# Patient Record
Sex: MALE | Race: WHITE | NOT HISPANIC OR LATINO | Employment: OTHER | URBAN - METROPOLITAN AREA
[De-identification: names, ages, dates, MRNs, and addresses within clinical notes are randomized per-mention and may not be internally consistent; named-entity substitution may affect disease eponyms.]

---

## 2017-01-04 ENCOUNTER — TELEPHONE (OUTPATIENT)
Dept: NEUROSURGERY | Facility: CLINIC | Age: 82
End: 2017-01-04

## 2017-01-04 DIAGNOSIS — M54.2 CERVICAL PAIN (NECK): Primary | ICD-10-CM

## 2017-01-04 NOTE — TELEPHONE ENCOUNTER
Patient called complaining of slight neck pain. His PCP put him on Tramadol 50 mg and the patient states that it is not helping. He has had 1 epidural and he cancelled the 2nd one because he was feeling better. He wants to know what he can do for the pain, he does not really want to have another epidural. He would like to speak with you at your convenience.

## 2017-01-04 NOTE — TELEPHONE ENCOUNTER
Epidural or a referral to a pain management doctor. Please call him and tell him that. If he has further questions let me know.

## 2017-01-05 NOTE — TELEPHONE ENCOUNTER
I have spoken with the patient and he would like to go to pain management. The order has been placed in his chart.

## 2017-02-28 ENCOUNTER — TELEPHONE (OUTPATIENT)
Dept: INTERNAL MEDICINE | Facility: CLINIC | Age: 82
End: 2017-02-28

## 2017-02-28 ENCOUNTER — OFFICE VISIT (OUTPATIENT)
Dept: INTERNAL MEDICINE | Facility: CLINIC | Age: 82
End: 2017-02-28

## 2017-02-28 VITALS
RESPIRATION RATE: 16 BRPM | TEMPERATURE: 96.7 F | WEIGHT: 187 LBS | SYSTOLIC BLOOD PRESSURE: 140 MMHG | HEART RATE: 77 BPM | HEIGHT: 66 IN | OXYGEN SATURATION: 96 % | BODY MASS INDEX: 30.05 KG/M2 | DIASTOLIC BLOOD PRESSURE: 90 MMHG

## 2017-02-28 DIAGNOSIS — M54.31 SCIATIC NERVE PAIN, RIGHT: ICD-10-CM

## 2017-02-28 DIAGNOSIS — R00.1 BRADYCARDIA: Primary | ICD-10-CM

## 2017-02-28 DIAGNOSIS — I50.23 ACUTE ON CHRONIC SYSTOLIC HEART FAILURE (HCC): ICD-10-CM

## 2017-02-28 DIAGNOSIS — N52.01 ERECTILE DYSFUNCTION DUE TO ARTERIAL INSUFFICIENCY: ICD-10-CM

## 2017-02-28 DIAGNOSIS — R53.83 OTHER FATIGUE: ICD-10-CM

## 2017-02-28 PROCEDURE — 99214 OFFICE O/P EST MOD 30 MIN: CPT | Performed by: FAMILY MEDICINE

## 2017-02-28 NOTE — TELEPHONE ENCOUNTER
Patient called and stated he was seen yesterday and is reading over his clinical summary and is concerned about some the diagnosis and feels he is unaware of some health issues stated in his clinical summary.  Particulars were Bradycardia and Acute/Chronic Systolic Heart Failure. He would like to speak with Dr. Mccall.

## 2017-02-28 NOTE — PROGRESS NOTES
Subjective   Jonathan Trejo is a 81 y.o. male.     Chief Complaint   Patient presents with   • Leg Pain   • Hypertension   • Pain         History of Present Illness patient returns for recheck and is feeling better reactive.  Metoprolol altogether and feels much better now and is starting to get erections.    He stop furosemide as well.  An overall stamina in general is better and he has no symptoms.    He has stopped taking tramadol in his neck is better after one epidural shot.  Otherwise he has developed some sciatic nerve pain in the right side coming from the right buttock.  Exercise and stretching exercises are demonstrated for him.  He like to do that as opposed to further rule out radiologic workup.    We discussed erectile dysfunction and will give him Viagra 2 mg 2 tablets.  He does not take nitroglycerin.    The following portions of the patient's history were reviewed and updated as appropriate: allergies, current medications, past social history and problem list.    Review of Systems   Constitutional: Positive for fatigue.   HENT: Negative.    Eyes: Negative.    Respiratory: Negative.    Cardiovascular: Negative.    Gastrointestinal: Negative.    Endocrine: Negative.    Genitourinary: Negative.    Musculoskeletal: Positive for arthralgias, back pain, gait problem and myalgias.   Skin: Negative.    Allergic/Immunologic: Negative.    Hematological: Negative.    Psychiatric/Behavioral: Negative.        Objective   Vitals:    02/28/17 1335   BP: 140/90   Pulse: 77   Resp: 16   Temp: 96.7 °F (35.9 °C)   SpO2: 96%     Physical Exam   Constitutional: He is oriented to person, place, and time. He appears well-developed and well-nourished.   HENT:   Head: Normocephalic and atraumatic.   Right Ear: Tympanic membrane and external ear normal.   Left Ear: Tympanic membrane and external ear normal.   Nose: Nose normal.   Mouth/Throat: Oropharynx is clear and moist.   Eyes: Conjunctivae and EOM are normal. Pupils are  equal, round, and reactive to light.   Neck: Normal range of motion. Neck supple. No JVD present. No thyromegaly present.   Cardiovascular: Normal rate, regular rhythm, normal heart sounds and intact distal pulses.    Pulmonary/Chest: Effort normal and breath sounds normal.   Abdominal: Soft. Bowel sounds are normal.   Musculoskeletal: Normal range of motion.        Back:    Lymphadenopathy:     He has no cervical adenopathy.   Neurological: He is alert and oriented to person, place, and time. No cranial nerve deficit. Coordination normal.   Mild chronic left foot drop   Skin: Skin is warm and dry. No rash noted.   Psychiatric: He has a normal mood and affect. His behavior is normal. Judgment and thought content normal.   Vitals reviewed.      Assessment/Plan   Problem List Items Addressed This Visit        Cardiovascular and Mediastinum    Bradycardia - Primary    Acute on chronic systolic heart failure      Other Visit Diagnoses     Erectile dysfunction due to arterial insufficiency        Other fatigue        Sciatic nerve pain, right           plan: He has stopped metoprolol and furosemide neck he feels better.  Will given Viagra 50 mg 1 daily when necessary #2.  Recheck in about 6 months or when necessary.  He'll follow-up with cardiology as well.  Stretching exercises for sciatica demonstrated for the right buttock.

## 2017-03-01 RX ORDER — ATORVASTATIN CALCIUM 10 MG/1
10 TABLET, FILM COATED ORAL DAILY
Qty: 90 TABLET | Refills: 1 | Status: SHIPPED | OUTPATIENT
Start: 2017-03-01 | End: 2017-03-16 | Stop reason: SDUPTHER

## 2017-03-01 RX ORDER — OMEPRAZOLE 40 MG/1
40 CAPSULE, DELAYED RELEASE ORAL DAILY
Qty: 90 CAPSULE | Refills: 1 | Status: SHIPPED | OUTPATIENT
Start: 2017-03-01 | End: 2017-03-16 | Stop reason: SDUPTHER

## 2017-03-16 RX ORDER — OMEPRAZOLE 40 MG/1
40 CAPSULE, DELAYED RELEASE ORAL DAILY
Qty: 90 CAPSULE | Refills: 1 | Status: ON HOLD | OUTPATIENT
Start: 2017-03-16 | End: 2017-07-27

## 2017-03-16 RX ORDER — ATORVASTATIN CALCIUM 10 MG/1
10 TABLET, FILM COATED ORAL DAILY
Qty: 90 TABLET | Refills: 1 | Status: ON HOLD | OUTPATIENT
Start: 2017-03-16 | End: 2017-07-27

## 2017-04-06 ENCOUNTER — OFFICE VISIT (OUTPATIENT)
Dept: CARDIOLOGY | Facility: CLINIC | Age: 82
End: 2017-04-06

## 2017-04-06 VITALS
HEIGHT: 66 IN | WEIGHT: 184 LBS | BODY MASS INDEX: 29.57 KG/M2 | SYSTOLIC BLOOD PRESSURE: 102 MMHG | DIASTOLIC BLOOD PRESSURE: 62 MMHG | HEART RATE: 71 BPM

## 2017-04-06 DIAGNOSIS — I48.3 TYPICAL ATRIAL FLUTTER (HCC): Primary | ICD-10-CM

## 2017-04-06 PROCEDURE — 93000 ELECTROCARDIOGRAM COMPLETE: CPT | Performed by: INTERNAL MEDICINE

## 2017-04-06 PROCEDURE — 99213 OFFICE O/P EST LOW 20 MIN: CPT | Performed by: INTERNAL MEDICINE

## 2017-04-06 NOTE — PROGRESS NOTES
Date of Office Visit: 2017  Encounter Provider: Bogdan Vargas MD  Place of Service: [facility] CARDIOLOGY  Patient Name: Jonathan Trejo  :1935      Chief Complaint   Patient presents with   • Atrial Flutter     History of Present Illness  The patient is an 82-year-old white male with a history of atrial flutter who returns to the office today for follow-up.  Since his last visit in cardioversion he has felt well.  His anticoagulation was discontinued.  He is not on any rhythm control medication because of his blood pressure.  Despite this he remains in sinus rhythm without any complaints of shortness of breath, lightheadedness nor dizziness.      Past Medical History:   Diagnosis Date   • Abnormal electrocardiogram    • Atrial fibrillation    • Colon cancer     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   • Colon polyp 2015   • Dyspnea    • Episode of generalized weakness     knees were weak - had to be helped by wife to sit down   • Fatigue    • Hyperlipidemia    • Inguinal hernia    • Lumbar radiculopathy    • Osteoarthritis cervical spine     doc on [facility]    • Urge incontinence of urine    • Vitamin D deficiency    • Wheezing          Past Surgical History:   Procedure Laterality Date   • CARDIOVERSION     • COLONOSCOPY      07/15 redo 5 years  Segun   • KNEE SURGERY      benign tumor removed   • REFRACTIVE SURGERY     • REPAIR PERONEAL TENDONS ANKLE W/ FIBULAR OSTEOTOMY Right 2013    Dr. Banks           Current Outpatient Prescriptions:   •  aspirin 81 MG tablet, Take 81 mg by mouth., Disp: , Rfl:   •  atorvastatin (LIPITOR) 10 MG tablet, Take 1 tablet by mouth Daily., Disp: 90 tablet, Rfl: 1  •  buPROPion XL (WELLBUTRIN XL) 300 MG 24 hr tablet, Take 300 mg by mouth daily. Take one tablet daily as directed, Disp: , Rfl:   •  Cholecalciferol (VITAMIN D3) 2000 UNITS tablet, Take 2,000 Units by mouth daily., Disp: , Rfl:   •  Flaxseed, Linseed, (FLAXSEED  "OIL) 1000 MG capsule, Take 1,000 mg by mouth., Disp: , Rfl:   •  Omega-3 Fatty Acids (FISH OIL) 1000 MG capsule, Take 1,000 mg by mouth daily., Disp: , Rfl:   •  omeprazole (priLOSEC) 40 MG capsule, Take 1 capsule by mouth Daily., Disp: 90 capsule, Rfl: 1  •  tamsulosin (FLOMAX) 0.4 MG capsule 24 hr capsule, Take 1 capsule by mouth Daily., Disp: 90 capsule, Rfl: 1  •  traMADol (ULTRAM) 50 MG tablet, Take 1 tablet by mouth Every 6 (Six) Hours As Needed for moderate pain (4-6)., Disp: 30 tablet, Rfl: 2      Social History     Social History   • Marital status:      Spouse name: N/A   • Number of children: 2   • Years of education: COLLEGE GRADUATE     Occupational History   • RETIRED      Social History Main Topics   • Smoking status: Former Smoker     Packs/day: 0.50     Years: 50.00     Types: Cigarettes     Quit date: 5/1/2008   • Smokeless tobacco: Never Used   • Alcohol use Yes   • Drug use: No   • Sexual activity: Defer     Other Topics Concern   • Not on file     Social History Narrative         Review of Systems   Constitution: Negative.   HENT: Negative.    Eyes: Negative.    Cardiovascular: Negative.    Respiratory: Negative.    Endocrine: Negative.    Skin: Negative.    Musculoskeletal: Negative.    Gastrointestinal: Negative.    Neurological: Negative.    Psychiatric/Behavioral: Negative.        Procedures      ECG 12 Lead  Date/Time: 4/6/2017 1:36 PM  Performed by: ASIM ORTEGA  Authorized by: ASIM ORTEGA   Comparison: compared with previous ECG from 9/29/2016  Similar to previous ECG  Rhythm: sinus rhythm  Rate: normal  Conduction: conduction normal  QRS axis: normal  Comments: Nonspecific ST-T wave changes               Objective:    /62  Pulse 71  Ht 66\" (167.6 cm)  Wt 184 lb (83.5 kg)  BMI 29.7 kg/m2        Physical Exam   Constitutional: He is oriented to person, place, and time. He appears well-developed and well-nourished.   HENT:   Head: Normocephalic.   Eyes: " Pupils are equal, round, and reactive to light.   Neck: Normal range of motion. No JVD present. Carotid bruit is not present. No thyromegaly present.   Cardiovascular: Normal rate, regular rhythm, S1 normal, S2 normal, normal heart sounds and intact distal pulses.  Exam reveals no gallop and no friction rub.    No murmur heard.  Pulmonary/Chest: Effort normal and breath sounds normal.   Abdominal: Soft. Bowel sounds are normal.   Musculoskeletal: He exhibits no edema.   Neurological: He is alert and oriented to person, place, and time.   Skin: Skin is warm, dry and intact. No erythema.   Psychiatric: He has a normal mood and affect.   Vitals reviewed.          Assessment:       Diagnosis Plan   1. Typical atrial flutter              Plan:     Overall the patient is feeling well.  No changes in medication are being made.  I will see him back in a year

## 2017-05-01 RX ORDER — TRAMADOL HYDROCHLORIDE 50 MG/1
50 TABLET ORAL EVERY 6 HOURS PRN
Qty: 90 TABLET | Refills: 0 | Status: ON HOLD | OUTPATIENT
Start: 2017-05-01 | End: 2017-07-27

## 2017-06-06 ENCOUNTER — OFFICE VISIT (OUTPATIENT)
Dept: INTERNAL MEDICINE | Facility: CLINIC | Age: 82
End: 2017-06-06

## 2017-06-06 VITALS
DIASTOLIC BLOOD PRESSURE: 80 MMHG | OXYGEN SATURATION: 95 % | TEMPERATURE: 98.1 F | HEART RATE: 77 BPM | SYSTOLIC BLOOD PRESSURE: 120 MMHG | WEIGHT: 192 LBS | HEIGHT: 66 IN | BODY MASS INDEX: 30.86 KG/M2 | RESPIRATION RATE: 16 BRPM

## 2017-06-06 DIAGNOSIS — M54.16 LUMBAR RADICULOPATHY: Primary | ICD-10-CM

## 2017-06-06 DIAGNOSIS — E78.2 MIXED HYPERLIPIDEMIA: ICD-10-CM

## 2017-06-06 DIAGNOSIS — F32.1 MODERATE SINGLE CURRENT EPISODE OF MAJOR DEPRESSIVE DISORDER (HCC): ICD-10-CM

## 2017-06-06 PROCEDURE — 99214 OFFICE O/P EST MOD 30 MIN: CPT | Performed by: FAMILY MEDICINE

## 2017-06-06 RX ORDER — BUPROPION HYDROCHLORIDE 300 MG/1
300 TABLET ORAL DAILY
Qty: 90 TABLET | Refills: 3 | Status: ON HOLD | OUTPATIENT
Start: 2017-06-06 | End: 2017-07-27

## 2017-06-06 RX ORDER — GABAPENTIN 100 MG/1
100 CAPSULE ORAL 3 TIMES DAILY
Qty: 270 CAPSULE | Refills: 1 | Status: SHIPPED | OUTPATIENT
Start: 2017-06-06 | End: 2017-07-06 | Stop reason: DRUGHIGH

## 2017-06-06 NOTE — PROGRESS NOTES
Subjective   Jonathan Trejo is a 82 y.o. male.     Chief Complaint   Patient presents with   • Leg Pain   • Depression   • Back Pain         History of Present Illness   Patient comes in he is going to transitional issues from moving from his house to assisted living.  This is been quite difficult overall but he is getting there.    Otherwise she's had some neuropathic type pain in the anterior left thigh is also anterior right thigh sometimes twitching legs.  It is positional.  He's had no evidence of weakness.    His description of pain is that of some degree of spinal stenosis although he has no neurogenic claudication.    Otherwise she is stable on Wellbutrin  milligrams daily and desires not to follow-up with a psychiatrist which he states he waits a long time when he gets there and only sees him for a few minutes during which she gets a refill every 6 months.      The following portions of the patient's history were reviewed and updated as appropriate: allergies, current medications, past social history and problem list.    Review of Systems   Constitutional: Negative.    HENT: Negative.    Eyes: Negative.    Respiratory: Negative.    Cardiovascular: Negative.    Gastrointestinal: Negative.    Endocrine: Negative.    Genitourinary: Negative.    Musculoskeletal: Positive for arthralgias, back pain, gait problem and myalgias.   Skin: Negative.    Allergic/Immunologic: Negative.    Hematological: Negative.    Psychiatric/Behavioral: Negative.        Objective   Vitals:    06/06/17 1645   BP: 120/80   Pulse: 77   Resp: 16   Temp: 98.1 °F (36.7 °C)   SpO2: 95%     Physical Exam   Constitutional: He is oriented to person, place, and time. He appears well-developed and well-nourished.   HENT:   Head: Normocephalic and atraumatic.   Right Ear: Tympanic membrane and external ear normal.   Left Ear: Tympanic membrane and external ear normal.   Nose: Nose normal.   Mouth/Throat: Oropharynx is clear and moist.   Eyes:  Conjunctivae and EOM are normal. Pupils are equal, round, and reactive to light.   Neck: Normal range of motion. Neck supple. No JVD present. No thyromegaly present.   Cardiovascular: Normal rate, regular rhythm, normal heart sounds and intact distal pulses.    Pulmonary/Chest: Effort normal and breath sounds normal.   Abdominal: Soft. Bowel sounds are normal.   Musculoskeletal:        Lumbar back: He exhibits decreased range of motion.   Lymphadenopathy:     He has no cervical adenopathy.   Neurological: He is alert and oriented to person, place, and time. No cranial nerve deficit. Coordination normal.   Skin: Skin is warm and dry. No rash noted.   Psychiatric: He has a normal mood and affect. His behavior is normal. Judgment and thought content normal.   Vitals reviewed.      Assessment/Plan   Problem List Items Addressed This Visit        Cardiovascular and Mediastinum    Hyperlipidemia       Nervous and Auditory    Lumbar radiculopathy - Primary       Other    Depression    Relevant Medications    buPROPion XL (WELLBUTRIN XL) 300 MG 24 hr tablet      Plan: Refill Wellbutrin XL 3 mg daily #90 refill ×3 mail-order.  Trial of gabapentin 100 3 times a day #270 refill ×1.  Recheck in 6 months sooner if needed.  Knee current treatment for hyperlipidemia.

## 2017-06-12 ENCOUNTER — TELEPHONE (OUTPATIENT)
Dept: CARDIOLOGY | Facility: CLINIC | Age: 82
End: 2017-06-12

## 2017-06-12 ENCOUNTER — TELEPHONE (OUTPATIENT)
Dept: INTERNAL MEDICINE | Facility: CLINIC | Age: 82
End: 2017-06-12

## 2017-06-12 NOTE — TELEPHONE ENCOUNTER
Pt is calling to report that his heart rate has been elevated the past couple days. He had a physical on 5-29-17 & his hr was 77. Yesterday he was feeling fatigued & went to [facility] to check his bp/hr. It was 123/73/111 after a few minutes his hr dropped down to 96. This morning his hr was 135. He gets soa with activity. He is not currently taking a beta blocker or anything for bp. Pt # [phone]. dmk

## 2017-06-12 NOTE — TELEPHONE ENCOUNTER
Pt is concerned about his heart rate, the last few times he checked it it's been very high  Yesterday 111  This morning 135    Please advise

## 2017-06-13 NOTE — TELEPHONE ENCOUNTER
He will see cardiology tomorrow and I'll talk to him after that.  Discussed his symptoms with him today.

## 2017-06-14 ENCOUNTER — OFFICE VISIT (OUTPATIENT)
Dept: CARDIOLOGY | Facility: CLINIC | Age: 82
End: 2017-06-14

## 2017-06-14 VITALS
BODY MASS INDEX: 30.53 KG/M2 | WEIGHT: 190 LBS | HEART RATE: 115 BPM | SYSTOLIC BLOOD PRESSURE: 118 MMHG | DIASTOLIC BLOOD PRESSURE: 68 MMHG | HEIGHT: 66 IN

## 2017-06-14 DIAGNOSIS — I48.3 TYPICAL ATRIAL FLUTTER (HCC): Primary | ICD-10-CM

## 2017-06-14 PROCEDURE — 93000 ELECTROCARDIOGRAM COMPLETE: CPT | Performed by: INTERNAL MEDICINE

## 2017-06-14 PROCEDURE — 99213 OFFICE O/P EST LOW 20 MIN: CPT | Performed by: INTERNAL MEDICINE

## 2017-06-14 RX ORDER — ATENOLOL 25 MG/1
25 TABLET ORAL DAILY
Qty: 30 TABLET | Refills: 11 | Status: SHIPPED | OUTPATIENT
Start: 2017-06-14 | End: 2017-06-19 | Stop reason: SDUPTHER

## 2017-06-14 RX ORDER — WARFARIN SODIUM 5 MG/1
5 TABLET ORAL
Qty: 30 TABLET | Refills: 11 | Status: SHIPPED | OUTPATIENT
Start: 2017-06-14 | End: 2017-06-19 | Stop reason: SDUPTHER

## 2017-06-14 NOTE — PROGRESS NOTES
Date of Office Visit: 2017  Encounter Provider: Bogdan Vargas MD  Place of Service: [facility] CARDIOLOGY  Patient Name: Jonathan Trejo  :1935      Chief Complaint   Patient presents with   • Atrial Flutter     History of Present IllnessPatient is an 82-year-old white male with a history of atrial flutter.  He returns to the office today with the same complaint.  Over the past week or so he's noted elevated heart rate.  He does not feel great with 8 complaining of palpitations, dyspnea on exertion lightheadedness and fatigue.  He does not have any chest pain.  He does have a history of atrial flutter in the past and he was converted to normal sinus rhythm.  He hasn't had complaints up until recently.    Past Medical History:   Diagnosis Date   • Abnormal electrocardiogram    • Atrial fibrillation    • Colon cancer     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   • Colon polyp 2015   • Dyspnea    • Episode of generalized weakness     knees were weak - had to be helped by wife to sit down   • Fatigue    • Hyperlipidemia    • Inguinal hernia    • Lumbar radiculopathy    • Osteoarthritis cervical spine     doc on [facility]    • Urge incontinence of urine    • Vitamin D deficiency    • Wheezing          Past Surgical History:   Procedure Laterality Date   • CARDIOVERSION     • COLONOSCOPY      07/15 redo 5 years  Segun   • KNEE SURGERY      benign tumor removed   • REFRACTIVE SURGERY     • REPAIR PERONEAL TENDONS ANKLE W/ FIBULAR OSTEOTOMY Right 2013    Dr. Banks           Current Outpatient Prescriptions:   •  atorvastatin (LIPITOR) 10 MG tablet, Take 1 tablet by mouth Daily., Disp: 90 tablet, Rfl: 1  •  buPROPion XL (WELLBUTRIN XL) 300 MG 24 hr tablet, Take 1 tablet by mouth Daily. Take one tablet daily as directed, Disp: 90 tablet, Rfl: 3  •  Cholecalciferol (VITAMIN D3) 2000 UNITS tablet, Take 2,000 Units by mouth daily., Disp: , Rfl:   •  Flaxseed, Linseed,  (FLAXSEED OIL) 1000 MG capsule, Take 1,000 mg by mouth., Disp: , Rfl:   •  gabapentin (NEURONTIN) 100 MG capsule, Take 1 capsule by mouth 3 (Three) Times a Day., Disp: 270 capsule, Rfl: 1  •  Omega-3 Fatty Acids (FISH OIL) 1000 MG capsule, Take 1,000 mg by mouth daily., Disp: , Rfl:   •  omeprazole (priLOSEC) 40 MG capsule, Take 1 capsule by mouth Daily., Disp: 90 capsule, Rfl: 1  •  tamsulosin (FLOMAX) 0.4 MG capsule 24 hr capsule, Take 1 capsule by mouth Daily., Disp: 90 capsule, Rfl: 1  •  traMADol (ULTRAM) 50 MG tablet, Take 1 tablet by mouth Every 6 (Six) Hours As Needed for Moderate Pain (4-6)., Disp: 90 tablet, Rfl: 0  •  atenolol (TENORMIN) 25 MG tablet, Take 1 tablet by mouth Daily., Disp: 30 tablet, Rfl: 11  •  warfarin (COUMADIN) 5 MG tablet, Take 1 tablet by mouth Daily., Disp: 30 tablet, Rfl: 11      Social History     Social History   • Marital status:      Spouse name: N/A   • Number of children: 2   • Years of education: COLLEGE GRADUATE     Occupational History   • RETIRED      Social History Main Topics   • Smoking status: Former Smoker     Packs/day: 0.50     Years: 50.00     Types: Cigarettes     Quit date: 5/1/2008   • Smokeless tobacco: Never Used   • Alcohol use Yes   • Drug use: No   • Sexual activity: Defer     Other Topics Concern   • Not on file     Social History Narrative         Review of Systems   Constitution: Positive for malaise/fatigue.   HENT: Negative.    Eyes: Negative.    Cardiovascular: Positive for dyspnea on exertion and palpitations.   Respiratory: Negative.    Endocrine: Negative.    Skin: Negative.    Musculoskeletal: Negative.    Gastrointestinal: Negative.    Neurological: Negative.    Psychiatric/Behavioral: Negative.        Procedures      ECG 12 Lead  Date/Time: 6/14/2017 4:03 PM  Performed by: ASIM ORTEGA  Authorized by: ASIM ORTEGA   Comparison: compared with previous ECG from 4/6/2017  Comparison to previous ECG: Atrial flutter is  "new  Rhythm: atrial flutter  Rate: tachycardic  Conduction: conduction normal  QRS axis: normal  Comments: Nonspecific ST-T wave changes               Objective:    /68  Pulse 115  Ht 66\" (167.6 cm)  Wt 190 lb (86.2 kg)  BMI 30.67 kg/m2        Physical Exam   Constitutional: He is oriented to person, place, and time. He appears well-developed and well-nourished.   HENT:   Head: Normocephalic.   Eyes: Pupils are equal, round, and reactive to light.   Neck: Normal range of motion. No JVD present. Carotid bruit is not present. No thyromegaly present.   Cardiovascular: Regular rhythm, S1 normal, S2 normal, normal heart sounds and intact distal pulses.  Tachycardia present.  Exam reveals no gallop and no friction rub.    No murmur heard.  Pulses:       Carotid pulses are 1+ on the right side, and 1+ on the left side.       Dorsalis pedis pulses are 1+ on the right side, and 1+ on the left side.        Posterior tibial pulses are 1+ on the right side, and 1+ on the left side.   Pulmonary/Chest: Effort normal and breath sounds normal.   Abdominal: Soft. Bowel sounds are normal.   Musculoskeletal: He exhibits no edema.   Neurological: He is alert and oriented to person, place, and time.   Skin: Skin is warm, dry and intact. No erythema.   Psychiatric: He has a normal mood and affect.   Vitals reviewed.          Assessment:       Diagnosis Plan   1. Typical atrial flutter              Plan:     I'm going to place the patient on warfarin.  I will have him check his INR in one week.  In addition I will start atenolol in the hopes of slowing his heart rate down.  I've given him literature and also discussed with him atrial flutter ablation.  I will have him make an appointment with Dr. Solis.    "

## 2017-06-15 ENCOUNTER — TELEPHONE (OUTPATIENT)
Dept: CARDIOLOGY | Facility: CLINIC | Age: 82
End: 2017-06-15

## 2017-06-15 ENCOUNTER — TELEPHONE (OUTPATIENT)
Dept: INTERNAL MEDICINE | Facility: CLINIC | Age: 82
End: 2017-06-15

## 2017-06-16 NOTE — TELEPHONE ENCOUNTER
I called back Friday evening at 5:40 PM same thing happened this this morning the phone rang 3 times and went with a voicemail I left a message.

## 2017-06-19 ENCOUNTER — TELEPHONE (OUTPATIENT)
Dept: INTERNAL MEDICINE | Facility: CLINIC | Age: 82
End: 2017-06-19

## 2017-06-19 RX ORDER — WARFARIN SODIUM 5 MG/1
5 TABLET ORAL
Qty: 30 TABLET | Refills: 11 | Status: ON HOLD | OUTPATIENT
Start: 2017-06-19 | End: 2017-07-27

## 2017-06-19 RX ORDER — ATENOLOL 25 MG/1
25 TABLET ORAL DAILY
Qty: 30 TABLET | Refills: 11 | Status: ON HOLD | OUTPATIENT
Start: 2017-06-19 | End: 2017-07-27

## 2017-06-20 NOTE — TELEPHONE ENCOUNTER
The patient has not gotten his medicine from Dr. Vargas which appears that it was sent to [facility] mail order.  I will send the medicine to the local pharmacy.and he will follow up with Dr. Vargas.

## 2017-06-21 ENCOUNTER — HOSPITAL ENCOUNTER (OUTPATIENT)
Dept: CARDIOLOGY | Facility: HOSPITAL | Age: 82
Setting detail: RECURRING SERIES
Discharge: HOME | End: 2017-06-21

## 2017-06-21 PROCEDURE — 36416 COLLJ CAPILLARY BLOOD SPEC: CPT

## 2017-06-21 PROCEDURE — 85610 PROTHROMBIN TIME: CPT

## 2017-06-28 ENCOUNTER — HOSPITAL ENCOUNTER (OUTPATIENT)
Dept: CARDIOLOGY | Facility: HOSPITAL | Age: 82
Setting detail: RECURRING SERIES
Discharge: HOME | End: 2017-06-28

## 2017-06-28 PROCEDURE — 36416 COLLJ CAPILLARY BLOOD SPEC: CPT

## 2017-06-28 PROCEDURE — 85610 PROTHROMBIN TIME: CPT

## 2017-07-05 ENCOUNTER — HOSPITAL ENCOUNTER (OUTPATIENT)
Dept: CARDIOLOGY | Facility: HOSPITAL | Age: 82
Setting detail: RECURRING SERIES
Discharge: HOME | End: 2017-07-05

## 2017-07-05 PROCEDURE — 85610 PROTHROMBIN TIME: CPT

## 2017-07-05 PROCEDURE — 36416 COLLJ CAPILLARY BLOOD SPEC: CPT

## 2017-07-06 ENCOUNTER — TELEPHONE (OUTPATIENT)
Dept: INTERNAL MEDICINE | Facility: CLINIC | Age: 82
End: 2017-07-06

## 2017-07-06 RX ORDER — GABAPENTIN 300 MG/1
300 CAPSULE ORAL 3 TIMES DAILY
Qty: 90 CAPSULE | Refills: 2 | Status: SHIPPED | OUTPATIENT
Start: 2017-07-06 | End: 2017-07-07 | Stop reason: SDUPTHER

## 2017-07-06 NOTE — TELEPHONE ENCOUNTER
Pt states that he is on Gabapentin 100mg TID.  When prescribed you mentioned that dose could be increased if needed.  Pt is requesting that we increase dose.

## 2017-07-07 ENCOUNTER — OFFICE VISIT (OUTPATIENT)
Dept: CARDIOLOGY | Facility: CLINIC | Age: 82
End: 2017-07-07

## 2017-07-07 VITALS
WEIGHT: 192 LBS | HEART RATE: 125 BPM | SYSTOLIC BLOOD PRESSURE: 100 MMHG | DIASTOLIC BLOOD PRESSURE: 68 MMHG | BODY MASS INDEX: 30.86 KG/M2 | HEIGHT: 66 IN

## 2017-07-07 DIAGNOSIS — I48.3 TYPICAL ATRIAL FLUTTER (HCC): Primary | ICD-10-CM

## 2017-07-07 PROCEDURE — 99214 OFFICE O/P EST MOD 30 MIN: CPT | Performed by: NURSE PRACTITIONER

## 2017-07-07 PROCEDURE — 93000 ELECTROCARDIOGRAM COMPLETE: CPT | Performed by: NURSE PRACTITIONER

## 2017-07-07 RX ORDER — GABAPENTIN 300 MG/1
300 CAPSULE ORAL 3 TIMES DAILY
Qty: 270 CAPSULE | Refills: 0 | Status: ON HOLD | OUTPATIENT
Start: 2017-07-07 | End: 2017-07-27

## 2017-07-07 NOTE — PROGRESS NOTES
Date of Office Visit: 2017  Encounter Provider: SAMI Egan  Place of Service: [facility] CARDIOLOGY  Patient Name: Jonathan Trejo  :1935    Chief Complaint   Patient presents with   • Rapid Heart Rate     Atrial flutter   :     HPI: Jonathan Trejo is a 82 y.o. male who is a patient of Dr. Vargas, new to me today. Old records reviewed.  He has a past medical history of atrial flutter (diagnosed initially in Spring of 2016) status post cardioversion in 2016 with recurrence of atrial  flutter recently.  He also has a history of colon cancer status post resection, hyperlipidemia, osteoarthritis, lumbar radiculopathy as well as CK D stage III followed by Dr. Scott.  He was seen by Dr. Vargas, noted to be back in atrial flutter with RVR on 2017 and was started on atenolol and warfarin. He has not been able to tolerate rhythm control medications secondary to blood pressure and only low dose beta blocker. He had an echocardiogram in 2016 which showed a calculated EF of 42% but an estimated EF of 30% with normal LV size, mild LVH, trace AI and mild TR.  When he had atrial flutter in the past he was on rivaroxabn. It was stopped due to maintaining sinus rhythm and also probably because he has renal insufficiency. He has been referred to the EP service for evaluation for possible ablation.          Past Medical History:   Diagnosis Date   • Abnormal electrocardiogram    • Arm pain    • Atrial fibrillation    • BPH associated with nocturia    • Chronic kidney disease    • Colon cancer     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   • Colon polyp 2015   • Depression    • Dyspnea    • Episode of generalized weakness     knees were weak - had to be helped by wife to sit down   • Esophagitis, reflux    • Fatigue    • Hyperlipidemia    • Inguinal hernia    • Loss of hearing    • Lumbar radiculopathy    • Obesity    • Osteoarthritis cervical spine     doc on  Lucy 08/16   • Osteopenia    • Osteoporosis    • Overweight    • Tobacco dependence in remission    • Urge incontinence of urine    • Vitamin D deficiency    • Wheezing        Past Surgical History:   Procedure Laterality Date   • CARDIOVERSION     • COLONOSCOPY      07/15 redo 5 years  Segun   • KNEE SURGERY      benign tumor removed   • REFRACTIVE SURGERY  2007   • REPAIR PERONEAL TENDONS ANKLE W/ FIBULAR OSTEOTOMY Right 03/2013    Dr. Banks       Social History     Social History   • Marital status:      Spouse name: N/A   • Number of children: 2   • Years of education: COLLEGE GRADUATE     Occupational History   • RETIRED      Social History Main Topics   • Smoking status: Former Smoker     Packs/day: 0.50     Years: 50.00     Types: Cigarettes     Quit date: 5/1/2008   • Smokeless tobacco: Never Used   • Alcohol use Yes   • Drug use: No   • Sexual activity: Defer     Other Topics Concern   • Not on file     Social History Narrative       Family History   Problem Relation Age of Onset   • Breast cancer Mother    • Heart disease Father    • Hypertension Father    • Hypertension Sister        Review of Systems   Constitution: Positive for malaise/fatigue. Negative for chills, fever, weight gain and weight loss.   HENT: Negative for ear pain, headaches, hearing loss, nosebleeds and sore throat.    Eyes: Negative for double vision, pain and visual disturbance.   Cardiovascular: Positive for dyspnea on exertion and palpitations. Negative for chest pain, irregular heartbeat, leg swelling, near-syncope, orthopnea, paroxysmal nocturnal dyspnea and syncope.   Respiratory: Negative for cough, shortness of breath, sleep disturbances due to breathing, snoring and wheezing.    Endocrine: Negative for cold intolerance, heat intolerance and polyuria.   Hematologic/Lymphatic: Negative.    Skin: Negative for itching and rash.   Musculoskeletal: Negative for joint pain, joint swelling and myalgias.   Gastrointestinal:  "Negative for abdominal pain, diarrhea, melena, nausea and vomiting.   Genitourinary: Negative for frequency, hematuria and hesitancy.   Neurological: Positive for light-headedness. Negative for excessive daytime sleepiness, numbness, paresthesias and seizures.   Psychiatric/Behavioral: Negative for altered mental status and depression.   Allergic/Immunologic: Negative.    All other systems reviewed and are negative.      No Known Allergies      Current Outpatient Prescriptions:   •  atenolol (TENORMIN) 25 MG tablet, Take 1 tablet by mouth Daily., Disp: 30 tablet, Rfl: 11  •  atorvastatin (LIPITOR) 10 MG tablet, Take 1 tablet by mouth Daily., Disp: 90 tablet, Rfl: 1  •  buPROPion XL (WELLBUTRIN XL) 300 MG 24 hr tablet, Take 1 tablet by mouth Daily. Take one tablet daily as directed, Disp: 90 tablet, Rfl: 3  •  Cholecalciferol (VITAMIN D3) 2000 UNITS tablet, Take 2,000 Units by mouth daily., Disp: , Rfl:   •  Flaxseed, Linseed, (FLAXSEED OIL) 1000 MG capsule, Take 1,000 mg by mouth., Disp: , Rfl:   •  gabapentin (NEURONTIN) 300 MG capsule, Take 1 capsule by mouth 3 (Three) Times a Day. For nerve pain, Disp: 90 capsule, Rfl: 2  •  Omega-3 Fatty Acids (FISH OIL) 1000 MG capsule, Take 1,000 mg by mouth daily., Disp: , Rfl:   •  omeprazole (priLOSEC) 40 MG capsule, Take 1 capsule by mouth Daily., Disp: 90 capsule, Rfl: 1  •  tamsulosin (FLOMAX) 0.4 MG capsule 24 hr capsule, Take 1 capsule by mouth Daily., Disp: 90 capsule, Rfl: 1  •  traMADol (ULTRAM) 50 MG tablet, Take 1 tablet by mouth Every 6 (Six) Hours As Needed for Moderate Pain (4-6)., Disp: 90 tablet, Rfl: 0  •  warfarin (COUMADIN) 5 MG tablet, Take 1 tablet by mouth Daily., Disp: 30 tablet, Rfl: 11     Objective:     Vitals:    07/07/17 0855   BP: 100/68   Pulse: (!) 125   Weight: 192 lb (87.1 kg)   Height: 66\" (167.6 cm)     Body mass index is 30.99 kg/(m^2).    PHYSICAL EXAM:    Vitals Reviewed.   General Appearance: No acute distress, well developed and well " nourished.   Eyes: Conjunctiva and lids: No erythema, swelling, or discharge. Sclera non-icteric.   HENT: Atraumatic, normocephalic. External eyes, ears, and nose normal. No hearing loss noted. Mucous membranes normal. Lips not cyanotic. Neck supple with no tenderness.  Respiratory: No signs of respiratory distress. Respiration rhythm and depth normal.   Clear to auscultation. No rales, crackles, rhonchi, or wheezing auscultated.   Cardiovascular:  Jugular Venous Pressure: Normal  Heart Rate and Rhythm: regular but tachy. Heart Sounds: Normal S1 and S2. No S3 or S4 noted.  Murmurs: No murmurs noted. No rubs, thrills, or gallops.   Arterial Pulses: Posterior tibialis and dorsalis pedis pulses normal.   Lower Extremities: No edema noted.  Gastrointestinal:  Abdomen soft, non-distended, non-tender. Normal bowel sounds. No hepatomegaly.   Musculoskeletal: Normal movement of extremities  Skin and Nails: General appearance normal. No pallor, cyanosis, diaphoresis. Skin temperature normal. No clubbing of fingernails.   Psychiatric: Patient alert and oriented to person, place, and time. Speech and behavior appropriate. Normal mood and affect.       ECG 12 Lead  Date/Time: 7/7/2017 10:07 AM  Performed by: ILIR MCNAMARA  Authorized by: ILIR MCNAMARA   Comparison: compared with previous ECG   Similar to previous ECG  Comparison to previous ECG: Rate slower  Rhythm: atrial flutter  BPM: 125  Clinical impression: abnormal ECG  Comments: Clinical indication: atrial flutter              Assessment:       Diagnosis Plan   1. Typical atrial flutter  Case Request EP Lab: Ablation atrial flutter  Will need to have 3 -4 weeks of therapeutic INR's          Plan:       1. Atrial Fibrillation and Atrial Flutter  Assessment  • The patient has persistent atrial flutter  • The patient's CHADS2-VASc score is 2  • A MET2ZA8-SIEx score of 2 or more is considered a high risk for a thromboembolic event  • Warfarin prescribed  • Recurrent  symptomatic atrial flutter, intolerant to meds due to blood pressure. Dr. Solis and I discussed treatment options, ablation recommended, patient agreeable. He was just started on warfarin on 6/14/17. He has had therapeutic INR's on 6/28/17 3.6, 7/5/17 3.2, he will have next INR on 7/12. I have spoke with INR clinic to let them know he is pre-flutter ablation so they will monitor him weekly. We will tentatively get him scheduled for the week of 7/24 for ablation. We did instruct him to just take it easy until then as we really don't have any room to increase his beta blocker due to blood pressure.     Plan  • Attempt to maintain sinus rhythm  • Continue warfarin for antithrombotic therapy, bleeding issues discussed  • Continue beta blocker for rhythm control    Subjective - Objective  • The patient underwent cardioversion on 8/22/2016           As always, it has been a pleasure to participate in your patient's care.      Sincerely,         SAMI Laureano

## 2017-07-10 ENCOUNTER — HOSPITAL ENCOUNTER (OUTPATIENT)
Dept: CARDIOLOGY | Facility: HOSPITAL | Age: 82
Setting detail: RECURRING SERIES
Discharge: HOME | End: 2017-07-10

## 2017-07-10 PROCEDURE — 85610 PROTHROMBIN TIME: CPT

## 2017-07-10 PROCEDURE — 36416 COLLJ CAPILLARY BLOOD SPEC: CPT

## 2017-07-19 ENCOUNTER — HOSPITAL ENCOUNTER (OUTPATIENT)
Dept: CARDIOLOGY | Facility: HOSPITAL | Age: 82
Setting detail: RECURRING SERIES
Discharge: HOME | End: 2017-07-19

## 2017-07-19 PROCEDURE — 36416 COLLJ CAPILLARY BLOOD SPEC: CPT

## 2017-07-19 PROCEDURE — 85610 PROTHROMBIN TIME: CPT

## 2017-07-26 ENCOUNTER — LAB (OUTPATIENT)
Dept: LAB | Facility: HOSPITAL | Age: 82
End: 2017-07-26
Attending: INTERNAL MEDICINE

## 2017-07-26 ENCOUNTER — TRANSCRIBE ORDERS (OUTPATIENT)
Dept: ADMINISTRATIVE | Facility: HOSPITAL | Age: 82
End: 2017-07-26

## 2017-07-26 DIAGNOSIS — Z01.810 PRE-OPERATIVE CARDIOVASCULAR EXAMINATION: Primary | ICD-10-CM

## 2017-07-26 DIAGNOSIS — Z79.01 LONG TERM (CURRENT) USE OF ANTICOAGULANTS: ICD-10-CM

## 2017-07-26 DIAGNOSIS — I48.3 TYPICAL ATRIAL FLUTTER (HCC): Primary | ICD-10-CM

## 2017-07-26 DIAGNOSIS — Z13.6 SCREENING FOR ISCHEMIC HEART DISEASE: ICD-10-CM

## 2017-07-26 DIAGNOSIS — Z01.810 PRE-OPERATIVE CARDIOVASCULAR EXAMINATION: ICD-10-CM

## 2017-07-26 LAB
ANION GAP SERPL CALCULATED.3IONS-SCNC: 11.2 MMOL/L
BASOPHILS # BLD AUTO: 0.04 10*3/MM3 (ref 0–0.2)
BASOPHILS NFR BLD AUTO: 0.4 % (ref 0–1.5)
BUN BLD-MCNC: 20 MG/DL (ref 8–23)
BUN/CREAT SERPL: 13.5 (ref 7–25)
CALCIUM SPEC-SCNC: 9.5 MG/DL (ref 8.6–10.5)
CHLORIDE SERPL-SCNC: 110 MMOL/L (ref 98–107)
CO2 SERPL-SCNC: 24.8 MMOL/L (ref 22–29)
CREAT BLD-MCNC: 1.48 MG/DL (ref 0.76–1.27)
DEPRECATED RDW RBC AUTO: 53.1 FL (ref 37–54)
EOSINOPHIL # BLD AUTO: 0.25 10*3/MM3 (ref 0–0.7)
EOSINOPHIL NFR BLD AUTO: 2.6 % (ref 0.3–6.2)
ERYTHROCYTE [DISTWIDTH] IN BLOOD BY AUTOMATED COUNT: 14.3 % (ref 11.5–14.5)
GFR SERPL CREATININE-BSD FRML MDRD: 46 ML/MIN/1.73
GLUCOSE BLD-MCNC: 101 MG/DL (ref 65–99)
HCT VFR BLD AUTO: 46.4 % (ref 40.4–52.2)
HGB BLD-MCNC: 14.5 G/DL (ref 13.7–17.6)
IMM GRANULOCYTES # BLD: 0.28 10*3/MM3 (ref 0–0.03)
IMM GRANULOCYTES NFR BLD: 2.9 % (ref 0–0.5)
INR PPP: 1.57 (ref 0.9–1.1)
LYMPHOCYTES # BLD AUTO: 1.14 10*3/MM3 (ref 0.9–4.8)
LYMPHOCYTES NFR BLD AUTO: 11.8 % (ref 19.6–45.3)
MCH RBC QN AUTO: 31.7 PG (ref 27–32.7)
MCHC RBC AUTO-ENTMCNC: 31.3 G/DL (ref 32.6–36.4)
MCV RBC AUTO: 101.3 FL (ref 79.8–96.2)
MONOCYTES # BLD AUTO: 1.15 10*3/MM3 (ref 0.2–1.2)
MONOCYTES NFR BLD AUTO: 11.9 % (ref 5–12)
NEUTROPHILS # BLD AUTO: 6.84 10*3/MM3 (ref 1.9–8.1)
NEUTROPHILS NFR BLD AUTO: 70.4 % (ref 42.7–76)
PLATELET # BLD AUTO: 289 10*3/MM3 (ref 140–500)
PMV BLD AUTO: 10.6 FL (ref 6–12)
POTASSIUM BLD-SCNC: 4.8 MMOL/L (ref 3.5–5.2)
PROTHROMBIN TIME: 18.2 SECONDS (ref 11.7–14.2)
RBC # BLD AUTO: 4.58 10*6/MM3 (ref 4.6–6)
SODIUM BLD-SCNC: 146 MMOL/L (ref 136–145)
WBC NRBC COR # BLD: 9.7 10*3/MM3 (ref 4.5–10.7)

## 2017-07-26 PROCEDURE — 36415 COLL VENOUS BLD VENIPUNCTURE: CPT

## 2017-07-26 PROCEDURE — 80048 BASIC METABOLIC PNL TOTAL CA: CPT

## 2017-07-26 PROCEDURE — 85025 COMPLETE CBC W/AUTO DIFF WBC: CPT

## 2017-07-26 PROCEDURE — 85610 PROTHROMBIN TIME: CPT

## 2017-07-27 ENCOUNTER — HOSPITAL ENCOUNTER (OUTPATIENT)
Dept: CARDIOLOGY | Facility: HOSPITAL | Age: 82
Discharge: HOME | End: 2017-07-27
Attending: INTERNAL MEDICINE

## 2017-07-27 ENCOUNTER — ANESTHESIA EVENT (OUTPATIENT)
Dept: CARDIOLOGY | Facility: HOSPITAL | Age: 82
End: 2017-07-27

## 2017-07-27 ENCOUNTER — HOSPITAL ENCOUNTER (OUTPATIENT)
Facility: HOSPITAL | Age: 82
Setting detail: HOSPITAL OUTPATIENT SURGERY
Discharge: HOME | End: 2017-07-27
Attending: INTERNAL MEDICINE | Admitting: INTERNAL MEDICINE

## 2017-07-27 ENCOUNTER — ANESTHESIA (OUTPATIENT)
Dept: CARDIOLOGY | Facility: HOSPITAL | Age: 82
End: 2017-07-27

## 2017-07-27 VITALS
OXYGEN SATURATION: 96 % | HEART RATE: 89 BPM | RESPIRATION RATE: 16 BRPM | BODY MASS INDEX: 29.73 KG/M2 | WEIGHT: 185 LBS | TEMPERATURE: 99 F | SYSTOLIC BLOOD PRESSURE: 133 MMHG | DIASTOLIC BLOOD PRESSURE: 98 MMHG | HEIGHT: 66 IN

## 2017-07-27 DIAGNOSIS — I48.3 TYPICAL ATRIAL FLUTTER (HCC): ICD-10-CM

## 2017-07-27 LAB
BH CV ECHO MEAS - BSA(HAYCOCK): 2 M^2
BH CV ECHO MEAS - BSA: 1.9 M^2
BH CV ECHO MEAS - BZI_BMI: 29.9 KILOGRAMS/M^2
BH CV ECHO MEAS - BZI_METRIC_HEIGHT: 167.6 CM
BH CV ECHO MEAS - BZI_METRIC_WEIGHT: 83.9 KG
BH CV VAS BP RIGHT ARM: NORMAL MMHG
INR PPP: 1.1 (ref 0.9–1.1)
LV EF 2D ECHO EST: 35 %
PROTHROMBIN TIME: 13.2 SECONDS

## 2017-07-27 PROCEDURE — 25010000002 HEPARIN (PORCINE) PER 1000 UNITS: Performed by: INTERNAL MEDICINE

## 2017-07-27 PROCEDURE — 25010000002 ONDANSETRON PER 1 MG: Performed by: NURSE ANESTHETIST, CERTIFIED REGISTERED

## 2017-07-27 PROCEDURE — C1732 CATH, EP, DIAG/ABL, 3D/VECT: HCPCS | Performed by: INTERNAL MEDICINE

## 2017-07-27 PROCEDURE — 93325 DOPPLER ECHO COLOR FLOW MAPG: CPT | Performed by: INTERNAL MEDICINE

## 2017-07-27 PROCEDURE — 25010000002 PROPOFOL 10 MG/ML EMULSION: Performed by: NURSE ANESTHETIST, CERTIFIED REGISTERED

## 2017-07-27 PROCEDURE — 93613 INTRACARDIAC EPHYS 3D MAPG: CPT | Performed by: INTERNAL MEDICINE

## 2017-07-27 PROCEDURE — C1894 INTRO/SHEATH, NON-LASER: HCPCS | Performed by: INTERNAL MEDICINE

## 2017-07-27 PROCEDURE — 25010000002 FENTANYL CITRATE (PF) 100 MCG/2ML SOLUTION: Performed by: NURSE ANESTHETIST, CERTIFIED REGISTERED

## 2017-07-27 PROCEDURE — 93653 COMPRE EP EVAL TX SVT: CPT | Performed by: INTERNAL MEDICINE

## 2017-07-27 PROCEDURE — 93320 DOPPLER ECHO COMPLETE: CPT | Performed by: INTERNAL MEDICINE

## 2017-07-27 PROCEDURE — 93005 ELECTROCARDIOGRAM TRACING: CPT | Performed by: INTERNAL MEDICINE

## 2017-07-27 PROCEDURE — 93010 ELECTROCARDIOGRAM REPORT: CPT | Performed by: INTERNAL MEDICINE

## 2017-07-27 PROCEDURE — 93621 COMP EP EVL L PAC&REC C SINS: CPT | Performed by: INTERNAL MEDICINE

## 2017-07-27 PROCEDURE — 25010000002 DEXAMETHASONE PER 1 MG: Performed by: NURSE ANESTHETIST, CERTIFIED REGISTERED

## 2017-07-27 PROCEDURE — C1730 CATH, EP, 19 OR FEW ELECT: HCPCS | Performed by: INTERNAL MEDICINE

## 2017-07-27 PROCEDURE — 93325 DOPPLER ECHO COLOR FLOW MAPG: CPT

## 2017-07-27 PROCEDURE — 85610 PROTHROMBIN TIME: CPT

## 2017-07-27 PROCEDURE — 93655 ICAR CATH ABLTJ DSCRT ARRHYT: CPT | Performed by: INTERNAL MEDICINE

## 2017-07-27 PROCEDURE — 25010000002 PHENYLEPHRINE PER 1 ML: Performed by: NURSE ANESTHETIST, CERTIFIED REGISTERED

## 2017-07-27 PROCEDURE — 93312 ECHO TRANSESOPHAGEAL: CPT | Performed by: INTERNAL MEDICINE

## 2017-07-27 PROCEDURE — C1731 CATH, EP, 20 OR MORE ELEC: HCPCS | Performed by: INTERNAL MEDICINE

## 2017-07-27 PROCEDURE — 93312 ECHO TRANSESOPHAGEAL: CPT

## 2017-07-27 PROCEDURE — 93623 PRGRMD STIMJ&PACG IV RX NFS: CPT | Performed by: INTERNAL MEDICINE

## 2017-07-27 PROCEDURE — 93320 DOPPLER ECHO COMPLETE: CPT

## 2017-07-27 PROCEDURE — C1893 INTRO/SHEATH, FIXED,NON-PEEL: HCPCS | Performed by: INTERNAL MEDICINE

## 2017-07-27 RX ORDER — NALOXONE HCL 0.4 MG/ML
0.4 VIAL (ML) INJECTION
Status: DISCONTINUED | OUTPATIENT
Start: 2017-07-27 | End: 2017-07-27 | Stop reason: HOSPADM

## 2017-07-27 RX ORDER — LIDOCAINE HYDROCHLORIDE AND EPINEPHRINE 10; 10 MG/ML; UG/ML
INJECTION, SOLUTION INFILTRATION; PERINEURAL AS NEEDED
Status: DISCONTINUED | OUTPATIENT
Start: 2017-07-27 | End: 2017-07-27 | Stop reason: HOSPADM

## 2017-07-27 RX ORDER — MIDAZOLAM HYDROCHLORIDE 1 MG/ML
2 INJECTION INTRAMUSCULAR; INTRAVENOUS
Status: DISCONTINUED | OUTPATIENT
Start: 2017-07-27 | End: 2017-07-28 | Stop reason: HOSPADM

## 2017-07-27 RX ORDER — HYDROMORPHONE HYDROCHLORIDE 1 MG/ML
0.5 INJECTION, SOLUTION INTRAMUSCULAR; INTRAVENOUS; SUBCUTANEOUS
Status: DISCONTINUED | OUTPATIENT
Start: 2017-07-27 | End: 2017-07-27 | Stop reason: HOSPADM

## 2017-07-27 RX ORDER — PROMETHAZINE HYDROCHLORIDE 25 MG/ML
12.5 INJECTION, SOLUTION INTRAMUSCULAR; INTRAVENOUS ONCE AS NEEDED
Status: DISCONTINUED | OUTPATIENT
Start: 2017-07-27 | End: 2017-07-27 | Stop reason: HOSPADM

## 2017-07-27 RX ORDER — ZOLPIDEM TARTRATE 5 MG/1
5 TABLET ORAL NIGHTLY PRN
Status: DISCONTINUED | OUTPATIENT
Start: 2017-07-27 | End: 2017-07-27 | Stop reason: HOSPADM

## 2017-07-27 RX ORDER — SODIUM CHLORIDE, SODIUM LACTATE, POTASSIUM CHLORIDE, CALCIUM CHLORIDE 600; 310; 30; 20 MG/100ML; MG/100ML; MG/100ML; MG/100ML
9 INJECTION, SOLUTION INTRAVENOUS CONTINUOUS
Status: DISCONTINUED | OUTPATIENT
Start: 2017-07-27 | End: 2017-07-28 | Stop reason: HOSPADM

## 2017-07-27 RX ORDER — HYDRALAZINE HYDROCHLORIDE 20 MG/ML
5 INJECTION INTRAMUSCULAR; INTRAVENOUS
Status: DISCONTINUED | OUTPATIENT
Start: 2017-07-27 | End: 2017-07-27 | Stop reason: HOSPADM

## 2017-07-27 RX ORDER — PROMETHAZINE HYDROCHLORIDE 25 MG/1
12.5 TABLET ORAL ONCE AS NEEDED
Status: DISCONTINUED | OUTPATIENT
Start: 2017-07-27 | End: 2017-07-27 | Stop reason: HOSPADM

## 2017-07-27 RX ORDER — NITROGLYCERIN 0.4 MG/1
0.4 TABLET SUBLINGUAL
Status: DISCONTINUED | OUTPATIENT
Start: 2017-07-27 | End: 2017-07-27 | Stop reason: HOSPADM

## 2017-07-27 RX ORDER — SODIUM CHLORIDE 0.9 % (FLUSH) 0.9 %
1-10 SYRINGE (ML) INJECTION AS NEEDED
Status: DISCONTINUED | OUTPATIENT
Start: 2017-07-27 | End: 2017-07-28 | Stop reason: HOSPADM

## 2017-07-27 RX ORDER — ONDANSETRON 2 MG/ML
4 INJECTION INTRAMUSCULAR; INTRAVENOUS EVERY 6 HOURS PRN
Status: DISCONTINUED | OUTPATIENT
Start: 2017-07-27 | End: 2017-07-27 | Stop reason: HOSPADM

## 2017-07-27 RX ORDER — LABETALOL HYDROCHLORIDE 5 MG/ML
5 INJECTION, SOLUTION INTRAVENOUS
Status: DISCONTINUED | OUTPATIENT
Start: 2017-07-27 | End: 2017-07-27 | Stop reason: HOSPADM

## 2017-07-27 RX ORDER — FAMOTIDINE 10 MG/ML
20 INJECTION, SOLUTION INTRAVENOUS ONCE
Status: COMPLETED | OUTPATIENT
Start: 2017-07-27 | End: 2017-07-27

## 2017-07-27 RX ORDER — TAMSULOSIN HYDROCHLORIDE 0.4 MG/1
1 CAPSULE ORAL NIGHTLY
COMMUNITY
End: 2017-10-24 | Stop reason: SINTOL

## 2017-07-27 RX ORDER — SODIUM CHLORIDE 0.9 % (FLUSH) 0.9 %
1-10 SYRINGE (ML) INJECTION AS NEEDED
Status: CANCELLED | OUTPATIENT
Start: 2017-07-27

## 2017-07-27 RX ORDER — ATENOLOL 25 MG/1
25 TABLET ORAL DAILY
COMMUNITY
End: 2017-09-22 | Stop reason: SINTOL

## 2017-07-27 RX ORDER — HYDROCODONE BITARTRATE AND ACETAMINOPHEN 7.5; 325 MG/1; MG/1
1 TABLET ORAL EVERY 4 HOURS PRN
Status: DISCONTINUED | OUTPATIENT
Start: 2017-07-27 | End: 2017-07-27 | Stop reason: HOSPADM

## 2017-07-27 RX ORDER — SODIUM CHLORIDE 0.9 % (FLUSH) 0.9 %
1-10 SYRINGE (ML) INJECTION AS NEEDED
Status: DISCONTINUED | OUTPATIENT
Start: 2017-07-27 | End: 2017-07-27 | Stop reason: HOSPADM

## 2017-07-27 RX ORDER — WARFARIN SODIUM 5 MG/1
5 TABLET ORAL
COMMUNITY
End: 2017-07-27 | Stop reason: HOSPADM

## 2017-07-27 RX ORDER — FENTANYL CITRATE 50 UG/ML
INJECTION, SOLUTION INTRAMUSCULAR; INTRAVENOUS AS NEEDED
Status: DISCONTINUED | OUTPATIENT
Start: 2017-07-27 | End: 2017-07-27 | Stop reason: SURG

## 2017-07-27 RX ORDER — METOCLOPRAMIDE HYDROCHLORIDE 5 MG/ML
10 INJECTION INTRAMUSCULAR; INTRAVENOUS EVERY 6 HOURS PRN
Status: DISCONTINUED | OUTPATIENT
Start: 2017-07-27 | End: 2017-07-27 | Stop reason: HOSPADM

## 2017-07-27 RX ORDER — TRAMADOL HYDROCHLORIDE 50 MG/1
50 TABLET ORAL EVERY 6 HOURS PRN
COMMUNITY
End: 2017-10-04

## 2017-07-27 RX ORDER — MIDAZOLAM HYDROCHLORIDE 1 MG/ML
2 INJECTION INTRAMUSCULAR; INTRAVENOUS
Status: CANCELLED | OUTPATIENT
Start: 2017-07-27

## 2017-07-27 RX ORDER — HYDROCODONE BITARTRATE AND ACETAMINOPHEN 7.5; 325 MG/1; MG/1
1 TABLET ORAL ONCE AS NEEDED
Status: DISCONTINUED | OUTPATIENT
Start: 2017-07-27 | End: 2017-07-27 | Stop reason: HOSPADM

## 2017-07-27 RX ORDER — OMEPRAZOLE 40 MG/1
40 CAPSULE, DELAYED RELEASE ORAL DAILY
COMMUNITY
End: 2018-05-01 | Stop reason: SDUPTHER

## 2017-07-27 RX ORDER — PROMETHAZINE HYDROCHLORIDE 25 MG/1
25 TABLET ORAL ONCE AS NEEDED
Status: DISCONTINUED | OUTPATIENT
Start: 2017-07-27 | End: 2017-07-27 | Stop reason: HOSPADM

## 2017-07-27 RX ORDER — PROMETHAZINE HYDROCHLORIDE 25 MG/1
25 SUPPOSITORY RECTAL ONCE AS NEEDED
Status: DISCONTINUED | OUTPATIENT
Start: 2017-07-27 | End: 2017-07-27 | Stop reason: HOSPADM

## 2017-07-27 RX ORDER — FENTANYL CITRATE 50 UG/ML
50 INJECTION, SOLUTION INTRAMUSCULAR; INTRAVENOUS
Status: DISCONTINUED | OUTPATIENT
Start: 2017-07-27 | End: 2017-07-27 | Stop reason: HOSPADM

## 2017-07-27 RX ORDER — MIDAZOLAM HYDROCHLORIDE 1 MG/ML
1 INJECTION INTRAMUSCULAR; INTRAVENOUS
Status: CANCELLED | OUTPATIENT
Start: 2017-07-27

## 2017-07-27 RX ORDER — DEXAMETHASONE SODIUM PHOSPHATE 10 MG/ML
INJECTION INTRAMUSCULAR; INTRAVENOUS AS NEEDED
Status: DISCONTINUED | OUTPATIENT
Start: 2017-07-27 | End: 2017-07-27 | Stop reason: SURG

## 2017-07-27 RX ORDER — PROPOFOL 10 MG/ML
VIAL (ML) INTRAVENOUS AS NEEDED
Status: DISCONTINUED | OUTPATIENT
Start: 2017-07-27 | End: 2017-07-27 | Stop reason: SURG

## 2017-07-27 RX ORDER — FENTANYL CITRATE 50 UG/ML
50 INJECTION, SOLUTION INTRAMUSCULAR; INTRAVENOUS
Status: DISCONTINUED | OUTPATIENT
Start: 2017-07-27 | End: 2017-07-28 | Stop reason: HOSPADM

## 2017-07-27 RX ORDER — EPHEDRINE SULFATE 50 MG/ML
5 INJECTION, SOLUTION INTRAVENOUS ONCE AS NEEDED
Status: DISCONTINUED | OUTPATIENT
Start: 2017-07-27 | End: 2017-07-27 | Stop reason: HOSPADM

## 2017-07-27 RX ORDER — ALPRAZOLAM 0.25 MG/1
0.25 TABLET ORAL EVERY 6 HOURS PRN
Status: DISCONTINUED | OUTPATIENT
Start: 2017-07-27 | End: 2017-07-27 | Stop reason: HOSPADM

## 2017-07-27 RX ORDER — NALOXONE HCL 0.4 MG/ML
0.2 VIAL (ML) INJECTION AS NEEDED
Status: DISCONTINUED | OUTPATIENT
Start: 2017-07-27 | End: 2017-07-27 | Stop reason: HOSPADM

## 2017-07-27 RX ORDER — FAMOTIDINE 10 MG/ML
20 INJECTION, SOLUTION INTRAVENOUS ONCE
Status: CANCELLED | OUTPATIENT
Start: 2017-07-27 | End: 2017-07-27

## 2017-07-27 RX ORDER — ONDANSETRON 2 MG/ML
4 INJECTION INTRAMUSCULAR; INTRAVENOUS ONCE AS NEEDED
Status: DISCONTINUED | OUTPATIENT
Start: 2017-07-27 | End: 2017-07-27 | Stop reason: HOSPADM

## 2017-07-27 RX ORDER — HYDROCODONE BITARTRATE AND ACETAMINOPHEN 10; 325 MG/1; MG/1
1 TABLET ORAL EVERY 4 HOURS PRN
Status: DISCONTINUED | OUTPATIENT
Start: 2017-07-27 | End: 2017-07-27 | Stop reason: HOSPADM

## 2017-07-27 RX ORDER — GABAPENTIN 300 MG/1
300 CAPSULE ORAL 3 TIMES DAILY
COMMUNITY
End: 2017-09-11 | Stop reason: SDUPTHER

## 2017-07-27 RX ORDER — MIDAZOLAM HYDROCHLORIDE 1 MG/ML
1 INJECTION INTRAMUSCULAR; INTRAVENOUS
Status: DISCONTINUED | OUTPATIENT
Start: 2017-07-27 | End: 2017-07-28 | Stop reason: HOSPADM

## 2017-07-27 RX ORDER — FENTANYL CITRATE 50 UG/ML
50 INJECTION, SOLUTION INTRAMUSCULAR; INTRAVENOUS
Status: CANCELLED | OUTPATIENT
Start: 2017-07-27

## 2017-07-27 RX ORDER — HEPARIN SODIUM 1000 [USP'U]/ML
INJECTION, SOLUTION INTRAVENOUS; SUBCUTANEOUS AS NEEDED
Status: DISCONTINUED | OUTPATIENT
Start: 2017-07-27 | End: 2017-07-27 | Stop reason: HOSPADM

## 2017-07-27 RX ORDER — ONDANSETRON 2 MG/ML
INJECTION INTRAMUSCULAR; INTRAVENOUS AS NEEDED
Status: DISCONTINUED | OUTPATIENT
Start: 2017-07-27 | End: 2017-07-27 | Stop reason: SURG

## 2017-07-27 RX ORDER — LIDOCAINE HYDROCHLORIDE 20 MG/ML
INJECTION, SOLUTION INFILTRATION; PERINEURAL AS NEEDED
Status: DISCONTINUED | OUTPATIENT
Start: 2017-07-27 | End: 2017-07-27 | Stop reason: SURG

## 2017-07-27 RX ORDER — FLUMAZENIL 0.1 MG/ML
0.2 INJECTION INTRAVENOUS AS NEEDED
Status: DISCONTINUED | OUTPATIENT
Start: 2017-07-27 | End: 2017-07-27 | Stop reason: HOSPADM

## 2017-07-27 RX ORDER — SODIUM CHLORIDE 9 MG/ML
75 INJECTION, SOLUTION INTRAVENOUS CONTINUOUS
Status: DISCONTINUED | OUTPATIENT
Start: 2017-07-27 | End: 2017-07-27 | Stop reason: HOSPADM

## 2017-07-27 RX ORDER — PROMETHAZINE HYDROCHLORIDE 25 MG/ML
12.5 INJECTION, SOLUTION INTRAMUSCULAR; INTRAVENOUS EVERY 4 HOURS PRN
Status: DISCONTINUED | OUTPATIENT
Start: 2017-07-27 | End: 2017-07-27 | Stop reason: HOSPADM

## 2017-07-27 RX ORDER — SODIUM CHLORIDE, SODIUM LACTATE, POTASSIUM CHLORIDE, CALCIUM CHLORIDE 600; 310; 30; 20 MG/100ML; MG/100ML; MG/100ML; MG/100ML
9 INJECTION, SOLUTION INTRAVENOUS CONTINUOUS
Status: CANCELLED | OUTPATIENT
Start: 2017-07-27

## 2017-07-27 RX ORDER — OXYCODONE AND ACETAMINOPHEN 7.5; 325 MG/1; MG/1
1 TABLET ORAL ONCE AS NEEDED
Status: DISCONTINUED | OUTPATIENT
Start: 2017-07-27 | End: 2017-07-27 | Stop reason: HOSPADM

## 2017-07-27 RX ORDER — ATORVASTATIN CALCIUM 10 MG/1
10 TABLET, FILM COATED ORAL DAILY
COMMUNITY
End: 2017-09-06 | Stop reason: SDUPTHER

## 2017-07-27 RX ORDER — BUPROPION HYDROCHLORIDE 300 MG/1
300 TABLET ORAL DAILY
COMMUNITY
End: 2018-07-12 | Stop reason: SDUPTHER

## 2017-07-27 RX ORDER — DIPHENHYDRAMINE HYDROCHLORIDE 50 MG/ML
12.5 INJECTION INTRAMUSCULAR; INTRAVENOUS
Status: DISCONTINUED | OUTPATIENT
Start: 2017-07-27 | End: 2017-07-27 | Stop reason: HOSPADM

## 2017-07-27 RX ORDER — LIDOCAINE HYDROCHLORIDE 10 MG/ML
0.1 INJECTION, SOLUTION EPIDURAL; INFILTRATION; INTRACAUDAL; PERINEURAL ONCE AS NEEDED
Status: DISCONTINUED | OUTPATIENT
Start: 2017-07-27 | End: 2017-07-27 | Stop reason: HOSPADM

## 2017-07-27 RX ORDER — ROCURONIUM BROMIDE 10 MG/ML
INJECTION, SOLUTION INTRAVENOUS AS NEEDED
Status: DISCONTINUED | OUTPATIENT
Start: 2017-07-27 | End: 2017-07-27 | Stop reason: SURG

## 2017-07-27 RX ADMIN — SODIUM CHLORIDE 75 ML/HR: 9 INJECTION, SOLUTION INTRAVENOUS at 10:00

## 2017-07-27 RX ADMIN — PHENYLEPHRINE HYDROCHLORIDE 100 MCG: 10 INJECTION INTRAVENOUS at 12:15

## 2017-07-27 RX ADMIN — RIVAROXABAN 20 MG: 20 TABLET, FILM COATED ORAL at 15:16

## 2017-07-27 RX ADMIN — PHENYLEPHRINE HYDROCHLORIDE 100 MCG: 10 INJECTION INTRAVENOUS at 11:26

## 2017-07-27 RX ADMIN — PHENYLEPHRINE HYDROCHLORIDE 100 MCG: 10 INJECTION INTRAVENOUS at 12:07

## 2017-07-27 RX ADMIN — PHENYLEPHRINE HYDROCHLORIDE 100 MCG: 10 INJECTION INTRAVENOUS at 12:04

## 2017-07-27 RX ADMIN — PHENYLEPHRINE HYDROCHLORIDE 100 MCG: 10 INJECTION INTRAVENOUS at 12:10

## 2017-07-27 RX ADMIN — PHENYLEPHRINE HYDROCHLORIDE 100 MCG: 10 INJECTION INTRAVENOUS at 11:16

## 2017-07-27 RX ADMIN — SODIUM CHLORIDE, POTASSIUM CHLORIDE, SODIUM LACTATE AND CALCIUM CHLORIDE: 600; 310; 30; 20 INJECTION, SOLUTION INTRAVENOUS at 10:38

## 2017-07-27 RX ADMIN — FAMOTIDINE 20 MG: 10 INJECTION INTRAVENOUS at 10:34

## 2017-07-27 RX ADMIN — ROCURONIUM BROMIDE 50 MG: 10 INJECTION INTRAVENOUS at 11:06

## 2017-07-27 RX ADMIN — PROPOFOL 160 MG: 10 INJECTION, EMULSION INTRAVENOUS at 11:06

## 2017-07-27 RX ADMIN — FENTANYL CITRATE 100 MCG: 50 INJECTION INTRAMUSCULAR; INTRAVENOUS at 11:06

## 2017-07-27 RX ADMIN — PHENYLEPHRINE HYDROCHLORIDE 100 MCG: 10 INJECTION INTRAVENOUS at 11:41

## 2017-07-27 RX ADMIN — DEXAMETHASONE SODIUM PHOSPHATE 8 MG: 10 INJECTION INTRAMUSCULAR; INTRAVENOUS at 12:20

## 2017-07-27 RX ADMIN — LIDOCAINE HYDROCHLORIDE 80 MG: 20 INJECTION, SOLUTION INFILTRATION; PERINEURAL at 11:06

## 2017-07-27 RX ADMIN — ONDANSETRON 4 MG: 2 INJECTION INTRAMUSCULAR; INTRAVENOUS at 12:25

## 2017-07-27 RX ADMIN — SUGAMMADEX 200 MG: 100 INJECTION, SOLUTION INTRAVENOUS at 12:52

## 2017-07-27 NOTE — ANESTHESIA PROCEDURE NOTES
Airway  Urgency: elective    Airway not difficult    General Information and Staff    Patient location during procedure: OR  Anesthesiologist: CHARLES LEAHY  CRNA: DANILO LAGUNA    Indications and Patient Condition  Indications for airway management: airway protection    Preoxygenated: yes  MILS not maintained throughout  Mask difficulty assessment: 1 - vent by mask    Final Airway Details  Final airway type: endotracheal airway      Successful airway: ETT  Cuffed: yes   Successful intubation technique: direct laryngoscopy  Endotracheal tube insertion site: oral  Blade: Nguyen  Blade size: #4  ETT size: 7.5 mm  Cormack-Lehane Classification: grade I - full view of glottis  Placement verified by: chest auscultation and capnometry   Measured from: lips  ETT to lips (cm): 23  Number of attempts at approach: 1    Additional Comments  Dentition intact and unchanged. CBEBS.  +ETCO2.

## 2017-07-27 NOTE — H&P (VIEW-ONLY)
Date of Office Visit: 2017  Encounter Provider: SAMI Egan  Place of Service: [facility] CARDIOLOGY  Patient Name: Jonathan Trejo  :1935    Chief Complaint   Patient presents with   • Rapid Heart Rate     Atrial flutter   :     HPI: Jonathan Trejo is a 82 y.o. male who is a patient of Dr. Vargas, new to me today. Old records reviewed.  He has a past medical history of atrial flutter (diagnosed initially in Spring of 2016) status post cardioversion in 2016 with recurrence of atrial  flutter recently.  He also has a history of colon cancer status post resection, hyperlipidemia, osteoarthritis, lumbar radiculopathy as well as CK D stage III followed by Dr. Scott.  He was seen by Dr. Vargas, noted to be back in atrial flutter with RVR on 2017 and was started on atenolol and warfarin. He has not been able to tolerate rhythm control medications secondary to blood pressure and only low dose beta blocker. He had an echocardiogram in 2016 which showed a calculated EF of 42% but an estimated EF of 30% with normal LV size, mild LVH, trace AI and mild TR.  When he had atrial flutter in the past he was on rivaroxabn. It was stopped due to maintaining sinus rhythm and also probably because he has renal insufficiency. He has been referred to the EP service for evaluation for possible ablation.          Past Medical History:   Diagnosis Date   • Abnormal electrocardiogram    • Arm pain    • Atrial fibrillation    • BPH associated with nocturia    • Chronic kidney disease    • Colon cancer     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   • Colon polyp 2015   • Depression    • Dyspnea    • Episode of generalized weakness     knees were weak - had to be helped by wife to sit down   • Esophagitis, reflux    • Fatigue    • Hyperlipidemia    • Inguinal hernia    • Loss of hearing    • Lumbar radiculopathy    • Obesity    • Osteoarthritis cervical spine     doc on  Lucy 08/16   • Osteopenia    • Osteoporosis    • Overweight    • Tobacco dependence in remission    • Urge incontinence of urine    • Vitamin D deficiency    • Wheezing        Past Surgical History:   Procedure Laterality Date   • CARDIOVERSION     • COLONOSCOPY      07/15 redo 5 years  Segun   • KNEE SURGERY      benign tumor removed   • REFRACTIVE SURGERY  2007   • REPAIR PERONEAL TENDONS ANKLE W/ FIBULAR OSTEOTOMY Right 03/2013    Dr. Banks       Social History     Social History   • Marital status:      Spouse name: N/A   • Number of children: 2   • Years of education: COLLEGE GRADUATE     Occupational History   • RETIRED      Social History Main Topics   • Smoking status: Former Smoker     Packs/day: 0.50     Years: 50.00     Types: Cigarettes     Quit date: 5/1/2008   • Smokeless tobacco: Never Used   • Alcohol use Yes   • Drug use: No   • Sexual activity: Defer     Other Topics Concern   • Not on file     Social History Narrative       Family History   Problem Relation Age of Onset   • Breast cancer Mother    • Heart disease Father    • Hypertension Father    • Hypertension Sister        Review of Systems   Constitution: Positive for malaise/fatigue. Negative for chills, fever, weight gain and weight loss.   HENT: Negative for ear pain, headaches, hearing loss, nosebleeds and sore throat.    Eyes: Negative for double vision, pain and visual disturbance.   Cardiovascular: Positive for dyspnea on exertion and palpitations. Negative for chest pain, irregular heartbeat, leg swelling, near-syncope, orthopnea, paroxysmal nocturnal dyspnea and syncope.   Respiratory: Negative for cough, shortness of breath, sleep disturbances due to breathing, snoring and wheezing.    Endocrine: Negative for cold intolerance, heat intolerance and polyuria.   Hematologic/Lymphatic: Negative.    Skin: Negative for itching and rash.   Musculoskeletal: Negative for joint pain, joint swelling and myalgias.   Gastrointestinal:  "Negative for abdominal pain, diarrhea, melena, nausea and vomiting.   Genitourinary: Negative for frequency, hematuria and hesitancy.   Neurological: Positive for light-headedness. Negative for excessive daytime sleepiness, numbness, paresthesias and seizures.   Psychiatric/Behavioral: Negative for altered mental status and depression.   Allergic/Immunologic: Negative.    All other systems reviewed and are negative.      No Known Allergies      Current Outpatient Prescriptions:   •  atenolol (TENORMIN) 25 MG tablet, Take 1 tablet by mouth Daily., Disp: 30 tablet, Rfl: 11  •  atorvastatin (LIPITOR) 10 MG tablet, Take 1 tablet by mouth Daily., Disp: 90 tablet, Rfl: 1  •  buPROPion XL (WELLBUTRIN XL) 300 MG 24 hr tablet, Take 1 tablet by mouth Daily. Take one tablet daily as directed, Disp: 90 tablet, Rfl: 3  •  Cholecalciferol (VITAMIN D3) 2000 UNITS tablet, Take 2,000 Units by mouth daily., Disp: , Rfl:   •  Flaxseed, Linseed, (FLAXSEED OIL) 1000 MG capsule, Take 1,000 mg by mouth., Disp: , Rfl:   •  gabapentin (NEURONTIN) 300 MG capsule, Take 1 capsule by mouth 3 (Three) Times a Day. For nerve pain, Disp: 90 capsule, Rfl: 2  •  Omega-3 Fatty Acids (FISH OIL) 1000 MG capsule, Take 1,000 mg by mouth daily., Disp: , Rfl:   •  omeprazole (priLOSEC) 40 MG capsule, Take 1 capsule by mouth Daily., Disp: 90 capsule, Rfl: 1  •  tamsulosin (FLOMAX) 0.4 MG capsule 24 hr capsule, Take 1 capsule by mouth Daily., Disp: 90 capsule, Rfl: 1  •  traMADol (ULTRAM) 50 MG tablet, Take 1 tablet by mouth Every 6 (Six) Hours As Needed for Moderate Pain (4-6)., Disp: 90 tablet, Rfl: 0  •  warfarin (COUMADIN) 5 MG tablet, Take 1 tablet by mouth Daily., Disp: 30 tablet, Rfl: 11     Objective:     Vitals:    07/07/17 0855   BP: 100/68   Pulse: (!) 125   Weight: 192 lb (87.1 kg)   Height: 66\" (167.6 cm)     Body mass index is 30.99 kg/(m^2).    PHYSICAL EXAM:    Vitals Reviewed.   General Appearance: No acute distress, well developed and well " nourished.   Eyes: Conjunctiva and lids: No erythema, swelling, or discharge. Sclera non-icteric.   HENT: Atraumatic, normocephalic. External eyes, ears, and nose normal. No hearing loss noted. Mucous membranes normal. Lips not cyanotic. Neck supple with no tenderness.  Respiratory: No signs of respiratory distress. Respiration rhythm and depth normal.   Clear to auscultation. No rales, crackles, rhonchi, or wheezing auscultated.   Cardiovascular:  Jugular Venous Pressure: Normal  Heart Rate and Rhythm: regular but tachy. Heart Sounds: Normal S1 and S2. No S3 or S4 noted.  Murmurs: No murmurs noted. No rubs, thrills, or gallops.   Arterial Pulses: Posterior tibialis and dorsalis pedis pulses normal.   Lower Extremities: No edema noted.  Gastrointestinal:  Abdomen soft, non-distended, non-tender. Normal bowel sounds. No hepatomegaly.   Musculoskeletal: Normal movement of extremities  Skin and Nails: General appearance normal. No pallor, cyanosis, diaphoresis. Skin temperature normal. No clubbing of fingernails.   Psychiatric: Patient alert and oriented to person, place, and time. Speech and behavior appropriate. Normal mood and affect.       ECG 12 Lead  Date/Time: 7/7/2017 10:07 AM  Performed by: ILIR MCNAMARA  Authorized by: ILIR MCNAMARA   Comparison: compared with previous ECG   Similar to previous ECG  Comparison to previous ECG: Rate slower  Rhythm: atrial flutter  BPM: 125  Clinical impression: abnormal ECG  Comments: Clinical indication: atrial flutter              Assessment:       Diagnosis Plan   1. Typical atrial flutter  Case Request EP Lab: Ablation atrial flutter  Will need to have 3 -4 weeks of therapeutic INR's          Plan:       1. Atrial Fibrillation and Atrial Flutter  Assessment  • The patient has persistent atrial flutter  • The patient's CHADS2-VASc score is 2  • A HLH2AP8-YQSa score of 2 or more is considered a high risk for a thromboembolic event  • Warfarin prescribed  • Recurrent  symptomatic atrial flutter, intolerant to meds due to blood pressure. Dr. Solis and I discussed treatment options, ablation recommended, patient agreeable. He was just started on warfarin on 6/14/17. He has had therapeutic INR's on 6/28/17 3.6, 7/5/17 3.2, he will have next INR on 7/12. I have spoke with INR clinic to let them know he is pre-flutter ablation so they will monitor him weekly. We will tentatively get him scheduled for the week of 7/24 for ablation. We did instruct him to just take it easy until then as we really don't have any room to increase his beta blocker due to blood pressure.     Plan  • Attempt to maintain sinus rhythm  • Continue warfarin for antithrombotic therapy, bleeding issues discussed  • Continue beta blocker for rhythm control    Subjective - Objective  • The patient underwent cardioversion on 8/22/2016           As always, it has been a pleasure to participate in your patient's care.      Sincerely,         SAMI Laureano

## 2017-07-27 NOTE — PERIOPERATIVE NURSING NOTE
Sienna RASHID here, samples of Xarelto given to patient to take home.  Daughter given instructions for pt to take Xarelto 20 mg daily every evening.

## 2017-07-27 NOTE — DISCHARGE INSTRUCTIONS
[facility]  Cardiology  [address] [place] [address]  [phone]    Coronary Ablation After Care    Refer to this sheet in the next few weeks. These instructions provide you with information on caring for yourself after your procedure. Your health care provider may also give you more specific instructions. Your treatment has been planned according to current medical practices, but problems sometimes occur. Call your health care provider if you have any problems or questions after your procedure.      What to Expect After the Procedure:  After your procedure, it is typical to have the following sensations:  · Minor discomfort or tenderness and a small bump at the catheter insertion site(s). The bump(s) should usually decrease in size and tenderness within 1 to 2 weeks.  · Any bruising will usually fade within 2 to 4 weeks.  Home Care Instructions:  · Do not apply powder or lotion to the site.  · Do not take baths, swim, or use a hot tub until your health care provider approves and the site is completely healed.  · Do not bend, squat, or lift anything over 20 lb (9 kg) or as directed by your health care provider. However, we recommend lifting nothing heavier than a gallon of milk.    · You may shower 24 hours after the procedure. Remove the bandage (dressing) and gently wash the site with plain soap and water. Gently pat the site dry. You may apply a band aid daily for 2 days if desired.    · Inspect the site at least twice daily.  · Increase your fluid intake for the next 2 days.    · Limit your activity for the first 48 hours.   · Avoid strenuous activity for 1 week or as advised by your physician.    · Follow instructions about when you can drive or return to work as directed by your physician.    · Hold direct pressure over the site when you cough, sneeze, laugh or change positions.  Do this for the next 2 days.    Call Your Doctor If:  · You have drainage (other than a small amount of  blood on the dressing).  · You have chills or a fever > 101.  · You have redness, warmth, swelling (larger than a walnut), or pain at the insertion   · You develop palpitations, chest pain or shortness of breath, feel faint, or pass out.  · You develop pain, discoloration, coldness, numbness, tingling, or severe bruising in the leg that held the catheter.  · You develop bleeding from any other place, such as the bowels.  · You have heavy bleeding from the site.  If this happens, hold pressure on the site and call 911.        Make Sure You:  · Understand these instructions.  · Will watch your condition.  · Will get help right away if you are not doing well or get worse.

## 2017-07-27 NOTE — ANESTHESIA PREPROCEDURE EVALUATION
Anesthesia Evaluation            Airway   Mallampati: III  no difficulty expected  Dental      Pulmonary - normal exam   (+) a smoker Former, shortness of breath,   (-) COPD, asthma, sleep apnea  Cardiovascular - normal exam  Exercise tolerance: poor (<4 METS)    (+) hypertension, dysrhythmias Atrial Fib, GARCIA, hyperlipidemia  (-) past MI, CAD, angina, cardiac stents, CABG      Neuro/Psych  (+) psychiatric history Depression,    GI/Hepatic/Renal/Endo    (+) obesity,  renal disease CRI,     Musculoskeletal     Abdominal    Substance History      OB/GYN          Other                                        Anesthesia Plan    ASA 3     general     Anesthetic plan and risks discussed with patient.

## 2017-07-27 NOTE — ANESTHESIA POSTPROCEDURE EVALUATION
Patient: Jonathan Trejo    Procedure Summary     Date Anesthesia Start Anesthesia Stop Room / Location    07/27/17 1100 1315 [facility] CATH/EP LAB 5 / [facility]IVE LOCATION       Procedure Diagnosis Provider Provider    Ablation atrial flutter Will need to have 3 -4 weeks of therapeutic INR's  (N/A ); Valve assessment (N/A ) Typical atrial flutter  (atrial flutter) Adonis Solis MD; Cath Lab Physician Cherelle Breaux MD          Anesthesia Type: general  Last vitals  BP   133/98 (07/27/17 1605)    Temp   37.2 °C (99 °F) (07/27/17 1618)    Pulse   89 (07/27/17 1605)   Resp   16 (07/27/17 1605)    SpO2   96 % (07/27/17 1605)      Post Anesthesia Care and Evaluation    Patient location during evaluation: PHASE II  Patient participation: complete - patient participated  Level of consciousness: awake and alert  Pain management: adequate  Airway patency: patent  Anesthetic complications: No anesthetic complications  PONV Status: none  Cardiovascular status: acceptable  Respiratory status: acceptable  Hydration status: acceptable

## 2017-07-27 NOTE — PLAN OF CARE
Problem: Patient Care Overview (Adult)  Goal: Plan of Care Review  Outcome: Outcome(s) achieved Date Met:  07/27/17 07/27/17 1643   Coping/Psychosocial Response Interventions   Plan Of Care Reviewed With patient;daughter   Patient Care Overview   Progress improving   Outcome Evaluation   Outcome Summary/Follow up Plan ready for discharge       Goal: Adult Individualization and Mutuality  Outcome: Outcome(s) achieved Date Met:  07/27/17  Goal: Discharge Needs Assessment  Outcome: Outcome(s) achieved Date Met:  07/27/17    Problem: Cardiac Catheterization with/without PCI (Adult)  Goal: Signs and Symptoms of Listed Potential Problems Will be Absent or Manageable (Cardiac Catheterization with/without PCI)  Outcome: Outcome(s) achieved Date Met:  07/27/17    Problem: Perioperative Period (Adult)  Goal: Signs and Symptoms of Listed Potential Problems Will be Absent or Manageable (Perioperative Period)  Outcome: Outcome(s) achieved Date Met:  07/27/17

## 2017-07-28 LAB
INR PPP: 1.1 (ref 0.8–1.2)
PROTHROMBIN TIME: 13.2 SECONDS (ref 12.8–15.2)

## 2017-08-07 ENCOUNTER — TELEPHONE (OUTPATIENT)
Dept: INTERNAL MEDICINE | Facility: CLINIC | Age: 82
End: 2017-08-07

## 2017-08-08 ENCOUNTER — OFFICE VISIT (OUTPATIENT)
Dept: INTERNAL MEDICINE | Facility: CLINIC | Age: 82
End: 2017-08-08

## 2017-08-08 VITALS
RESPIRATION RATE: 16 BRPM | BODY MASS INDEX: 30.37 KG/M2 | OXYGEN SATURATION: 94 % | HEART RATE: 82 BPM | HEIGHT: 66 IN | TEMPERATURE: 98.8 F | DIASTOLIC BLOOD PRESSURE: 88 MMHG | SYSTOLIC BLOOD PRESSURE: 110 MMHG | WEIGHT: 189 LBS

## 2017-08-08 DIAGNOSIS — Z74.09 IMPAIRED FUNCTIONAL MOBILITY, BALANCE, GAIT, AND ENDURANCE: ICD-10-CM

## 2017-08-08 DIAGNOSIS — R29.6 FREQUENT FALLS: Primary | ICD-10-CM

## 2017-08-08 DIAGNOSIS — F32.1 MODERATE SINGLE CURRENT EPISODE OF MAJOR DEPRESSIVE DISORDER (HCC): ICD-10-CM

## 2017-08-08 PROCEDURE — 99213 OFFICE O/P EST LOW 20 MIN: CPT | Performed by: NURSE PRACTITIONER

## 2017-08-08 RX ORDER — ASPIRIN 81 MG/1
81 TABLET, CHEWABLE ORAL DAILY
COMMUNITY

## 2017-08-08 RX ORDER — WARFARIN SODIUM 5 MG/1
5 TABLET ORAL
COMMUNITY
End: 2017-08-24

## 2017-08-08 NOTE — PROGRESS NOTES
Vitals:    08/08/17 1355   BP: 110/88   Pulse: 82   Resp: 16   Temp: 98.8 °F (37.1 °C)   SpO2: 94%     Last 2 weights    08/08/17  1355   Weight: 189 lb (85.7 kg)     Social History   Substance Use Topics   • Smoking status: Former Smoker     Packs/day: 0.50     Years: 55.00     Types: Cigarettes     Quit date: 5/1/2008   • Smokeless tobacco: Never Used      Comment: QUIT 10 YRS   • Alcohol use Yes      Comment: SOCIALLY       Subjective     HPI  Pt presents to office today with new problem of recent falls. He states over the past couple of years he has had progressive balances issues. He explains that as he walks and increases his speed his foot/toes are not able to keep up with the rest of his body therefore his toes drag, causing him to fall. He state he can typically catch himself of compensate his imbalance issues but is worried about him recently not being able to do so now over the past 3 days. He had an ablation done on July 7th for a flutter and recovered well from that. Pt was started on  wellbutrin for depression on 6/6/17 due to transition issues from moving out of his own home to assisted living; and he was started on coumadin by cardiology on 6/14/17. Pt denies any injuries from recent falls aside from scabbed knees. He also denies blurry vision, vision changes, headaches, dizziness or lightheadedness, cp, soa, palpitations.    The following portions of the patient's history were reviewed and updated as appropriate: allergies, current medications, past medical history, past social history and problem list.    Review of Systems   Constitutional: Negative.    Respiratory: Negative.    Cardiovascular: Negative.    Neurological:        Balance issues and recent falls         Objective      Physical Exam   Constitutional: He is oriented to person, place, and time. Vital signs are normal. He appears well-developed and well-nourished.   HENT:   Head: Normocephalic and atraumatic.   Neck: Normal range of  motion.   Cardiovascular: Normal rate, regular rhythm and normal heart sounds.    Pulmonary/Chest: Effort normal and breath sounds normal.   Musculoskeletal: Normal range of motion.   Neurological: He is alert and oriented to person, place, and time. He has normal strength. No cranial nerve deficit or sensory deficit. He displays a negative Romberg sign. GCS eye subscore is 4. GCS verbal subscore is 5.   Normal walking gait in office, pt able to flex and dosiflex bilateral feet   Nursing note and vitals reviewed.      Assessment/Plan   Jonathan was seen today for balance issues.    Diagnoses and all orders for this visit:    Frequent falls  -     MRI Brain With & Without Contrast    Moderate single current episode of major depressive disorder  -     MRI Brain With & Without Contrast    Impaired functional mobility, balance, gait, and endurance  -     MRI Brain With & Without Contrast         -discussed pt with dr barton. He would like to get MRI of brain to see if there are any changes or abnormalities causing pts imbalances and recent falls  -cont home meds at this time  -FU prn or if symptoms persist/worsen

## 2017-08-08 NOTE — TELEPHONE ENCOUNTER
I discussed the situation with his daughter.  He will see nurse practitioner Mery on Tuesday.  I discussed this with her as well.

## 2017-08-09 RX ORDER — ALPRAZOLAM 1 MG/1
TABLET ORAL
Qty: 1 TABLET | Refills: 0 | Status: SHIPPED | OUTPATIENT
Start: 2017-08-09 | End: 2017-09-22

## 2017-08-15 ENCOUNTER — TELEPHONE (OUTPATIENT)
Dept: INTERNAL MEDICINE | Facility: CLINIC | Age: 82
End: 2017-08-15

## 2017-08-16 ENCOUNTER — TELEPHONE (OUTPATIENT)
Dept: INTERNAL MEDICINE | Facility: CLINIC | Age: 82
End: 2017-08-16

## 2017-08-16 NOTE — TELEPHONE ENCOUNTER
The pt's Daughter called back and she scheduled him an appt with Dr Holden at [facility] for 8/23/17 at 10am.  She did not tell him and they are going to mail him a packet, so she wanted us to be aware incase he calls wondering why he has an appt with them.

## 2017-08-16 NOTE — TELEPHONE ENCOUNTER
I talked to the patient's daughter this morning is becoming more delusional.  Advise follow-up with Michelle Mcdonald for neuropsychological testing.  He is scheduled for an MRI of his brain already on the 24th.  I'll instruct her about this and will assist as needed.

## 2017-08-24 ENCOUNTER — HOSPITAL ENCOUNTER (OUTPATIENT)
Dept: MRI IMAGING | Facility: HOSPITAL | Age: 82
Discharge: HOME | End: 2017-08-24
Admitting: NURSE PRACTITIONER

## 2017-08-24 LAB — CREAT BLDA-MCNC: 1.7 MG/DL (ref 0.6–1.3)

## 2017-08-24 PROCEDURE — A9577 INJ MULTIHANCE: HCPCS | Performed by: NURSE PRACTITIONER

## 2017-08-24 PROCEDURE — A9270 NON-COVERED ITEM OR SERVICE: HCPCS | Performed by: RADIOLOGY

## 2017-08-24 PROCEDURE — 63710000001 DIAZEPAM 5 MG TABLET: Performed by: RADIOLOGY

## 2017-08-24 PROCEDURE — 70553 MRI BRAIN STEM W/O & W/DYE: CPT

## 2017-08-24 PROCEDURE — 0 GADOBENATE DIMEGLUMINE 529 MG/ML SOLUTION: Performed by: NURSE PRACTITIONER

## 2017-08-24 PROCEDURE — 82565 ASSAY OF CREATININE: CPT

## 2017-08-24 RX ORDER — DIAZEPAM 5 MG/1
5 TABLET ORAL ONCE
Status: COMPLETED | OUTPATIENT
Start: 2017-08-24 | End: 2017-08-24

## 2017-08-24 RX ADMIN — GADOBENATE DIMEGLUMINE 17 ML: 529 INJECTION, SOLUTION INTRAVENOUS at 14:20

## 2017-08-24 RX ADMIN — DIAZEPAM 5 MG: 5 TABLET ORAL at 12:57

## 2017-08-24 NOTE — NURSING NOTE
Patient requesting relaxing medicine for MRI.  Spoke with Dr. Werner.  Order noted for Valium 5 mg.  Allergies and meds reviewed.  Daughter with patient and will be driving him home.  Patient states that this relaxed him last time he had an MRI and he did fine with the MRI.

## 2017-09-05 ENCOUNTER — TELEPHONE (OUTPATIENT)
Dept: INTERNAL MEDICINE | Facility: CLINIC | Age: 82
End: 2017-09-05

## 2017-09-05 ENCOUNTER — TELEPHONE (OUTPATIENT)
Dept: CARDIOLOGY | Facility: CLINIC | Age: 82
End: 2017-09-05

## 2017-09-05 NOTE — TELEPHONE ENCOUNTER
The MRI shows mild small vessel disease the brain with mild brain atrophy/shrinkage which is not unusual.  There are no strokes or tumors.  There is a fair amount of sinus swelling.

## 2017-09-05 NOTE — TELEPHONE ENCOUNTER
Called pt to schedule and he only wants to be seen in the afternoon.  He is asking if this appointment is necessary?    Thank you  Tereza BARNES

## 2017-09-05 NOTE — TELEPHONE ENCOUNTER
----- Message from Rosa Campbell MA sent at 8/31/2017 10:35 AM EDT -----  Regarding: NEEDS APPT WITH [facility]  Pt needs hospital follow up with [facility]. He had an ablation on 7/27/17.....Rosa

## 2017-09-05 NOTE — TELEPHONE ENCOUNTER
Tell him yes we do like to follow up with them after a procedure. I see he is on 9/19 at 10:20- can he not make that?

## 2017-09-06 RX ORDER — ATORVASTATIN CALCIUM 10 MG/1
TABLET, FILM COATED ORAL
Qty: 90 TABLET | Refills: 1 | Status: SHIPPED | OUTPATIENT
Start: 2017-09-06 | End: 2018-09-29 | Stop reason: SDUPTHER

## 2017-09-11 RX ORDER — GABAPENTIN 300 MG/1
CAPSULE ORAL
Qty: 270 CAPSULE | Refills: 1 | Status: SHIPPED | OUTPATIENT
Start: 2017-09-11 | End: 2017-09-22 | Stop reason: SINTOL

## 2017-09-19 ENCOUNTER — OFFICE VISIT (OUTPATIENT)
Dept: CARDIOLOGY | Facility: CLINIC | Age: 82
End: 2017-09-19

## 2017-09-19 VITALS
SYSTOLIC BLOOD PRESSURE: 122 MMHG | HEART RATE: 60 BPM | WEIGHT: 182 LBS | HEIGHT: 66 IN | DIASTOLIC BLOOD PRESSURE: 70 MMHG | BODY MASS INDEX: 29.25 KG/M2

## 2017-09-19 DIAGNOSIS — Z86.79 S/P CATHETER ABLATION OF SLOW PATHWAY: ICD-10-CM

## 2017-09-19 DIAGNOSIS — Z86.79 S/P ABLATION OF ATRIAL FLUTTER: ICD-10-CM

## 2017-09-19 DIAGNOSIS — R06.09 DOE (DYSPNEA ON EXERTION): ICD-10-CM

## 2017-09-19 DIAGNOSIS — Z98.890 S/P ABLATION OF ATRIAL FLUTTER: ICD-10-CM

## 2017-09-19 DIAGNOSIS — I42.9 CARDIOMYOPATHY (HCC): ICD-10-CM

## 2017-09-19 DIAGNOSIS — I48.3 TYPICAL ATRIAL FLUTTER (HCC): Primary | ICD-10-CM

## 2017-09-19 DIAGNOSIS — I47.1 AVNRT (AV NODAL RE-ENTRY TACHYCARDIA) (HCC): ICD-10-CM

## 2017-09-19 DIAGNOSIS — Z98.890 S/P CATHETER ABLATION OF SLOW PATHWAY: ICD-10-CM

## 2017-09-19 PROBLEM — I47.19 AVNRT (AV NODAL RE-ENTRY TACHYCARDIA): Status: ACTIVE | Noted: 2017-09-19

## 2017-09-19 PROCEDURE — 93000 ELECTROCARDIOGRAM COMPLETE: CPT | Performed by: NURSE PRACTITIONER

## 2017-09-19 PROCEDURE — 99214 OFFICE O/P EST MOD 30 MIN: CPT | Performed by: NURSE PRACTITIONER

## 2017-09-19 NOTE — PROGRESS NOTES
Date of Office Visit: 2017  Encounter Provider: SAMI Egan  Place of Service: [facility] CARDIOLOGY  Patient Name: Jonathan Trejo  :1935    Chief Complaint   Patient presents with   • Rapid Heart Rate     s/p aflutter ablation   :     HPI: Jonathan Trejo is a 82 y.o. male who is a patient of Dr. Vargas who was referred to Dr. Solis for possible aflutter ablation. Dr. Solis and I saw him on 17 and he underwent successful CTI and AVNRT ablation by Dr. Solis on 17. His clinical arhythmia was typical CTI flutter. He presents today for routine post procedure follow up. He reports that he is doing pretty good. He has not had any more palpitations or heart racing. He denies chest pain, shortness of breath at rest, PND, orthopnea, dizziness or syncope. In reviewing his chart he did have an MRI of his brain due to his daughter calling Dr. Mccall and reporting that he was have some falls and patient being off balance. The MRI was done on 17 and showed moderate small vessel disease in the cerebral white matter and there was mild cerebral atrophy.  Mild right frontiall and bilateral anterior ethmoid sinus mucosal thickening and thre is subtotal opacificantion of the right maxillary sinus with thick ndoular circumferential mucosal thickening.The remainder of the MRI was within normal limits. He does complain of dyspnea with walking short distances. He says this is not new, he has had it for at least 4-6 months, and he thought it would be better after his rhythm was fixed. His most recent echo was in 2016 that showed calculated EF 42% but an estimated EF of 30% with normal LV size, mild LVH, trace AI and mild TR. He did have a LUISA prior to his ablation on 17 which showed EF 35%, LV global hypokinesis, mildly reduced RV systolic function, moderate to severely dilated LA, no evidence of BECKY thrombus, mild to moderately dilated RA, trace MR and TR.            Past Medical History:   Diagnosis Date   • Abnormal electrocardiogram    • Arm pain    • Atrial fibrillation    • Atrial flutter    • BPH associated with nocturia    • Chronic kidney disease    • Colon cancer     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   • Colon polyp 11/22/2015   • Depression    • Dyspnea    • Episode of generalized weakness     knees were weak - had to be helped by wife to sit down   • Esophagitis, reflux    • Fatigue    • Hyperlipidemia    • Inguinal hernia    • Loss of hearing    • Lumbar radiculopathy    • Obesity    • Osteoarthritis cervical spine     doc on [facility] 08/16   • Osteopenia    • Osteoporosis    • Overweight    • Tobacco dependence in remission    • Urge incontinence of urine    • Vitamin D deficiency    • Wheezing        Past Surgical History:   Procedure Laterality Date   • CARDIAC CATHETERIZATION N/A 7/27/2017    Procedure: Valve assessment;  Surgeon: Adonis Solis MD;  Location:  [facility];  Service:    • CARDIAC ELECTROPHYSIOLOGY PROCEDURE N/A 7/27/2017    Procedure: Ablation atrial flutter Will need to have 3 -4 weeks of therapeutic INR's ;  Surgeon: Adonis Solis MD;  Location:  [facility];  Service:    • CARDIOVERSION     • CATARACT EXTRACTION W/ INTRAOCULAR LENS  IMPLANT, BILATERAL     • COLON RESECTION     • COLONOSCOPY      07/15 redo 5 years  [facility]   • KNEE SURGERY      benign tumor removed   • REFRACTIVE SURGERY  2007   • REPAIR PERONEAL TENDONS ANKLE W/ FIBULAR OSTEOTOMY Right 03/2013    Dr. Banks       Social History     Social History   • Marital status:      Spouse name: N/A   • Number of children: 2   • Years of education: COLLEGE GRADUATE     Occupational History   • RETIRED      Social History Main Topics   • Smoking status: Former Smoker     Packs/day: 0.50     Years: 55.00     Types: Cigarettes     Quit date: 5/1/2008   • Smokeless tobacco: Never Used      Comment: QUIT 10 YRS   • Alcohol use Yes      Comment:  SOCIALLY   • Drug use: No   • Sexual activity: Defer     Other Topics Concern   • Not on file     Social History Narrative       Family History   Problem Relation Age of Onset   • Breast cancer Mother    • Heart disease Father    • Hypertension Father    • Hypertension Sister        Review of Systems   Constitution: Negative for chills, fever, malaise/fatigue, weight gain and weight loss.   HENT: Negative for ear pain, headaches, hearing loss, nosebleeds and sore throat.    Eyes: Negative for double vision, pain and visual disturbance.   Cardiovascular: Positive for dyspnea on exertion. Negative for chest pain, irregular heartbeat, leg swelling, near-syncope, orthopnea, palpitations, paroxysmal nocturnal dyspnea and syncope.   Respiratory: Negative for cough, shortness of breath, sleep disturbances due to breathing, snoring and wheezing.    Endocrine: Negative for cold intolerance, heat intolerance and polyuria.   Hematologic/Lymphatic: Negative.    Skin: Negative for itching and rash.   Musculoskeletal: Negative for joint pain, joint swelling and myalgias.   Gastrointestinal: Negative for abdominal pain, diarrhea, melena, nausea and vomiting.   Genitourinary: Negative for frequency, hematuria and hesitancy.   Neurological: Positive for loss of balance. Negative for excessive daytime sleepiness, light-headedness, numbness, paresthesias and seizures.   Psychiatric/Behavioral: Negative for altered mental status and depression.   Allergic/Immunologic: Negative.    All other systems reviewed and are negative.      No Known Allergies      Current Outpatient Prescriptions:   •  ALPRAZolam (XANAX) 1 MG tablet, Take 1 Tablet By Mouth 30 Minutes Prior To Open MRI, Disp: 1 tablet, Rfl: 0  •  aspirin 81 MG chewable tablet, Chew 81 mg Daily., Disp: , Rfl:   •  atenolol (TENORMIN) 25 MG tablet, Take 25 mg by mouth Daily., Disp: , Rfl:   •  atorvastatin (LIPITOR) 10 MG tablet, TAKE 1 TABLET EVERY DAY, Disp: 90 tablet, Rfl: 1  •   "buPROPion XL (WELLBUTRIN XL) 300 MG 24 hr tablet, Take 300 mg by mouth Daily., Disp: , Rfl:   •  Cholecalciferol (VITAMIN D3) 2000 UNITS tablet, Take 2,000 Units by mouth daily., Disp: , Rfl:   •  Flaxseed, Linseed, (FLAXSEED OIL) 1000 MG capsule, Take 1,000 mg by mouth Daily., Disp: , Rfl:   •  gabapentin (NEURONTIN) 300 MG capsule, TAKE 1 CAPSULE THREE TIMES DAILY  FOR  NERVE  PAIN, Disp: 270 capsule, Rfl: 1  •  omeprazole (priLOSEC) 40 MG capsule, Take 40 mg by mouth Daily., Disp: , Rfl:   •  tamsulosin (FLOMAX) 0.4 MG capsule 24 hr capsule, Take 1 capsule by mouth Every Night., Disp: , Rfl:   •  traMADol (ULTRAM) 50 MG tablet, Take 50 mg by mouth Every 6 (Six) Hours As Needed for Moderate Pain (4-6)., Disp: , Rfl:      Objective:     Vitals:    09/19/17 1035   BP: 122/70   Pulse: 60   Weight: 182 lb (82.6 kg)   Height: 66\" (167.6 cm)     Body mass index is 29.38 kg/(m^2).    PHYSICAL EXAM:    Vitals Reviewed.   General Appearance: No acute distress, well developed and well nourished.   Eyes: Conjunctiva and lids: No erythema, swelling, or discharge. Sclera non-icteric.   HENT: Atraumatic, normocephalic. External eyes, ears, and nose normal. No hearing loss noted. Mucous membranes normal. Lips not cyanotic. Neck supple with no tenderness.  Respiratory: No signs of respiratory distress. Respiration rhythm and depth normal.   Clear to auscultation. No rales, crackles, rhonchi, or wheezing auscultated.   Cardiovascular:  Jugular Venous Pressure: Normal  Heart Rate and Rhythm: Normal, Heart Sounds: Normal S1 and S2. No S3 or S4 noted.  Murmurs: No murmurs noted. No rubs, thrills, or gallops.   Arterial Pulses:  Posterior tibialis and dorsalis pedis pulses normal.   Lower Extremities: Trace lower extremity edema.   Gastrointestinal:  Abdomen soft, non-distended, non-tender. Normal bowel sounds.  Musculoskeletal: Normal movement of extremities  Skin and Nails: General appearance normal. No pallor, cyanosis, " diaphoresis. Skin temperature normal. No clubbing of fingernails.   Psychiatric: Patient alert and oriented to person, place, and time. Speech and behavior appropriate. Normal mood and affect.       ECG 12 Lead  Date/Time: 9/19/2017 11:48 AM  Performed by: ILIR MCNAMARA  Authorized by: ILIR MCNAMARA   Comparison: compared with previous ECG from 7/27/2017  Similar to previous ECG  Rhythm: sinus rhythm  Other findings: early transition  Clinical impression: abnormal ECG  Comments: T wave abnormalities  Clinical indication: Aflutter              Assessment:       Diagnosis Plan   1. Typical atrial flutter     2. AVNRT (AV tarun re-entry tachycardia)     3. S/P ablation of atrial flutter     4. S/P catheter ablation of slow pathway     5. Cardiomyopathy     6. GARCIA (dyspnea on exertion)            Plan:       1., 2. 3. & 4. Clinical arrhythmia typical CTI flutter, s/p successful ablation. Also found AVNRT during EP and had successful ablation of AVNRT. SR today. Has no had any palpitations or heart racing.    5. CM, EF 35% by LUISA 7/27/17, but basically unchanged from previous echo.    6. GARCIA, which is unchanged for the last 4-6 months, but not improved since ablation, will defer/discuss with Dr. Vargas and call patient.     He has follow up appointment with Dr. Vargas.     As always, it has been a pleasure to participate in your patient's care.      Sincerely,         SAMI Laureano

## 2017-09-20 ENCOUNTER — TELEPHONE (OUTPATIENT)
Dept: CARDIOLOGY | Facility: CLINIC | Age: 82
End: 2017-09-20

## 2017-09-20 NOTE — TELEPHONE ENCOUNTER
----- Message from SAMI Egan sent at 9/19/2017  5:05 PM EDT -----  Regarding: call pt  Will you call him and let him know I talked to Sam and he does want to see him regarding his dyspnea on exertion. I sent message to Nika to call him with appt in the next month per Dr. Vargas's instruction but I told the patient we would call and let him know what he said.    Thanks

## 2017-09-22 ENCOUNTER — OFFICE VISIT (OUTPATIENT)
Dept: INTERNAL MEDICINE | Facility: CLINIC | Age: 82
End: 2017-09-22

## 2017-09-22 VITALS
BODY MASS INDEX: 29.54 KG/M2 | OXYGEN SATURATION: 94 % | HEART RATE: 57 BPM | SYSTOLIC BLOOD PRESSURE: 86 MMHG | TEMPERATURE: 97.2 F | DIASTOLIC BLOOD PRESSURE: 58 MMHG | WEIGHT: 183 LBS

## 2017-09-22 DIAGNOSIS — F32.1 MODERATE SINGLE CURRENT EPISODE OF MAJOR DEPRESSIVE DISORDER (HCC): ICD-10-CM

## 2017-09-22 DIAGNOSIS — G21.8 OTHER SECONDARY PARKINSONISM (HCC): ICD-10-CM

## 2017-09-22 DIAGNOSIS — R26.89 FUNCTIONAL GAIT ABNORMALITY: Primary | ICD-10-CM

## 2017-09-22 DIAGNOSIS — I95.1 ORTHOSTATIC HYPOTENSION: ICD-10-CM

## 2017-09-22 PROCEDURE — G0439 PPPS, SUBSEQ VISIT: HCPCS | Performed by: FAMILY MEDICINE

## 2017-09-22 PROCEDURE — 99214 OFFICE O/P EST MOD 30 MIN: CPT | Performed by: FAMILY MEDICINE

## 2017-09-22 RX ORDER — ESCITALOPRAM OXALATE 5 MG/1
5 TABLET ORAL DAILY
Qty: 90 TABLET | Refills: 0 | OUTPATIENT
Start: 2017-09-22 | End: 2017-12-16

## 2017-09-22 NOTE — PROGRESS NOTES
QUICK REFERENCE INFORMATION:  The ABCs of the Annual Wellness Visit    Subsequent Medicare Wellness Visit    HEALTH RISK ASSESSMENT    1935    Recent Hospitalizations:  No hospitalization(s) within the last year..        Current Medical Providers:  Patient Care Team:  Panda Mccall Jr., MD as PCP - General (Family Medicine)  Panda Mccall Jr., MD as PCP - Claims Attributed        Smoking Status:  History   Smoking Status   • Former Smoker   • Packs/day: 0.50   • Years: 55.00   • Types: Cigarettes   • Quit date: 5/1/2008   Smokeless Tobacco   • Never Used     Comment: QUIT 10 YRS       Alcohol Consumption:  History   Alcohol Use   • Yes     Comment: SOCIALLY       Depression Screen:   PHQ-2/PHQ-9 Depression Screening 9/22/2017   Little interest or pleasure in doing things 1   Feeling down, depressed, or hopeless 2   Trouble falling or staying asleep, or sleeping too much 1   Feeling tired or having little energy 3   Poor appetite or overeating 0   Feeling bad about yourself - or that you are a failure or have let yourself or your family down 0   Trouble concentrating on things, such as reading the newspaper or watching television 0   Moving or speaking so slowly that other people could have noticed. Or the opposite - being so fidgety or restless that you have been moving around a lot more than usual 0   Thoughts that you would be better off dead, or of hurting yourself in some way 1   Total Score 8   If you checked off any problems, how difficult have these problems made it for you to do your work, take care of things at home, or get along with other people? Not difficult at all       Health Habits and Functional and Cognitive Screening:  No flowsheet data found.           Does the patient have evidence of cognitive impairment? Yes    Aspirin use counseling: Taking ASA appropriately as indicated      Recent Lab Results:  CMP:  Lab Results   Component Value Date    GLU 93 03/31/2016    BUN 20 07/26/2017    CREATININE  1.70 (H) 08/24/2017    EGFRIFNONA 46 (L) 07/26/2017    EGFRIFAFRI 53 (L) 03/31/2016    BCR 13.5 07/26/2017     (H) 07/26/2017    K 4.8 07/26/2017    CO2 24.8 07/26/2017    CALCIUM 9.5 07/26/2017    PROTENTOTREF 6.2 09/29/2016    ALBUMIN 4.20 09/29/2016    LABGLOBREF 2.3 03/31/2016    LABIL2 1.7 03/31/2016    BILITOT 0.7 09/29/2016    ALKPHOS 71 09/29/2016    AST 14 09/29/2016    ALT 16 09/29/2016     Lipid Panel:  Lab Results   Component Value Date    TRIG 124 09/29/2016    HDL 53 09/29/2016    VLDL 24.8 09/29/2016     HbA1c:       Visual Acuity:  No exam data present    Age-appropriate Screening Schedule:  Refer to the list below for future screening recommendations based on patient's age, sex and/or medical conditions. Orders for these recommended tests are listed in the plan section. The patient has been provided with a written plan.    Health Maintenance   Topic Date Due   • PNEUMOCOCCAL VACCINES (65+ LOW/MEDIUM RISK) (2 of 2 - PPSV23) 04/04/2017   • INFLUENZA VACCINE  08/01/2017   • LIPID PANEL  09/29/2017   • DXA SCAN  09/22/2018   • COLONOSCOPY  07/15/2020   • TDAP/TD VACCINES (2 - Td) 03/18/2024   • ZOSTER VACCINE  Completed        Subjective   History of Present Illness    Jonathan Trejo is a 82 y.o. male who presents for an Subsequent Wellness Visit.    The following portions of the patient's history were reviewed and updated as appropriate: allergies, current medications, past family history, past medical history, past social history, past surgical history and problem list.    Outpatient Medications Prior to Visit   Medication Sig Dispense Refill   • aspirin 81 MG chewable tablet Chew 81 mg Daily.     • atorvastatin (LIPITOR) 10 MG tablet TAKE 1 TABLET EVERY DAY 90 tablet 1   • buPROPion XL (WELLBUTRIN XL) 300 MG 24 hr tablet Take 300 mg by mouth Daily.     • Cholecalciferol (VITAMIN D3) 2000 UNITS tablet Take 2,000 Units by mouth daily.     • Flaxseed, Linseed, (FLAXSEED OIL) 1000 MG capsule Take  1,000 mg by mouth Daily.     • omeprazole (priLOSEC) 40 MG capsule Take 40 mg by mouth Daily.     • tamsulosin (FLOMAX) 0.4 MG capsule 24 hr capsule Take 1 capsule by mouth Every Night.     • traMADol (ULTRAM) 50 MG tablet Take 50 mg by mouth Every 6 (Six) Hours As Needed for Moderate Pain (4-6).     • atenolol (TENORMIN) 25 MG tablet Take 25 mg by mouth Daily.     • gabapentin (NEURONTIN) 300 MG capsule TAKE 1 CAPSULE THREE TIMES DAILY  FOR  NERVE  PAIN 270 capsule 1   • ALPRAZolam (XANAX) 1 MG tablet Take 1 Tablet By Mouth 30 Minutes Prior To Open MRI 1 tablet 0     No facility-administered medications prior to visit.        Patient Active Problem List   Diagnosis   • Benign prostatic hyperplasia with urinary obstruction   • Chronic renal impairment, stage 3 (moderate)   • Depression   • Gastroesophageal reflux disease with esophagitis   • Hyperlipidemia   • Nocturia   • Obesity (BMI 30-39.9)   • Osteopenia   • Osteoporosis   • Seborrhea   • Abnormal EKG   • Bradycardia   • Degenerative disc disease, cervical   • Typical atrial flutter   • Lumbar radiculopathy   • AVNRT (AV tarun re-entry tachycardia)   • S/P ablation of atrial flutter   • S/P catheter ablation of slow pathway   • Cardiomyopathy   • GARCIA (dyspnea on exertion)       Advance Care Planning:  has an advance directive - a copy has been provided and is in file    Identification of Risk Factors:  Risk factors include: cardiovascular risk and increased fall risk.    Review of Systems    Compared to one year ago, the patient feels his physical health is worse.  Compared to one year ago, the patient feels his mental health is worse.    Objective     Physical Exam    Vitals:    09/22/17 1709   BP: (!) 86/58   BP Location: Left arm   Patient Position: Sitting   Cuff Size: Adult   Pulse: 57   Temp: 97.2 °F (36.2 °C)   TempSrc: Tympanic   SpO2: 94%   Weight: 183 lb (83 kg)   PainSc: 0-No pain       Body mass index is 29.54 kg/(m^2).  Discussed the patient's BMI  with him. The BMI is above average; BMI management plan is completed.    Assessment/Plan   Patient Self-Management and Personalized Health Advice  The patient has been provided with information about: prevention of cardiac or vascular disease, fall prevention and mental health concerns and preventive services including:   · Counseling for cardiovascular disease risk reduction.    Visit Diagnoses:  No diagnosis found.    No orders of the defined types were placed in this encounter.      Outpatient Encounter Prescriptions as of 9/22/2017   Medication Sig Dispense Refill   • aspirin 81 MG chewable tablet Chew 81 mg Daily.     • atorvastatin (LIPITOR) 10 MG tablet TAKE 1 TABLET EVERY DAY 90 tablet 1   • buPROPion XL (WELLBUTRIN XL) 300 MG 24 hr tablet Take 300 mg by mouth Daily.     • Cholecalciferol (VITAMIN D3) 2000 UNITS tablet Take 2,000 Units by mouth daily.     • Flaxseed, Linseed, (FLAXSEED OIL) 1000 MG capsule Take 1,000 mg by mouth Daily.     • omeprazole (priLOSEC) 40 MG capsule Take 40 mg by mouth Daily.     • tamsulosin (FLOMAX) 0.4 MG capsule 24 hr capsule Take 1 capsule by mouth Every Night.     • traMADol (ULTRAM) 50 MG tablet Take 50 mg by mouth Every 6 (Six) Hours As Needed for Moderate Pain (4-6).     • atenolol (TENORMIN) 25 MG tablet Take 25 mg by mouth Daily.     • gabapentin (NEURONTIN) 300 MG capsule TAKE 1 CAPSULE THREE TIMES DAILY  FOR  NERVE  PAIN 270 capsule 1   • [DISCONTINUED] ALPRAZolam (XANAX) 1 MG tablet Take 1 Tablet By Mouth 30 Minutes Prior To Open MRI 1 tablet 0     No facility-administered encounter medications on file as of 9/22/2017.        Reviewed use of high risk medication in the elderly: yes  Reviewed for potential of harmful drug interactions in the elderly: yes    Follow Up:  No Follow-up on file.     An After Visit Summary and PPPS with all of these plans were given to the patient.

## 2017-09-22 NOTE — PROGRESS NOTES
Subjective   Jonathan Trejo is a 82 y.o. male.     Chief Complaint   Patient presents with   • Depression   • Fatigue   • Shortness of Breath         History of Present Illness patient comes in accompanied by his daughter.  He is gone through neuropsychological testing which shows mild cognitive dysfunction exacerbated by evidence of some depression.  He has had an MRI of the brain which only shows small vessel disease the brain.  He has had some parkinsonian gait issues and some episodes of falling.  His move from his house and residence for very long time to an independent living senior community.  He is tired his right leg tends to limp at times he has nocturia at night for 5 times tends to walk forward with small steps.  His balance is not great.  He has been evaluated with cardiologist within the past week with normal blood pressure and he is off atenolol.  He has a history of ablation for atrial flutter.  Otherwise today he had low blood pressure with systolic blood pressure approximately 86 on 2 separate measurements.  Diastolic was 58.  Heart rate was 57.  We discussed coming back for labs and drinking plenty of fluids.  He is asymptomatic in the office during the interview.    He does admit to some depression without suicidal effusion we did Medicare wellness exam as well.  He is on Wellbutrin  mg which is helped in the past has been treated by psychiatry with that dose.  We discussed adding low-dose Lexapro.  He has been on Prozac in the distant past.    The following portions of the patient's history were reviewed and updated as appropriate: allergies, current medications, past social history and problem list.    Review of Systems   Constitutional: Positive for fatigue.   HENT: Negative.    Eyes: Negative.    Respiratory: Negative.    Cardiovascular: Negative.    Gastrointestinal: Negative.    Endocrine: Negative.    Genitourinary: Negative.    Musculoskeletal: Negative.    Skin: Negative.     Allergic/Immunologic: Negative.    Neurological: Negative.    Hematological: Negative.    Psychiatric/Behavioral: Positive for dysphoric mood.       Objective   Vitals:    09/22/17 1709   BP: (!) 86/58   Pulse: 57   Temp: 97.2 °F (36.2 °C)   SpO2: 94%     Physical Exam   Constitutional: He is oriented to person, place, and time. He appears well-developed and well-nourished.   HENT:   Head: Normocephalic and atraumatic.   Right Ear: Tympanic membrane and external ear normal.   Left Ear: Tympanic membrane and external ear normal.   Nose: Nose normal.   Mouth/Throat: Oropharynx is clear and moist.   Eyes: Conjunctivae and EOM are normal. Pupils are equal, round, and reactive to light.   Neck: Normal range of motion. Neck supple. No JVD present. No thyromegaly present.   Cardiovascular: Normal rate, regular rhythm, normal heart sounds and intact distal pulses.    Pulmonary/Chest: Effort normal and breath sounds normal.   Abdominal: Soft. Bowel sounds are normal.   Musculoskeletal: Normal range of motion.   Lymphadenopathy:     He has no cervical adenopathy.   Neurological: He is alert and oriented to person, place, and time. No cranial nerve deficit. Coordination and gait abnormal.   Skin: Skin is warm and dry. No rash noted.   Psychiatric: His behavior is normal. Judgment and thought content normal. He exhibits a depressed mood.   Vitals reviewed.      Assessment/Plan   Problem List Items Addressed This Visit        Other    Depression    Relevant Medications    escitalopram (LEXAPRO) 5 MG tablet    Other Relevant Orders    CBC & Differential    Basic Metabolic Panel    TSH    T4, Free    Urinalysis With / Culture If Indicated    Cortisol      Other Visit Diagnoses     Functional gait abnormality    -  Primary    Relevant Orders    Ambulatory Referral to Neurology    CBC & Differential    Basic Metabolic Panel    TSH    T4, Free    Urinalysis With / Culture If Indicated    Cortisol    Orthostatic hypotension         Relevant Orders    CBC & Differential    Basic Metabolic Panel    TSH    T4, Free    Urinalysis With / Culture If Indicated    Cortisol    Other secondary parkinsonism        Relevant Orders    CBC & Differential    Basic Metabolic Panel    TSH    T4, Free    Urinalysis With / Culture If Indicated    Cortisol      Plan: Return next week for labs.  Recheck in a month.  Schedule follow-up with neurology.  This is for examination possible parkinsonian gait.  Add Lexapro 5 mg at bedtime.

## 2017-09-22 NOTE — PATIENT INSTRUCTIONS
Medicare Wellness  Personal Prevention Plan of Service     Date of Office Visit:  2017  Encounter Provider:  Panda Mccall Jr., MD  Place of Service:  [facility] INTERNAL MEDICINE  Patient Name: Jonathan FUCHS:  1935    As part of the Medicare Wellness portion of your visit today, we are providing you with this personalized preventive plan of services (PPPS). This plan is based upon recommendations of the [place] Preventive Services Task Force (USPSTF) and the Advisory Committee on Immunization Practices (ACIP).    This lists the preventive care services that should be considered, and provides dates of when you are due. Items listed as completed are up-to-date and do not require any further intervention.    Health Maintenance   Topic Date Due   • MEDICARE ANNUAL WELLNESS  2016   • PNEUMOCOCCAL VACCINES (65+ LOW/MEDIUM RISK) (2 of 2 - PPSV23) 2017   • INFLUENZA VACCINE  2017   • LIPID PANEL  2017   • DXA SCAN  2018   • COLONOSCOPY  07/15/2020   • TDAP/TD VACCINES (2 - Td) 2024   • ZOSTER VACCINE  Completed       Orders Placed This Encounter   Procedures   • Basic Metabolic Panel   • TSH   • T4, Free   • Urinalysis With / Culture If Indicated   • Cortisol   • Ambulatory Referral to Neurology     Referral Priority:   Urgent     Referral Type:   Consultation     Referral Reason:   Specialty Services Required     Referred to Provider:   Carla Mcmahan MD     Requested Specialty:   Neurology     Number of Visits Requested:   1   • CBC & Differential     Order Specific Question:   Manual Differential     Answer:   No       Return in about 1 month (around 10/22/2017) for Recheck.

## 2017-09-25 ENCOUNTER — TELEPHONE (OUTPATIENT)
Dept: INTERNAL MEDICINE | Facility: CLINIC | Age: 82
End: 2017-09-25

## 2017-09-25 NOTE — TELEPHONE ENCOUNTER
Patient left 90 capsules of Gabapentin 300 mg given to Dr. Mccall which was disposed of in the appropriate manner.

## 2017-09-29 LAB
APPEARANCE UR: CLEAR
BACTERIA #/AREA URNS HPF: ABNORMAL /HPF
BACTERIA UR CULT: NORMAL
BACTERIA UR CULT: NORMAL
BASOPHILS # BLD AUTO: 0.05 10*3/MM3 (ref 0–0.2)
BASOPHILS NFR BLD AUTO: 0.6 % (ref 0–1.5)
BILIRUB UR QL STRIP: NEGATIVE
BUN SERPL-MCNC: 17 MG/DL (ref 8–23)
BUN/CREAT SERPL: 11.6 (ref 7–25)
CALCIUM SERPL-MCNC: 9.9 MG/DL (ref 8.6–10.5)
CASTS URNS MICRO: ABNORMAL
CASTS URNS QL MICRO: PRESENT /LPF
CHLORIDE SERPL-SCNC: 106 MMOL/L (ref 98–107)
CO2 SERPL-SCNC: 24.3 MMOL/L (ref 22–29)
COLOR UR: YELLOW
CORTIS SERPL-MCNC: 16.3 UG/DL
CREAT SERPL-MCNC: 1.46 MG/DL (ref 0.76–1.27)
CRYSTALS URNS MICRO: ABNORMAL
EOSINOPHIL # BLD AUTO: 0.1 10*3/MM3 (ref 0–0.7)
EOSINOPHIL NFR BLD AUTO: 1.1 % (ref 0.3–6.2)
EPI CELLS #/AREA URNS HPF: ABNORMAL /HPF
ERYTHROCYTE [DISTWIDTH] IN BLOOD BY AUTOMATED COUNT: 14 % (ref 11.5–14.5)
GLUCOSE SERPL-MCNC: 108 MG/DL (ref 65–99)
GLUCOSE UR QL: NEGATIVE
HCT VFR BLD AUTO: 46.8 % (ref 40.4–52.2)
HGB BLD-MCNC: 15.4 G/DL (ref 13.7–17.6)
HGB UR QL STRIP: NEGATIVE
IMM GRANULOCYTES # BLD: 0.17 10*3/MM3 (ref 0–0.03)
IMM GRANULOCYTES NFR BLD: 1.9 % (ref 0–0.5)
KETONES UR QL STRIP: NEGATIVE
LEUKOCYTE ESTERASE UR QL STRIP: ABNORMAL
LYMPHOCYTES # BLD AUTO: 1.02 10*3/MM3 (ref 0.9–4.8)
LYMPHOCYTES NFR BLD AUTO: 11.2 % (ref 19.6–45.3)
MCH RBC QN AUTO: 32.8 PG (ref 27–32.7)
MCHC RBC AUTO-ENTMCNC: 32.9 G/DL (ref 32.6–36.4)
MCV RBC AUTO: 99.8 FL (ref 79.8–96.2)
MICRO URNS: ABNORMAL
MONOCYTES # BLD AUTO: 1.01 10*3/MM3 (ref 0.2–1.2)
MONOCYTES NFR BLD AUTO: 11.1 % (ref 5–12)
MUCOUS THREADS URNS QL MICRO: PRESENT /HPF
NEUTROPHILS # BLD AUTO: 6.73 10*3/MM3 (ref 1.9–8.1)
NEUTROPHILS NFR BLD AUTO: 74.1 % (ref 42.7–76)
NITRITE UR QL STRIP: NEGATIVE
PH UR STRIP: 6 [PH] (ref 5–7.5)
PLATELET # BLD AUTO: 288 10*3/MM3 (ref 140–500)
POTASSIUM SERPL-SCNC: 4.9 MMOL/L (ref 3.5–5.2)
PROT UR QL STRIP: ABNORMAL
RBC # BLD AUTO: 4.69 10*6/MM3 (ref 4.6–6)
RBC #/AREA URNS HPF: ABNORMAL /HPF
SODIUM SERPL-SCNC: 144 MMOL/L (ref 136–145)
SP GR UR: 1.02 (ref 1–1.03)
T4 FREE SERPL-MCNC: 1.42 NG/DL (ref 0.93–1.7)
TSH SERPL DL<=0.005 MIU/L-ACNC: 2.56 MIU/ML (ref 0.27–4.2)
UNIDENT CRYS URNS QL MICRO: PRESENT /LPF
URINALYSIS REFLEX: ABNORMAL
UROBILINOGEN UR STRIP-MCNC: 1 MG/DL (ref 0.2–1)
WBC # BLD AUTO: 9.08 10*3/MM3 (ref 4.5–10.7)
WBC #/AREA URNS HPF: ABNORMAL /HPF

## 2017-10-04 ENCOUNTER — OFFICE VISIT (OUTPATIENT)
Dept: CARDIOLOGY | Facility: CLINIC | Age: 82
End: 2017-10-04

## 2017-10-04 VITALS
SYSTOLIC BLOOD PRESSURE: 114 MMHG | DIASTOLIC BLOOD PRESSURE: 80 MMHG | HEART RATE: 70 BPM | HEIGHT: 66 IN | BODY MASS INDEX: 29.73 KG/M2 | WEIGHT: 185 LBS

## 2017-10-04 DIAGNOSIS — I47.1 AVNRT (AV NODAL RE-ENTRY TACHYCARDIA) (HCC): Primary | ICD-10-CM

## 2017-10-04 DIAGNOSIS — I42.9 CARDIOMYOPATHY, UNSPECIFIED TYPE (HCC): ICD-10-CM

## 2017-10-04 PROCEDURE — 99214 OFFICE O/P EST MOD 30 MIN: CPT | Performed by: INTERNAL MEDICINE

## 2017-10-04 PROCEDURE — 93000 ELECTROCARDIOGRAM COMPLETE: CPT | Performed by: INTERNAL MEDICINE

## 2017-10-04 RX ORDER — GABAPENTIN 300 MG/1
300 CAPSULE ORAL 3 TIMES DAILY
Status: ON HOLD | COMMUNITY
End: 2018-01-05

## 2017-10-04 NOTE — PROGRESS NOTES
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  As part of his workup he had an echocardiogram which shows that his left ventricular function has been reduced to about 30-35%.  Despite this patient symptoms suggesting congestive heart failure.  He does not complain of any palpitations, peripheral edema nor does he complain of any chest pain.  He does have problems with fatigue and he also complains of some lower extremity weakness.  When he was in atrial flutter he really had no sensation that this was occurring.    Past Medical History:   Diagnosis Date   • Abnormal electrocardiogram    • Arm pain    • Atrial fibrillation    • Atrial flutter    • BPH associated with nocturia    • Chronic kidney disease    • Colon cancer     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   • Colon polyp 2015   • Depression    • Dyspnea    • Episode of generalized weakness     knees were weak - had to be helped by wife to sit down   • Esophagitis, reflux    • Fatigue    • Hyperlipidemia    • Inguinal hernia    • Loss of hearing    • Lumbar radiculopathy    • Obesity    • Osteoarthritis cervical spine     doc on [place]    • Osteopenia    • Osteoporosis    • Overweight    • Tobacco dependence in remission    • Urge incontinence of urine    • Vitamin D deficiency    • Wheezing          Past Surgical History:   Procedure Laterality Date   • CARDIAC CATHETERIZATION N/A 2017    Procedure: Valve assessment;  Surgeon: Adonis Solis MD;  Location: [facility] INVASIVE LOCATION;  Service:    • CARDIAC ELECTROPHYSIOLOGY PROCEDURE N/A 2017    Procedure:  Ablation atrial flutter Will need to have 3 -4 weeks of therapeutic INR's ;  Surgeon: Adonis Solis MD;  Location: [facility] INVASIVE LOCATION;  Service:    • CARDIOVERSION     • CATARACT EXTRACTION W/ INTRAOCULAR LENS  IMPLANT, BILATERAL     • COLON RESECTION     • COLONOSCOPY      07/15 redo 5 years  Segun   • KNEE SURGERY      benign tumor removed   • REFRACTIVE SURGERY  2007   • REPAIR PERONEAL TENDONS ANKLE W/ FIBULAR OSTEOTOMY Right 03/2013    Dr. Banks           Current Outpatient Prescriptions:   •  aspirin 81 MG chewable tablet, Chew 81 mg Daily., Disp: , Rfl:   •  atorvastatin (LIPITOR) 10 MG tablet, TAKE 1 TABLET EVERY DAY, Disp: 90 tablet, Rfl: 1  •  buPROPion XL (WELLBUTRIN XL) 300 MG 24 hr tablet, Take 300 mg by mouth Daily., Disp: , Rfl:   •  Cholecalciferol (VITAMIN D3) 2000 UNITS tablet, Take 2,000 Units by mouth daily., Disp: , Rfl:   •  escitalopram (LEXAPRO) 5 MG tablet, Take 1 tablet by mouth Daily., Disp: 90 tablet, Rfl: 0  •  Flaxseed, Linseed, (FLAXSEED OIL) 1000 MG capsule, Take 1,000 mg by mouth Daily., Disp: , Rfl:   •  gabapentin (NEURONTIN) 300 MG capsule, Take 300 mg by mouth 3 (Three) Times a Day., Disp: , Rfl:   •  omeprazole (priLOSEC) 40 MG capsule, Take 40 mg by mouth Daily., Disp: , Rfl:   •  tamsulosin (FLOMAX) 0.4 MG capsule 24 hr capsule, Take 1 capsule by mouth Every Night., Disp: , Rfl:       Social History     Social History   • Marital status:      Spouse name: N/A   • Number of children: 2   • Years of education: COLLEGE GRADUATE     Occupational History   • RETIRED      Social History Main Topics   • Smoking status: Former Smoker     Packs/day: 0.50     Years: 55.00     Types: Cigarettes     Quit date: 5/1/2008   • Smokeless tobacco: Never Used      Comment: QUIT 10 YRS   • Alcohol use Yes      Comment: SOCIALLY   • Drug use: No   • Sexual activity: Defer     Other Topics Concern   • Not on file     Social History Narrative         Review of Systems  "  Constitution: Positive for malaise/fatigue.   HENT: Negative.    Eyes: Negative.    Cardiovascular: Negative.    Respiratory: Negative.    Endocrine: Negative.    Skin: Negative.    Musculoskeletal: Negative.    Gastrointestinal: Negative.    Neurological: Positive for light-headedness.   Psychiatric/Behavioral: Negative.        Procedures      ECG 12 Lead  Date/Time: 10/4/2017 11:58 AM  Performed by: ASIM ORTEGA  Authorized by: ASIM ORTEGA   Comparison: compared with previous ECG from 7/27/2017  Comparison to previous ECG: ST-T wave changes in the lateral leads appear to be a little bit more prominent  Rhythm: sinus rhythm  Conduction: conduction normal  QRS axis: normal                 Objective:    /80  Pulse 70  Ht 66\" (167.6 cm)  Wt 185 lb (83.9 kg)  BMI 29.86 kg/m2        Physical Exam   Constitutional: He is oriented to person, place, and time. He appears well-developed and well-nourished.   HENT:   Head: Normocephalic.   Eyes: Pupils are equal, round, and reactive to light.   Neck: Normal range of motion. No JVD present. Carotid bruit is not present. No thyromegaly present.   Cardiovascular: Normal rate, regular rhythm, S1 normal, S2 normal, normal heart sounds and intact distal pulses.  Exam reveals no gallop and no friction rub.    No murmur heard.  Pulmonary/Chest: Effort normal and breath sounds normal.   Abdominal: Soft. Bowel sounds are normal.   Musculoskeletal: He exhibits no edema.   Neurological: He is alert and oriented to person, place, and time.   Skin: Skin is warm, dry and intact. No erythema.   Psychiatric: He has a normal mood and affect.   Vitals reviewed.          Assessment:       Diagnosis Plan   1. AVNRT (AV tarun re-entry tachycardia)     2. Cardiomyopathy, unspecified type         The patient is now status post ablation.  I'm going to see him back in 6 months and do another echocardiogram for evaluation of his myopathy.  He does not not have signs or " symptoms of congestive heart failure at present.     Plan:

## 2017-10-09 ENCOUNTER — TELEPHONE (OUTPATIENT)
Dept: INTERNAL MEDICINE | Facility: CLINIC | Age: 82
End: 2017-10-09

## 2017-10-13 ENCOUNTER — TELEPHONE (OUTPATIENT)
Dept: INTERNAL MEDICINE | Facility: CLINIC | Age: 82
End: 2017-10-13

## 2017-10-13 NOTE — TELEPHONE ENCOUNTER
Pt's Dtr called because she missed a call from you a few days ago, she assumed it was regarding his blood work?  Also, she is going to tell him that we said he should go see the NP at the Neurologist instead of waiting to see the actual Dr Salcedo

## 2017-10-21 ENCOUNTER — HOSPITAL ENCOUNTER (EMERGENCY)
Facility: HOSPITAL | Age: 82
Discharge: HOME | End: 2017-10-22
Attending: EMERGENCY MEDICINE | Admitting: EMERGENCY MEDICINE

## 2017-10-21 VITALS
WEIGHT: 185 LBS | RESPIRATION RATE: 16 BRPM | BODY MASS INDEX: 29.73 KG/M2 | TEMPERATURE: 99.1 F | OXYGEN SATURATION: 97 % | HEIGHT: 66 IN | SYSTOLIC BLOOD PRESSURE: 156 MMHG | HEART RATE: 78 BPM | DIASTOLIC BLOOD PRESSURE: 88 MMHG

## 2017-10-21 DIAGNOSIS — S80.211A KNEE ABRASION, RIGHT, INITIAL ENCOUNTER: Primary | ICD-10-CM

## 2017-10-21 DIAGNOSIS — S80.01XA CONTUSION OF RIGHT KNEE, INITIAL ENCOUNTER: ICD-10-CM

## 2017-10-21 DIAGNOSIS — S80.212A KNEE ABRASION, LEFT, INITIAL ENCOUNTER: ICD-10-CM

## 2017-10-21 PROCEDURE — 99283 EMERGENCY DEPT VISIT LOW MDM: CPT

## 2017-10-22 NOTE — ED NOTES
Pt fell on Thursday on right knee. Pt stated since Thursday the pain and mobility have gotten worse, pt states he can barely walk around now. Pt states he has pain in lower back, right hip and right leg at this time.      Mamie Melvin RN  10/21/17 2277

## 2017-10-22 NOTE — ED TRIAGE NOTES
Patient fell on right knee, pt complains of right hip pain and denies pain in right thigh, and c/o mild pain in calf; patient denies hitting head, patient denies blood thinning medication. No obvious deformity of lower extremities.

## 2017-10-22 NOTE — ED PROVIDER NOTES
EMERGENCY DEPARTMENT ENCOUNTER    CHIEF COMPLAINT  Chief Complaint: fall  History given by: patient with daughter at bedside  History limited by: none  Room Number: 26/26  PMD: Panda Mccall Jr., MD      HPI:  Pt is a 82 y.o. male who experienced a trip and fall several weeks ago. Patient states that he fell unto both his knees at that time and presents today with worsening pain and weakness to the affected knees. He has been able to bear weight since the incident, but states that earlier tonight the pain and weakness was too much to bear. Daughter at bedside states that patient has been having difficulty with his gait and fluctuating blood pressure recently. His gait has significantly worsened since his fall several weeks ago.     Duration:  Several weeks  Onset: sudden  Timing: constant  Location: bilateral knees  Radiation: none stated  Quality: weakness  Intensity/Severity: moderate  Progression: worsening  Associated Symptoms: gait difficulty, fluctuating blood pressure  Aggravating Factors: none stated  Alleviating Factors: none stated  Previous Episodes: none stated  Treatment before arrival: none stated    PAST MEDICAL HISTORY  Active Ambulatory Problems     Diagnosis Date Noted   • Benign prostatic hyperplasia with urinary obstruction 11/22/2015   • Chronic renal impairment, stage 3 (moderate) 11/22/2015   • Depression 11/22/2015   • Gastroesophageal reflux disease with esophagitis 11/22/2015   • Hyperlipidemia 11/22/2015   • Nocturia 11/22/2015   • Obesity (BMI 30-39.9) 11/22/2015   • Osteopenia 11/22/2015   • Osteoporosis 11/22/2015   • Seborrhea 04/04/2016   • Abnormal EKG 07/19/2016   • Bradycardia 09/02/2016   • Degenerative disc disease, cervical 10/02/2016   • Typical atrial flutter 04/06/2017   • Lumbar radiculopathy 06/06/2017   • 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   • S/P ablation of atrial flutter 09/19/2017   • S/P catheter ablation of slow pathway 09/19/2017   • Cardiomyopathy  09/19/2017   • GARCIA (dyspnea on exertion) 09/19/2017     Resolved Ambulatory Problems     Diagnosis Date Noted   • Pain of upper extremity 11/22/2015   • Colon polyp 11/22/2015   • Hearing loss 11/22/2015   • Obesity (BMI 30-39.9) 11/22/2015   • Acute on chronic systolic heart failure 09/02/2016     Past Medical History:   Diagnosis Date   • Abnormal electrocardiogram    • Arm pain    • Atrial fibrillation    • Atrial flutter    • BPH associated with nocturia    • Chronic kidney disease    • Colon cancer    • Colon polyp 11/22/2015   • Depression    • Dyspnea    • Episode of generalized weakness    • Esophagitis, reflux    • Fatigue    • Hyperlipidemia    • Inguinal hernia    • Loss of hearing    • Lumbar radiculopathy    • Obesity    • Osteoarthritis cervical spine    • Osteopenia    • Osteoporosis    • Overweight    • Tobacco dependence in remission    • Urge incontinence of urine    • Vitamin D deficiency    • Wheezing        PAST SURGICAL HISTORY  Past Surgical History:   Procedure Laterality Date   • CARDIAC CATHETERIZATION N/A 7/27/2017    Procedure: Valve assessment;  Surgeon: Adonis Solis MD;  Location: [facility] CATH INVASIVE LOCATION;  Service:    • CARDIAC ELECTROPHYSIOLOGY PROCEDURE N/A 7/27/2017    Procedure: Ablation atrial flutter Will need to have 3 -4 weeks of therapeutic INR's ;  Surgeon: Adonis Solis MD;  Location:  [facility] CATH INVASIVE LOCATION;  Service:    • CARDIOVERSION     • CATARACT EXTRACTION W/ INTRAOCULAR LENS  IMPLANT, BILATERAL     • COLON RESECTION     • COLONOSCOPY      07/15 redo 5 years  Segun   • KNEE SURGERY      benign tumor removed   • REFRACTIVE SURGERY  2007   • REPAIR PERONEAL TENDONS ANKLE W/ FIBULAR OSTEOTOMY Right 03/2013    Dr. Banks       FAMILY HISTORY  Family History   Problem Relation Age of Onset   • Breast cancer Mother    • Heart disease Father    • Hypertension Father    • Hypertension Sister        SOCIAL HISTORY  Social History     Social History   • Marital  status:      Spouse name: N/A   • Number of children: 2   • Years of education: COLLEGE GRADUATE     Occupational History   • RETIRED      Social History Main Topics   • Smoking status: Former Smoker     Packs/day: 0.50     Years: 55.00     Types: Cigarettes     Quit date: 5/1/2008   • Smokeless tobacco: Never Used      Comment: QUIT 10 YRS   • Alcohol use Yes      Comment: SOCIALLY   • Drug use: No   • Sexual activity: Defer     Other Topics Concern   • Not on file     Social History Narrative       ALLERGIES  Review of patient's allergies indicates no known allergies.    REVIEW OF SYSTEMS  Review of Systems   Constitutional: Negative for activity change and fever.   Respiratory: Negative for shortness of breath.    Cardiovascular: Negative for chest pain.        Positive for blood pressure fluctuating   Musculoskeletal: Positive for arthralgias (bilateral knees) and gait problem.   Neurological: Positive for weakness (bilateral knees). Negative for dizziness, numbness and headaches.   All other systems reviewed and are negative.      PHYSICAL EXAM  ED Triage Vitals   Temp Heart Rate Resp BP SpO2   10/21/17 2119 10/21/17 2119 10/21/17 2119 10/21/17 2119 10/21/17 2216   98.5 °F (36.9 °C) 80 16 154/84 96 %      Temp src Heart Rate Source Patient Position BP Location FiO2 (%)   -- -- -- -- --              Physical Exam   Constitutional: He is oriented to person, place, and time. No distress.   HENT:   Head: Normocephalic and atraumatic.   Eyes: EOM are normal.   Neck: Normal range of motion.   Cardiovascular: Normal heart sounds.    Pulmonary/Chest: No respiratory distress.   Abdominal: There is no tenderness.   Musculoskeletal: He exhibits no edema.   Soft tissue swelling and abrasion over right patella with no effusion, instability or signs of infection   Neurological: He is alert and oriented to person, place, and time.   Skin: Skin is warm and dry.   Right knee has an abrasion over the patella   Nursing  note and vitals reviewed.        PROCEDURES  Procedures      PROGRESS AND CONSULTS  ED Course   2330: Patient's injuries are not indicative of fracture, thus no imaging was ordered. He has full ROM without pain. Patient will be d/c with a walker. Pt and family understand and agree with the plan. All questions answered.        MEDICAL DECISION MAKING  Results were reviewed/discussed with the patient and they were also made aware of online access.     MDM  Number of Diagnoses or Management Options  Patient Progress  Patient progress: stable         DIAGNOSIS  Final diagnoses:   Knee abrasion, right, initial encounter   Contusion of right knee, initial encounter   Knee abrasion, left, initial encounter       DISPOSITION  DISCHARGE    Patient discharged in stable condition.    Reviewed implications of results, diagnosis, meds, responsibility to follow up, warning signs and symptoms of possible worsening, potential complications and reasons to return to ER, including new or worsening sx.    Patient/Family voiced understanding of above instructions.    Discussed plan for discharge, as there is no emergent indication for admission.  Pt/family is agreeable and understands need for follow up and repeat testing.  Pt is aware that discharge does not mean that nothing is wrong but it indicates no emergency is present that requires admission and they must continue care with follow-up as given below or physician of their choice.     FOLLOW-UP  Panda Mccall Jr., MD  [address] [address]  [phone]               Medication List      Stop          escitalopram 5 MG tablet   Commonly known as:  LEXAPRO             Latest Documented Vital Signs:  As of 11:29 PM  BP- 152/84 HR- 80 Temp- 98.5 °F (36.9 °C) O2 sat- 96%    --  Documentation assistance provided by akua Pretty for Dr. Burgos.  Information recorded by the akua was done at my direction and has been verified and validated by me.     Denice  Sukhwinder  10/22/17 0028       Chai Burgos MD  10/22/17 0347

## 2017-10-23 ENCOUNTER — EPISODE CHANGES (OUTPATIENT)
Dept: CASE MANAGEMENT | Facility: OTHER | Age: 82
End: 2017-10-23

## 2017-10-24 ENCOUNTER — OFFICE VISIT (OUTPATIENT)
Dept: INTERNAL MEDICINE | Facility: CLINIC | Age: 82
End: 2017-10-24

## 2017-10-24 VITALS
BODY MASS INDEX: 28.89 KG/M2 | OXYGEN SATURATION: 94 % | WEIGHT: 179 LBS | TEMPERATURE: 97.2 F | SYSTOLIC BLOOD PRESSURE: 102 MMHG | HEART RATE: 82 BPM | DIASTOLIC BLOOD PRESSURE: 66 MMHG

## 2017-10-24 DIAGNOSIS — I95.1 ORTHOSTATIC HYPOTENSION: ICD-10-CM

## 2017-10-24 DIAGNOSIS — I42.9 CARDIOMYOPATHY, UNSPECIFIED TYPE (HCC): Primary | ICD-10-CM

## 2017-10-24 DIAGNOSIS — G25.9 MOVEMENT DISORDER: ICD-10-CM

## 2017-10-24 DIAGNOSIS — N40.1 BENIGN PROSTATIC HYPERPLASIA WITH URINARY OBSTRUCTION: ICD-10-CM

## 2017-10-24 DIAGNOSIS — N13.8 BENIGN PROSTATIC HYPERPLASIA WITH URINARY OBSTRUCTION: ICD-10-CM

## 2017-10-24 PROCEDURE — 99214 OFFICE O/P EST MOD 30 MIN: CPT | Performed by: FAMILY MEDICINE

## 2017-10-24 NOTE — PROGRESS NOTES
Subjective   Jonathan Trejo is a 82 y.o. male.     Chief Complaint   Patient presents with   • falling   • Hypotension   • Hypertension         History of Present Illness    patient returns for recheck.  He has papers to be filled out for [facility] movement disorder clinic.  Otherwise he's had some falling episodes and is reviewed by emergency department record.  He recounts this episode.  He has a walker which she is encouraged to use.  He has evidence of hypotension in the office today at 102/66 but this is a random hypotension which was not evident and cardiologist's office.    Otherwise he talked about getting some Viagra although he is not in a relationship nor does he have significant other at this time.  He states this is for futurepossibilities of relationship.  I encouraged him not to get Viagra given the fact these on tamsulosin for BPH.  He feels like he is having not much trouble urinating and I was worried that tamsulosin is the alone medicine on his list that may be enhancing occasional hypotension.  He agreed to stop the tamsulosin and wait on any discussion of Viagra to light the first of the year.      The following portions of the patient's history were reviewed and updated as appropriate: allergies, current medications, past social history and problem list.    Review of Systems   Constitutional: Negative.    HENT: Negative.    Eyes: Negative.    Respiratory: Negative.    Cardiovascular: Negative.    Gastrointestinal: Negative.    Endocrine: Negative.    Genitourinary: Negative.    Musculoskeletal: Negative.    Skin: Negative.    Allergic/Immunologic: Negative.    Neurological: Positive for dizziness and light-headedness.   Hematological: Negative.    Psychiatric/Behavioral: Negative.        Objective   Vitals:    10/24/17 1652   BP: 102/66   Pulse: 82   Temp: 97.2 °F (36.2 °C)   SpO2: 94%     Physical Exam   Constitutional: He is oriented to person, place, and time. He appears  well-developed and well-nourished.   HENT:   Head: Normocephalic and atraumatic.   Right Ear: Tympanic membrane and external ear normal.   Left Ear: Tympanic membrane and external ear normal.   Nose: Nose normal.   Mouth/Throat: Oropharynx is clear and moist.   Eyes: Conjunctivae and EOM are normal. Pupils are equal, round, and reactive to light.   Neck: Normal range of motion. Neck supple. No JVD present. No thyromegaly present.   Cardiovascular: Normal rate, regular rhythm, normal heart sounds and intact distal pulses.    Pulmonary/Chest: Effort normal and breath sounds normal.   Abdominal: Soft. Bowel sounds are normal.   Musculoskeletal: Normal range of motion.   Lymphadenopathy:     He has no cervical adenopathy.   Neurological: He is alert and oriented to person, place, and time. No cranial nerve deficit. Coordination and gait abnormal.   Skin: Skin is warm and dry. No rash noted.   Psychiatric: He has a normal mood and affect. His behavior is normal. Judgment and thought content normal.   Vitals reviewed.      Assessment/Plan   Problem List Items Addressed This Visit        Cardiovascular and Mediastinum    Cardiomyopathy - Primary       Genitourinary    Benign prostatic hyperplasia with urinary obstruction      Other Visit Diagnoses     Orthostatic hypotension        Movement disorder          see him back in about 6 weeks and stop the tamsulosin see if hisblood pressure improves.

## 2017-11-03 ENCOUNTER — TELEPHONE (OUTPATIENT)
Dept: INTERNAL MEDICINE | Facility: CLINIC | Age: 82
End: 2017-11-03

## 2017-11-06 ENCOUNTER — TELEPHONE (OUTPATIENT)
Dept: INTERNAL MEDICINE | Facility: CLINIC | Age: 82
End: 2017-11-06

## 2017-11-06 NOTE — TELEPHONE ENCOUNTER
The [facility] Dr he saw to check for Parkinsons saw him and doesn't see any signs of Parkinsons but he also had a fall and was c/o of some low back pain so she ordered a MRI-it showed a Fracture at S1 and some other abnormalities which she is going to refer to Neuro for but she wanted to make you aware and she will send you a copy of the MRI

## 2017-11-08 ENCOUNTER — TELEPHONE (OUTPATIENT)
Dept: INTERNAL MEDICINE | Facility: CLINIC | Age: 82
End: 2017-11-08

## 2017-11-15 NOTE — TELEPHONE ENCOUNTER
Safest thing for him would be tramadol 50 mg 3 times a day when necessary back pain #30 no refill and see how he does on it.

## 2017-11-16 RX ORDER — TRAMADOL HYDROCHLORIDE 50 MG/1
50 TABLET ORAL EVERY 6 HOURS PRN
Status: ON HOLD
Start: 2017-11-16 | End: 2018-01-05

## 2017-11-16 NOTE — TELEPHONE ENCOUNTER
I spoke with the pt and he has some tramadol 50mg at home that he's been taking, I will add to his med list

## 2017-12-12 ENCOUNTER — TRANSCRIBE ORDERS (OUTPATIENT)
Dept: ADMINISTRATIVE | Facility: HOSPITAL | Age: 82
End: 2017-12-12

## 2017-12-12 DIAGNOSIS — M21.371 RIGHT FOOT DROP: Primary | ICD-10-CM

## 2017-12-12 DIAGNOSIS — S32.000S COMPRESSION FX, LUMBAR SPINE, SEQUELA: ICD-10-CM

## 2017-12-13 ENCOUNTER — TRANSCRIBE ORDERS (OUTPATIENT)
Dept: ADMINISTRATIVE | Facility: HOSPITAL | Age: 82
End: 2017-12-13

## 2017-12-13 DIAGNOSIS — S32.10XA CLOSED FRACTURE OF SACRUM, UNSPECIFIED PORTION OF SACRUM, INITIAL ENCOUNTER (HCC): Primary | ICD-10-CM

## 2017-12-15 ENCOUNTER — HOSPITAL ENCOUNTER (OUTPATIENT)
Dept: CT IMAGING | Facility: HOSPITAL | Age: 82
Discharge: HOME | End: 2017-12-15
Admitting: PHYSICIAN ASSISTANT

## 2017-12-15 DIAGNOSIS — M21.371 RIGHT FOOT DROP: ICD-10-CM

## 2017-12-15 DIAGNOSIS — S32.000S COMPRESSION FX, LUMBAR SPINE, SEQUELA: ICD-10-CM

## 2017-12-15 PROCEDURE — 72131 CT LUMBAR SPINE W/O DYE: CPT

## 2017-12-26 ENCOUNTER — HOSPITAL ENCOUNTER (OUTPATIENT)
Dept: PAIN MEDICINE | Facility: HOSPITAL | Age: 82
Discharge: HOME | End: 2017-12-26
Attending: NEUROLOGICAL SURGERY

## 2018-01-02 ENCOUNTER — APPOINTMENT (OUTPATIENT)
Dept: CT IMAGING | Facility: HOSPITAL | Age: 83
End: 2018-01-02

## 2018-01-02 ENCOUNTER — APPOINTMENT (OUTPATIENT)
Dept: GENERAL RADIOLOGY | Facility: HOSPITAL | Age: 83
End: 2018-01-02

## 2018-01-02 ENCOUNTER — TELEPHONE (OUTPATIENT)
Dept: INTERNAL MEDICINE | Facility: CLINIC | Age: 83
End: 2018-01-02

## 2018-01-02 ENCOUNTER — HOSPITAL ENCOUNTER (INPATIENT)
Facility: HOSPITAL | Age: 83
LOS: 3 days | Discharge: NURSING, REHAB OR LONG-TERM CARE | End: 2018-01-05
Attending: EMERGENCY MEDICINE | Admitting: INTERNAL MEDICINE

## 2018-01-02 DIAGNOSIS — R26.2 DIFFICULTY WALKING: ICD-10-CM

## 2018-01-02 DIAGNOSIS — E86.0 DEHYDRATION: ICD-10-CM

## 2018-01-02 DIAGNOSIS — N39.0 URINARY TRACT INFECTION WITHOUT HEMATURIA, SITE UNSPECIFIED: Primary | ICD-10-CM

## 2018-01-02 DIAGNOSIS — R29.6 FREQUENT FALLS: ICD-10-CM

## 2018-01-02 LAB
ALBUMIN SERPL-MCNC: 3.1 G/DL (ref 3.5–5.2)
ALBUMIN/GLOB SERPL: 0.9 G/DL
ALP SERPL-CCNC: 136 U/L (ref 39–117)
ALT SERPL W P-5'-P-CCNC: 22 U/L (ref 1–41)
ANION GAP SERPL CALCULATED.3IONS-SCNC: 11.8 MMOL/L
AST SERPL-CCNC: 23 U/L (ref 1–40)
BACTERIA UR QL AUTO: ABNORMAL /HPF
BASOPHILS # BLD AUTO: 0.02 10*3/MM3 (ref 0–0.2)
BASOPHILS NFR BLD AUTO: 0.2 % (ref 0–1.5)
BILIRUB SERPL-MCNC: 1 MG/DL (ref 0.1–1.2)
BILIRUB UR QL STRIP: NEGATIVE
BUN BLD-MCNC: 40 MG/DL (ref 8–23)
BUN/CREAT SERPL: 15.2 (ref 7–25)
CALCIUM SPEC-SCNC: 9.1 MG/DL (ref 8.6–10.5)
CHLORIDE SERPL-SCNC: 104 MMOL/L (ref 98–107)
CLARITY UR: ABNORMAL
CO2 SERPL-SCNC: 21.2 MMOL/L (ref 22–29)
COD CRY URNS QL: ABNORMAL /HPF
COLOR UR: ABNORMAL
CREAT BLD-MCNC: 2.64 MG/DL (ref 0.76–1.27)
DEPRECATED RDW RBC AUTO: 48.6 FL (ref 37–54)
EOSINOPHIL # BLD AUTO: 0.08 10*3/MM3 (ref 0–0.7)
EOSINOPHIL NFR BLD AUTO: 0.7 % (ref 0.3–6.2)
ERYTHROCYTE [DISTWIDTH] IN BLOOD BY AUTOMATED COUNT: 13.3 % (ref 11.5–14.5)
GFR SERPL CREATININE-BSD FRML MDRD: 23 ML/MIN/1.73
GLOBULIN UR ELPH-MCNC: 3.6 GM/DL
GLUCOSE BLD-MCNC: 109 MG/DL (ref 65–99)
GLUCOSE UR STRIP-MCNC: NEGATIVE MG/DL
HCT VFR BLD AUTO: 40.1 % (ref 40.4–52.2)
HGB BLD-MCNC: 13 G/DL (ref 13.7–17.6)
HGB UR QL STRIP.AUTO: NEGATIVE
HOLD SPECIMEN: NORMAL
HOLD SPECIMEN: NORMAL
HYALINE CASTS UR QL AUTO: ABNORMAL /LPF
IMM GRANULOCYTES # BLD: 0.15 10*3/MM3 (ref 0–0.03)
IMM GRANULOCYTES NFR BLD: 1.3 % (ref 0–0.5)
KETONES UR QL STRIP: ABNORMAL
LEUKOCYTE ESTERASE UR QL STRIP.AUTO: ABNORMAL
LYMPHOCYTES # BLD AUTO: 0.78 10*3/MM3 (ref 0.9–4.8)
LYMPHOCYTES NFR BLD AUTO: 6.9 % (ref 19.6–45.3)
MAGNESIUM SERPL-MCNC: 2.1 MG/DL (ref 1.6–2.4)
MCH RBC QN AUTO: 32.3 PG (ref 27–32.7)
MCHC RBC AUTO-ENTMCNC: 32.4 G/DL (ref 32.6–36.4)
MCV RBC AUTO: 99.5 FL (ref 79.8–96.2)
MONOCYTES # BLD AUTO: 1.48 10*3/MM3 (ref 0.2–1.2)
MONOCYTES NFR BLD AUTO: 13 % (ref 5–12)
NEUTROPHILS # BLD AUTO: 8.85 10*3/MM3 (ref 1.9–8.1)
NEUTROPHILS NFR BLD AUTO: 77.9 % (ref 42.7–76)
NITRITE UR QL STRIP: NEGATIVE
NRBC BLD MANUAL-RTO: 0 /100 WBC (ref 0–0)
PH UR STRIP.AUTO: <=5 [PH] (ref 5–8)
PLATELET # BLD AUTO: 390 10*3/MM3 (ref 140–500)
PMV BLD AUTO: 11.4 FL (ref 6–12)
POTASSIUM BLD-SCNC: 4.2 MMOL/L (ref 3.5–5.2)
PROT SERPL-MCNC: 6.7 G/DL (ref 6–8.5)
PROT UR QL STRIP: ABNORMAL
RBC # BLD AUTO: 4.03 10*6/MM3 (ref 4.6–6)
RBC # UR: ABNORMAL /HPF
REF LAB TEST METHOD: ABNORMAL
SODIUM BLD-SCNC: 137 MMOL/L (ref 136–145)
SP GR UR STRIP: 1.02 (ref 1–1.03)
SQUAMOUS #/AREA URNS HPF: ABNORMAL /HPF
TROPONIN T SERPL-MCNC: <0.01 NG/ML (ref 0–0.03)
UROBILINOGEN UR QL STRIP: ABNORMAL
WBC NRBC COR # BLD: 11.36 10*3/MM3 (ref 4.5–10.7)
WBC UR QL AUTO: ABNORMAL /HPF
WHOLE BLOOD HOLD SPECIMEN: NORMAL
WHOLE BLOOD HOLD SPECIMEN: NORMAL

## 2018-01-02 PROCEDURE — 93010 ELECTROCARDIOGRAM REPORT: CPT | Performed by: INTERNAL MEDICINE

## 2018-01-02 PROCEDURE — 70450 CT HEAD/BRAIN W/O DYE: CPT

## 2018-01-02 PROCEDURE — 83735 ASSAY OF MAGNESIUM: CPT | Performed by: EMERGENCY MEDICINE

## 2018-01-02 PROCEDURE — 85025 COMPLETE CBC W/AUTO DIFF WBC: CPT | Performed by: EMERGENCY MEDICINE

## 2018-01-02 PROCEDURE — 84484 ASSAY OF TROPONIN QUANT: CPT | Performed by: EMERGENCY MEDICINE

## 2018-01-02 PROCEDURE — 80053 COMPREHEN METABOLIC PANEL: CPT | Performed by: EMERGENCY MEDICINE

## 2018-01-02 PROCEDURE — 99285 EMERGENCY DEPT VISIT HI MDM: CPT

## 2018-01-02 PROCEDURE — 87086 URINE CULTURE/COLONY COUNT: CPT | Performed by: EMERGENCY MEDICINE

## 2018-01-02 PROCEDURE — 71045 X-RAY EXAM CHEST 1 VIEW: CPT

## 2018-01-02 PROCEDURE — 81001 URINALYSIS AUTO W/SCOPE: CPT | Performed by: EMERGENCY MEDICINE

## 2018-01-02 PROCEDURE — 36415 COLL VENOUS BLD VENIPUNCTURE: CPT | Performed by: EMERGENCY MEDICINE

## 2018-01-02 PROCEDURE — 93005 ELECTROCARDIOGRAM TRACING: CPT | Performed by: EMERGENCY MEDICINE

## 2018-01-02 PROCEDURE — 25010000002 CEFTRIAXONE IN SWFI 1 GRAM/10ML IV PUSH SYRINGE (SIMPLE): Performed by: PHYSICIAN ASSISTANT

## 2018-01-02 RX ORDER — ACETAMINOPHEN 325 MG/1
650 TABLET ORAL EVERY 4 HOURS PRN
Status: DISCONTINUED | OUTPATIENT
Start: 2018-01-02 | End: 2018-01-05 | Stop reason: HOSPADM

## 2018-01-02 RX ORDER — ESCITALOPRAM OXALATE 5 MG/1
5 TABLET ORAL DAILY
COMMUNITY
End: 2018-01-16 | Stop reason: SDUPTHER

## 2018-01-02 RX ORDER — ONDANSETRON 2 MG/ML
4 INJECTION INTRAMUSCULAR; INTRAVENOUS EVERY 6 HOURS PRN
Status: DISCONTINUED | OUTPATIENT
Start: 2018-01-02 | End: 2018-01-05 | Stop reason: HOSPADM

## 2018-01-02 RX ORDER — SODIUM CHLORIDE 0.9 % (FLUSH) 0.9 %
1-10 SYRINGE (ML) INJECTION AS NEEDED
Status: DISCONTINUED | OUTPATIENT
Start: 2018-01-02 | End: 2018-01-05 | Stop reason: HOSPADM

## 2018-01-02 RX ORDER — ONDANSETRON 4 MG/1
4 TABLET, ORALLY DISINTEGRATING ORAL EVERY 6 HOURS PRN
Status: DISCONTINUED | OUTPATIENT
Start: 2018-01-02 | End: 2018-01-05 | Stop reason: HOSPADM

## 2018-01-02 RX ORDER — SODIUM CHLORIDE 0.9 % (FLUSH) 0.9 %
10 SYRINGE (ML) INJECTION AS NEEDED
Status: DISCONTINUED | OUTPATIENT
Start: 2018-01-02 | End: 2018-01-05 | Stop reason: HOSPADM

## 2018-01-02 RX ORDER — MELOXICAM 15 MG/1
15 TABLET ORAL DAILY
COMMUNITY
End: 2018-01-05 | Stop reason: HOSPADM

## 2018-01-02 RX ORDER — CYCLOBENZAPRINE HCL 10 MG
10 TABLET ORAL 3 TIMES DAILY PRN
COMMUNITY
End: 2018-08-16

## 2018-01-02 RX ORDER — SODIUM CHLORIDE 9 MG/ML
75 INJECTION, SOLUTION INTRAVENOUS CONTINUOUS
Status: DISCONTINUED | OUTPATIENT
Start: 2018-01-02 | End: 2018-01-04

## 2018-01-02 RX ORDER — HEPARIN SODIUM 5000 [USP'U]/ML
5000 INJECTION, SOLUTION INTRAVENOUS; SUBCUTANEOUS EVERY 8 HOURS SCHEDULED
Status: DISCONTINUED | OUTPATIENT
Start: 2018-01-02 | End: 2018-01-05 | Stop reason: HOSPADM

## 2018-01-02 RX ORDER — SENNA AND DOCUSATE SODIUM 50; 8.6 MG/1; MG/1
2 TABLET, FILM COATED ORAL NIGHTLY PRN
Status: DISCONTINUED | OUTPATIENT
Start: 2018-01-02 | End: 2018-01-05 | Stop reason: HOSPADM

## 2018-01-02 RX ORDER — ONDANSETRON 4 MG/1
4 TABLET, FILM COATED ORAL EVERY 6 HOURS PRN
Status: DISCONTINUED | OUTPATIENT
Start: 2018-01-02 | End: 2018-01-05 | Stop reason: HOSPADM

## 2018-01-02 RX ADMIN — SODIUM CHLORIDE 500 ML: 9 INJECTION, SOLUTION INTRAVENOUS at 21:20

## 2018-01-02 RX ADMIN — CEFTRIAXONE SODIUM 1 G: 1 INJECTION, POWDER, FOR SOLUTION INTRAMUSCULAR; INTRAVENOUS at 21:46

## 2018-01-02 NOTE — ED TRIAGE NOTES
"Multiple falls over the last few months, family reports since Sunday afternoon pt has fallen 5 times despite using a walker, issues with speech and \"clear\" thoughts  "

## 2018-01-02 NOTE — TELEPHONE ENCOUNTER
I reviewed the current results in the ER which is where he is right now with his daughter being evaluated.

## 2018-01-02 NOTE — TELEPHONE ENCOUNTER
Pt's Daughter called concerned and would like to speak with you regarding his recent falls, etc before his upcoming appt.

## 2018-01-03 PROBLEM — G93.41 METABOLIC ENCEPHALOPATHY: Status: ACTIVE | Noted: 2018-01-03

## 2018-01-03 PROBLEM — N17.9 ACUTE RENAL FAILURE: Status: ACTIVE | Noted: 2018-01-03

## 2018-01-03 PROBLEM — R29.6 FREQUENT FALLS: Status: ACTIVE | Noted: 2018-01-03

## 2018-01-03 PROBLEM — M21.371 RIGHT FOOT DROP: Status: ACTIVE | Noted: 2018-01-03

## 2018-01-03 LAB
ALBUMIN SERPL-MCNC: 3.1 G/DL (ref 3.5–5.2)
ALBUMIN/GLOB SERPL: 1 G/DL
ALP SERPL-CCNC: 127 U/L (ref 39–117)
ALT SERPL W P-5'-P-CCNC: 19 U/L (ref 1–41)
ANION GAP SERPL CALCULATED.3IONS-SCNC: 14.1 MMOL/L
AST SERPL-CCNC: 18 U/L (ref 1–40)
BASOPHILS # BLD AUTO: 0.04 10*3/MM3 (ref 0–0.2)
BASOPHILS NFR BLD AUTO: 0.5 % (ref 0–1.5)
BILIRUB SERPL-MCNC: 0.6 MG/DL (ref 0.1–1.2)
BUN BLD-MCNC: 35 MG/DL (ref 8–23)
BUN/CREAT SERPL: 16.1 (ref 7–25)
CALCIUM SPEC-SCNC: 8.4 MG/DL (ref 8.6–10.5)
CHLORIDE SERPL-SCNC: 106 MMOL/L (ref 98–107)
CO2 SERPL-SCNC: 21.9 MMOL/L (ref 22–29)
CREAT BLD-MCNC: 2.17 MG/DL (ref 0.76–1.27)
DEPRECATED RDW RBC AUTO: 49.2 FL (ref 37–54)
EOSINOPHIL # BLD AUTO: 0.24 10*3/MM3 (ref 0–0.7)
EOSINOPHIL NFR BLD AUTO: 3 % (ref 0.3–6.2)
ERYTHROCYTE [DISTWIDTH] IN BLOOD BY AUTOMATED COUNT: 13.4 % (ref 11.5–14.5)
GFR SERPL CREATININE-BSD FRML MDRD: 29 ML/MIN/1.73
GLOBULIN UR ELPH-MCNC: 3.2 GM/DL
GLUCOSE BLD-MCNC: 86 MG/DL (ref 65–99)
HCT VFR BLD AUTO: 40.1 % (ref 40.4–52.2)
HGB BLD-MCNC: 12.5 G/DL (ref 13.7–17.6)
IMM GRANULOCYTES # BLD: 0.2 10*3/MM3 (ref 0–0.03)
IMM GRANULOCYTES NFR BLD: 2.5 % (ref 0–0.5)
LYMPHOCYTES # BLD AUTO: 1.17 10*3/MM3 (ref 0.9–4.8)
LYMPHOCYTES NFR BLD AUTO: 14.6 % (ref 19.6–45.3)
MCH RBC QN AUTO: 31.3 PG (ref 27–32.7)
MCHC RBC AUTO-ENTMCNC: 31.2 G/DL (ref 32.6–36.4)
MCV RBC AUTO: 100.3 FL (ref 79.8–96.2)
MONOCYTES # BLD AUTO: 1.25 10*3/MM3 (ref 0.2–1.2)
MONOCYTES NFR BLD AUTO: 15.5 % (ref 5–12)
NEUTROPHILS # BLD AUTO: 5.14 10*3/MM3 (ref 1.9–8.1)
NEUTROPHILS NFR BLD AUTO: 63.9 % (ref 42.7–76)
NRBC BLD MANUAL-RTO: 0 /100 WBC (ref 0–0)
NT-PROBNP SERPL-MCNC: 493.9 PG/ML (ref 0–1800)
PLATELET # BLD AUTO: 321 10*3/MM3 (ref 140–500)
PMV BLD AUTO: 11.5 FL (ref 6–12)
POTASSIUM BLD-SCNC: 3.7 MMOL/L (ref 3.5–5.2)
PROT SERPL-MCNC: 6.3 G/DL (ref 6–8.5)
RBC # BLD AUTO: 4 10*6/MM3 (ref 4.6–6)
SODIUM BLD-SCNC: 142 MMOL/L (ref 136–145)
TSH SERPL DL<=0.05 MIU/L-ACNC: 0.87 MIU/ML (ref 0.27–4.2)
VIT B12 BLD-MCNC: 388 PG/ML (ref 211–946)
WBC NRBC COR # BLD: 8.04 10*3/MM3 (ref 4.5–10.7)

## 2018-01-03 PROCEDURE — 80053 COMPREHEN METABOLIC PANEL: CPT | Performed by: INTERNAL MEDICINE

## 2018-01-03 PROCEDURE — 97110 THERAPEUTIC EXERCISES: CPT

## 2018-01-03 PROCEDURE — 83880 ASSAY OF NATRIURETIC PEPTIDE: CPT | Performed by: INTERNAL MEDICINE

## 2018-01-03 PROCEDURE — 84443 ASSAY THYROID STIM HORMONE: CPT | Performed by: INTERNAL MEDICINE

## 2018-01-03 PROCEDURE — 25010000002 HEPARIN (PORCINE) PER 1000 UNITS: Performed by: INTERNAL MEDICINE

## 2018-01-03 PROCEDURE — 85025 COMPLETE CBC W/AUTO DIFF WBC: CPT | Performed by: INTERNAL MEDICINE

## 2018-01-03 PROCEDURE — 97162 PT EVAL MOD COMPLEX 30 MIN: CPT

## 2018-01-03 PROCEDURE — 82607 VITAMIN B-12: CPT | Performed by: INTERNAL MEDICINE

## 2018-01-03 PROCEDURE — 25010000002 CEFTRIAXONE IN SWFI 1 GRAM/10ML IV PUSH SYRINGE (SIMPLE): Performed by: INTERNAL MEDICINE

## 2018-01-03 RX ORDER — PANTOPRAZOLE SODIUM 40 MG/1
40 TABLET, DELAYED RELEASE ORAL EVERY MORNING
Status: DISCONTINUED | OUTPATIENT
Start: 2018-01-03 | End: 2018-01-05 | Stop reason: HOSPADM

## 2018-01-03 RX ORDER — ATORVASTATIN CALCIUM 10 MG/1
10 TABLET, FILM COATED ORAL NIGHTLY
Status: DISCONTINUED | OUTPATIENT
Start: 2018-01-03 | End: 2018-01-05 | Stop reason: HOSPADM

## 2018-01-03 RX ORDER — BUPROPION HYDROCHLORIDE 300 MG/1
300 TABLET ORAL DAILY
Status: DISCONTINUED | OUTPATIENT
Start: 2018-01-03 | End: 2018-01-05 | Stop reason: HOSPADM

## 2018-01-03 RX ORDER — ESCITALOPRAM OXALATE 5 MG/1
5 TABLET ORAL DAILY
Status: DISCONTINUED | OUTPATIENT
Start: 2018-01-03 | End: 2018-01-05 | Stop reason: HOSPADM

## 2018-01-03 RX ORDER — ASPIRIN 81 MG/1
81 TABLET, CHEWABLE ORAL DAILY
Status: DISCONTINUED | OUTPATIENT
Start: 2018-01-03 | End: 2018-01-05 | Stop reason: HOSPADM

## 2018-01-03 RX ADMIN — CEFTRIAXONE SODIUM 1 G: 1 INJECTION, POWDER, FOR SOLUTION INTRAMUSCULAR; INTRAVENOUS at 21:26

## 2018-01-03 RX ADMIN — SODIUM CHLORIDE 100 ML/HR: 9 INJECTION, SOLUTION INTRAVENOUS at 00:34

## 2018-01-03 RX ADMIN — HEPARIN SODIUM 5000 UNITS: 5000 INJECTION, SOLUTION INTRAVENOUS; SUBCUTANEOUS at 22:28

## 2018-01-03 RX ADMIN — BUPROPION HYDROCHLORIDE 300 MG: 300 TABLET, EXTENDED RELEASE ORAL at 08:47

## 2018-01-03 RX ADMIN — ASPIRIN 81 MG: 81 TABLET, CHEWABLE ORAL at 08:47

## 2018-01-03 RX ADMIN — HEPARIN SODIUM 5000 UNITS: 5000 INJECTION, SOLUTION INTRAVENOUS; SUBCUTANEOUS at 06:15

## 2018-01-03 RX ADMIN — ATORVASTATIN CALCIUM 10 MG: 10 TABLET, FILM COATED ORAL at 22:27

## 2018-01-03 RX ADMIN — PANTOPRAZOLE SODIUM 40 MG: 40 TABLET, DELAYED RELEASE ORAL at 08:46

## 2018-01-03 RX ADMIN — ESCITALOPRAM 5 MG: 5 TABLET, FILM COATED ORAL at 08:47

## 2018-01-03 RX ADMIN — HEPARIN SODIUM 5000 UNITS: 5000 INJECTION, SOLUTION INTRAVENOUS; SUBCUTANEOUS at 15:17

## 2018-01-03 RX ADMIN — HEPARIN SODIUM 5000 UNITS: 5000 INJECTION, SOLUTION INTRAVENOUS; SUBCUTANEOUS at 00:34

## 2018-01-03 RX ADMIN — ATORVASTATIN CALCIUM 10 MG: 10 TABLET, FILM COATED ORAL at 01:43

## 2018-01-03 NOTE — H&P
Name: Jonathan Trejo ADMIT: 2018   : 1935  PCP: Panda Mccall Jr., MD    MRN: [id number] LOS: 1 days   AGE/SEX: 82 y.o. male  ROOM: [facility]     Chief Complaint   Patient presents with   • Fall     5 falls since  night    • Altered Mental Status     confusion, slurred speech, leg weakness        Subjective   Mr. Trejo is a 82 y.o. male who presents to [facility] complaining of worsening frequency of falls. He normally falls few times per month due to chronic right foot drop however recently has had worsening generalized weakness and has fallen 5 times at assisted living facility. He has had worsening confusion over this timeframe as well. He reports worsened urinary frequency/urgency but denies over dysuria. He does report subjective fever. He has had worsening dyspnea with exertion. No productive cough. No orthopnea pnd or edema. No CP or palpitations.      History of Present Illness    Past Medical History:   Diagnosis Date   • Abnormal electrocardiogram    • Arm pain    • Atrial fibrillation    • Atrial flutter    • BPH associated with nocturia    • Chronic kidney disease    • Colon cancer     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   • Colon polyp 2015   • Depression    • Dyspnea    • Episode of generalized weakness     knees were weak - had to be helped by wife to sit down   • Esophagitis, reflux    • Fatigue    • Hyperlipidemia    • Inguinal hernia    • Loss of hearing    • Lumbar radiculopathy    • Obesity    • Osteoarthritis cervical spine     doc on [place]    • Osteopenia    • Osteoporosis    • Overweight    • Tobacco dependence in remission    • Urge incontinence of urine    • Vitamin D deficiency    • Wheezing      Past Surgical History:   Procedure Laterality Date   • CARDIAC CATHETERIZATION N/A 2017    Procedure: Valve assessment;  Surgeon: Adonis Solis MD;  Location: [facility] INVASIVE LOCATION;  Service:    • CARDIAC ELECTROPHYSIOLOGY PROCEDURE N/A  7/27/2017    Procedure: Ablation atrial flutter Will need to have 3 -4 weeks of therapeutic INR's ;  Surgeon: Adonis Solis MD;  Location: [facility] INVASIVE LOCATION;  Service:    • CARDIOVERSION     • CATARACT EXTRACTION W/ INTRAOCULAR LENS  IMPLANT, BILATERAL     • COLON RESECTION     • COLONOSCOPY      07/15 redo 5 years  [facility]   • KNEE SURGERY      benign tumor removed   • REFRACTIVE SURGERY  2007   • REPAIR PERONEAL TENDONS ANKLE W/ FIBULAR OSTEOTOMY Right 03/2013    Dr. Banks     Family History   Problem Relation Age of Onset   • Breast cancer Mother    • Heart disease Father    • Hypertension Father    • Hypertension Sister      Social History   Substance Use Topics   • Smoking status: Former Smoker     Packs/day: 0.50     Years: 55.00     Types: Cigarettes     Quit date: 5/1/2008   • Smokeless tobacco: Never Used      Comment: QUIT 10 YRS   • Alcohol use Yes      Comment: SOCIALLY     Prescriptions Prior to Admission   Medication Sig Dispense Refill Last Dose   • aspirin 81 MG chewable tablet Chew 81 mg Daily.   Taking   • atorvastatin (LIPITOR) 10 MG tablet TAKE 1 TABLET EVERY DAY 90 tablet 1 Taking   • buPROPion XL (WELLBUTRIN XL) 300 MG 24 hr tablet Take 300 mg by mouth Daily.   Taking   • Cholecalciferol (VITAMIN D3) 2000 UNITS tablet Take 2,000 Units by mouth daily.   Taking   • cyclobenzaprine (FLEXERIL) 10 MG tablet Take 10 mg by mouth 3 (Three) Times a Day As Needed for Muscle Spasms.      • escitalopram (LEXAPRO) 5 MG tablet Take 5 mg by mouth Daily.      • Flaxseed, Linseed, (FLAXSEED OIL) 1000 MG capsule Take 1,000 mg by mouth Daily.   Taking   • meloxicam (MOBIC) 15 MG tablet Take 15 mg by mouth Daily.      • omeprazole (priLOSEC) 40 MG capsule Take 40 mg by mouth Daily.   Taking   • gabapentin (NEURONTIN) 300 MG capsule Take 300 mg by mouth 3 (Three) Times a Day.   Taking   • traMADol (ULTRAM) 50 MG tablet Take 1 tablet by mouth Every 6 (Six) Hours As Needed for Moderate Pain .         Allergies:  No Known Allergies    Review of Systems   Constitutional: Positive for fever. Negative for chills.   HENT: Negative for sinus pressure and sore throat.    Eyes: Negative for pain and visual disturbance.   Respiratory: Positive for shortness of breath. Negative for cough.    Cardiovascular: Negative for chest pain and palpitations.   Gastrointestinal: Negative for constipation, diarrhea, nausea and vomiting.   Endocrine: Negative for cold intolerance and heat intolerance.   Genitourinary: Negative for difficulty urinating and dysuria.   Musculoskeletal: Negative for neck pain and neck stiffness.   Skin: Negative for pallor and rash.   Allergic/Immunologic: Negative for environmental allergies and food allergies.   Neurological: Negative for seizures and syncope.   Hematological: Negative for adenopathy.   Psychiatric/Behavioral: Positive for confusion. Negative for agitation.        Objective    Vital Signs  Temp:  [97.5 °F (36.4 °C)-98.4 °F (36.9 °C)] 97.5 °F (36.4 °C)  Heart Rate:  [65-79] 67  Resp:  [16-18] 18  BP: (109-142)/(63-81) 142/78  SpO2:  [94 %-97 %] 94 %  on   ;   O2 Device: room air  Body mass index is 28.4 kg/(m^2).    Physical Exam   Constitutional: He appears well-developed and well-nourished. No distress.   HENT:   Head: Normocephalic and atraumatic.   Eyes: EOM are normal. Pupils are equal, round, and reactive to light.   Neck: Normal range of motion. Neck supple.   Cardiovascular: Normal rate, regular rhythm and intact distal pulses.    Pulmonary/Chest: Effort normal and breath sounds normal. He has no wheezes. He has no rales.   Abdominal: Soft. Bowel sounds are normal. He exhibits no distension. There is no tenderness.   Musculoskeletal: Normal range of motion. He exhibits no edema.   Neurological: He is alert. No cranial nerve deficit.   Skin: Skin is warm and dry. He is not diaphoretic.   Psychiatric: He has a normal mood and affect. His behavior is normal.   Nursing note and  vitals reviewed.      Results Review:   I reviewed the patient's new clinical results. Reviewed imaging, agree with interpretation. Reviewed EKG, sinus rhythm with lateral st depression similar to previous EKGs. Reviewed prior records.    Lab Results (last 24 hours)     Procedure Component Value Units Date/Time    CBC & Differential [[id number]] Collected:  01/02/18 1602    Specimen:  Blood Updated:  01/02/18 1657    Narrative:       The following orders were created for panel order CBC & Differential.  Procedure                               Abnormality         Status                     ---------                               -----------         ------                     Scan Slide[[id number]]                                                                  CBC Auto Differential[[id number]]        Abnormal            Final result                 Please view results for these tests on the individual orders.    Comprehensive Metabolic Panel [[id number]]  (Abnormal) Collected:  01/02/18 1602    Specimen:  Blood Updated:  01/02/18 1652     Glucose 109 (H) mg/dL      BUN 40 (H) mg/dL      Creatinine 2.64 (H) mg/dL      Sodium 137 mmol/L      Potassium 4.2 mmol/L      Chloride 104 mmol/L      CO2 21.2 (L) mmol/L      Calcium 9.1 mg/dL      Total Protein 6.7 g/dL      Albumin 3.10 (L) g/dL      ALT (SGPT) 22 U/L      AST (SGOT) 23 U/L      Alkaline Phosphatase 136 (H) U/L      Total Bilirubin 1.0 mg/dL      eGFR Non African Amer 23 (L) mL/min/1.73      Globulin 3.6 gm/dL      A/G Ratio 0.9 g/dL      BUN/Creatinine Ratio 15.2     Anion Gap 11.8 mmol/L     Narrative:       The MDRD GFR formula is only valid for adults with stable renal function between ages 18 and 70.    Troponin [[id number]]  (Normal) Collected:  01/02/18 1602    Specimen:  Blood Updated:  01/02/18 1652     Troponin T <0.010 ng/mL     Narrative:       Troponin T Reference Ranges:  Less than 0.03 ng/mL:    Negative for AMI  0.03 to 0.09 ng/mL:       Indeterminant for AMI  Greater than 0.09 ng/mL: Positive for AMI    Magnesium [[id number]]  (Normal) Collected:  01/02/18 1602    Specimen:  Blood Updated:  01/02/18 1652     Magnesium 2.1 mg/dL     CBC Auto Differential [[id number]]  (Abnormal) Collected:  01/02/18 1602    Specimen:  Blood Updated:  01/02/18 1657     WBC 11.36 (H) 10*3/mm3      RBC 4.03 (L) 10*6/mm3      Hemoglobin 13.0 (L) g/dL      Hematocrit 40.1 (L) %      MCV 99.5 (H) fL      MCH 32.3 pg      MCHC 32.4 (L) g/dL      RDW 13.3 %      RDW-SD 48.6 fl      MPV 11.4 fL      Platelets 390 10*3/mm3      Neutrophil % 77.9 (H) %      Lymphocyte % 6.9 (L) %      Monocyte % 13.0 (H) %      Eosinophil % 0.7 %      Basophil % 0.2 %      Immature Grans % 1.3 (H) %      Neutrophils, Absolute 8.85 (H) 10*3/mm3      Lymphocytes, Absolute 0.78 (L) 10*3/mm3      Monocytes, Absolute 1.48 (H) 10*3/mm3      Eosinophils, Absolute 0.08 10*3/mm3      Basophils, Absolute 0.02 10*3/mm3      Immature Grans, Absolute 0.15 (H) 10*3/mm3      nRBC 0.0 /100 WBC     Urinalysis With / Culture If Indicated - Urine, Clean Catch [[id number]]  (Abnormal) Collected:  01/02/18 1812    Specimen:  Urine from Urine, Clean Catch Updated:  01/02/18 1858     Color, UA Dark Yellow (A)     Appearance, UA Cloudy (A)     pH, UA <=5.0     Specific Gravity, UA 1.025     Glucose, UA Negative     Ketones, UA Trace (A)     Bilirubin, UA Negative     Blood, UA Negative     Protein, UA 30 mg/dL (1+) (A)     Leuk Esterase, UA Small (1+) (A)     Nitrite, UA Negative     Urobilinogen, UA 1.0 E.U./dL    Urinalysis, Microscopic Only - Urine, Clean Catch [[id number]]  (Abnormal) Collected:  01/02/18 1812    Specimen:  Urine from Urine, Clean Catch Updated:  01/02/18 1858     RBC, UA 0-2 /HPF      WBC, UA 6-12 (A) /HPF      Bacteria, UA 1+ (A) /HPF      Squamous Epithelial Cells, UA 0-2 /HPF      Hyaline Casts, UA 3-6 /LPF      Calcium Oxalate Crystals, UA Small/1+ /HPF      Methodology Manual Light Microscopy     Urine Culture - Urine, Urine, Clean Catch [[id number]] Collected:  01/02/18 1812    Specimen:  Urine from Urine, Clean Catch Updated:  01/02/18 1841          CT Head Without Contrast   Final Result           1. No acute intracranial hemorrhage or hydrocephalus. Chronic changes of   the brain. If there is further clinical concern, MRI could be considered   for further evaluation.   2. Paranasal sinus disease, with evidence of left maxillary acute   sinusitis.       This report was finalized on 1/2/2018 5:03 PM by Dr. Sami Avila MD.          XR Chest 1 View   Final Result   No focal pulmonary consolidation. Follow-up as clinical   indications persist.       This report was finalized on 1/2/2018 4:16 PM by Dr. Sami Avila MD.            Assessment/Plan   Active Hospital Problems (** Indicates Principal Problem)    Diagnosis Date Noted   • Right foot drop [M21.371] 01/03/2018   • Frequent falls [R29.6] 01/03/2018   • Metabolic encephalopathy [G93.41] 01/03/2018   • Acute renal failure [N17.9] 01/03/2018   • Urinary tract infection without hematuria [N39.0] 01/02/2018      Resolved Hospital Problems    Diagnosis Date Noted Date Resolved   No resolved problems to display.       - UTI/SPENCER: Will give IVF for SPENCER and Rocephin for antibiotic coverage. Monitor response and culture results.  - Metabolic Encephalopathy: Monitor with treatment.  - GARCIA: He has a history of systolic heart failure and AVNRT but does not appear acutely volume overloaded on exam. Check BNP. Can consult his Cardiologist, Dr Vargas if needed.  - Right Foot Drop: Will check TSH and B12. PT consult.  - PPx SQ Heparin  - Full Code      I discussed the patients findings and my recommendations with patient, nursing staff and consulting provider.      Bakari Frank MD  [facility]  01/03/18  12:36 AM

## 2018-01-03 NOTE — PLAN OF CARE
Problem: Patient Care Overview (Adult)  Goal: Plan of Care Review   01/03/18 1111   Outcome Evaluation   Outcome Summary/Follow up Plan Pt admitted with frequent falls/UTI. He was previously independent but has been falling. Increased R foot drop over last several months. Fractured tailbone during fall. Upon eval, pt demonstrates poor safety awareness with walker, foot drop, and decreased balance. Educated on exercises to improve R foot drop and LE weakness. Will see pt to improve independence, he is not currently safe to d/c to ILF, may require SNF at d/c. Will progress towards goals as tolerated.        Problem: Inpatient Physical Therapy  Goal: Bed Mobility Goal LTG- PT   01/03/18 1111   Bed Mobility PT LTG   Bed Mobility PT LTG, Date Established 01/03/18   Bed Mobility PT LTG, Time to Achieve 1 wk   Bed Mobility PT LTG, Activity Type all bed mobility   Bed Mobility PT LTG, [place] Level independent     Goal: Transfer Training Goal 1 LTG- PT   01/03/18 1111   Transfer Training PT LTG   Transfer Training PT LTG, Date Established 01/03/18   Transfer Training PT LTG, Time to Achieve 1 wk   Transfer Training PT LTG, Activity Type bed to chair /chair to bed;sit to stand/stand to sit   Transfer Training PT LTG, [place] Level supervision required   Transfer Training PT LTG, Assist Device walker, rolling     Goal: Gait Training Goal LTG- PT   01/03/18 1111   Gait Training PT LTG   Gait Training Goal PT LTG, Date Established 01/03/18   Gait Training Goal PT LTG, Time to Achieve 1 wk   Gait Training Goal PT LTG, [place] Level contact guard assist   Gait Training Goal PT LTG, Assist Device walker, rolling

## 2018-01-03 NOTE — ED PROVIDER NOTES
Pt presents to ED complaining of weakness in bilateral legs which has caused 5 falls the past 48 hours. Pt ambulates with walker. Pt's family states pt has been generally confused and intermittent slurred speech. Pt confirms increased frequency of urination denies urgency, difficulty urinating, cough, fever, vomiting, and loss of appetite. Pt has hx of falling but has worsened recently. Pt was seen in ED for fall 11/2017 where he cracked his tailbone. Pt's family states he has seen neurologists recently who have been unable to determine the cause of the falls.     On exam, pt is AOx3, is RRR with clear lungs, head atraumatic and normocephalic, neck supple and non tender, non tender abdomen, no bony tenderness of extremities, and has a normal neuro exam    EKG           EKG time: 2052  Rhythm/Rate: SR 56  P waves and WI: nml  QRS, axis: small lateral Q waves   ST and T waves: nonspecific T wave changes    Interpreted Contemporaneously by me, independently viewed  Unchanged compared to prior 10/27/17    I agree with midlevel plan to admit pt to hospital due to number of falls of indeterminate cause and to be treated for UTI.   Labs, CXR, and CT head viewed independently by me. Pt has elevated creatinine and symptoms of UTI.     I supervised care provided by the midlevel provider.    We have discussed this patient's history, physical exam, and treatment plan.   I have reviewed the note and personally saw and examined the patient and agree with the plan of care.    Documentation assistance provided by akua Paniagua for Dr. Al.  Information recorded by the akua was done at my direction and has been verified and validated by me.       Annabel Paniagua  01/02/18 [id number]       Neo Al MD  01/02/18 3501

## 2018-01-03 NOTE — ED NOTES
Pt from [facility] - independent / assisted living. Pt has fallen 5 times in the last 48 hours, family states he is confused,aphasia with slurred speech off and on over the past few days and has been walking with a forward lean which causes him to lose his balance. Daughter - Kaylynn states he started with gait issues about 6 months ago with his right leg dragging but unable to pinpoint cause. Pt has seen a neurosurgeon Dr Carlson for his low back pain (DDD and cracked tailbone in Nov 2017) and neurologist Dr Mckeon for gait issues.    Family concerned about his SOA which is worsening with activity.    Mamie Park RN  01/02/18 2109       Mamie Park RN  01/02/18 2115       Mamie Park RN  01/02/18 2137

## 2018-01-03 NOTE — ED PROVIDER NOTES
EMERGENCY DEPARTMENT ENCOUNTER    Room Number:  18/18  Date seen:  1/2/2018  Time seen: 8:37 PM  PCP: Panda Mccall Jr., MD    HPI:  Chief complaint: Fall (x5 in 48 hours)  Context:Jonathan Trejo is a 82 y.o. male who presents from [facility] after experiencing 5 falls in the past 48 hours. Daughter notes that patient has been increasingly confused with disordered thoughts and BLE weakness. She further reports that patient fell and was evaluated in the ED in 11/2017. He was dx with a fractured tailbone and d/c with a walker, and though he has a history of falls, never this many in this short of time. Patient personally c/o urinary frequency and decreased urine.     Timing:episodic  Duration: 2 days  Quality:falls  Intensity/Severity:moderate  Associated Symptoms:confusion, disordered thoughts, BLE weakness, frequency, decreased urine  Aggravating Factors:none stated  Alleviating Factors:none stated  Previous Episodes:patient was dx with a fractured tailbone 11/2017  Treatment before arrival:none stated        ALLERGIES  Review of patient's allergies indicates no known allergies.    PAST MEDICAL HISTORY  Active Ambulatory Problems     Diagnosis Date Noted   • Benign prostatic hyperplasia with urinary obstruction 11/22/2015   • Chronic renal impairment, stage 3 (moderate) 11/22/2015   • Depression 11/22/2015   • Gastroesophageal reflux disease with esophagitis 11/22/2015   • Hyperlipidemia 11/22/2015   • Nocturia 11/22/2015   • Obesity (BMI 30-39.9) 11/22/2015   • Osteopenia 11/22/2015   • Osteoporosis 11/22/2015   • Seborrhea 04/04/2016   • Abnormal EKG 07/19/2016   • Bradycardia 09/02/2016   • Degenerative disc disease, cervical 10/02/2016   • Typical atrial flutter 04/06/2017   • Lumbar radiculopathy 06/06/2017   • 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   • S/P ablation of atrial flutter 09/19/2017   • S/P catheter ablation of slow pathway 09/19/2017   • Cardiomyopathy 09/19/2017   • GARCIA (dyspnea  on exertion) 09/19/2017     Resolved Ambulatory Problems     Diagnosis Date Noted   • Pain of upper extremity 11/22/2015   • Colon polyp 11/22/2015   • Hearing loss 11/22/2015   • Obesity (BMI 30-39.9) 11/22/2015   • Acute on chronic systolic heart failure 09/02/2016     Past Medical History:   Diagnosis Date   • Abnormal electrocardiogram    • Arm pain    • Atrial fibrillation    • Atrial flutter    • BPH associated with nocturia    • Chronic kidney disease    • Colon cancer    • Colon polyp 11/22/2015   • Depression    • Dyspnea    • Episode of generalized weakness    • Esophagitis, reflux    • Fatigue    • Hyperlipidemia    • Inguinal hernia    • Loss of hearing    • Lumbar radiculopathy    • Obesity    • Osteoarthritis cervical spine    • Osteopenia    • Osteoporosis    • Overweight    • Tobacco dependence in remission    • Urge incontinence of urine    • Vitamin D deficiency    • Wheezing        PAST SURGICAL HISTORY  Past Surgical History:   Procedure Laterality Date   • CARDIAC CATHETERIZATION N/A 7/27/2017    Procedure: Valve assessment;  Surgeon: Adonis Solis MD;  Location: [facility] CATH INVASIVE LOCATION;  Service:    • CARDIAC ELECTROPHYSIOLOGY PROCEDURE N/A 7/27/2017    Procedure: Ablation atrial flutter Will need to have 3 -4 weeks of therapeutic INR's ;  Surgeon: Adonis Solis MD;  Location:  [facility] CATH INVASIVE LOCATION;  Service:    • CARDIOVERSION     • CATARACT EXTRACTION W/ INTRAOCULAR LENS  IMPLANT, BILATERAL     • COLON RESECTION     • COLONOSCOPY      07/15 redo 5 years  [facility]   • KNEE SURGERY      benign tumor removed   • REFRACTIVE SURGERY  2007   • REPAIR PERONEAL TENDONS ANKLE W/ FIBULAR OSTEOTOMY Right 03/2013    Dr. Banks       FAMILY HISTORY  Family History   Problem Relation Age of Onset   • Breast cancer Mother    • Heart disease Father    • Hypertension Father    • Hypertension Sister        SOCIAL HISTORY  Social History     Social History   • Marital status:      Spouse  name: N/A   • Number of children: 2   • Years of education: COLLEGE GRADUATE     Occupational History   • RETIRED      Social History Main Topics   • Smoking status: Former Smoker     Packs/day: 0.50     Years: 55.00     Types: Cigarettes     Quit date: 5/1/2008   • Smokeless tobacco: Never Used      Comment: QUIT 10 YRS   • Alcohol use Yes      Comment: SOCIALLY   • Drug use: No   • Sexual activity: Defer     Other Topics Concern   • Not on file     Social History Narrative       REVIEW OF SYSTEMS  Review of Systems   Constitutional: Negative for chills and fever.   HENT: Negative.  Negative for congestion.    Eyes: Negative.    Respiratory: Positive for shortness of breath (chronic). Negative for cough.    Cardiovascular: Negative for chest pain.        Intermittent hypotension is chronic   Gastrointestinal: Negative for abdominal pain, diarrhea, nausea and vomiting.   Genitourinary: Positive for decreased urine volume and frequency. Negative for dysuria and hematuria.   Musculoskeletal: Negative.    Skin: Negative.    Neurological: Positive for weakness (BLE). Negative for numbness.   Psychiatric/Behavioral: Positive for confusion.        Disordered thoughts       PHYSICAL EXAM  ED Triage Vitals   Temp Heart Rate Resp BP SpO2   01/02/18 1534 01/02/18 1527 01/02/18 1527 01/02/18 1534 01/02/18 1527   98.4 °F (36.9 °C) 79 18 114/63 94 %      Temp src Heart Rate Source Patient Position BP Location FiO2 (%)   -- 01/02/18 1831 -- -- --    Monitor        Physical Exam   Constitutional: He is oriented to person, place, and time and well-developed, well-nourished, and in no distress. No distress.   HENT:   Head: Normocephalic and atraumatic.   Right Ear: Tympanic membrane and external ear normal.   Left Ear: Tympanic membrane and external ear normal.   Nose: Nose normal.   Mouth/Throat: Oropharynx is clear and moist.   Eyes: Conjunctivae and EOM are normal. Pupils are equal, round, and reactive to light.   Neck: Normal  range of motion.   Cardiovascular: Normal rate and regular rhythm.    Pulmonary/Chest: Effort normal and breath sounds normal.   Abdominal:   Normal bowel sounds  Soft  Nontender  Nondistended  No rebound, guarding, or rigidity  No appreciable organomegaly  No CVA tenderness   Musculoskeletal: Normal range of motion.   There is eccymosis to the left second toe without tenderness or edema. There is also ecchymosis to the lumbosacral area. No C-T- or L midline tenderness. No other signs of trauma or tenderness to the extremities.    Neurological: He is alert and oriented to person, place, and time.   GCS is 15  Cranial nerves II-XII are intact.  There is no dysarthria, aphasia or slurred speech.  Sensation is intact to light touch bilaterally and is symmetrical in the face, BUE and BLE.  Strength is 5/5 and symmetrical in the BUE and BLE.  Finger to nose, heel to shin, and rapid alternating movements are intact. 4+/5 EHL on the right   Skin: Skin is warm and dry.   Psychiatric: Affect normal.   Nursing note and vitals reviewed.      LAB RESULTS  Recent Results (from the past 24 hour(s))   Comprehensive Metabolic Panel    Collection Time: 01/02/18  4:02 PM   Result Value Ref Range    Glucose 109 (H) 65 - 99 mg/dL    BUN 40 (H) 8 - 23 mg/dL    Creatinine 2.64 (H) 0.76 - 1.27 mg/dL    Sodium 137 136 - 145 mmol/L    Potassium 4.2 3.5 - 5.2 mmol/L    Chloride 104 98 - 107 mmol/L    CO2 21.2 (L) 22.0 - 29.0 mmol/L    Calcium 9.1 8.6 - 10.5 mg/dL    Total Protein 6.7 6.0 - 8.5 g/dL    Albumin 3.10 (L) 3.50 - 5.20 g/dL    ALT (SGPT) 22 1 - 41 U/L    AST (SGOT) 23 1 - 40 U/L    Alkaline Phosphatase 136 (H) 39 - 117 U/L    Total Bilirubin 1.0 0.1 - 1.2 mg/dL    eGFR Non African Amer 23 (L) >60 mL/min/1.73    Globulin 3.6 gm/dL    A/G Ratio 0.9 g/dL    BUN/Creatinine Ratio 15.2 7.0 - 25.0    Anion Gap 11.8 mmol/L   Troponin    Collection Time: 01/02/18  4:02 PM   Result Value Ref Range    Troponin T <0.010 0.000 - 0.030 ng/mL    Magnesium    Collection Time: 01/02/18  4:02 PM   Result Value Ref Range    Magnesium 2.1 1.6 - 2.4 mg/dL   Light Blue Top    Collection Time: 01/02/18  4:02 PM   Result Value Ref Range    Extra Tube hold for add-on    Green Top (Gel)    Collection Time: 01/02/18  4:02 PM   Result Value Ref Range    Extra Tube Hold for add-ons.    Lavender Top    Collection Time: 01/02/18  4:02 PM   Result Value Ref Range    Extra Tube hold for add-on    Gold Top - SST    Collection Time: 01/02/18  4:02 PM   Result Value Ref Range    Extra Tube Hold for add-ons.    CBC Auto Differential    Collection Time: 01/02/18  4:02 PM   Result Value Ref Range    WBC 11.36 (H) 4.50 - 10.70 10*3/mm3    RBC 4.03 (L) 4.60 - 6.00 10*6/mm3    Hemoglobin 13.0 (L) 13.7 - 17.6 g/dL    Hematocrit 40.1 (L) 40.4 - 52.2 %    MCV 99.5 (H) 79.8 - 96.2 fL    MCH 32.3 27.0 - 32.7 pg    MCHC 32.4 (L) 32.6 - 36.4 g/dL    RDW 13.3 11.5 - 14.5 %    RDW-SD 48.6 37.0 - 54.0 fl    MPV 11.4 6.0 - 12.0 fL    Platelets 390 140 - 500 10*3/mm3    Neutrophil % 77.9 (H) 42.7 - 76.0 %    Lymphocyte % 6.9 (L) 19.6 - 45.3 %    Monocyte % 13.0 (H) 5.0 - 12.0 %    Eosinophil % 0.7 0.3 - 6.2 %    Basophil % 0.2 0.0 - 1.5 %    Immature Grans % 1.3 (H) 0.0 - 0.5 %    Neutrophils, Absolute 8.85 (H) 1.90 - 8.10 10*3/mm3    Lymphocytes, Absolute 0.78 (L) 0.90 - 4.80 10*3/mm3    Monocytes, Absolute 1.48 (H) 0.20 - 1.20 10*3/mm3    Eosinophils, Absolute 0.08 0.00 - 0.70 10*3/mm3    Basophils, Absolute 0.02 0.00 - 0.20 10*3/mm3    Immature Grans, Absolute 0.15 (H) 0.00 - 0.03 10*3/mm3    nRBC 0.0 0.0 - 0.0 /100 WBC   Urinalysis With / Culture If Indicated - Urine, Clean Catch    Collection Time: 01/02/18  6:12 PM   Result Value Ref Range    Color, UA Dark Yellow (A) Yellow, Straw    Appearance, UA Cloudy (A) Clear    pH, UA <=5.0 5.0 - 8.0    Specific Gravity, UA 1.025 1.005 - 1.030    Glucose, UA Negative Negative    Ketones, UA Trace (A) Negative    Bilirubin, UA Negative Negative     Blood, UA Negative Negative    Protein, UA 30 mg/dL (1+) (A) Negative    Leuk Esterase, UA Small (1+) (A) Negative    Nitrite, UA Negative Negative    Urobilinogen, UA 1.0 E.U./dL 0.2 - 1.0 E.U./dL   Urinalysis, Microscopic Only - Urine, Clean Catch    Collection Time: 01/02/18  6:12 PM   Result Value Ref Range    RBC, UA 0-2 None Seen, 0-2 /HPF    WBC, UA 6-12 (A) None Seen, 0-2 /HPF    Bacteria, UA 1+ (A) None Seen /HPF    Squamous Epithelial Cells, UA 0-2 None Seen, 0-2 /HPF    Hyaline Casts, UA 3-6 None Seen /LPF    Calcium Oxalate Crystals, UA Small/1+ None Seen /HPF    Methodology Manual Light Microscopy        I ordered the above labs and reviewed the results    RADIOLOGY  CT Head Without Contrast   Final Result           1. No acute intracranial hemorrhage or hydrocephalus. Chronic changes of   the brain. If there is further clinical concern, MRI could be considered   for further evaluation.   2. Paranasal sinus disease, with evidence of left maxillary acute   sinusitis.       This report was finalized on 1/2/2018 5:03 PM by Dr. Sami Avila MD.          XR Chest 1 View   Final Result   No focal pulmonary consolidation. Follow-up as clinical   indications persist.       This report was finalized on 1/2/2018 4:16 PM by Dr. Sami Avila MD.              I ordered the above noted radiological studies and reviewed the images on the PACS system.      MEDICATIONS GIVEN IN ER  Medications   sodium chloride 0.9 % flush 10 mL (not administered)   cefTRIAXone (ROCEPHIN) in SWFI 1 gram/10ml IV PUSH syringe (not administered)   sodium chloride 0.9 % bolus 500 mL (500 mL Intravenous New Bag 1/2/18 2120)       EKG  Interpreted by ED Physician    PROCEDURES  Procedures      PROGRESS AND CONSULTS    Progress Notes:    ED Course     2100 Informed patient and family of pertinent workup which revealed patient is mildly dehydrated and has a possible UTI. Due to his frequent falling and UTI, I think it would be  "beneficial to admit for treatment with abx and IVF. Pt and family understand and agree with the plan. All questions answered.    2108 Reviewed pt's history and workup with Dr. Al.  After a bedside evaluation; Dr Al agrees with the plan of care. Based on the patient's lab findings and presenting symptoms, the doctor and I feel it is appropriate to admit the patient for further management, evaluation, and treatment.  I have discussed this with the admitting team.  I have also discussed this with the patient/family.  They are in agreement with admission.      2133 Discussed patient's case with DOYLE Benitez, including concerns regarding patient's frequent falls and pertinent lab/radiology workup which revealed a mild UTI. He agrees to admit.     Disposition vitals:  /73 (BP Location: Right arm, Patient Position: Lying)  Pulse 71  Temp 98.4 °F (36.9 °C)  Resp 18  Ht 167.6 cm (66\")  Wt 79.8 kg (176 lb)  SpO2 97%  BMI 28.41 kg/m2      DIAGNOSIS  Final diagnoses:   Urinary tract infection without hematuria, site unspecified   Frequent falls   Dehydration     Documentation assistance provided by akua Pretty for Chari Spear PA-C.  Information recorded by the akua was done at my direction and has been verified and validated by me.  Electronically signed by Denice Pretty on 1/2/2018 at time 9:38 PM           Denice Pretty  01/02/18 2138       WILDA Patel  01/06/18 1533    "

## 2018-01-03 NOTE — DISCHARGE PLACEMENT REQUEST
"Jose Trejo (82 y.o. Male)     Date of Birth Social Security Number Address Home Phone MRN    1935  [id number] [address] [phone] [id number]    Jehovah[employer] witness Marital Status          Evangelical        Admission Date Admission Type Admitting Provider Attending Provider Department, Room/Bed    1/2/18 Emergency Zac, MD Guille Villarreal, Refugio Perez MD [address], P586/1    Discharge Date Discharge Disposition Discharge Destination                      Attending Provider: Refugio Han MD     Allergies:  No Known Allergies    Isolation:  None   Infection:  None   Code Status:  FULL    Ht:  167.6 cm (66\")   Wt:  79.8 kg (175 lb 14.8 oz)    Admission Cmt:  None   Principal Problem:  None                Active Insurance as of 1/2/2018     Primary Coverage     Payor Plan Insurance Group Employer/Plan Group    MEDICARE MEDICARE A & B      Payor Plan Address Payor Plan Phone Number Effective From Effective To    [address] [phone] 3/1/2002     [place] [address]       Subscriber Name Subscriber Birth Date Member ID       JOSE TREJO 1935 [id number]           Secondary Coverage     Payor Plan Insurance Group Employer/Plan Group    AAR MED SUPP [facility] HEALTH CARE OPTIONS      Payor Plan Address Payor Plan Phone Number Effective From Effective To    [facility] [phone] 1/1/2016     [address]       [place] [address]       Subscriber Name Subscriber Birth Date Member ID       JOSE TREJO 1935 [id number]                 Emergency Contacts      (Rel.) Home Phone Work Phone Mobile Phone    Louise(Bailey)Kaylynn (Daughter) [phone] -- [phone]            "

## 2018-01-03 NOTE — PLAN OF CARE
Problem: Fall Risk (Adult)  Goal: Identify Related Risk Factors and Signs and Symptoms  Outcome: Ongoing (interventions implemented as appropriate)   01/03/18 1558   Fall Risk   Fall Risk: Related Risk Factors age-related changes;confusion/agitation;culprit medication(s);gait/mobility problems;history of falls;environment unfamiliar   Fall Risk: Signs and Symptoms presence of risk factors     Goal: Absence of Falls  Outcome: Ongoing (interventions implemented as appropriate)      Problem: Patient Care Overview (Adult)  Goal: Plan of Care Review  Outcome: Ongoing (interventions implemented as appropriate)   01/03/18 5458   Coping/Psychosocial Response Interventions   Plan Of Care Reviewed With patient;daughter   Patient Care Overview   Progress no change   Outcome Evaluation   Outcome Summary/Follow up Plan Patient alert with confusion. Unsteady gait. Requires two assist with transfers and ambulation. Daughter at the bedside. Denies having any pain. IVF infusing without difficulty. Patient stated he had a fracture tailbone from a previous fall. Previous right foot drop present.

## 2018-01-03 NOTE — PROGRESS NOTES
Discharge Planning Assessment  [facility]     Patient Name: Jonathan Trejo  MRN: [id number]  Today's Date: 1/3/2018    Admit Date: 1/2/2018          Discharge Needs Assessment       01/03/18 1218    Living Environment    Lives With facility resident    Living Arrangements independent living facility   [facility]    Home Accessibility no concerns    Type of Financial/Environmental Concern none    Transportation Available car;family or friend will provide    Living Environment    Provides Primary Care For no one    Quality Of Family Relationships supportive;helpful;involved    Able to Return to Prior Living Arrangements yes    Discharge Needs Assessment    Concerns To Be Addressed discharge planning concerns    Equipment Currently Used at Home walker, rolling    Discharge Facility/Level Of Care Needs nursing facility, skilled    Discharge Disposition skilled nursing facility    Discharge Planning Comments SNF            Discharge Plan       01/03/18 1220    Case Management/Social Work Plan    Plan SNF    Patient/Family In Agreement With Plan yes    Additional Comments IMM letter checked. CCP met with pt and daughter/POA (Kaylynn Gil, [phone]) to discuss d/c planning. Facesheet verified. CCP role explained. Pt resides in independent living at [facility] and reportedly uses walker for mobility. Pt has not had past home health or sub-acute rehab. Pt's pharmacy is [facility]. Pt/daughter anticipate sub-acute rehab need and select 1. [facility] and 2. [facility] for referral. CCP made referrals to Jo and will follow for anthony Morrell LCSW        Discharge Placement     Facility/Agency Request Status Selected? Address Phone Number Fax Number    [facility] Pending - Request Sent     [address] [facility] [address] [phone]    [facility] Pending - Request Sent     [address] [facility],  [facility] [id number] [phone]                Demographic Summary       01/03/18 1217    Referral Information    Admission Type inpatient    Arrived From admitted as an inpatient    Referral Source nursing;physician    Reason For Consult discharge planning    Record Reviewed medical record    Primary Care Physician Information    Name Panda Mccall MD            Functional Status       01/03/18 1217    Functional Status Current    Ambulation 3-->assistive equipment and person    Transferring 3-->assistive equipment and person    Toileting 1-->assistive equipment    Bathing 1-->assistive equipment    Dressing 1-->assistive equipment    Eating 0-->independent    Communication 0-->understands/communicates without difficulty    Swallowing (if score 2 or more for any item, consult Rehab Services) 0-->swallows foods/liquids without difficulty    Functional Status Prior    Ambulation 1-->assistive equipment    Transferring 1-->assistive equipment    Toileting 0-->independent    Bathing 0-->independent    Dressing 0-->independent    Eating 0-->independent    Communication 0-->understands/communicates without difficulty    Swallowing 0-->swallows foods/liquids without difficulty    Cognitive/Perceptual/Developmental    Current Mental Status/Cognitive Functioning unable to assess            Psychosocial     None            Abuse/Neglect     None            Legal     None            Substance Abuse     None            Patient Forms     None          Mamie Morrell LCSW

## 2018-01-03 NOTE — THERAPY EVALUATION
Acute Care - Physical Therapy Initial Evaluation  [facility]     Patient Name: Jonathan Trejo  : 1935  MRN: [id number]  Today's Date: 1/3/2018         Referring Physician: Guille       Admit Date: 2018     Visit Dx:    ICD-10-CM ICD-9-CM   1. Urinary tract infection without hematuria, site unspecified N39.0 599.0   2. Frequent falls R29.6 V15.88   3. Dehydration E86.0 276.51   4. Difficulty walking R26.2 719.7     Patient Active Problem List   Diagnosis   • Benign prostatic hyperplasia with urinary obstruction   • Chronic renal impairment, stage 3 (moderate)   • Depression   • Gastroesophageal reflux disease with esophagitis   • Hyperlipidemia   • Nocturia   • Obesity (BMI 30-39.9)   • Osteopenia   • Osteoporosis   • Seborrhea   • Abnormal EKG   • Bradycardia   • Degenerative disc disease, cervical   • Typical atrial flutter   • Lumbar radiculopathy   • AVNRT (AV tarun re-entry tachycardia)   • S/P ablation of atrial flutter   • S/P catheter ablation of slow pathway   • Cardiomyopathy   • GARCIA (dyspnea on exertion)   • Urinary tract infection without hematuria   • Right foot drop   • Frequent falls   • Metabolic encephalopathy   • Acute renal failure     Past Medical History:   Diagnosis Date   • Abnormal electrocardiogram    • Arm pain    • Atrial fibrillation    • Atrial flutter    • BPH associated with nocturia    • Chronic kidney disease    • Colon cancer     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   • Colon polyp 2015   • Depression    • Dyspnea    • Episode of generalized weakness     knees were weak - had to be helped by wife to sit down   • Esophagitis, reflux    • Fatigue    • Hyperlipidemia    • Inguinal hernia    • Loss of hearing    • Lumbar radiculopathy    • Obesity    • Osteoarthritis cervical spine     doc on Sray    • Osteopenia    • Osteoporosis    • Overweight    • Tobacco dependence in remission    • Urge incontinence of urine    • Vitamin D deficiency    • Wheezing      Past  Surgical History:   Procedure Laterality Date   • CARDIAC CATHETERIZATION N/A 7/27/2017    Procedure: Valve assessment;  Surgeon: Adonis Solis MD;  Location:  [facility];  Service:    • CARDIAC ELECTROPHYSIOLOGY PROCEDURE N/A 7/27/2017    Procedure: Ablation atrial flutter Will need to have 3 -4 weeks of therapeutic INR's ;  Surgeon: Adonis Solis MD;  Location:  [facility];  Service:    • CARDIOVERSION     • CATARACT EXTRACTION W/ INTRAOCULAR LENS  IMPLANT, BILATERAL     • COLON RESECTION     • COLONOSCOPY      07/15 redo 5 years  [facility]   • KNEE SURGERY      benign tumor removed   • REFRACTIVE SURGERY  2007   • REPAIR PERONEAL TENDONS ANKLE W/ FIBULAR OSTEOTOMY Right 03/2013    Dr. Banks          PT ASSESSMENT (last 72 hours)      PT Evaluation       01/03/18 1100 01/02/18 2300    Rehab Evaluation    Document Type evaluation  -MG     Subjective Information agree to therapy;complains of;weakness;fatigue  -MG     Patient Effort, Rehab Treatment good  -MG     Symptoms Noted During/After Treatment fatigue  -MG     General Information    Patient Profile Review yes  -MG     Referring Physician Guille   -     General Observations supine in bed, no alarm   -MG     Pertinent History Of Current Problem incr falls over last year, fractured tailbone, foot drop last 6mo, LE weakness last 6mo   -MG     Precautions/Limitations fall precautions  -MG     Prior Level of Function independent:  -MG     Equipment Currently Used at Home walker, rolling  -MG walker, rolling  -SC    Plans/Goals Discussed With patient;family  -MG     Barriers to Rehab medically complex;previous functional deficit;cognitive status  -MG     Living Environment    Lives With  other (see comments)   assisted living  -SC    Living Arrangements  assisted living  -SC    Home Accessibility  no concerns  -SC    Stair Railings at Home  none  -SC    Type of Financial/Environmental Concern  none  -SC    Transportation  "Available  family or friend will provide  -SC    Living Environment Comment Lives in independent living   -MG     Clinical Impression    Patient/Family Goals Statement get stronger  -MG     Criteria for Skilled Therapeutic Interventions Met yes;treatment indicated  -MG     Impairments Found (describe specific impairments) gait, locomotion, and balance;aerobic capacity/endurance;muscle performance  -MG     Rehab Potential fair, will monitor progress closely  -MG     Pain Assessment    Pain Assessment --   tailbone discomfort   -MG     Cognitive Assessment/Intervention    Current Cognitive/Communication Assessment impaired   per daughter, \"not thinking clearly\"   -MG     Orientation Status oriented x 4  -MG     Follows Commands/Answers Questions 100% of the time  -MG     Personal Safety mild impairment  -MG     Personal Safety Interventions fall prevention program maintained;gait belt  -MG     ROM (Range of Motion)    General ROM Detail WFL  -MG     MMT (Manual Muscle Testing)    General MMT Assessment Detail R ankle dorsiflexion 3/5  -MG     Bed Mobility, Assessment/Treatment    Bed Mob, Supine to Sit, [facility] supervision required  -MG     Transfer Assessment/Treatment    Transfers, Sit-Stand [facility] minimum assist (75% patient effort);2 person assist required  -MG     Transfers, Stand-Sit [facility] minimum assist (75% patient effort)  -MG     Transfers, Sit-Stand-Sit, Assist Device rolling walker  -MG     Transfer, Safety Issues step length decreased;balance decreased during turns  -MG     Transfer, Impairments strength decreased;impaired balance  -MG     Transfer, Comment cues for hand placement  -MG     Gait Assessment/Treatment    Gait, [facility] Level minimum assist (75% patient effort);1 person + 1 person to manage equipment  -MG     Gait, Assistive Device rolling walker  -MG     Gait, Gait Deviations ирина decreased;step length decreased  -MG     Gait, Safety Issues step length " decreased;balance decreased during turns  -MG     Gait, Impairments strength decreased;impaired balance  -MG     Gait, Comment R LE ER, cues to stay close and inside walker   -MG     Therapy Exercises    Bilateral Lower Extremities AROM:;10 reps;ankle pumps/circles;SLR  -MG     Positioning and Restraints    Pre-Treatment Position in bed  -MG     Post Treatment Position bed  -MG     In Bed supine;call light within reach;encouraged to call for assist;exit alarm on;with family/caregiver  -MG       User Key  (r) = Recorded By, (t) = Taken By, (c) = Cosigned By    Initials Name Provider Type    SC Zee Camp, RN Registered Nurse    MG Megan Gosselin, PT Physical Therapist          Physical Therapy Education     Title: PT OT SLP Therapies (Done)     Topic: Physical Therapy (Done)     Point: Mobility training (Done)    Learning Progress Summary    Learner Readiness Method Response Comment Documented by Status   Patient Acceptance E VU,NR  MG 01/03/18 1110 Done               Point: Home exercise program (Done)    Learning Progress Summary    Learner Readiness Method Response Comment Documented by Status   Patient Acceptance E VU,NR  MG 01/03/18 1110 Done               Point: Body mechanics (Done)    Learning Progress Summary    Learner Readiness Method Response Comment Documented by Status   Patient Acceptance E VU,NR  MG 01/03/18 1110 Done               Point: Precautions (Done)    Learning Progress Summary    Learner Readiness Method Response Comment Documented by Status   Patient Acceptance E VU,NR  MG 01/03/18 1110 Done                      User Key     Initials Effective Dates Name Provider Type Discipline     09/13/17 -  Megan Gosselin, PT Physical Therapist PT                PT Recommendation and Plan  Anticipated Discharge Disposition: skilled nursing facility (SNF vs ILF)  Planned Therapy Interventions: gait training, balance training, bed mobility training, home exercise program, strengthening, transfer  training, patient/family education  PT Frequency: daily  Plan of Care Review  Outcome Summary/Follow up Plan: Pt admitted with frequent falls/UTI. He was previously independent but has been falling. Increased R foot drop over last several months. Fractured tailbone during fall. Upon eval, pt demonstrates poor safety awareness with walker, foot drop, and decreased balance. Educated on exercises to improve R foot drop and LE weakness. Will see pt to improve independence, he is not currently safe to d/c to ILF, may require SNF at d/c. Will progress towards goals as tolerated.           IP PT Goals       01/03/18 1111          Bed Mobility PT LTG    Bed Mobility PT LTG, Date Established 01/03/18  -MG      Bed Mobility PT LTG, Time to Achieve 1 wk  -MG      Bed Mobility PT LTG, Activity Type all bed mobility  -MG      Bed Mobility PT LTG, [place] Level independent  -MG      Transfer Training PT LTG    Transfer Training PT LTG, Date Established 01/03/18  -MG      Transfer Training PT LTG, Time to Achieve 1 wk  -MG      Transfer Training PT LTG, Activity Type bed to chair /chair to bed;sit to stand/stand to sit  -MG      Transfer Training PT LTG, [place] Level supervision required  -MG      Transfer Training PT LTG, Assist Device walker, rolling  -MG      Gait Training PT LTG    Gait Training Goal PT LTG, Date Established 01/03/18  -MG      Gait Training Goal PT LTG, Time to Achieve 1 wk  -MG      Gait Training Goal PT LTG, [place]ell Level contact guard assist  -MG      Gait Training Goal PT LTG, Assist Device walker, rolling  -MG        User Key  (r) = Recorded By, (t) = Taken By, (c) = Cosigned By    Initials Name Provider Type    MG Megan Gosselin, PT Physical Therapist                Outcome Measures       01/03/18 1100          How much help from another person do you currently need...    Turning from your back to your side while in flat bed without using bedrails? 4  -MG      Moving from lying on back to  sitting on the side of a flat bed without bedrails? 4  -MG      Moving to and from a bed to a chair (including a wheelchair)? 3  -MG      Standing up from a chair using your arms (e.g., wheelchair, bedside chair)? 3  -MG      Climbing 3-5 steps with a railing? 2  -MG      To walk in hospital room? 2  -MG      AM-PAC 6 Clicks Score 18  -MG      Functional Assessment    Outcome Measure Options AM-PAC 6 Clicks Basic Mobility (PT)  -MG        User Key  (r) = Recorded By, (t) = Taken By, (c) = Cosigned By    Initials Name Provider Type    MG Megan Gosselin, PT Physical Therapist           Time Calculation:         PT Charges       01/03/18 1115          Time Calculation    Start Time 1035  -MG      Stop Time 1102  -MG      Time Calculation (min) 27 min  -MG      PT Received On 01/03/18  -MG      PT - Next Appointment 01/04/18  -MG      PT Goal Re-Cert Due Date 01/10/18  -MG        User Key  (r) = Recorded By, (t) = Taken By, (c) = Cosigned By    Initials Name Provider Type    MG Megan Gosselin, PT Physical Therapist          Therapy Charges for Today     Code Description Service Date Service Provider Modifiers Qty    08960971017 HC PT EVAL MOD COMPLEXITY 2 1/3/2018 Megan Gosselin, PT GP 1    54278020423 HC PT THER PROC EA 15 MIN 1/3/2018 Megan Gosselin, PT GP 2          PT G-Codes  Outcome Measure Options: AM-PAC 6 Clicks Basic Mobility (PT)      Megan Gosselin, PT  1/3/2018

## 2018-01-03 NOTE — PLAN OF CARE
Problem: Fall Risk (Adult)  Goal: Identify Related Risk Factors and Signs and Symptoms  Outcome: Ongoing (interventions implemented as appropriate)    Goal: Absence of Falls  Outcome: Ongoing (interventions implemented as appropriate)      Problem: Patient Care Overview (Adult)  Goal: Plan of Care Review  Outcome: Ongoing (interventions implemented as appropriate)   01/03/18 0527   Coping/Psychosocial Response Interventions   Plan Of Care Reviewed With patient;family   Patient Care Overview   Progress no change   Outcome Evaluation   Outcome Summary/Follow up Plan New admit from ER. Hx of falls. Pt a&ox4, but intermittently confused. Bed alarm on with call button in reach. Vss.

## 2018-01-04 LAB
ANION GAP SERPL CALCULATED.3IONS-SCNC: 12.2 MMOL/L
BACTERIA SPEC AEROBE CULT: NO GROWTH
BUN BLD-MCNC: 25 MG/DL (ref 8–23)
BUN/CREAT SERPL: 15.2 (ref 7–25)
CALCIUM SPEC-SCNC: 8.5 MG/DL (ref 8.6–10.5)
CHLORIDE SERPL-SCNC: 111 MMOL/L (ref 98–107)
CO2 SERPL-SCNC: 20.8 MMOL/L (ref 22–29)
CREAT BLD-MCNC: 1.64 MG/DL (ref 0.76–1.27)
DEPRECATED RDW RBC AUTO: 50.2 FL (ref 37–54)
ERYTHROCYTE [DISTWIDTH] IN BLOOD BY AUTOMATED COUNT: 13.4 % (ref 11.5–14.5)
GFR SERPL CREATININE-BSD FRML MDRD: 41 ML/MIN/1.73
GLUCOSE BLD-MCNC: 95 MG/DL (ref 65–99)
HCT VFR BLD AUTO: 39.8 % (ref 40.4–52.2)
HGB BLD-MCNC: 12.2 G/DL (ref 13.7–17.6)
MCH RBC QN AUTO: 31.4 PG (ref 27–32.7)
MCHC RBC AUTO-ENTMCNC: 30.7 G/DL (ref 32.6–36.4)
MCV RBC AUTO: 102.3 FL (ref 79.8–96.2)
PLATELET # BLD AUTO: 325 10*3/MM3 (ref 140–500)
PMV BLD AUTO: 11.2 FL (ref 6–12)
POTASSIUM BLD-SCNC: 3.7 MMOL/L (ref 3.5–5.2)
RBC # BLD AUTO: 3.89 10*6/MM3 (ref 4.6–6)
SODIUM BLD-SCNC: 144 MMOL/L (ref 136–145)
WBC NRBC COR # BLD: 8.17 10*3/MM3 (ref 4.5–10.7)

## 2018-01-04 PROCEDURE — 80048 BASIC METABOLIC PNL TOTAL CA: CPT | Performed by: INTERNAL MEDICINE

## 2018-01-04 PROCEDURE — 85027 COMPLETE CBC AUTOMATED: CPT | Performed by: INTERNAL MEDICINE

## 2018-01-04 PROCEDURE — 25010000002 HEPARIN (PORCINE) PER 1000 UNITS: Performed by: INTERNAL MEDICINE

## 2018-01-04 PROCEDURE — 25010000002 CEFTRIAXONE IN SWFI 1 GRAM/10ML IV PUSH SYRINGE (SIMPLE): Performed by: INTERNAL MEDICINE

## 2018-01-04 PROCEDURE — 97110 THERAPEUTIC EXERCISES: CPT

## 2018-01-04 RX ADMIN — ASPIRIN 81 MG: 81 TABLET, CHEWABLE ORAL at 08:48

## 2018-01-04 RX ADMIN — HEPARIN SODIUM 5000 UNITS: 5000 INJECTION, SOLUTION INTRAVENOUS; SUBCUTANEOUS at 20:35

## 2018-01-04 RX ADMIN — HEPARIN SODIUM 5000 UNITS: 5000 INJECTION, SOLUTION INTRAVENOUS; SUBCUTANEOUS at 05:46

## 2018-01-04 RX ADMIN — ATORVASTATIN CALCIUM 10 MG: 10 TABLET, FILM COATED ORAL at 20:35

## 2018-01-04 RX ADMIN — PANTOPRAZOLE SODIUM 40 MG: 40 TABLET, DELAYED RELEASE ORAL at 05:46

## 2018-01-04 RX ADMIN — SODIUM CHLORIDE 75 ML/HR: 9 INJECTION, SOLUTION INTRAVENOUS at 12:14

## 2018-01-04 RX ADMIN — BUPROPION HYDROCHLORIDE 300 MG: 300 TABLET, EXTENDED RELEASE ORAL at 08:48

## 2018-01-04 RX ADMIN — HEPARIN SODIUM 5000 UNITS: 5000 INJECTION, SOLUTION INTRAVENOUS; SUBCUTANEOUS at 14:48

## 2018-01-04 RX ADMIN — ESCITALOPRAM 5 MG: 5 TABLET, FILM COATED ORAL at 08:48

## 2018-01-04 NOTE — PROGRESS NOTES
Continued Stay Note  [facility]     Patient Name: Jonathan Trejo  MRN: [id number]  Today's Date: 1/4/2018    Admit Date: 1/2/2018          Discharge Plan       01/04/18 1446    Case Management/Social Work Plan    Plan [facility], bed available tomorrow    Patient/Family In Agreement With Plan yes    Additional Comments Per Zeinab Nguyenz[facility] has no bed availability. Per Edith, [facility] accepts pt. CCP spoke with pt's daughter/POA (Kaylynn Gil, [phone]) due to pt's confusion. Pt's daughter agreeable to [facility] and states she will transport pt to facility at d/c. Packet in CCP office. Lara Morrell LCSW              Discharge Codes     None            Mamie Morrell LCSW

## 2018-01-04 NOTE — PLAN OF CARE
Problem: Fall Risk (Adult)  Goal: Identify Related Risk Factors and Signs and Symptoms  Outcome: Ongoing (interventions implemented as appropriate)    Goal: Absence of Falls  Outcome: Ongoing (interventions implemented as appropriate)      Problem: Patient Care Overview (Adult)  Goal: Plan of Care Review  Outcome: Ongoing (interventions implemented as appropriate)   01/04/18 0446   Coping/Psychosocial Response Interventions   Plan Of Care Reviewed With patient   Patient Care Overview   Progress no change   Outcome Evaluation   Outcome Summary/Follow up Plan Pt alert and confused. Frequently trys to get out of bed without assistance. Bed alarm on and frequent reminders to press call button when needing assistance. Neuro checks remain the same.

## 2018-01-04 NOTE — PROGRESS NOTES
"  Name: Jonathan Trejo  ADMIT: 2018   Age/Sex: 82 y.o.male LOS:  LOS: 2 days    :    1935     ROOM: [facility]   MRN:    [id number]    PCP:    Panda Mccall Jr., MD     Subjective   Doing well. No complaints. Wants to go home    Objective   Vital Signs  Temp:  [97.7 °F (36.5 °C)-99.1 °F (37.3 °C)] 97.7 °F (36.5 °C)  Heart Rate:  [72-80] 74  Resp:  [18-20] 18  BP: (134-154)/(68-90) 134/82  Body mass index is 28.4 kg/(m^2).    Objective:  General Appearance:  Comfortable and well-appearing.    Vital signs: (most recent): Blood pressure 134/82, pulse 74, temperature 97.7 °F (36.5 °C), temperature source Oral, resp. rate 18, height 167.6 cm (66\"), weight 79.8 kg (175 lb 14.8 oz), SpO2 94 %.  Vital signs are normal.    HEENT: Normal HEENT exam.    Lungs:  Normal effort.  Breath sounds clear to auscultation.    Heart: Normal rate.  Regular rhythm.    Abdomen: Abdomen is soft and non-distended.  Bowel sounds are normal.   There is no abdominal tenderness.     Extremities: Normal range of motion.  There is no dependent edema.    Neurological: Patient is alert and oriented to person, place and time.    Skin:  Warm and dry.  No rash.             Results Review:       I reviewed the patient's new clinical results.    Results from last 7 days  Lab Units 18  0512 18  0540 18  1602   WBC 10*3/mm3 8.17 8.04 11.36*   HEMOGLOBIN g/dL 12.2* 12.5* 13.0*   PLATELETS 10*3/mm3 325 321 390     Results from last 7 days  Lab Units 18  0512 18  0540 18  1602   SODIUM mmol/L 144 142 137   POTASSIUM mmol/L 3.7 3.7 4.2   CHLORIDE mmol/L 111* 106 104   CO2 mmol/L 20.8* 21.9* 21.2*   BUN mg/dL 25* 35* 40*   CREATININE mg/dL 1.64* 2.17* 2.64*   GLUCOSE mg/dL 95 86 109*   Estimated Creatinine Clearance: 35.7 mL/min (by C-G formula based on Cr of 1.64).  Results from last 7 days  Lab Units 18  0512 18  0540 18  1602   CALCIUM mg/dL 8.5* 8.4* 9.1   ALBUMIN g/dL  --  3.10* 3.10*   MAGNESIUM " mg/dL  --   --  2.1       RADIOLOGY  1/4/2018  Pending      aspirin 81 mg Oral Daily   atorvastatin 10 mg Oral Nightly   buPROPion  mg Oral Daily   ceftriaxone 1 g Intravenous Q24H   escitalopram 5 mg Oral Daily   heparin (porcine) 5,000 Units Subcutaneous Q8H   pantoprazole 40 mg Oral QAM       sodium chloride 75 mL/hr Last Rate: 75 mL/hr (01/04/18 1214)   Diet Regular      Assessment/Plan   Assessment:   Active Problems:    Urinary tract infection without hematuria    Right foot drop    Frequent falls    Metabolic encephalopathy    Acute renal failure        Plan:   1. SPENCER  - improved with IVF  - will d/c IVF today and recheck in am  - avoid any nephrotoxic agents  - UCx with NGTD. Will d/c rocephin after 3rd dose today    2. Encephalopathy  - likely has some underlying cognitive impairment vs dementia  - he is completely AAOx3 for me and answers all questions appropriately    3. Chronic systolic HF  - EF 35%  - d/c IVF and monitor  - low salt diet. Not on lasix as outpt      Disposition  To SNF hopefully tomorrow      Norberto Price MD  [facility]  01/04/18  12:53 PM

## 2018-01-04 NOTE — PLAN OF CARE
Problem: Fall Risk (Adult)  Goal: Identify Related Risk Factors and Signs and Symptoms  Outcome: Outcome(s) achieved Date Met: 01/04/18 01/04/18 1614   Fall Risk   Fall Risk: Related Risk Factors environment unfamiliar     Goal: Absence of Falls  Outcome: Ongoing (interventions implemented as appropriate)      Problem: Patient Care Overview (Adult)  Goal: Plan of Care Review  Outcome: Ongoing (interventions implemented as appropriate)   01/04/18 1614   Coping/Psychosocial Response Interventions   Plan Of Care Reviewed With patient   Patient Care Overview   Progress improving   Outcome Evaluation   Outcome Summary/Follow up Plan PT DISORIENTED TO SITUATION, CONFUSION AT TIMES, BED ALARM IN PLACE D/T PT GETTING UP S CALLING FOR ASSISTANCE, DIET 2000 NA RESTRICTION, IVF DCED, NO PAIN NOTED, VOIDING PER BRP, BM TODAY, VSS- APICAL HR IRR     Goal: Adult Individualization and Mutuality  Outcome: Ongoing (interventions implemented as appropriate)    Goal: Discharge Needs Assessment  Outcome: Ongoing (interventions implemented as appropriate)

## 2018-01-04 NOTE — THERAPY TREATMENT NOTE
Acute Care - Physical Therapy Treatment Note  [facility]     Patient Name: Jonathan Trejo  : 1935  MRN: [id number]  Today's Date: 2018        Referring Physician: Guille     Admit Date: 2018    Visit Dx:    ICD-10-CM ICD-9-CM   1. Urinary tract infection without hematuria, site unspecified N39.0 599.0   2. Frequent falls R29.6 V15.88   3. Dehydration E86.0 276.51   4. Difficulty walking R26.2 719.7     Patient Active Problem List   Diagnosis   • Benign prostatic hyperplasia with urinary obstruction   • Chronic renal impairment, stage 3 (moderate)   • Depression   • Gastroesophageal reflux disease with esophagitis   • Hyperlipidemia   • Nocturia   • Obesity (BMI 30-39.9)   • Osteopenia   • Osteoporosis   • Seborrhea   • Abnormal EKG   • Bradycardia   • Degenerative disc disease, cervical   • Typical atrial flutter   • Lumbar radiculopathy   • AVNRT (AV tarun re-entry tachycardia)   • S/P ablation of atrial flutter   • S/P catheter ablation of slow pathway   • Cardiomyopathy   • GARCIA (dyspnea on exertion)   • Urinary tract infection without hematuria   • Right foot drop   • Frequent falls   • Metabolic encephalopathy   • Acute renal failure               Adult Rehabilitation Note       18 0800 18 1100       Rehab Assessment/Intervention    Discipline physical therapist  -MG      Document Type therapy note (daily note)  -MG      Subjective Information agree to therapy;complains of;weakness;fatigue  -MG      Patient Effort, Rehab Treatment good  -MG      Symptoms Noted During/After Treatment fatigue  -MG      Precautions/Limitations fall precautions  -MG      Recorded by [MG] Megan Gosselin, PT      Pain Assessment    Pain Assessment No/denies pain  -MG      Recorded by [MG] Megan Gosselin, PT      Cognitive Assessment/Intervention    Current Cognitive/Communication Assessment impaired  -MG      Orientation Status oriented to;person;place  -MG      Follows Commands/Answers Questions 75% of  the time;able to follow single-step instructions;needs cueing;needs increased time  -MG      Personal Safety mild impairment  -MG      Personal Safety Interventions gait belt;fall prevention program maintained  -MG      Recorded by [MG] Megan Gosselin, PT      Bed Mobility, Assessment/Treatment    Bed Mob, Supine to Sit, Jefferson supervision required  -MG      Bed Mob, Sit to Supine, Jefferson supervision required  -MG      Recorded by [MG] Megan Gosselin, PT      Transfer Assessment/Treatment    Transfers, Sit-Stand Jefferson minimum assist (75% patient effort)  -MG      Transfers, Stand-Sit Jefferson minimum assist (75% patient effort)  -MG      Transfers, Sit-Stand-Sit, Assist Device rolling walker  -MG      Transfer, Safety Issues step length decreased;balance decreased during turns;weight-shifting ability decreased  -MG      Transfer, Impairments strength decreased;impaired balance  -MG      Transfer, Comment cues to keep using walker until completely turned. SIts quickly and uncontrolled   -MG      Recorded by [MG] Megan Gosselin, PT      Gait Assessment/Treatment    Gait, Jefferson Level minimum assist (75% patient effort);1 person + 1 person to manage equipment  -MG      Gait, Assistive Device rolling walker  -MG      Gait, Distance (Feet) 100  -MG 75  -MG     Gait, Gait Deviations ирина decreased;forward flexed posture;step length decreased  -MG      Gait, Safety Issues step length decreased;balance decreased during turns  -MG      Gait, Impairments impaired balance;strength decreased  -MG      Gait, Comment R LE ER (cues to point toes forward to decrease dragging of R foot). increased ирина this date, however allows walker to get too far out in front (despite cues) causing him to lose his balance forwards  -MG      Recorded by [MG] Megan Gosselin, PT [MG] Megan Gosselin, PT     Therapy Exercises    Bilateral Lower Extremities --   verbally reviewed exercises  -MG      Recorded by [MG]  Megan Gosselin, PT      Positioning and Restraints    Pre-Treatment Position in bed  -MG      Post Treatment Position bed  -MG      In Bed supine;call light within reach;encouraged to call for assist;exit alarm on;with family/caregiver   declined to sit up in chair   -MG      Recorded by [MG] Megan Gosselin, PT        User Key  (r) = Recorded By, (t) = Taken By, (c) = Cosigned By    Initials Name Effective Dates    MG Megan Gosselin, PT 09/13/17 -                 IP PT Goals       01/03/18 1111          Bed Mobility PT LTG    Bed Mobility PT LTG, Date Established 01/03/18  -MG      Bed Mobility PT LTG, Time to Achieve 1 wk  -MG      Bed Mobility PT LTG, Activity Type all bed mobility  -MG      Bed Mobility PT LTG, Selfridge Level independent  -MG      Transfer Training PT LTG    Transfer Training PT LTG, Date Established 01/03/18  -MG      Transfer Training PT LTG, Time to Achieve 1 wk  -MG      Transfer Training PT LTG, Activity Type bed to chair /chair to bed;sit to stand/stand to sit  -MG      Transfer Training PT LTG, Selfridge Level supervision required  -MG      Transfer Training PT LTG, Assist Device walker, rolling  -MG      Gait Training PT LTG    Gait Training Goal PT LTG, Date Established 01/03/18  -MG      Gait Training Goal PT LTG, Time to Achieve 1 wk  -MG      Gait Training Goal PT LTG, Selfridge Level contact guard assist  -MG      Gait Training Goal PT LTG, Assist Device walker, rolling  -MG        User Key  (r) = Recorded By, (t) = Taken By, (c) = Cosigned By    Initials Name Provider Type    MG Megan Gosselin, PT Physical Therapist          Physical Therapy Education     Title: PT OT SLP Therapies (Done)     Topic: Physical Therapy (Done)     Point: Mobility training (Done)    Learning Progress Summary    Learner Readiness Method Response Comment Documented by Status   Patient Acceptance E VU,NR  MG 01/04/18 0834 Done    Acceptance E VU,NR  MG 01/03/18 1110 Done               Point: Home  exercise program (Done)    Learning Progress Summary    Learner Readiness Method Response Comment Documented by Status   Patient Acceptance E VU,NR  MG 01/04/18 0834 Done    Acceptance E VU,NR  MG 01/03/18 1110 Done               Point: Body mechanics (Done)    Learning Progress Summary    Learner Readiness Method Response Comment Documented by Status   Patient Acceptance E VU,NR  MG 01/04/18 0834 Done    Acceptance E VU,NR  MG 01/03/18 1110 Done               Point: Precautions (Done)    Learning Progress Summary    Learner Readiness Method Response Comment Documented by Status   Patient Acceptance E VU,NR  MG 01/04/18 0834 Done    Acceptance E VU,NR  MG 01/03/18 1110 Done                      User Key     Initials Effective Dates Name Provider Type Discipline     09/13/17 -  Megan Gosselin, PT Physical Therapist PT                    PT Recommendation and Plan  Anticipated Discharge Disposition: skilled nursing facility (SNF vs ILF)  Planned Therapy Interventions: gait training, balance training, bed mobility training, home exercise program, strengthening, transfer training, patient/family education  PT Frequency: daily  Plan of Care Review  Outcome Summary/Follow up Plan: Pt continues to have poor safety awareness which places him at an increased fall risk. He requires cues to stay close to walker and to keep feet insides walker. Unsafe to return to independent living. Will benefit from SNF stay to improve his safety and decrease his fall risk. Will conitnue to progress towards goals as tolerated. Encouraged pt to ambulate with nursing with FWW this afternoon, pt in agreement.           Outcome Measures       01/04/18 0800 01/03/18 1100       How much help from another person do you currently need...    Turning from your back to your side while in flat bed without using bedrails? 4  -MG 4  -MG     Moving from lying on back to sitting on the side of a flat bed without bedrails? 4  -MG 4  -MG     Moving to and  from a bed to a chair (including a wheelchair)? 3  -MG 3  -MG     Standing up from a chair using your arms (e.g., wheelchair, bedside chair)? 3  -MG 3  -MG     Climbing 3-5 steps with a railing? 2  -MG 2  -MG     To walk in hospital room? 3  -MG 2  -MG     AM-PAC 6 Clicks Score 19  -MG 18  -MG     Functional Assessment    Outcome Measure Options AM-PAC 6 Clicks Basic Mobility (PT)  -MG AM-PAC 6 Clicks Basic Mobility (PT)  -MG       User Key  (r) = Recorded By, (t) = Taken By, (c) = Cosigned By    Initials Name Provider Type    MG Megan Gosselin, PT Physical Therapist           Time Calculation:         PT Charges       01/04/18 0836          Time Calculation    Start Time 0813  -MG      Stop Time 0824  -MG      Time Calculation (min) 11 min  -MG      PT Received On 01/04/18  -MG      PT - Next Appointment 01/05/18  -MG        User Key  (r) = Recorded By, (t) = Taken By, (c) = Cosigned By    Initials Name Provider Type    MG Megan Gosselin, PT Physical Therapist          Therapy Charges for Today     Code Description Service Date Service Provider Modifiers Qty    04053213188 HC PT EVAL MOD COMPLEXITY 2 1/3/2018 Megan Gosselin, PT GP 1    13572645399 HC PT THER PROC EA 15 MIN 1/3/2018 Megan Gosselin, PT GP 2    49850066212 HC PT THER PROC EA 15 MIN 1/4/2018 Megan Gosselin, PT GP 1          PT G-Codes  Outcome Measure Options: AM-PAC 6 Clicks Basic Mobility (PT)    Megan Gosselin, PT  1/4/2018

## 2018-01-04 NOTE — PLAN OF CARE
Problem: Patient Care Overview (Adult)  Goal: Plan of Care Review   01/04/18 0834   Outcome Evaluation   Outcome Summary/Follow up Plan Pt continues to have poor safety awareness which places him at an increased fall risk. He requires cues to stay close to walker and to keep feet insides walker. Unsafe to return to independent living. Will benefit from SNF stay to improve his safety and decrease his fall risk. Will conitnue to progress towards goals as tolerated. Encouraged pt to ambulate with nursing with FWW this afternoon, pt in agreement.

## 2018-01-05 ENCOUNTER — APPOINTMENT (OUTPATIENT)
Dept: GENERAL RADIOLOGY | Facility: HOSPITAL | Age: 83
End: 2018-01-05
Attending: INTERNAL MEDICINE

## 2018-01-05 VITALS
TEMPERATURE: 97.6 F | SYSTOLIC BLOOD PRESSURE: 150 MMHG | HEIGHT: 66 IN | OXYGEN SATURATION: 95 % | DIASTOLIC BLOOD PRESSURE: 82 MMHG | WEIGHT: 175.93 LBS | RESPIRATION RATE: 18 BRPM | BODY MASS INDEX: 28.27 KG/M2 | HEART RATE: 71 BPM

## 2018-01-05 LAB
ANION GAP SERPL CALCULATED.3IONS-SCNC: 11.9 MMOL/L
BUN BLD-MCNC: 16 MG/DL (ref 8–23)
BUN/CREAT SERPL: 11.5 (ref 7–25)
CALCIUM SPEC-SCNC: 8.8 MG/DL (ref 8.6–10.5)
CHLORIDE SERPL-SCNC: 107 MMOL/L (ref 98–107)
CO2 SERPL-SCNC: 23.1 MMOL/L (ref 22–29)
CREAT BLD-MCNC: 1.39 MG/DL (ref 0.76–1.27)
GFR SERPL CREATININE-BSD FRML MDRD: 49 ML/MIN/1.73
GLUCOSE BLD-MCNC: 91 MG/DL (ref 65–99)
POTASSIUM BLD-SCNC: 3.8 MMOL/L (ref 3.5–5.2)
SODIUM BLD-SCNC: 142 MMOL/L (ref 136–145)

## 2018-01-05 PROCEDURE — 25010000002 HEPARIN (PORCINE) PER 1000 UNITS: Performed by: INTERNAL MEDICINE

## 2018-01-05 PROCEDURE — 73502 X-RAY EXAM HIP UNI 2-3 VIEWS: CPT

## 2018-01-05 PROCEDURE — 80048 BASIC METABOLIC PNL TOTAL CA: CPT | Performed by: INTERNAL MEDICINE

## 2018-01-05 RX ORDER — GABAPENTIN 300 MG/1
300 CAPSULE ORAL 3 TIMES DAILY
Qty: 15 CAPSULE | Refills: 0 | Status: SHIPPED | OUTPATIENT
Start: 2018-01-05 | End: 2018-01-05 | Stop reason: HOSPADM

## 2018-01-05 RX ORDER — TRAMADOL HYDROCHLORIDE 50 MG/1
50 TABLET ORAL EVERY 6 HOURS PRN
Qty: 15 TABLET
Start: 2018-01-05 | End: 2018-01-05 | Stop reason: HOSPADM

## 2018-01-05 RX ADMIN — ESCITALOPRAM 5 MG: 5 TABLET, FILM COATED ORAL at 08:19

## 2018-01-05 RX ADMIN — HEPARIN SODIUM 5000 UNITS: 5000 INJECTION, SOLUTION INTRAVENOUS; SUBCUTANEOUS at 16:22

## 2018-01-05 RX ADMIN — PANTOPRAZOLE SODIUM 40 MG: 40 TABLET, DELAYED RELEASE ORAL at 05:55

## 2018-01-05 RX ADMIN — ASPIRIN 81 MG: 81 TABLET, CHEWABLE ORAL at 08:20

## 2018-01-05 RX ADMIN — BUPROPION HYDROCHLORIDE 300 MG: 300 TABLET, EXTENDED RELEASE ORAL at 08:20

## 2018-01-05 NOTE — NURSING NOTE
Aide and nurse entered patient's room due to bed alarm going off.  Staff attempted to assist patient back to bed, however patient was combative and uncooperative and continued walking.  Pt fell to floor and hit right hip.  Dr. Garcia aware, X-ray ordered.  Attempted to call patient's daughter Kaylynn, left message for a return call.

## 2018-01-05 NOTE — PROGRESS NOTES
Case Management Discharge Note    Final Note: skilled bed at [facility]; Placed call to daughter/POA Kaylynn Gil @ [phone] and left vm advising of d/c today.  Provided call back number.    Discharge Placement     Facility/Agency Request Status Selected? Address Phone Number Fax Number    [facility] Accepted    Yes [address] [facility] [address] [phone]    [facility]     [address] [facility] [address] [phone]        Other: Other (dgt car)    Discharge Codes: 03  Discharged/transferred to skilled nursing facility (SNF) with Medicare certification in anticipation of skilled care

## 2018-01-05 NOTE — PLAN OF CARE
Problem: Fall Risk (Adult)  Goal: Absence of Falls  Outcome: Ongoing (interventions implemented as appropriate)      Problem: Patient Care Overview (Adult)  Goal: Plan of Care Review  Outcome: Ongoing (interventions implemented as appropriate)   01/05/18 0520   Coping/Psychosocial Response Interventions   Plan Of Care Reviewed With patient   Patient Care Overview   Progress no change   Outcome Evaluation   Outcome Summary/Follow up Plan Patient alert, disoriented at times. Still uncooperative when educated on why he must call for assistance before getting out of bed. However, his orientation has greatly improved overall today.

## 2018-01-05 NOTE — DISCHARGE SUMMARY
Date of Admission: 1/2/2018  Date of Discharge:  1/5/2018    PCP: Panda Mccall Jr., MD      DISCHARGE DIAGNOSIS  Active Problems:    Urinary tract infection without hematuria    Right foot drop    Frequent falls    Metabolic encephalopathy    Acute renal failure      SECONDARY DIAGNOSES  Past Medical History:   Diagnosis Date   • Abnormal electrocardiogram    • Arm pain    • Atrial fibrillation    • Atrial flutter    • BPH associated with nocturia    • Chronic kidney disease    • Colon cancer     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   • Colon polyp 11/22/2015   • Depression    • Dyspnea    • Episode of generalized weakness     knees were weak - had to be helped by wife to sit down   • Esophagitis, reflux    • Fatigue    • Hyperlipidemia    • Inguinal hernia    • Loss of hearing    • Lumbar radiculopathy    • Obesity    • Osteoarthritis cervical spine     doc on [facility] 08/16   • Osteopenia    • Osteoporosis    • Overweight    • Tobacco dependence in remission    • Urge incontinence of urine    • Vitamin D deficiency    • Wheezing        CONSULTS   Consults     Date and Time Order Name Status Description    1/2/2018 2110 [facility] (on-call MD unless specified) Completed           PROCEDURES PERFORMED  CT Head:  1. No acute intracranial hemorrhage or hydrocephalus. Chronic changes of the brain. If there is further clinical concern, MRI could be considered for further evaluation.  2. Paranasal sinus disease, with evidence of left maxillary acute sinusitis.    HOSPITAL COURSE  Patient is a 82 y.o. male presented to [facility] complaining of worsening frequency of falls.  Please see the admitting history and physical for further details. On admission he had a Cr of 2.64 with a mildly elevated WBC count of 11,000. His u/a looked infectious initially so he was placed on IV Rocephin, however, UCx remained negative so he was only treated for 3 days. He was given IVF and his Cr came down and was 1.3 at the time  "of discharge. He did have some mild confusion off and on throughout the admission. I feel this is most likely due to some mild underlying cognitive impairment vs mild dementia. He should have further testing for this as an outpatient. He was stable for d/c to SNF today.      He does have systolic HF and was on lisinopril and metoprolol when he came in. His lisinopril was not restarted due to his SPENCER. He should have repeat BMP early next week and if his Cr is stable or improved Lisinopril can be restarted at that time.       CONDITION ON DISCHARGE  Stable.      VITAL SIGNS  /92 (BP Location: Right arm, Patient Position: Lying)  Pulse 72  Temp 97.9 °F (36.6 °C) (Oral)   Resp 18  Ht 167.6 cm (66\")  Wt 79.8 kg (175 lb 14.8 oz)  SpO2 96%  BMI 28.4 kg/m2  Objective:  General Appearance:  Comfortable and well-appearing.    Vital signs: (most recent): Blood pressure 164/92, pulse 72, temperature 97.9 °F (36.6 °C), temperature source Oral, resp. rate 18, height 167.6 cm (66\"), weight 79.8 kg (175 lb 14.8 oz), SpO2 96 %.  Vital signs are normal.    HEENT: Normal HEENT exam.    Lungs:  Normal effort.  Breath sounds clear to auscultation.    Heart: Normal rate.  Regular rhythm.    Abdomen: Abdomen is soft and non-distended.  Bowel sounds are normal.   There is no abdominal tenderness.     Extremities: Normal range of motion.  There is no dependent edema.    Neurological: Patient is alert and oriented to person, place and time.    Skin:  Warm and dry.  No rash.               DISCHARGE DISPOSITION   Skilled Nursing Facility (DC - External)      DISCHARGE MEDICATIONS   Jonathan Trejo   Home Medication Instructions SIRIA:[id number]    Printed on:01/05/18 8944   Medication Information                      aspirin 81 MG chewable tablet  Chew 81 mg Daily.             atorvastatin (LIPITOR) 10 MG tablet  TAKE 1 TABLET EVERY DAY             buPROPion XL (WELLBUTRIN XL) 300 MG 24 hr tablet  Take 300 mg by mouth Daily.         "     Cholecalciferol (VITAMIN D3) 2000 UNITS tablet  Take 2,000 Units by mouth daily.             cyclobenzaprine (FLEXERIL) 10 MG tablet  Take 10 mg by mouth 3 (Three) Times a Day As Needed for Muscle Spasms.             escitalopram (LEXAPRO) 5 MG tablet  Take 5 mg by mouth Daily.             Flaxseed, Linseed, (FLAXSEED OIL) 1000 MG capsule  Take 1,000 mg by mouth Daily.             gabapentin (NEURONTIN) 300 MG capsule  Take 1 capsule by mouth 3 (Three) Times a Day.             omeprazole (priLOSEC) 40 MG capsule  Take 40 mg by mouth Daily.             traMADol (ULTRAM) 50 MG tablet  Take 1 tablet by mouth Every 6 (Six) Hours As Needed for Moderate Pain .                Future Appointments  Date Time Provider Department Center   1/11/2018 3:45 PM Panda Mccall Jr., MD [id number] None   4/5/2018 12:30 PM MD JAYSHREE [id number] None     Follow-up Information     Follow up with [facility] .    Specialties:  Skilled Nursing Facility, Intermediate Care Facility    Contact information:    [address]  [facility] [address] [phone]        Follow up with Panda Mccall Jr., MD .    Specialty:  Family Medicine    Contact information:    [address] [facility] [address]  [place] [id number]  [phone]            TEST  RESULTS PENDING AT DISCHARGE         Norberto Price MD  [facility]  01/05/18  8:55 AM      Time: greater than 30 minutes.      Dragon disclaimer:  Much of this encounter note is an electronic transcription/translation of spoken language to printed text. The electronic translation of spoken language may permit erroneous, or at times, nonsensical words or phrases to be inadvertently transcribed; Although I have reviewed the note for such errors, some may still exist.

## 2018-01-08 ENCOUNTER — EPISODE CHANGES (OUTPATIENT)
Dept: CASE MANAGEMENT | Facility: OTHER | Age: 83
End: 2018-01-08

## 2018-01-08 ENCOUNTER — PATIENT OUTREACH (OUTPATIENT)
Dept: CASE MANAGEMENT | Facility: OTHER | Age: 83
End: 2018-01-08

## 2018-01-10 ENCOUNTER — PATIENT OUTREACH (OUTPATIENT)
Dept: CASE MANAGEMENT | Facility: OTHER | Age: 83
End: 2018-01-10

## 2018-01-16 RX ORDER — ESCITALOPRAM OXALATE 5 MG/1
5 TABLET ORAL DAILY
Qty: 30 TABLET | Refills: 3 | Status: SHIPPED | OUTPATIENT
Start: 2018-01-16 | End: 2018-06-09 | Stop reason: SDUPTHER

## 2018-01-18 ENCOUNTER — PATIENT OUTREACH (OUTPATIENT)
Dept: CASE MANAGEMENT | Facility: OTHER | Age: 83
End: 2018-01-18

## 2018-01-23 ENCOUNTER — EPISODE CHANGES (OUTPATIENT)
Dept: CASE MANAGEMENT | Facility: OTHER | Age: 83
End: 2018-01-23

## 2018-02-15 ENCOUNTER — OFFICE VISIT (OUTPATIENT)
Dept: INTERNAL MEDICINE | Facility: CLINIC | Age: 83
End: 2018-02-15

## 2018-02-15 VITALS
WEIGHT: 176 LBS | OXYGEN SATURATION: 97 % | SYSTOLIC BLOOD PRESSURE: 92 MMHG | BODY MASS INDEX: 28.41 KG/M2 | DIASTOLIC BLOOD PRESSURE: 54 MMHG | HEART RATE: 64 BPM | TEMPERATURE: 97.2 F

## 2018-02-15 DIAGNOSIS — F32.1 MODERATE SINGLE CURRENT EPISODE OF MAJOR DEPRESSIVE DISORDER (HCC): Primary | ICD-10-CM

## 2018-02-15 DIAGNOSIS — N40.1 BENIGN PROSTATIC HYPERPLASIA WITH URINARY OBSTRUCTION: ICD-10-CM

## 2018-02-15 DIAGNOSIS — N13.8 BENIGN PROSTATIC HYPERPLASIA WITH URINARY OBSTRUCTION: ICD-10-CM

## 2018-02-15 DIAGNOSIS — R41.3 MEMORY DEFICIT: ICD-10-CM

## 2018-02-15 DIAGNOSIS — E78.2 MIXED HYPERLIPIDEMIA: ICD-10-CM

## 2018-02-15 PROCEDURE — 99214 OFFICE O/P EST MOD 30 MIN: CPT | Performed by: FAMILY MEDICINE

## 2018-02-15 RX ORDER — TAMSULOSIN HYDROCHLORIDE 0.4 MG/1
1 CAPSULE ORAL NIGHTLY
Qty: 90 CAPSULE | Refills: 3 | Status: SHIPPED | OUTPATIENT
Start: 2018-02-15 | End: 2018-07-27

## 2018-02-16 NOTE — PROGRESS NOTES
Subjective   Jonathan Trejo is a 82 y.o. male.     Chief Complaint   Patient presents with   • Back Pain   • Hyperlipidemia         History of Present Illness   Patient returns after [employer] rehabilitation.  He is still using a walker and does not like using a walker.  He is in assisted living and has evidence of sundry of memory deficit as well.  He is currently treated for depression were reviewed and continue Wellbutrin and Lexapro for now.    We discussed his history of BPH he feels like his urination is reduced and we can restart Flomax 0.4 mg daily.    Otherwise continue treating hyperlipidemia Lipitor.    He is in denial of any type of memory deficit at this time.      The following portions of the patient's history were reviewed and updated as appropriate: allergies, current medications, past social history and problem list.    Review of Systems   Constitutional: Negative.    HENT: Negative.    Eyes: Negative.    Respiratory: Negative.    Cardiovascular: Negative.    Gastrointestinal: Negative.    Endocrine: Negative.    Genitourinary: Negative.    Musculoskeletal: Positive for back pain and gait problem.   Skin: Negative.    Allergic/Immunologic: Negative.    Hematological: Negative.    Psychiatric/Behavioral: Negative.        Objective   Vitals:    02/15/18 1723   BP: 92/54   Pulse: 64   Temp: 97.2 °F (36.2 °C)   SpO2: 97%     Physical Exam   Constitutional: He is oriented to person, place, and time. He appears well-developed and well-nourished.   HENT:   Head: Normocephalic and atraumatic.   Right Ear: Tympanic membrane and external ear normal.   Left Ear: Tympanic membrane and external ear normal.   Nose: Nose normal.   Mouth/Throat: Oropharynx is clear and moist.   Eyes: Conjunctivae and EOM are normal. Pupils are equal, round, and reactive to light.   Neck: Normal range of motion. Neck supple. No JVD present. No thyromegaly present.   Cardiovascular: Normal rate, regular rhythm, normal heart  sounds and intact distal pulses.    Pulmonary/Chest: Effort normal and breath sounds normal.   Abdominal: Soft. Bowel sounds are normal.   Musculoskeletal: Normal range of motion.   Lymphadenopathy:     He has no cervical adenopathy.   Neurological: He is alert and oriented to person, place, and time. No cranial nerve deficit. Coordination and gait abnormal.   His gait is impaired to the point of using a walker but he is pretty good at using a walker when he will use it.   Skin: Skin is warm and dry. No rash noted.   Psychiatric: He has a normal mood and affect. His behavior is normal. Judgment and thought content normal.   Vitals reviewed.      Assessment/Plan   Problem List Items Addressed This Visit        Cardiovascular and Mediastinum    Hyperlipidemia       Genitourinary    Benign prostatic hyperplasia with urinary obstruction    Relevant Medications    tamsulosin (FLOMAX) 0.4 MG capsule 24 hr capsule       Other    Depression - Primary      Other Visit Diagnoses     Memory deficit          Plan: Re-add Flomax 0.4 daily.  We'll see him back in 3 months sooner if needed.  Other medications are continued.  Await and update from his daughter.

## 2018-02-22 ENCOUNTER — TELEPHONE (OUTPATIENT)
Dept: INTERNAL MEDICINE | Facility: CLINIC | Age: 83
End: 2018-02-22

## 2018-02-22 DIAGNOSIS — M21.371 RIGHT FOOT DROP: Primary | ICD-10-CM

## 2018-03-13 ENCOUNTER — PATIENT OUTREACH (OUTPATIENT)
Dept: CASE MANAGEMENT | Facility: OTHER | Age: 83
End: 2018-03-13

## 2018-03-13 ENCOUNTER — TELEPHONE (OUTPATIENT)
Dept: INTERNAL MEDICINE | Facility: CLINIC | Age: 83
End: 2018-03-13

## 2018-03-13 NOTE — OUTREACH NOTE
Spoke with patient and received brace from Marco Antonio last week and PT continues with [facility] for exercise program.  Currently residing at [facility] where staff assists with medication administration.  Review of upcoming physician appointments and gaps in care.  Following for health care maintenance needs.

## 2018-03-18 ENCOUNTER — DOCUMENTATION (OUTPATIENT)
Dept: INTERNAL MEDICINE | Facility: CLINIC | Age: 83
End: 2018-03-18

## 2018-03-19 NOTE — TELEPHONE ENCOUNTER
I talked to his daughter last week and apparently he saw the neurologist with no diagnosis of Parkinson's.  He seems to have some fixed obsessions and may still have a vascular dementia.  He has osteoporotic vertebral fracture last fall and needs a reclast infusion as recommended by his neurosurgeon.

## 2018-04-12 ENCOUNTER — TELEPHONE (OUTPATIENT)
Dept: INTERNAL MEDICINE | Facility: CLINIC | Age: 83
End: 2018-04-12

## 2018-04-12 NOTE — TELEPHONE ENCOUNTER
Pt's Daughter has some things she'd like to speak with you about, please call her next week when you get back. I told her it might be middle of the weak

## 2018-04-20 ENCOUNTER — DOCUMENTATION (OUTPATIENT)
Dept: INTERNAL MEDICINE | Facility: CLINIC | Age: 83
End: 2018-04-20

## 2018-04-20 NOTE — PROGRESS NOTES
I talked to the patient's daughter Faby and he is having some falls even walker.  I suggested stopping Flexeril and Flomax.  He gets his room very hot and if he is still following with some lab work as well.

## 2018-05-01 ENCOUNTER — TELEPHONE (OUTPATIENT)
Dept: INTERNAL MEDICINE | Facility: CLINIC | Age: 83
End: 2018-05-01

## 2018-05-01 DIAGNOSIS — R41.0 CONFUSION: Primary | ICD-10-CM

## 2018-05-01 DIAGNOSIS — E78.2 MIXED HYPERLIPIDEMIA: ICD-10-CM

## 2018-05-01 DIAGNOSIS — R41.0 CONFUSION: ICD-10-CM

## 2018-05-01 DIAGNOSIS — R29.6 FALLING EPISODES: Primary | ICD-10-CM

## 2018-05-01 RX ORDER — OMEPRAZOLE 40 MG/1
CAPSULE, DELAYED RELEASE ORAL
Qty: 90 CAPSULE | Refills: 0 | Status: SHIPPED | OUTPATIENT
Start: 2018-05-01 | End: 2018-08-02 | Stop reason: SDUPTHER

## 2018-05-01 NOTE — TELEPHONE ENCOUNTER
She is bringing her dad in but he is still falling.  He thinks it is blood pressure related. Labs will be performed before he comes. Please remind me when he comes in that I need to tell him not to drive.

## 2018-05-06 ENCOUNTER — RESULTS ENCOUNTER (OUTPATIENT)
Dept: INTERNAL MEDICINE | Facility: CLINIC | Age: 83
End: 2018-05-06

## 2018-05-06 DIAGNOSIS — R41.0 CONFUSION: ICD-10-CM

## 2018-05-07 ENCOUNTER — RESULTS ENCOUNTER (OUTPATIENT)
Dept: INTERNAL MEDICINE | Facility: CLINIC | Age: 83
End: 2018-05-07

## 2018-05-07 DIAGNOSIS — R29.6 FALLING EPISODES: ICD-10-CM

## 2018-05-07 DIAGNOSIS — R41.0 CONFUSION: ICD-10-CM

## 2018-05-07 DIAGNOSIS — E78.2 MIXED HYPERLIPIDEMIA: ICD-10-CM

## 2018-06-05 ENCOUNTER — APPOINTMENT (OUTPATIENT)
Dept: LAB | Facility: HOSPITAL | Age: 83
End: 2018-06-05

## 2018-06-05 LAB
ALBUMIN SERPL-MCNC: 4.1 G/DL (ref 3.5–5.2)
ALBUMIN/GLOB SERPL: 1.3 G/DL
ALP SERPL-CCNC: 165 U/L (ref 39–117)
ALT SERPL W P-5'-P-CCNC: 12 U/L (ref 1–41)
ANION GAP SERPL CALCULATED.3IONS-SCNC: 8.3 MMOL/L
AST SERPL-CCNC: 12 U/L (ref 1–40)
BACTERIA UR QL AUTO: ABNORMAL /HPF
BASOPHILS # BLD AUTO: 0.06 10*3/MM3 (ref 0–0.2)
BASOPHILS NFR BLD AUTO: 0.7 % (ref 0–1.5)
BILIRUB SERPL-MCNC: 0.8 MG/DL (ref 0.1–1.2)
BILIRUB UR QL STRIP: NEGATIVE
BUN BLD-MCNC: 23 MG/DL (ref 8–23)
BUN/CREAT SERPL: 16.9 (ref 7–25)
CALCIUM SPEC-SCNC: 9.5 MG/DL (ref 8.6–10.5)
CHLORIDE SERPL-SCNC: 107 MMOL/L (ref 98–107)
CHOLEST SERPL-MCNC: 136 MG/DL (ref 0–200)
CLARITY UR: CLEAR
CO2 SERPL-SCNC: 24.7 MMOL/L (ref 22–29)
COLOR UR: YELLOW
CREAT BLD-MCNC: 1.36 MG/DL (ref 0.76–1.27)
DEPRECATED RDW RBC AUTO: 48.4 FL (ref 37–54)
EOSINOPHIL # BLD AUTO: 0.08 10*3/MM3 (ref 0–0.7)
EOSINOPHIL NFR BLD AUTO: 0.9 % (ref 0.3–6.2)
ERYTHROCYTE [DISTWIDTH] IN BLOOD BY AUTOMATED COUNT: 13.6 % (ref 11.5–14.5)
ERYTHROCYTE [SEDIMENTATION RATE] IN BLOOD: 25 MM/HR (ref 0–20)
FOLATE SERPL-MCNC: 11.29 NG/ML (ref 4.78–24.2)
GFR SERPL CREATININE-BSD FRML MDRD: 50 ML/MIN/1.73
GLOBULIN UR ELPH-MCNC: 3.1 GM/DL
GLUCOSE BLD-MCNC: 105 MG/DL (ref 65–99)
GLUCOSE UR STRIP-MCNC: NEGATIVE MG/DL
HCT VFR BLD AUTO: 42.5 % (ref 40.4–52.2)
HDLC SERPL QL: 2.57
HDLC SERPL-MCNC: 53 MG/DL (ref 40–60)
HGB BLD-MCNC: 14.1 G/DL (ref 13.7–17.6)
HGB UR QL STRIP.AUTO: NEGATIVE
HYALINE CASTS UR QL AUTO: ABNORMAL /LPF
IMM GRANULOCYTES # BLD: 0.13 10*3/MM3 (ref 0–0.03)
IMM GRANULOCYTES NFR BLD: 1.5 % (ref 0–0.5)
KETONES UR QL STRIP: NEGATIVE
LDLC SERPL CALC-MCNC: 67 MG/DL (ref 0–100)
LEUKOCYTE ESTERASE UR QL STRIP.AUTO: ABNORMAL
LYMPHOCYTES # BLD AUTO: 1.27 10*3/MM3 (ref 0.9–4.8)
LYMPHOCYTES NFR BLD AUTO: 14.6 % (ref 19.6–45.3)
MCH RBC QN AUTO: 32 PG (ref 27–32.7)
MCHC RBC AUTO-ENTMCNC: 33.2 G/DL (ref 32.6–36.4)
MCV RBC AUTO: 96.4 FL (ref 79.8–96.2)
MONOCYTES # BLD AUTO: 0.93 10*3/MM3 (ref 0.2–1.2)
MONOCYTES NFR BLD AUTO: 10.7 % (ref 5–12)
NEUTROPHILS # BLD AUTO: 6.25 10*3/MM3 (ref 1.9–8.1)
NEUTROPHILS NFR BLD AUTO: 71.6 % (ref 42.7–76)
NITRITE UR QL STRIP: NEGATIVE
NRBC BLD MANUAL-RTO: 0 /100 WBC (ref 0–0)
PH UR STRIP.AUTO: 6.5 [PH] (ref 5–8)
PLATELET # BLD AUTO: 344 10*3/MM3 (ref 140–500)
PMV BLD AUTO: 10.6 FL (ref 6–12)
POTASSIUM BLD-SCNC: 4.3 MMOL/L (ref 3.5–5.2)
PROT SERPL-MCNC: 7.2 G/DL (ref 6–8.5)
PROT UR QL STRIP: ABNORMAL
RBC # BLD AUTO: 4.41 10*6/MM3 (ref 4.6–6)
RBC # UR: ABNORMAL /HPF
REF LAB TEST METHOD: ABNORMAL
SODIUM BLD-SCNC: 140 MMOL/L (ref 136–145)
SP GR UR STRIP: 1.02 (ref 1–1.03)
SQUAMOUS #/AREA URNS HPF: ABNORMAL /HPF
T3FREE SERPL-MCNC: 3.5 PG/ML (ref 2–4.4)
T4 FREE SERPL-MCNC: 1.18 NG/DL (ref 0.93–1.7)
TRIGL SERPL-MCNC: 82 MG/DL (ref 0–150)
TSH SERPL DL<=0.05 MIU/L-ACNC: 0.72 MIU/ML (ref 0.27–4.2)
UROBILINOGEN UR QL STRIP: ABNORMAL
VIT B12 BLD-MCNC: 268 PG/ML (ref 211–946)
VLDLC SERPL-MCNC: 16.4 MG/DL (ref 5–40)
WBC NRBC COR # BLD: 8.72 10*3/MM3 (ref 4.5–10.7)
WBC UR QL AUTO: ABNORMAL /HPF

## 2018-06-05 PROCEDURE — 85651 RBC SED RATE NONAUTOMATED: CPT | Performed by: FAMILY MEDICINE

## 2018-06-05 PROCEDURE — 84439 ASSAY OF FREE THYROXINE: CPT | Performed by: FAMILY MEDICINE

## 2018-06-05 PROCEDURE — 80053 COMPREHEN METABOLIC PANEL: CPT | Performed by: FAMILY MEDICINE

## 2018-06-05 PROCEDURE — 84481 FREE ASSAY (FT-3): CPT | Performed by: FAMILY MEDICINE

## 2018-06-05 PROCEDURE — 82746 ASSAY OF FOLIC ACID SERUM: CPT | Performed by: FAMILY MEDICINE

## 2018-06-05 PROCEDURE — 84443 ASSAY THYROID STIM HORMONE: CPT | Performed by: FAMILY MEDICINE

## 2018-06-05 PROCEDURE — 80061 LIPID PANEL: CPT | Performed by: FAMILY MEDICINE

## 2018-06-05 PROCEDURE — 36415 COLL VENOUS BLD VENIPUNCTURE: CPT | Performed by: FAMILY MEDICINE

## 2018-06-05 PROCEDURE — 82607 VITAMIN B-12: CPT | Performed by: FAMILY MEDICINE

## 2018-06-05 PROCEDURE — 85025 COMPLETE CBC W/AUTO DIFF WBC: CPT | Performed by: FAMILY MEDICINE

## 2018-06-05 PROCEDURE — 81001 URINALYSIS AUTO W/SCOPE: CPT | Performed by: FAMILY MEDICINE

## 2018-06-11 RX ORDER — ESCITALOPRAM OXALATE 5 MG/1
5 TABLET ORAL DAILY
Qty: 30 TABLET | Refills: 3 | Status: SHIPPED | OUTPATIENT
Start: 2018-06-11 | End: 2018-10-17 | Stop reason: SDUPTHER

## 2018-06-29 ENCOUNTER — TELEPHONE (OUTPATIENT)
Dept: INTERNAL MEDICINE | Facility: CLINIC | Age: 83
End: 2018-06-29

## 2018-07-02 NOTE — TELEPHONE ENCOUNTER
I left his daughter odalys on Monday at 9:19 AM.  He is coming in today.  We'll discuss labs at that time.  Please send this back to me.

## 2018-07-05 ENCOUNTER — PATIENT OUTREACH (OUTPATIENT)
Dept: CASE MANAGEMENT | Facility: OTHER | Age: 83
End: 2018-07-05

## 2018-07-05 NOTE — OUTREACH NOTE
Prefers contact after his daughter discusses lab results with Dr. Mccall.  Discussed canceled appointments and states his daughter will reschedule after she speaks with PCP.  Agreeable to follow-up in about a month.  Denies any further falls and attempted to discuss fall preventive measures..

## 2018-07-12 RX ORDER — BUPROPION HYDROCHLORIDE 300 MG/1
300 TABLET ORAL DAILY
Qty: 90 TABLET | Refills: 1 | Status: SHIPPED | OUTPATIENT
Start: 2018-07-12 | End: 2019-01-26 | Stop reason: SDUPTHER

## 2018-07-13 ENCOUNTER — TELEPHONE (OUTPATIENT)
Dept: INTERNAL MEDICINE | Facility: CLINIC | Age: 83
End: 2018-07-13

## 2018-07-13 NOTE — TELEPHONE ENCOUNTER
The pt's daughter called to check on his lab results? Next step? Next appt? Pt is still c/o severe dyspnea with any exertion

## 2018-07-15 DIAGNOSIS — R06.09 DYSPNEA ON EXERTION: Primary | ICD-10-CM

## 2018-07-16 NOTE — TELEPHONE ENCOUNTER
I tried calling her twice this weekend.  Based on his prior cardiac studies and ECHOcardiogram he needs an appointment with cardiology,  Dr. Vargas for dyspnea on exertion.  I placed orders for ECHO and chest xray.  Please try to call her and I will try calling her again.

## 2018-07-16 NOTE — TELEPHONE ENCOUNTER
I left two messages on her voice mail.  An echocardiogram is ordered and we need to send him to Dr. Vargas.  I also ordered a chest xray.

## 2018-07-17 DIAGNOSIS — E53.8 B12 DEFICIENCY: ICD-10-CM

## 2018-07-17 DIAGNOSIS — R06.09 DYSPNEA ON EXERTION: Primary | ICD-10-CM

## 2018-07-17 DIAGNOSIS — R74.8 ELEVATED ALKALINE PHOSPHATASE LEVEL: Primary | ICD-10-CM

## 2018-07-17 NOTE — TELEPHONE ENCOUNTER
I talked her on the phone at length.  Please confirm these calling for echocardiogram and chest x-ray on Thursday.  Also placed her referral to Dr. Tovar.  He is to get labs when he comes in for echo.  Orders in the system and I think he can get them at the hospital.

## 2018-07-18 ENCOUNTER — TELEPHONE (OUTPATIENT)
Dept: INTERNAL MEDICINE | Facility: CLINIC | Age: 83
End: 2018-07-18

## 2018-07-18 NOTE — TELEPHONE ENCOUNTER
Pt wants more detail on his labs from June and didn't want to talk to me he wants you to call him

## 2018-07-19 ENCOUNTER — HOSPITAL ENCOUNTER (OUTPATIENT)
Dept: CARDIOLOGY | Facility: HOSPITAL | Age: 83
Discharge: HOME | End: 2018-07-19
Admitting: FAMILY MEDICINE

## 2018-07-19 ENCOUNTER — APPOINTMENT (OUTPATIENT)
Dept: LAB | Facility: HOSPITAL | Age: 83
End: 2018-07-19

## 2018-07-19 ENCOUNTER — HOSPITAL ENCOUNTER (OUTPATIENT)
Dept: GENERAL RADIOLOGY | Facility: HOSPITAL | Age: 83
Discharge: HOME | End: 2018-07-19

## 2018-07-19 VITALS
DIASTOLIC BLOOD PRESSURE: 60 MMHG | HEART RATE: 63 BPM | WEIGHT: 176 LBS | SYSTOLIC BLOOD PRESSURE: 130 MMHG | BODY MASS INDEX: 28.28 KG/M2 | HEIGHT: 66 IN

## 2018-07-19 DIAGNOSIS — R06.09 DYSPNEA ON EXERTION: ICD-10-CM

## 2018-07-19 LAB
ASCENDING AORTA: 3.2 CM
BH CV ECHO MEAS - ACS: 2.3 CM
BH CV ECHO MEAS - AO MAX PG (FULL): 5.8 MMHG
BH CV ECHO MEAS - AO MAX PG: 8.7 MMHG
BH CV ECHO MEAS - AO MEAN PG (FULL): 2.8 MMHG
BH CV ECHO MEAS - AO MEAN PG: 4.5 MMHG
BH CV ECHO MEAS - AO ROOT AREA (BSA CORRECTED): 2.1
BH CV ECHO MEAS - AO ROOT AREA: 12.3 CM^2
BH CV ECHO MEAS - AO ROOT DIAM: 4 CM
BH CV ECHO MEAS - AO V2 MAX: 147.7 CM/SEC
BH CV ECHO MEAS - AO V2 MEAN: 96.4 CM/SEC
BH CV ECHO MEAS - AO V2 VTI: 28.2 CM
BH CV ECHO MEAS - AVA(I,A): 2.4 CM^2
BH CV ECHO MEAS - AVA(I,D): 2.4 CM^2
BH CV ECHO MEAS - AVA(V,A): 2.3 CM^2
BH CV ECHO MEAS - AVA(V,D): 2.3 CM^2
BH CV ECHO MEAS - BSA(HAYCOCK): 1.9 M^2
BH CV ECHO MEAS - BSA: 1.9 M^2
BH CV ECHO MEAS - BZI_BMI: 28.4 KILOGRAMS/M^2
BH CV ECHO MEAS - BZI_METRIC_HEIGHT: 167.6 CM
BH CV ECHO MEAS - BZI_METRIC_WEIGHT: 79.8 KG
BH CV ECHO MEAS - CONTRAST EF (2CH): 61.8 ML/M^2
BH CV ECHO MEAS - CONTRAST EF 4CH: 60 ML/M^2
BH CV ECHO MEAS - EDV(MOD-SP2): 55 ML
BH CV ECHO MEAS - EDV(MOD-SP4): 50 ML
BH CV ECHO MEAS - EF(MOD-BP): 61 %
BH CV ECHO MEAS - EF(MOD-SP2): 61.8 %
BH CV ECHO MEAS - EF(MOD-SP4): 60 %
BH CV ECHO MEAS - ESV(MOD-SP2): 21 ML
BH CV ECHO MEAS - ESV(MOD-SP4): 20 ML
BH CV ECHO MEAS - LAT PEAK E' VEL: 11 CM/SEC
BH CV ECHO MEAS - LV DIASTOLIC VOL/BSA (35-75): 26.4 ML/M^2
BH CV ECHO MEAS - LV MAX PG: 2.9 MMHG
BH CV ECHO MEAS - LV MEAN PG: 1.7 MMHG
BH CV ECHO MEAS - LV SYSTOLIC VOL/BSA (12-30): 10.6 ML/M^2
BH CV ECHO MEAS - LV V1 MAX: 85.9 CM/SEC
BH CV ECHO MEAS - LV V1 MEAN: 61.8 CM/SEC
BH CV ECHO MEAS - LV V1 VTI: 16.7 CM
BH CV ECHO MEAS - LVLD AP2: 7.3 CM
BH CV ECHO MEAS - LVLD AP4: 6.8 CM
BH CV ECHO MEAS - LVLS AP2: 6.2 CM
BH CV ECHO MEAS - LVLS AP4: 5.7 CM
BH CV ECHO MEAS - LVOT AREA (M): 4.2 CM^2
BH CV ECHO MEAS - LVOT AREA: 4 CM^2
BH CV ECHO MEAS - LVOT DIAM: 2.3 CM
BH CV ECHO MEAS - MED PEAK E' VEL: 7 CM/SEC
BH CV ECHO MEAS - MV A DUR: 0.1 SEC
BH CV ECHO MEAS - MV A MAX VEL: 75.7 CM/SEC
BH CV ECHO MEAS - MV DEC SLOPE: 246.6 CM/SEC^2
BH CV ECHO MEAS - MV DEC TIME: 0.23 SEC
BH CV ECHO MEAS - MV E MAX VEL: 53.4 CM/SEC
BH CV ECHO MEAS - MV E/A: 0.71
BH CV ECHO MEAS - MV MAX PG: 3.5 MMHG
BH CV ECHO MEAS - MV MEAN PG: 1 MMHG
BH CV ECHO MEAS - MV P1/2T MAX VEL: 53.8 CM/SEC
BH CV ECHO MEAS - MV P1/2T: 63.9 MSEC
BH CV ECHO MEAS - MV V2 MAX: 93.4 CM/SEC
BH CV ECHO MEAS - MV V2 MEAN: 46.6 CM/SEC
BH CV ECHO MEAS - MV V2 VTI: 16.6 CM
BH CV ECHO MEAS - MVA P1/2T LCG: 4.1 CM^2
BH CV ECHO MEAS - MVA(P1/2T): 3.4 CM^2
BH CV ECHO MEAS - MVA(VTI): 4.1 CM^2
BH CV ECHO MEAS - PA ACC TIME: 0.11 SEC
BH CV ECHO MEAS - PA MAX PG (FULL): 3.7 MMHG
BH CV ECHO MEAS - PA MAX PG: 4.7 MMHG
BH CV ECHO MEAS - PA PR(ACCEL): 30.3 MMHG
BH CV ECHO MEAS - PA V2 MAX: 108.6 CM/SEC
BH CV ECHO MEAS - PI END-D VEL: 120.5 CM/SEC
BH CV ECHO MEAS - PULM A REVS DUR: 0.09 SEC
BH CV ECHO MEAS - PULM A REVS VEL: 37.3 CM/SEC
BH CV ECHO MEAS - PULM DIAS VEL: 44.1 CM/SEC
BH CV ECHO MEAS - PULM S/D: 1.4
BH CV ECHO MEAS - PULM SYS VEL: 61.6 CM/SEC
BH CV ECHO MEAS - PVA(V,A): 1.7 CM^2
BH CV ECHO MEAS - PVA(V,D): 1.7 CM^2
BH CV ECHO MEAS - QP/QS: 0.45
BH CV ECHO MEAS - RAP SYSTOLE: 3 MMHG
BH CV ECHO MEAS - RV MAX PG: 1 MMHG
BH CV ECHO MEAS - RV MEAN PG: 0.59 MMHG
BH CV ECHO MEAS - RV V1 MAX: 50.9 CM/SEC
BH CV ECHO MEAS - RV V1 MEAN: 36.3 CM/SEC
BH CV ECHO MEAS - RV V1 VTI: 8.5 CM
BH CV ECHO MEAS - RVOT AREA: 3.5 CM^2
BH CV ECHO MEAS - RVOT DIAM: 2.1 CM
BH CV ECHO MEAS - RVSP: 16 MMHG
BH CV ECHO MEAS - SI(AO): 183 ML/M^2
BH CV ECHO MEAS - SI(LVOT): 35.6 ML/M^2
BH CV ECHO MEAS - SI(MOD-SP2): 17.9 ML/M^2
BH CV ECHO MEAS - SI(MOD-SP4): 15.8 ML/M^2
BH CV ECHO MEAS - SUP REN AO DIAM: 2 CM
BH CV ECHO MEAS - SV(AO): 346.7 ML
BH CV ECHO MEAS - SV(LVOT): 67.5 ML
BH CV ECHO MEAS - SV(MOD-SP2): 34 ML
BH CV ECHO MEAS - SV(MOD-SP4): 30 ML
BH CV ECHO MEAS - SV(RVOT): 30 ML
BH CV ECHO MEAS - TAPSE (>1.6): 1.8 CM2
BH CV ECHO MEAS - TR MAX VEL: 179 CM/SEC
BH CV ECHO MEASUREMENTS AVERAGE E/E' RATIO: 5.93
BH CV XLRA - RV BASE: 3.2 CM
BH CV XLRA - TDI S': 16 CM/SEC
LEFT ATRIUM VOLUME INDEX: 41 ML/M2
LV EF 2D ECHO EST: 61 %
SINUS: 3.5 CM
STJ: 2.5 CM

## 2018-07-19 PROCEDURE — 83921 ORGANIC ACID SINGLE QUANT: CPT | Performed by: FAMILY MEDICINE

## 2018-07-19 PROCEDURE — 36415 COLL VENOUS BLD VENIPUNCTURE: CPT | Performed by: FAMILY MEDICINE

## 2018-07-19 PROCEDURE — 84165 PROTEIN E-PHORESIS SERUM: CPT | Performed by: FAMILY MEDICINE

## 2018-07-19 PROCEDURE — 84155 ASSAY OF PROTEIN SERUM: CPT | Performed by: FAMILY MEDICINE

## 2018-07-19 PROCEDURE — 71046 X-RAY EXAM CHEST 2 VIEWS: CPT

## 2018-07-19 PROCEDURE — 93306 TTE W/DOPPLER COMPLETE: CPT | Performed by: INTERNAL MEDICINE

## 2018-07-19 PROCEDURE — 93306 TTE W/DOPPLER COMPLETE: CPT

## 2018-07-20 LAB
ALBUMIN SERPL-MCNC: 3.4 G/DL (ref 2.9–4.4)
ALBUMIN/GLOB SERPL: 1.1 {RATIO} (ref 0.7–1.7)
ALPHA1 GLOB FLD ELPH-MCNC: 0.2 G/DL (ref 0–0.4)
ALPHA2 GLOB SERPL ELPH-MCNC: 0.8 G/DL (ref 0.4–1)
B-GLOBULIN SERPL ELPH-MCNC: 0.9 G/DL (ref 0.7–1.3)
GAMMA GLOB SERPL ELPH-MCNC: 1 G/DL (ref 0.4–1.8)
GLOBULIN SER CALC-MCNC: 3 G/DL (ref 2.2–3.9)
Lab: NORMAL
M-SPIKE: NORMAL G/DL
PROT PATTERN SERPL ELPH-IMP: NORMAL
PROT SERPL-MCNC: 6.4 G/DL (ref 6–8.5)

## 2018-07-22 ENCOUNTER — RESULTS ENCOUNTER (OUTPATIENT)
Dept: INTERNAL MEDICINE | Facility: CLINIC | Age: 83
End: 2018-07-22

## 2018-07-22 DIAGNOSIS — E53.8 B12 DEFICIENCY: ICD-10-CM

## 2018-07-22 DIAGNOSIS — R74.8 ELEVATED ALKALINE PHOSPHATASE LEVEL: ICD-10-CM

## 2018-07-24 LAB
Lab: ABNORMAL
METHYLMALONATE SERPL-SCNC: 531 NMOL/L (ref 0–378)

## 2018-07-27 ENCOUNTER — APPOINTMENT (OUTPATIENT)
Dept: GENERAL RADIOLOGY | Facility: HOSPITAL | Age: 83
End: 2018-07-27

## 2018-07-27 ENCOUNTER — HOSPITAL ENCOUNTER (EMERGENCY)
Facility: HOSPITAL | Age: 83
Discharge: HOME | End: 2018-07-27
Attending: EMERGENCY MEDICINE | Admitting: EMERGENCY MEDICINE

## 2018-07-27 VITALS
RESPIRATION RATE: 16 BRPM | HEART RATE: 80 BPM | BODY MASS INDEX: 28.72 KG/M2 | OXYGEN SATURATION: 99 % | WEIGHT: 182.98 LBS | SYSTOLIC BLOOD PRESSURE: 125 MMHG | HEIGHT: 67 IN | DIASTOLIC BLOOD PRESSURE: 68 MMHG | TEMPERATURE: 98 F

## 2018-07-27 DIAGNOSIS — N18.9 CHRONIC RENAL FAILURE, UNSPECIFIED CKD STAGE: ICD-10-CM

## 2018-07-27 DIAGNOSIS — R55 SYNCOPE, UNSPECIFIED SYNCOPE TYPE: Primary | ICD-10-CM

## 2018-07-27 LAB
ALBUMIN SERPL-MCNC: 4 G/DL (ref 3.5–5.2)
ALBUMIN/GLOB SERPL: 1.4 G/DL
ALP SERPL-CCNC: 139 U/L (ref 39–117)
ALT SERPL W P-5'-P-CCNC: 12 U/L (ref 1–41)
ANION GAP SERPL CALCULATED.3IONS-SCNC: 12.4 MMOL/L
AST SERPL-CCNC: 12 U/L (ref 1–40)
BASOPHILS # BLD AUTO: 0.02 10*3/MM3 (ref 0–0.2)
BASOPHILS NFR BLD AUTO: 0.2 % (ref 0–1.5)
BILIRUB SERPL-MCNC: 0.9 MG/DL (ref 0.1–1.2)
BUN BLD-MCNC: 22 MG/DL (ref 8–23)
BUN/CREAT SERPL: 14.6 (ref 7–25)
CALCIUM SPEC-SCNC: 9.4 MG/DL (ref 8.6–10.5)
CHLORIDE SERPL-SCNC: 103 MMOL/L (ref 98–107)
CO2 SERPL-SCNC: 24.6 MMOL/L (ref 22–29)
CREAT BLD-MCNC: 1.51 MG/DL (ref 0.76–1.27)
D DIMER PPP FEU-MCNC: 0.38 MCGFEU/ML (ref 0–0.49)
DEPRECATED RDW RBC AUTO: 48.6 FL (ref 37–54)
EOSINOPHIL # BLD AUTO: 0.03 10*3/MM3 (ref 0–0.7)
EOSINOPHIL NFR BLD AUTO: 0.3 % (ref 0.3–6.2)
ERYTHROCYTE [DISTWIDTH] IN BLOOD BY AUTOMATED COUNT: 13.5 % (ref 11.5–14.5)
GFR SERPL CREATININE-BSD FRML MDRD: 45 ML/MIN/1.73
GLOBULIN UR ELPH-MCNC: 2.9 GM/DL
GLUCOSE BLD-MCNC: 107 MG/DL (ref 65–99)
HCT VFR BLD AUTO: 44.1 % (ref 40.4–52.2)
HGB BLD-MCNC: 13.8 G/DL (ref 13.7–17.6)
HOLD SPECIMEN: NORMAL
HOLD SPECIMEN: NORMAL
IMM GRANULOCYTES # BLD: 0.13 10*3/MM3 (ref 0–0.03)
IMM GRANULOCYTES NFR BLD: 1.2 % (ref 0–0.5)
INR PPP: 1.07 (ref 0.9–1.1)
LYMPHOCYTES # BLD AUTO: 1.52 10*3/MM3 (ref 0.9–4.8)
LYMPHOCYTES NFR BLD AUTO: 13.5 % (ref 19.6–45.3)
MCH RBC QN AUTO: 30.9 PG (ref 27–32.7)
MCHC RBC AUTO-ENTMCNC: 31.3 G/DL (ref 32.6–36.4)
MCV RBC AUTO: 98.9 FL (ref 79.8–96.2)
MONOCYTES # BLD AUTO: 0.44 10*3/MM3 (ref 0.2–1.2)
MONOCYTES NFR BLD AUTO: 3.9 % (ref 5–12)
NEUTROPHILS # BLD AUTO: 9.14 10*3/MM3 (ref 1.9–8.1)
NEUTROPHILS NFR BLD AUTO: 80.9 % (ref 42.7–76)
PLATELET # BLD AUTO: 309 10*3/MM3 (ref 140–500)
PMV BLD AUTO: 11.1 FL (ref 6–12)
POTASSIUM BLD-SCNC: 4.4 MMOL/L (ref 3.5–5.2)
PROT SERPL-MCNC: 6.9 G/DL (ref 6–8.5)
PROTHROMBIN TIME: 13.7 SECONDS (ref 11.7–14.2)
RBC # BLD AUTO: 4.46 10*6/MM3 (ref 4.6–6)
SODIUM BLD-SCNC: 140 MMOL/L (ref 136–145)
TROPONIN T SERPL-MCNC: <0.01 NG/ML (ref 0–0.03)
WBC NRBC COR # BLD: 11.28 10*3/MM3 (ref 4.5–10.7)
WHOLE BLOOD HOLD SPECIMEN: NORMAL
WHOLE BLOOD HOLD SPECIMEN: NORMAL

## 2018-07-27 PROCEDURE — 84484 ASSAY OF TROPONIN QUANT: CPT | Performed by: EMERGENCY MEDICINE

## 2018-07-27 PROCEDURE — 93005 ELECTROCARDIOGRAM TRACING: CPT | Performed by: EMERGENCY MEDICINE

## 2018-07-27 PROCEDURE — 99284 EMERGENCY DEPT VISIT MOD MDM: CPT

## 2018-07-27 PROCEDURE — 85379 FIBRIN DEGRADATION QUANT: CPT | Performed by: EMERGENCY MEDICINE

## 2018-07-27 PROCEDURE — 93010 ELECTROCARDIOGRAM REPORT: CPT | Performed by: INTERNAL MEDICINE

## 2018-07-27 PROCEDURE — 80053 COMPREHEN METABOLIC PANEL: CPT | Performed by: EMERGENCY MEDICINE

## 2018-07-27 PROCEDURE — 85025 COMPLETE CBC W/AUTO DIFF WBC: CPT | Performed by: EMERGENCY MEDICINE

## 2018-07-27 PROCEDURE — 85610 PROTHROMBIN TIME: CPT | Performed by: EMERGENCY MEDICINE

## 2018-07-27 PROCEDURE — 71045 X-RAY EXAM CHEST 1 VIEW: CPT

## 2018-07-27 RX ORDER — MELOXICAM 15 MG/1
15 TABLET ORAL DAILY
COMMUNITY
End: 2018-08-16 | Stop reason: ALTCHOICE

## 2018-07-27 RX ORDER — SODIUM CHLORIDE 0.9 % (FLUSH) 0.9 %
10 SYRINGE (ML) INJECTION AS NEEDED
Status: DISCONTINUED | OUTPATIENT
Start: 2018-07-27 | End: 2018-07-27 | Stop reason: HOSPADM

## 2018-07-27 RX ORDER — GABAPENTIN 300 MG/1
300 CAPSULE ORAL 3 TIMES DAILY
COMMUNITY
End: 2018-08-16 | Stop reason: ALTCHOICE

## 2018-07-30 ENCOUNTER — TELEPHONE (OUTPATIENT)
Dept: INTERNAL MEDICINE | Facility: CLINIC | Age: 83
End: 2018-07-30

## 2018-07-30 DIAGNOSIS — I95.1 ORTHOSTATIC HYPOTENSION: Primary | ICD-10-CM

## 2018-07-30 DIAGNOSIS — R06.09 DYSPNEA ON EXERTION: ICD-10-CM

## 2018-07-30 DIAGNOSIS — R55 SYNCOPE, UNSPECIFIED SYNCOPE TYPE: ICD-10-CM

## 2018-07-30 RX ORDER — LANOLIN ALCOHOL/MO/W.PET/CERES
1000 CREAM (GRAM) TOPICAL DAILY
Qty: 90 TABLET | Refills: 3 | Status: SHIPPED | OUTPATIENT
Start: 2018-07-30

## 2018-07-30 NOTE — TELEPHONE ENCOUNTER
I talked to his daughter at length about the labs and ED visit.  He has exertional dysnea and one episode of syncope on exertion.  I have arranged an appointment with cardiology. Also b12 1000 mcg to his pharmacy daily.

## 2018-07-31 ENCOUNTER — TELEPHONE (OUTPATIENT)
Dept: SOCIAL WORK | Facility: HOSPITAL | Age: 83
End: 2018-07-31

## 2018-07-31 NOTE — TELEPHONE ENCOUNTER
Spoke with pt today in f/u and he states he is feeling better and he has called both his PCP and heart doctor's office and is waiting for them to get back with him for f/u appointments. No other questions voiced by pt at this time. Sienna GAGNON

## 2018-08-02 RX ORDER — OMEPRAZOLE 40 MG/1
CAPSULE, DELAYED RELEASE ORAL
Qty: 90 CAPSULE | Refills: 1 | Status: SHIPPED | OUTPATIENT
Start: 2018-08-02 | End: 2018-08-16 | Stop reason: ALTCHOICE

## 2018-08-05 ENCOUNTER — DOCUMENTATION (OUTPATIENT)
Dept: INTERNAL MEDICINE | Facility: CLINIC | Age: 83
End: 2018-08-05

## 2018-08-06 ENCOUNTER — TELEPHONE (OUTPATIENT)
Dept: CARDIOLOGY | Facility: CLINIC | Age: 83
End: 2018-08-06

## 2018-08-06 NOTE — TELEPHONE ENCOUNTER
I talked to him at length Sunday night about his shortness of air with exertion.  This was on 8/5/2018.  He is very frustrated and did not make the appointment to see cardiology because his daughter is not letting him drive.  I stated that we need to go ahead and make him an appointment to see Dr. Vargas and a follow up to see me.  He is given oral B12 to his pharmacy due to evidence of low B12 .

## 2018-08-16 ENCOUNTER — OFFICE VISIT (OUTPATIENT)
Dept: CARDIOLOGY | Facility: CLINIC | Age: 83
End: 2018-08-16

## 2018-08-16 VITALS
HEART RATE: 81 BPM | BODY MASS INDEX: 27.15 KG/M2 | HEIGHT: 67 IN | WEIGHT: 173 LBS | SYSTOLIC BLOOD PRESSURE: 108 MMHG | DIASTOLIC BLOOD PRESSURE: 64 MMHG

## 2018-08-16 DIAGNOSIS — I47.1 AVNRT (AV NODAL RE-ENTRY TACHYCARDIA) (HCC): ICD-10-CM

## 2018-08-16 DIAGNOSIS — I42.9 CARDIOMYOPATHY, UNSPECIFIED TYPE (HCC): ICD-10-CM

## 2018-08-16 DIAGNOSIS — R94.31 ABNORMAL EKG: Primary | ICD-10-CM

## 2018-08-16 PROCEDURE — 93000 ELECTROCARDIOGRAM COMPLETE: CPT | Performed by: INTERNAL MEDICINE

## 2018-08-16 PROCEDURE — 99214 OFFICE O/P EST MOD 30 MIN: CPT | Performed by: INTERNAL MEDICINE

## 2018-08-16 RX ORDER — FLUDROCORTISONE ACETATE 0.1 MG/1
0.1 TABLET ORAL DAILY
Qty: 30 TABLET | Refills: 2 | Status: SHIPPED | OUTPATIENT
Start: 2018-08-16 | End: 2018-11-02 | Stop reason: SDUPTHER

## 2018-08-16 NOTE — PROGRESS NOTES
Date of Office Visit: 2018  Encounter Provider: Bogdan Vargas MD  Place of Service: [facility] CARDIOLOGY  Patient Name: Jonathan Trejo  :1935      Chief Complaint   Patient presents with   • Irregular Heart Beat   • Syncope     History of Present Illness  The patient is an 83-year-old white male with a history of AV tarun reentry tachycardia.  This previously undergone an ablation.  He was last seen in October and doing reasonably well.  Prior to his evaluation last October his ejection fraction had been reduced to approximate 30-35%.  At the time he was not complaining of any symptoms suggesting congestive heart failure and there were no signs on his examination.    Recently he's been having some episodes of falling.  He feels like he's going to lose consciousness but never does but falls to the ground fortunately without injury.  He does use a cane for walking.  He was seen in the emergency room recently for a fall and there were no abnormalities noted.  He was referred back for follow-up with a previsit echocardiogram which shows that his left ventricular function has returned to normal.  He does not complain of angina pectoris.  There is no known history of coronary disease. what he does complain of now is easy fatigability with exertion as well as shortness of breath with exertion and lightheadedness.    Past Medical History:   Diagnosis Date   • Abnormal electrocardiogram    • Arm pain    • Atrial fibrillation (CMS/HCC)    • Atrial flutter (CMS/HCC)    • BPH associated with nocturia    • Chronic kidney disease    • Colon cancer (CMS/HCC)     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   • Colon polyp 2015   • Depression    • Dyspnea    • Episode of generalized weakness     knees were weak - had to be helped by wife to sit down   • Esophagitis, reflux    • Fatigue    • Hyperlipidemia    • Inguinal hernia    • Loss of hearing    • Lumbar radiculopathy    • Obesity     • Osteoarthritis cervical spine     doc on [facility] 08/16   • Osteopenia    • Osteoporosis    • Overweight    • Tobacco dependence in remission    • Urge incontinence of urine    • Vitamin D deficiency    • Wheezing          Past Surgical History:   Procedure Laterality Date   • CARDIAC CATHETERIZATION N/A 7/27/2017    Procedure: Valve assessment;  Surgeon: Adonis Solis MD;  Location:  [facility];  Service:    • CARDIAC ELECTROPHYSIOLOGY PROCEDURE N/A 7/27/2017    Procedure: Ablation atrial flutter Will need to have 3 -4 weeks of therapeutic INR's ;  Surgeon: Adonis Solis MD;  Location:  [facility] LOCATION;  Service:    • CARDIOVERSION     • CATARACT EXTRACTION W/ INTRAOCULAR LENS  IMPLANT, BILATERAL     • COLON RESECTION     • COLONOSCOPY      07/15 redo 5 years  [facility]   • KNEE SURGERY      benign tumor removed   • REFRACTIVE SURGERY  2007   • REPAIR PERONEAL TENDONS ANKLE W/ FIBULAR OSTEOTOMY Right 03/2013    Dr. Banks           Current Outpatient Prescriptions:   •  aspirin 81 MG chewable tablet, Chew 81 mg Daily., Disp: , Rfl:   •  atorvastatin (LIPITOR) 10 MG tablet, TAKE 1 TABLET EVERY DAY, Disp: 90 tablet, Rfl: 1  •  buPROPion XL (WELLBUTRIN XL) 300 MG 24 hr tablet, Take 1 tablet by mouth Daily., Disp: 90 tablet, Rfl: 1  •  Cholecalciferol (VITAMIN D3) 2000 UNITS tablet, Take 2,000 Units by mouth daily., Disp: , Rfl:   •  escitalopram (LEXAPRO) 5 MG tablet, TAKE 1 TABLET BY MOUTH DAILY., Disp: 30 tablet, Rfl: 3  •  Flaxseed, Linseed, (FLAXSEED OIL) 1000 MG capsule, Take 1,000 mg by mouth Daily., Disp: , Rfl:   •  vitamin B-12 (CYANOCOBALAMIN) 1000 MCG tablet, Take 1 tablet by mouth Daily. For b12 def, Disp: 90 tablet, Rfl: 3  •  fludrocortisone 0.1 MG tablet, Take 1 tablet by mouth Daily., Disp: 30 tablet, Rfl: 2      Social History     Social History   • Marital status:      Spouse name: N/A   • Number of children: 2   • Years of education: COLLEGE GRADUATE  "    Occupational History   • RETIRED      Social History Main Topics   • Smoking status: Former Smoker     Packs/day: 0.50     Years: 55.00     Types: Cigarettes     Quit date: 5/1/2008   • Smokeless tobacco: Never Used      Comment: QUIT 10 YRS   • Alcohol use Yes      Comment: SOCIALLY   • Drug use: No   • Sexual activity: Defer     Other Topics Concern   • Not on file     Social History Narrative   • No narrative on file         Review of Systems   Constitution: Positive for malaise/fatigue.   HENT: Negative.    Eyes: Negative.    Cardiovascular: Positive for dyspnea on exertion and near-syncope.   Respiratory: Negative.    Endocrine: Negative.    Skin: Negative.    Musculoskeletal: Negative.    Gastrointestinal: Negative.    Neurological: Positive for light-headedness.   Psychiatric/Behavioral: Negative.        Procedures      ECG 12 Lead  Date/Time: 8/16/2018 4:04 PM  Performed by: ASIM ORTEGA  Authorized by: ASIM ORTEGA   Comparison: compared with previous ECG from 7/27/2018  Similar to previous ECG  Rhythm: sinus rhythm  Rate: normal  Conduction: conduction normal  QRS axis: normal  Comments: Diffuse nonspecific ST-T wave changes                Objective:    /64   Pulse 81   Ht 170.2 cm (67\")   Wt 78.5 kg (173 lb)   BMI 27.10 kg/m²         Physical Exam   Constitutional: He is oriented to person, place, and time. He appears well-developed and well-nourished.   HENT:   Head: Normocephalic.   Eyes: Pupils are equal, round, and reactive to light.   Neck: Normal range of motion. No JVD present. Carotid bruit is not present. No thyromegaly present.   Cardiovascular: Normal rate, regular rhythm, S1 normal, S2 normal, normal heart sounds and intact distal pulses.  Exam reveals no gallop and no friction rub.    No murmur heard.  Pulmonary/Chest: Effort normal and breath sounds normal.   Abdominal: Soft. Bowel sounds are normal.   Musculoskeletal: He exhibits no edema.   Neurological: He is " alert and oriented to person, place, and time.   Skin: Skin is warm, dry and intact. No erythema.   Psychiatric: He has a normal mood and affect.   Vitals reviewed.          Assessment:       Diagnosis Plan   1. Abnormal EKG  Stress Test With Myocardial Perfusion One Day   2. 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CMS/HCC)     3. Cardiomyopathy, unspecified type (CMS/HCC)  Stress Test With Myocardial Perfusion One Day     1.  Abnormal electrocardiogram: These may be related to long-standing hypertension of myocardial ischemia cannot be excluded.  A stress Cardiolite study will be planned.  He will need to be a Lexiscan due to the patient's relative immobility.    2.  AV tarun reentry tachycardia: Status post ablation no recurrence at this time  3.  Lightheadedness and dizziness: I believe the patient is relatively hypotensive.  Florinef 0.01 mg daily will be added to his medication.  4.  Hyperlipidemia: Presently on medical therapy       Plan:     Await the results of his Cardiolite study.  Reevaluate after trial of Florinef

## 2018-08-23 ENCOUNTER — HOSPITAL ENCOUNTER (OUTPATIENT)
Dept: CARDIOLOGY | Facility: HOSPITAL | Age: 83
Discharge: HOME | End: 2018-08-23
Attending: INTERNAL MEDICINE | Admitting: INTERNAL MEDICINE

## 2018-08-23 DIAGNOSIS — R94.31 ABNORMAL EKG: ICD-10-CM

## 2018-08-23 DIAGNOSIS — I42.9 CARDIOMYOPATHY, UNSPECIFIED TYPE (HCC): ICD-10-CM

## 2018-08-23 LAB
BH CV NUCLEAR PRIOR STUDY: 2
BH CV STRESS BP STAGE 1: NORMAL
BH CV STRESS COMMENTS STAGE 1: NORMAL
BH CV STRESS DOSE REGADENOSON STAGE 1: 0.4
BH CV STRESS DURATION MIN STAGE 1: 0
BH CV STRESS DURATION SEC STAGE 1: 15
BH CV STRESS HR STAGE 1: 88
BH CV STRESS PROTOCOL 1: NORMAL
BH CV STRESS RECOVERY BP: NORMAL MMHG
BH CV STRESS RECOVERY HR: 85 BPM
BH CV STRESS STAGE 1: 1
LV EF NUC BP: 50 %
MAXIMAL PREDICTED HEART RATE: 139 BPM
PERCENT MAX PREDICTED HR: 63.31 %
STRESS BASELINE BP: NORMAL MMHG
STRESS BASELINE HR: 75 BPM
STRESS PERCENT HR: 74 %
STRESS POST EXERCISE DUR MIN: 4 MIN
STRESS POST EXERCISE DUR SEC: 0 SEC
STRESS POST PEAK BP: NORMAL MMHG
STRESS POST PEAK HR: 88 BPM
STRESS TARGET HR: 118 BPM

## 2018-08-23 PROCEDURE — 25010000002 REGADENOSON 0.4 MG/5ML SOLUTION: Performed by: INTERNAL MEDICINE

## 2018-08-23 PROCEDURE — A9502 TC99M TETROFOSMIN: HCPCS | Performed by: INTERNAL MEDICINE

## 2018-08-23 PROCEDURE — 78452 HT MUSCLE IMAGE SPECT MULT: CPT

## 2018-08-23 PROCEDURE — 93017 CV STRESS TEST TRACING ONLY: CPT

## 2018-08-23 PROCEDURE — 0 TECHNETIUM TETROFOSMIN KIT: Performed by: INTERNAL MEDICINE

## 2018-08-23 PROCEDURE — 93018 CV STRESS TEST I&R ONLY: CPT | Performed by: INTERNAL MEDICINE

## 2018-08-23 PROCEDURE — 93016 CV STRESS TEST SUPVJ ONLY: CPT | Performed by: INTERNAL MEDICINE

## 2018-08-23 PROCEDURE — 78452 HT MUSCLE IMAGE SPECT MULT: CPT | Performed by: INTERNAL MEDICINE

## 2018-08-23 RX ADMIN — TETROFOSMIN 1 DOSE: 1.38 INJECTION, POWDER, LYOPHILIZED, FOR SOLUTION INTRAVENOUS at 13:55

## 2018-08-23 RX ADMIN — TETROFOSMIN 1 DOSE: 1.38 INJECTION, POWDER, LYOPHILIZED, FOR SOLUTION INTRAVENOUS at 13:10

## 2018-08-23 RX ADMIN — REGADENOSON 0.4 MG: 0.08 INJECTION, SOLUTION INTRAVENOUS at 13:55

## 2018-08-24 ENCOUNTER — TELEPHONE (OUTPATIENT)
Dept: CARDIOLOGY | Facility: CLINIC | Age: 83
End: 2018-08-24

## 2018-08-24 DIAGNOSIS — R06.09 DOE (DYSPNEA ON EXERTION): Primary | ICD-10-CM

## 2018-08-24 NOTE — TELEPHONE ENCOUNTER
Daughter is calling for stress test results, she requests that you call patient at [phone] & her [phone]. antelmo

## 2018-08-28 ENCOUNTER — TRANSCRIBE ORDERS (OUTPATIENT)
Dept: CARDIOLOGY | Facility: CLINIC | Age: 83
End: 2018-08-28

## 2018-08-28 DIAGNOSIS — R06.09 DOE (DYSPNEA ON EXERTION): ICD-10-CM

## 2018-08-28 DIAGNOSIS — Z13.6 SCREENING FOR CARDIOVASCULAR CONDITION: Primary | ICD-10-CM

## 2018-08-28 DIAGNOSIS — Z01.810 PREPROCEDURAL CARDIOVASCULAR EXAMINATION: ICD-10-CM

## 2018-10-01 ENCOUNTER — HOSPITAL ENCOUNTER (OUTPATIENT)
Facility: HOSPITAL | Age: 83
Setting detail: HOSPITAL OUTPATIENT SURGERY
Discharge: HOME | End: 2018-10-01
Attending: INTERNAL MEDICINE | Admitting: INTERNAL MEDICINE

## 2018-10-01 VITALS
WEIGHT: 172.56 LBS | HEART RATE: 62 BPM | OXYGEN SATURATION: 95 % | DIASTOLIC BLOOD PRESSURE: 90 MMHG | HEIGHT: 67 IN | SYSTOLIC BLOOD PRESSURE: 154 MMHG | RESPIRATION RATE: 16 BRPM | BODY MASS INDEX: 27.08 KG/M2 | TEMPERATURE: 97.4 F

## 2018-10-01 DIAGNOSIS — R06.09 DOE (DYSPNEA ON EXERTION): ICD-10-CM

## 2018-10-01 LAB
ANION GAP SERPL CALCULATED.3IONS-SCNC: 12.2 MMOL/L
BASOPHILS # BLD AUTO: 0.02 10*3/MM3 (ref 0–0.2)
BASOPHILS NFR BLD AUTO: 0.2 % (ref 0–1.5)
BUN BLD-MCNC: 20 MG/DL (ref 8–23)
BUN/CREAT SERPL: 14.2 (ref 7–25)
CALCIUM SPEC-SCNC: 9.4 MG/DL (ref 8.6–10.5)
CHLORIDE SERPL-SCNC: 103 MMOL/L (ref 98–107)
CO2 SERPL-SCNC: 25.8 MMOL/L (ref 22–29)
CREAT BLD-MCNC: 1.41 MG/DL (ref 0.76–1.27)
DEPRECATED RDW RBC AUTO: 49.8 FL (ref 37–54)
EOSINOPHIL # BLD AUTO: 0.08 10*3/MM3 (ref 0–0.7)
EOSINOPHIL NFR BLD AUTO: 0.8 % (ref 0.3–6.2)
ERYTHROCYTE [DISTWIDTH] IN BLOOD BY AUTOMATED COUNT: 13.9 % (ref 11.5–14.5)
GFR SERPL CREATININE-BSD FRML MDRD: 48 ML/MIN/1.73
GLUCOSE BLD-MCNC: 96 MG/DL (ref 65–99)
HCT VFR BLD AUTO: 42.9 % (ref 40.4–52.2)
HCT VFR BLDA CALC: 38 % (ref 38–51)
HCT VFR BLDA CALC: 39 % (ref 38–51)
HGB BLD-MCNC: 13.9 G/DL (ref 13.7–17.6)
HGB BLDA-MCNC: 12.9 G/DL (ref 12–17)
HGB BLDA-MCNC: 13.3 G/DL (ref 12–17)
IMM GRANULOCYTES # BLD: 0.13 10*3/MM3 (ref 0–0.03)
IMM GRANULOCYTES NFR BLD: 1.4 % (ref 0–0.5)
LYMPHOCYTES # BLD AUTO: 1.14 10*3/MM3 (ref 0.9–4.8)
LYMPHOCYTES NFR BLD AUTO: 12 % (ref 19.6–45.3)
MCH RBC QN AUTO: 31.3 PG (ref 27–32.7)
MCHC RBC AUTO-ENTMCNC: 32.4 G/DL (ref 32.6–36.4)
MCV RBC AUTO: 96.6 FL (ref 79.8–96.2)
MONOCYTES # BLD AUTO: 0.91 10*3/MM3 (ref 0.2–1.2)
MONOCYTES NFR BLD AUTO: 9.6 % (ref 5–12)
NEUTROPHILS # BLD AUTO: 7.35 10*3/MM3 (ref 1.9–8.1)
NEUTROPHILS NFR BLD AUTO: 77.4 % (ref 42.7–76)
PLATELET # BLD AUTO: 323 10*3/MM3 (ref 140–500)
PMV BLD AUTO: 11.1 FL (ref 6–12)
POTASSIUM BLD-SCNC: 4.3 MMOL/L (ref 3.5–5.2)
RBC # BLD AUTO: 4.44 10*6/MM3 (ref 4.6–6)
SAO2 % BLDA: 66 % (ref 95–98)
SAO2 % BLDA: 92 % (ref 95–98)
SODIUM BLD-SCNC: 141 MMOL/L (ref 136–145)
WBC NRBC COR # BLD: 9.5 10*3/MM3 (ref 4.5–10.7)

## 2018-10-01 PROCEDURE — 93460 R&L HRT ART/VENTRICLE ANGIO: CPT | Performed by: INTERNAL MEDICINE

## 2018-10-01 PROCEDURE — C1894 INTRO/SHEATH, NON-LASER: HCPCS | Performed by: INTERNAL MEDICINE

## 2018-10-01 PROCEDURE — 25010000002 MIDAZOLAM PER 1 MG: Performed by: INTERNAL MEDICINE

## 2018-10-01 PROCEDURE — 85014 HEMATOCRIT: CPT

## 2018-10-01 PROCEDURE — C1769 GUIDE WIRE: HCPCS | Performed by: INTERNAL MEDICINE

## 2018-10-01 PROCEDURE — 0 IOPAMIDOL PER 1 ML: Performed by: INTERNAL MEDICINE

## 2018-10-01 PROCEDURE — 25010000002 FENTANYL CITRATE (PF) 100 MCG/2ML SOLUTION: Performed by: INTERNAL MEDICINE

## 2018-10-01 PROCEDURE — 25010000002 HEPARIN (PORCINE) PER 1000 UNITS: Performed by: INTERNAL MEDICINE

## 2018-10-01 PROCEDURE — 85025 COMPLETE CBC W/AUTO DIFF WBC: CPT | Performed by: INTERNAL MEDICINE

## 2018-10-01 PROCEDURE — 80048 BASIC METABOLIC PNL TOTAL CA: CPT | Performed by: INTERNAL MEDICINE

## 2018-10-01 PROCEDURE — 85018 HEMOGLOBIN: CPT

## 2018-10-01 RX ORDER — MIDAZOLAM HYDROCHLORIDE 1 MG/ML
1 INJECTION INTRAMUSCULAR; INTRAVENOUS
Status: DISCONTINUED | OUTPATIENT
Start: 2018-10-01 | End: 2018-10-01 | Stop reason: HOSPADM

## 2018-10-01 RX ORDER — SODIUM CHLORIDE 0.9 % (FLUSH) 0.9 %
1-10 SYRINGE (ML) INJECTION AS NEEDED
Status: DISCONTINUED | OUTPATIENT
Start: 2018-10-01 | End: 2018-10-01 | Stop reason: HOSPADM

## 2018-10-01 RX ORDER — PROMETHAZINE HYDROCHLORIDE 25 MG/ML
12.5 INJECTION, SOLUTION INTRAMUSCULAR; INTRAVENOUS EVERY 4 HOURS PRN
Status: DISCONTINUED | OUTPATIENT
Start: 2018-10-01 | End: 2018-10-01 | Stop reason: HOSPADM

## 2018-10-01 RX ORDER — LIDOCAINE HYDROCHLORIDE 20 MG/ML
INJECTION, SOLUTION INFILTRATION; PERINEURAL AS NEEDED
Status: DISCONTINUED | OUTPATIENT
Start: 2018-10-01 | End: 2018-10-01 | Stop reason: HOSPADM

## 2018-10-01 RX ORDER — METOCLOPRAMIDE HYDROCHLORIDE 5 MG/ML
10 INJECTION INTRAMUSCULAR; INTRAVENOUS EVERY 6 HOURS PRN
Status: DISCONTINUED | OUTPATIENT
Start: 2018-10-01 | End: 2018-10-01 | Stop reason: HOSPADM

## 2018-10-01 RX ORDER — SODIUM CHLORIDE 9 MG/ML
75 INJECTION, SOLUTION INTRAVENOUS CONTINUOUS
Status: DISCONTINUED | OUTPATIENT
Start: 2018-10-01 | End: 2018-10-01 | Stop reason: HOSPADM

## 2018-10-01 RX ORDER — ALPRAZOLAM 0.25 MG/1
0.5 TABLET ORAL 2 TIMES DAILY PRN
Status: DISCONTINUED | OUTPATIENT
Start: 2018-10-01 | End: 2018-10-01 | Stop reason: HOSPADM

## 2018-10-01 RX ORDER — MIDAZOLAM HYDROCHLORIDE 1 MG/ML
INJECTION INTRAMUSCULAR; INTRAVENOUS AS NEEDED
Status: DISCONTINUED | OUTPATIENT
Start: 2018-10-01 | End: 2018-10-01 | Stop reason: HOSPADM

## 2018-10-01 RX ORDER — ATORVASTATIN CALCIUM 10 MG/1
TABLET, FILM COATED ORAL
Qty: 90 TABLET | Refills: 0 | Status: SHIPPED | OUTPATIENT
Start: 2018-10-01 | End: 2019-01-07 | Stop reason: SDUPTHER

## 2018-10-01 RX ORDER — ACETAMINOPHEN 325 MG/1
650 TABLET ORAL EVERY 4 HOURS PRN
Status: DISCONTINUED | OUTPATIENT
Start: 2018-10-01 | End: 2018-10-01 | Stop reason: HOSPADM

## 2018-10-01 RX ORDER — HYDROCODONE BITARTRATE AND ACETAMINOPHEN 5; 325 MG/1; MG/1
1 TABLET ORAL EVERY 4 HOURS PRN
Status: DISCONTINUED | OUTPATIENT
Start: 2018-10-01 | End: 2018-10-01 | Stop reason: HOSPADM

## 2018-10-01 RX ORDER — NITROGLYCERIN 0.4 MG/1
0.4 TABLET SUBLINGUAL
Status: DISCONTINUED | OUTPATIENT
Start: 2018-10-01 | End: 2018-10-01 | Stop reason: HOSPADM

## 2018-10-01 RX ORDER — FENTANYL CITRATE 50 UG/ML
INJECTION, SOLUTION INTRAMUSCULAR; INTRAVENOUS AS NEEDED
Status: DISCONTINUED | OUTPATIENT
Start: 2018-10-01 | End: 2018-10-01 | Stop reason: HOSPADM

## 2018-10-01 RX ORDER — FENTANYL CITRATE 50 UG/ML
25 INJECTION, SOLUTION INTRAMUSCULAR; INTRAVENOUS
Status: DISCONTINUED | OUTPATIENT
Start: 2018-10-01 | End: 2018-10-01 | Stop reason: HOSPADM

## 2018-10-01 RX ORDER — LIDOCAINE HYDROCHLORIDE 10 MG/ML
0.1 INJECTION, SOLUTION EPIDURAL; INFILTRATION; INTRACAUDAL; PERINEURAL ONCE AS NEEDED
Status: DISCONTINUED | OUTPATIENT
Start: 2018-10-01 | End: 2018-10-01 | Stop reason: HOSPADM

## 2018-10-01 RX ORDER — ONDANSETRON 2 MG/ML
4 INJECTION INTRAMUSCULAR; INTRAVENOUS EVERY 6 HOURS PRN
Status: DISCONTINUED | OUTPATIENT
Start: 2018-10-01 | End: 2018-10-01 | Stop reason: HOSPADM

## 2018-10-01 RX ADMIN — SODIUM CHLORIDE 75 ML/HR: 9 INJECTION, SOLUTION INTRAVENOUS at 11:12

## 2018-10-01 NOTE — H&P
Office Visit     2018  [facility] CARDIOLOGY  · Bogdan Vargas MD   Cardiology  · Abnormal EKG +2 more   Dx  · Irregular Heart Beat, Syncope; Referred by Panda Mccall Jr., MD   Reason for Visit    Progress Notes   Procedure Orders:   1. ECG 12 Lead [[id number]] ordered by Bogdan Vargas MD at 18 1604   Post-procedure Diagnoses:   1. Abnormal EKG [R94.31]   2. 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CMS/HCC) [I47.1]   3. Cardiomyopathy, unspecified type (CMS/HCC) [I42.9]   Expand All Collapse All    Date of Office Visit: 2018  Encounter Provider: Bogdan Vargas MD  Place of Service: [facility] CARDIOLOGY  Patient Name: Jonathan Trejo  :1935            Chief Complaint   Patient presents with   • Irregular Heart Beat   • Syncope      History of Present Illness  The patient is an 83-year-old white male with a history of AV tarun reentry tachycardia.  This previously undergone an ablation.  He was last seen in October and doing reasonably well.  Prior to his evaluation last October his ejection fraction had been reduced to approximate 30-35%.  At the time he was not complaining of any symptoms suggesting congestive heart failure and there were no signs on his examination.     Recently he's been having some episodes of falling.  He feels like he's going to lose consciousness but never does but falls to the ground fortunately without injury.  He does use a cane for walking.  He was seen in the emergency room recently for a fall and there were no abnormalities noted.  He was referred back for follow-up with a previsit echocardiogram which shows that his left ventricular function has returned to normal.  He does not complain of angina pectoris.  There is no known history of coronary disease. what he does complain of now is easy fatigability with exertion as well as shortness of breath with exertion and lightheadedness.    Medical  History        Past Medical History:   Diagnosis Date   • Abnormal electrocardiogram     • Arm pain     • Atrial fibrillation (CMS/HCC)     • Atrial flutter (CMS/HCC)     • BPH associated with nocturia     • Chronic kidney disease     • Colon cancer (CMS/HCC)       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   • Colon polyp 11/22/2015   • Depression     • Dyspnea     • Episode of generalized weakness       knees were weak - had to be helped by wife to sit down   • Esophagitis, reflux     • Fatigue     • Hyperlipidemia     • Inguinal hernia     • Loss of hearing     • Lumbar radiculopathy     • Obesity     • Osteoarthritis cervical spine       doc on [facility] 08/16   • Osteopenia     • Osteoporosis     • Overweight     • Tobacco dependence in remission     • Urge incontinence of urine     • Vitamin D deficiency     • Wheezing                 Surgical History         Past Surgical History:   Procedure Laterality Date   • CARDIAC CATHETERIZATION N/A 7/27/2017     Procedure: Valve assessment;  Surgeon: Adonis Solis MD;  Location:  [facility] LOC[facility];  Service:    • CARDIAC ELECTROPHYSIOLOGY PROCEDURE N/A 7/27/2017     Procedure: Ablation atrial flutter Will need to have 3 -4 weeks of therapeutic INR's ;  Surgeon: Adonis Solis MD;  Location:  [facility] LOCATION;  Service:    • CARDIOVERSION       • CATARACT EXTRACTION W/ INTRAOCULAR LENS  IMPLANT, BILATERAL       • COLON RESECTION       • COLONOSCOPY         07/15 redo 5 years  [facility]   • KNEE SURGERY         benign tumor removed   • REFRACTIVE SURGERY   2007   • REPAIR PERONEAL TENDONS ANKLE W/ FIBULAR OSTEOTOMY Right 03/2013     Dr. Banks                  Current Outpatient Prescriptions:   •  aspirin 81 MG chewable tablet, Chew 81 mg Daily., Disp: , Rfl:   •  atorvastatin (LIPITOR) 10 MG tablet, TAKE 1 TABLET EVERY DAY, Disp: 90 tablet, Rfl: 1  •  buPROPion XL (WELLBUTRIN XL) 300 MG 24 hr tablet, Take 1 tablet by mouth Daily., Disp: 90 tablet, Rfl: 1  •   Cholecalciferol (VITAMIN D3) 2000 UNITS tablet, Take 2,000 Units by mouth daily., Disp: , Rfl:   •  escitalopram (LEXAPRO) 5 MG tablet, TAKE 1 TABLET BY MOUTH DAILY., Disp: 30 tablet, Rfl: 3  •  Flaxseed, Linseed, (FLAXSEED OIL) 1000 MG capsule, Take 1,000 mg by mouth Daily., Disp: , Rfl:   •  vitamin B-12 (CYANOCOBALAMIN) 1000 MCG tablet, Take 1 tablet by mouth Daily. For b12 def, Disp: 90 tablet, Rfl: 3  •  fludrocortisone 0.1 MG tablet, Take 1 tablet by mouth Daily., Disp: 30 tablet, Rfl: 2        Social History   Social History            Social History   • Marital status:        Spouse name: N/A   • Number of children: 2   • Years of education: COLLEGE GRADUATE           Occupational History   • RETIRED               Social History Main Topics   • Smoking status: Former Smoker       Packs/day: 0.50       Years: 55.00       Types: Cigarettes       Quit date: 5/1/2008   • Smokeless tobacco: Never Used         Comment: QUIT 10 YRS   • Alcohol use Yes         Comment: SOCIALLY   • Drug use: No   • Sexual activity: Defer           Other Topics Concern   • Not on file          Social History Narrative   • No narrative on file               Review of Systems   Constitution: Positive for malaise/fatigue.   HENT: Negative.   Eyes: Negative.    Cardiovascular: Positive for dyspnea on exertion and near-syncope.   Respiratory: Negative.   Endocrine: Negative.    Skin: Negative.    Musculoskeletal: Negative.    Gastrointestinal: Negative.    Neurological: Positive for light-headedness.   Psychiatric/Behavioral: Negative.         Procedures       ECG 12 Lead  Date/Time: 8/16/2018 4:04 PM  Performed by: ASIM ORTEGA  Authorized by: ASIM ORTEGA   Comparison: compared with previous ECG from 7/27/2018  Similar to previous ECG  Rhythm: sinus rhythm  Rate: normal  Conduction: conduction normal  QRS axis: normal  Comments: Diffuse nonspecific ST-T wave changes                Objective:    /64   Pulse  "81   Ht 170.2 cm (67\")   Wt 78.5 kg (173 lb)   BMI 27.10 kg/m²            Physical Exam   Constitutional: He is oriented to person, place, and time. He appears well-developed and well-nourished.   HENT:   Head: Normocephalic.   Eyes: Pupils are equal, round, and reactive to light.   Neck: Normal range of motion. No JVD present. Carotid bruit is not present. No thyromegaly present.   Cardiovascular: Normal rate, regular rhythm, S1 normal, S2 normal, normal heart sounds and intact distal pulses.  Exam reveals no gallop and no friction rub.    No murmur heard.  Pulmonary/Chest: Effort normal and breath sounds normal.   Abdominal: Soft. Bowel sounds are normal.   Musculoskeletal: He exhibits no edema.   Neurological: He is alert and oriented to person, place, and time.   Skin: Skin is warm, dry and intact. No erythema.   Psychiatric: He has a normal mood and affect.   Vitals reviewed.            Assessment:        Diagnosis Plan   1. Abnormal EKG  Stress Test With Myocardial Perfusion One Day   2. 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CMS/HCC)      3. Cardiomyopathy, unspecified type (CMS/HCC)  Stress Test With Myocardial Perfusion One Day      1.  Abnormal electrocardiogram: These may be related to long-standing hypertension of myocardial ischemia cannot be excluded.  A stress Cardiolite study will be planned.  He will need to be a Lexiscan due to the patient's relative immobility.     2.  AV tarun reentry tachycardia: Status post ablation no recurrence at this time  3.  Lightheadedness and dizziness: I believe the patient is relatively hypotensive.  Florinef 0.01 mg daily will be added to his medication.  4.  Hyperlipidemia: Presently on medical therapy        Plan:     Await the results of his Cardiolite study.  Reevaluate after trial of Florinef         Additional Documentation   Vitals:   · /64   · Pulse 81   · Ht 170.2 cm (67\")   · Wt 78.5 kg (173 lb)   · BMI 27.10 kg/m²   · BSA 1.9 m²    Flowsheets:   " · Custom Formula Data   ·    Encounter Info:   · Billing Info,   · History,   · Allergies,   · Detailed Report,   · Prep for Surgery Order Report,   · PAF,   · Chart Review: Routing History,   · Coding Summary,   · Encounter Facesheet,      H&P updated. The patient was examined and abn stress test

## 2018-10-01 NOTE — DISCHARGE INSTRUCTIONS
[facility]  [address] [place] [address]    Coronary Angiogram (Radial/Ulnar Approach) After Care    Refer to this sheet in the next few weeks. These instructions provide you with information on caring for yourself after your procedure. Your caregiver may also give you more specific instructions. Your treatment has been planned according to current medical practices, but problems sometimes occur. Call your caregiver if you have any problems or questions after your procedure.    Home Care Instructions:  · You may shower the day after the procedure. Remove the bandage (dressing) and gently wash the site with plain soap and water. Gently pat the site dry. You may apply a band aid daily for 2 days if desired.    · Do not apply powder or lotion to the site.  · Do not submerge the affected site in water for 3 to 5 days or until the site is completely healed.   · Do not lift, push or pull anything over 10 pounds for 2 days after your procedure.  · Inspect the site at least twice daily. You may notice some bruising at the site and it may be tender for 1 to 2 weeks.     · Increase your fluid intake for the next 2 days.    · Keep arm elevated for 24 hours. For the remainder of the day, keep your arm in “Pledge of Allegiance” position when up and about.     · You may drive 24 hours after the procedure unless otherwise instructed by your caregiver.  · Do not operate machinery or power tools for 24 hours.  · A responsible adult should be with you for the first 24 hours after you arrive home. Do not make any important legal decisions or sign legal papers for 24 hours.  Do not drink alcohol for 24 hours.    · Metformin or any medications containing Metformin should not be taken for 48 hours after your procedure.      Call Your Doctor if:   · You have unusual pain at the radial/ulnar (wrist) site.  · You have redness, warmth, swelling, or pain at the radial/ulnar (wrist) site.  · You have drainage (other  than a small amount of blood on the dressing).  · You have chills or a fever > 101.  · Your arm becomes pale or dark, cool, tingly, or numb.  · You have heavy bleeding from the site, hold pressure on the site for 20 minutes.  If the bleeding stops, apply a fresh bandage and call your cardiologist.  However, if you continue to have bleeding, call 911.            Moderate Conscious Sedation, Adult, Care After  Refer to this sheet in the next few weeks. These instructions provide you with information on caring for yourself after your procedure. Your health care provider may also give you more specific instructions. Your treatment has been planned according to current medical practices, but problems sometimes occur. Call your health care provider if you have any problems or questions after your procedure.  WHAT TO EXPECT AFTER THE PROCEDURE   After your procedure:  · You may feel sleepy, clumsy, and have poor balance for several hours.  · Vomiting may occur if you eat too soon after the procedure.  HOME CARE INSTRUCTIONS  · Do not participate in any activities where you could become injured for at least 24 hours. Do not:    Drive.    Swim.    Ride a bicycle.    Operate heavy machinery.    Cook.    Use power tools.    Climb ladders.    Work from a high place.  · Do not make important decisions or sign legal documents until you are improved.  · If you vomit, drink water, juice, or soup when you can drink without vomiting. Make sure you have little or no nausea before eating solid foods.  · Only take over-the-counter or prescription medicines for pain, discomfort, or fever as directed by your health care provider.  · Make sure you and your family fully understand everything about the medicines given to you, including what side effects may occur.  · You should not drink alcohol, take sleeping pills, or take medicines that cause drowsiness for at least 24 hours.  · If you smoke, do not smoke without supervision.  · If you  are feeling better, you may resume normal activities 24 hours after you were sedated.  · Keep all appointments with your health care provider.  · You should have a responsible adult stay with you for the first 24 hours post procedure.  SEEK MEDICAL CARE IF:  · Your skin is pale or bluish in color.  · You continue to feel nauseous or vomit.  · Your pain is getting worse and is not helped by medicine.  · You have bleeding or swelling.  · You are still sleepy or feeling clumsy after 24 hours.  SEEK IMMEDIATE MEDICAL CARE IF:  · You develop a rash.  · You have difficulty breathing.  · You develop any type of allergic problem.  · You have a fever.  MAKE SURE YOU:  · Understand these instructions.  · Will watch your condition.  · Will get help right away if you are not doing well or get worse.     This information is not intended to replace advice given to you by your health care provider. Make sure you discuss any questions you have with your health care provider.     Document Released: 10/08/2014 Document Revised: 01/08/2016 Document Reviewed: 10/08/2014  [employer] Patient Education ©2016 [employer]

## 2018-10-08 ENCOUNTER — EPISODE CHANGES (OUTPATIENT)
Dept: CASE MANAGEMENT | Facility: OTHER | Age: 83
End: 2018-10-08

## 2018-10-17 RX ORDER — ESCITALOPRAM OXALATE 5 MG/1
5 TABLET ORAL DAILY
Qty: 30 TABLET | Refills: 3 | Status: SHIPPED | OUTPATIENT
Start: 2018-10-17 | End: 2019-02-14 | Stop reason: SDUPTHER

## 2018-10-22 ENCOUNTER — TELEPHONE (OUTPATIENT)
Dept: INTERNAL MEDICINE | Facility: CLINIC | Age: 83
End: 2018-10-22

## 2018-10-22 DIAGNOSIS — R06.09 DYSPNEA ON EXERTION: Primary | ICD-10-CM

## 2018-10-22 NOTE — TELEPHONE ENCOUNTER
Pt called because he saw Dr Vargas who suggested he get with you about being referred to a Pulmonary specialist.  The pt thought Dr JOSEPH was going to send you a note or something, which I don't see just reports.Please review his procedures with Dr JOSEPH and advise on who you are referring to and the DX please

## 2018-11-05 RX ORDER — FLUDROCORTISONE ACETATE 0.1 MG/1
0.1 TABLET ORAL DAILY
Qty: 30 TABLET | Refills: 5 | Status: SHIPPED | OUTPATIENT
Start: 2018-11-05 | End: 2019-05-14 | Stop reason: SDUPTHER

## 2018-12-04 ENCOUNTER — TRANSCRIBE ORDERS (OUTPATIENT)
Dept: ADMINISTRATIVE | Facility: HOSPITAL | Age: 83
End: 2018-12-04

## 2018-12-04 DIAGNOSIS — R06.00 DYSPNEA, UNSPECIFIED TYPE: Primary | ICD-10-CM

## 2018-12-11 ENCOUNTER — APPOINTMENT (OUTPATIENT)
Dept: CT IMAGING | Facility: HOSPITAL | Age: 83
End: 2018-12-11
Attending: INTERNAL MEDICINE

## 2018-12-11 ENCOUNTER — APPOINTMENT (OUTPATIENT)
Dept: NUCLEAR MEDICINE | Facility: HOSPITAL | Age: 83
End: 2018-12-11
Attending: INTERNAL MEDICINE

## 2018-12-20 ENCOUNTER — HOSPITAL ENCOUNTER (OUTPATIENT)
Dept: CT IMAGING | Facility: HOSPITAL | Age: 83
Discharge: HOME | End: 2018-12-20
Attending: INTERNAL MEDICINE | Admitting: INTERNAL MEDICINE

## 2018-12-20 ENCOUNTER — HOSPITAL ENCOUNTER (OUTPATIENT)
Dept: GENERAL RADIOLOGY | Facility: HOSPITAL | Age: 83
Discharge: HOME | End: 2018-12-20
Attending: INTERNAL MEDICINE

## 2018-12-20 ENCOUNTER — HOSPITAL ENCOUNTER (OUTPATIENT)
Dept: NUCLEAR MEDICINE | Facility: HOSPITAL | Age: 83
Discharge: HOME | End: 2018-12-20
Attending: INTERNAL MEDICINE

## 2018-12-20 DIAGNOSIS — R06.00 DYSPNEA, UNSPECIFIED TYPE: ICD-10-CM

## 2018-12-20 PROCEDURE — 78582 LUNG VENTILAT&PERFUS IMAGING: CPT

## 2018-12-20 PROCEDURE — 0 XENON XE 133: Performed by: INTERNAL MEDICINE

## 2018-12-20 PROCEDURE — 71046 X-RAY EXAM CHEST 2 VIEWS: CPT

## 2018-12-20 PROCEDURE — 0 TECHNETIUM ALBUMIN AGGREGATED: Performed by: INTERNAL MEDICINE

## 2018-12-20 PROCEDURE — A9558 XE133 XENON 10MCI: HCPCS | Performed by: INTERNAL MEDICINE

## 2018-12-20 PROCEDURE — 71250 CT THORAX DX C-: CPT

## 2018-12-20 PROCEDURE — A9540 TC99M MAA: HCPCS | Performed by: INTERNAL MEDICINE

## 2018-12-20 RX ADMIN — Medication 1 DOSE: at 13:41

## 2018-12-20 RX ADMIN — XENON XE-133 8.9 MILLICURIE: 10 GAS RESPIRATORY (INHALATION) at 13:41

## 2019-01-07 RX ORDER — ATORVASTATIN CALCIUM 10 MG/1
TABLET, FILM COATED ORAL
Qty: 90 TABLET | Refills: 0 | Status: SHIPPED | OUTPATIENT
Start: 2019-01-07 | End: 2019-04-18 | Stop reason: SDUPTHER

## 2019-01-08 ENCOUNTER — TRANSCRIBE ORDERS (OUTPATIENT)
Dept: SLEEP MEDICINE | Facility: HOSPITAL | Age: 84
End: 2019-01-08

## 2019-01-08 DIAGNOSIS — G47.34 SLEEP RELATED HYPOXIA: ICD-10-CM

## 2019-01-08 DIAGNOSIS — G47.33 OSA (OBSTRUCTIVE SLEEP APNEA): Primary | ICD-10-CM

## 2019-01-14 ENCOUNTER — APPOINTMENT (OUTPATIENT)
Dept: SLEEP MEDICINE | Facility: HOSPITAL | Age: 84
End: 2019-01-14

## 2019-01-18 ENCOUNTER — HOSPITAL ENCOUNTER (OUTPATIENT)
Dept: SLEEP MEDICINE | Facility: HOSPITAL | Age: 84
Discharge: HOME | End: 2019-01-18
Attending: INTERNAL MEDICINE | Admitting: INTERNAL MEDICINE

## 2019-01-18 DIAGNOSIS — G47.34 SLEEP RELATED HYPOXIA: ICD-10-CM

## 2019-01-18 DIAGNOSIS — G47.33 OSA (OBSTRUCTIVE SLEEP APNEA): ICD-10-CM

## 2019-01-18 PROCEDURE — 95811 POLYSOM 6/>YRS CPAP 4/> PARM: CPT

## 2019-01-28 RX ORDER — BUPROPION HYDROCHLORIDE 300 MG/1
300 TABLET ORAL DAILY
Qty: 90 TABLET | Refills: 0 | Status: SHIPPED | OUTPATIENT
Start: 2019-01-28 | End: 2019-03-06 | Stop reason: SDUPTHER

## 2019-02-04 RX ORDER — BUPROPION HYDROCHLORIDE 300 MG/1
300 TABLET ORAL DAILY
Qty: 90 TABLET | Refills: 1 | OUTPATIENT
Start: 2019-02-04

## 2019-02-05 ENCOUNTER — DOCUMENTATION (OUTPATIENT)
Dept: SLEEP MEDICINE | Facility: HOSPITAL | Age: 84
End: 2019-02-05

## 2019-02-05 NOTE — PROGRESS NOTES
Pt was set up thru [facility].  Pt is followed at [facility].  Spoke with Victorina @ [facility] to notify pt will need follow up visit for new biPAP; pt was dispensed Air Fit F30 Medium @ [facility].

## 2019-02-11 RX ORDER — OMEPRAZOLE 40 MG/1
CAPSULE, DELAYED RELEASE ORAL
Qty: 90 CAPSULE | Refills: 1 | OUTPATIENT
Start: 2019-02-11

## 2019-02-12 RX ORDER — OMEPRAZOLE 40 MG/1
40 CAPSULE, DELAYED RELEASE ORAL DAILY
Qty: 30 CAPSULE | Refills: 0 | Status: SHIPPED | OUTPATIENT
Start: 2019-02-12 | End: 2019-03-16 | Stop reason: SDUPTHER

## 2019-02-14 RX ORDER — ESCITALOPRAM OXALATE 5 MG/1
5 TABLET ORAL DAILY
Qty: 30 TABLET | Refills: 3 | Status: SHIPPED | OUTPATIENT
Start: 2019-02-14 | End: 2019-05-24 | Stop reason: SDUPTHER

## 2019-03-06 ENCOUNTER — TRANSCRIBE ORDERS (OUTPATIENT)
Dept: ADMINISTRATIVE | Facility: HOSPITAL | Age: 84
End: 2019-03-06

## 2019-03-06 DIAGNOSIS — J84.9 ILD (INTERSTITIAL LUNG DISEASE) (HCC): Primary | ICD-10-CM

## 2019-03-06 RX ORDER — BUPROPION HYDROCHLORIDE 300 MG/1
300 TABLET ORAL DAILY
Qty: 30 TABLET | Refills: 0 | Status: SHIPPED | OUTPATIENT
Start: 2019-03-06 | End: 2019-05-14 | Stop reason: SDUPTHER

## 2019-03-12 ENCOUNTER — OFFICE VISIT (OUTPATIENT)
Dept: INTERNAL MEDICINE | Facility: CLINIC | Age: 84
End: 2019-03-12

## 2019-03-12 VITALS
SYSTOLIC BLOOD PRESSURE: 98 MMHG | BODY MASS INDEX: 29.13 KG/M2 | HEART RATE: 84 BPM | WEIGHT: 186 LBS | OXYGEN SATURATION: 93 % | TEMPERATURE: 96.9 F | DIASTOLIC BLOOD PRESSURE: 64 MMHG

## 2019-03-12 DIAGNOSIS — F32.1 MODERATE SINGLE CURRENT EPISODE OF MAJOR DEPRESSIVE DISORDER (HCC): ICD-10-CM

## 2019-03-12 DIAGNOSIS — I95.1 ORTHOSTATIC HYPOTENSION: ICD-10-CM

## 2019-03-12 DIAGNOSIS — M54.16 LUMBAR RADICULOPATHY: ICD-10-CM

## 2019-03-12 DIAGNOSIS — J41.0 SIMPLE CHRONIC BRONCHITIS (HCC): ICD-10-CM

## 2019-03-12 DIAGNOSIS — R06.02 SHORT OF BREATH ON EXERTION: Primary | ICD-10-CM

## 2019-03-12 DIAGNOSIS — G21.8 OTHER SECONDARY PARKINSONISM (HCC): ICD-10-CM

## 2019-03-12 DIAGNOSIS — R29.6 FREQUENT FALLS: ICD-10-CM

## 2019-03-12 PROCEDURE — 99214 OFFICE O/P EST MOD 30 MIN: CPT | Performed by: FAMILY MEDICINE

## 2019-03-12 NOTE — PROGRESS NOTES
Subjective   Jonathan Trejo is a 84 y.o. male.     Chief Complaint   Patient presents with   • Back Pain   • Depression   • Shortness of Breath   • Leg Pain         History of Present Illness   Patient is here for recheck and does not know why he is here.  His problem has been short of air on exertion with a distant history of smoking.  He has seen pulmonary set up a CT scan of his lungs and also pulmonary function testing.  He underwent a sleep study but does not wish to take CPAP because of claustrophobia.  He is upset about the whole medical testing that he is gone through.  However he is satisfied with the cardiac testing results without critical stenosis.  Based on prior x-ray with some evidence of emphysematous change he agrees to try incruse 1 puff daily for 2 weeks.  He will let us know how that does.  Otherwise a follow-up with pulmonary for CT and PFTs.    He has evidence of some degree of memory deficit and possible onset of dementia.  He does not wish to talk about that.  He is upset with his daughter taking away his car.      The following portions of the patient's history were reviewed and updated as appropriate: allergies, current medications, past social history and problem list.    Review of Systems   Constitutional: Negative.    HENT: Negative.    Eyes: Negative.    Respiratory: Positive for shortness of breath.    Cardiovascular: Negative.    Gastrointestinal: Negative.    Endocrine: Negative.    Genitourinary: Negative.    Musculoskeletal: Positive for arthralgias, back pain and gait problem.   Skin: Negative.    Allergic/Immunologic: Negative.    Hematological: Negative.    Psychiatric/Behavioral: Negative.        Objective   Vitals:    03/12/19 1509   BP: 98/64   Pulse: 84   Temp: 96.9 °F (36.1 °C)   SpO2: 93%     Physical Exam   Constitutional: He is oriented to person, place, and time. He appears well-developed and well-nourished.   HENT:   Head: Normocephalic and atraumatic.   Right Ear:  Tympanic membrane and external ear normal.   Left Ear: Tympanic membrane and external ear normal.   Nose: Nose normal.   Mouth/Throat: Oropharynx is clear and moist.   Eyes: Conjunctivae and EOM are normal. Pupils are equal, round, and reactive to light.   Neck: Normal range of motion. Neck supple. No JVD present. No thyromegaly present.   Cardiovascular: Normal rate, regular rhythm, normal heart sounds and intact distal pulses.   Pulmonary/Chest: Effort normal and breath sounds normal.   Abdominal: Soft. Bowel sounds are normal.   Musculoskeletal:        Lumbar back: He exhibits decreased range of motion and tenderness.   Lymphadenopathy:     He has no cervical adenopathy.   Neurological: He is alert and oriented to person, place, and time. No cranial nerve deficit. Coordination normal.   Skin: Skin is warm and dry. No rash noted.   Psychiatric: He has a normal mood and affect. His behavior is normal. Judgment and thought content normal.   Vitals reviewed.      Assessment/Plan   Problem List Items Addressed This Visit        Nervous and Auditory    Other secondary parkinsonism (CMS/HCC)    Lumbar radiculopathy       Other    Moderate single current episode of major depressive disorder (CMS/HCC)    Frequent falls      Other Visit Diagnoses     Short of breath on exertion    -  Primary    Simple chronic bronchitis (CMS/HCC)        Orthostatic hypotension          Plan: Trial of incruze inhaler 1 puff daily recheck in about 3 months follow-up with pulmonary for CT PFT.  Continue treatment for orthostatic hypotension.  He ambulates with a cane.

## 2019-03-14 ENCOUNTER — APPOINTMENT (OUTPATIENT)
Dept: CT IMAGING | Facility: HOSPITAL | Age: 84
End: 2019-03-14

## 2019-03-18 RX ORDER — OMEPRAZOLE 40 MG/1
40 CAPSULE, DELAYED RELEASE ORAL DAILY
Qty: 30 CAPSULE | Refills: 5 | Status: SHIPPED | OUTPATIENT
Start: 2019-03-18 | End: 2019-08-14 | Stop reason: SDUPTHER

## 2019-03-26 ENCOUNTER — TELEPHONE (OUTPATIENT)
Dept: INTERNAL MEDICINE | Facility: CLINIC | Age: 84
End: 2019-03-26

## 2019-03-26 NOTE — TELEPHONE ENCOUNTER
Pt called to let you know he feels like the samples seemed to have helped, please send in a RX I can not find it in the system.(incruze)

## 2019-04-18 RX ORDER — ATORVASTATIN CALCIUM 10 MG/1
TABLET, FILM COATED ORAL
Qty: 90 TABLET | Refills: 1 | Status: SHIPPED | OUTPATIENT
Start: 2019-04-18 | End: 2019-07-25 | Stop reason: SDUPTHER

## 2019-05-08 ENCOUNTER — TELEPHONE (OUTPATIENT)
Dept: INTERNAL MEDICINE | Facility: CLINIC | Age: 84
End: 2019-05-08

## 2019-05-08 NOTE — TELEPHONE ENCOUNTER
Pt finished the 30 day supply of the inhaler you gave him, it seemed to helped some but he's still SOB should he continue or do you want to change it?

## 2019-05-09 ENCOUNTER — HOSPITAL ENCOUNTER (OUTPATIENT)
Dept: CARDIOLOGY | Facility: HOSPITAL | Age: 84
Discharge: HOME | End: 2019-05-09
Admitting: NURSE PRACTITIONER

## 2019-05-09 ENCOUNTER — OFFICE VISIT (OUTPATIENT)
Dept: INTERNAL MEDICINE | Facility: CLINIC | Age: 84
End: 2019-05-09

## 2019-05-09 VITALS
WEIGHT: 186 LBS | TEMPERATURE: 99 F | BODY MASS INDEX: 29.19 KG/M2 | HEART RATE: 77 BPM | HEIGHT: 67 IN | RESPIRATION RATE: 16 BRPM | SYSTOLIC BLOOD PRESSURE: 134 MMHG | DIASTOLIC BLOOD PRESSURE: 82 MMHG | OXYGEN SATURATION: 95 %

## 2019-05-09 DIAGNOSIS — M79.89 LEFT LEG SWELLING: Primary | ICD-10-CM

## 2019-05-09 DIAGNOSIS — L03.116 LEFT LEG CELLULITIS: Primary | ICD-10-CM

## 2019-05-09 LAB
BH CV LOWER VASCULAR LEFT COMMON FEMORAL AUGMENT: NORMAL
BH CV LOWER VASCULAR LEFT COMMON FEMORAL COMPETENT: NORMAL
BH CV LOWER VASCULAR LEFT COMMON FEMORAL COMPRESS: NORMAL
BH CV LOWER VASCULAR LEFT COMMON FEMORAL PHASIC: NORMAL
BH CV LOWER VASCULAR LEFT COMMON FEMORAL SPONT: NORMAL
BH CV LOWER VASCULAR LEFT DISTAL FEMORAL COMPRESS: NORMAL
BH CV LOWER VASCULAR LEFT GASTRONEMIUS COMPRESS: NORMAL
BH CV LOWER VASCULAR LEFT GREATER SAPH AK COMPRESS: NORMAL
BH CV LOWER VASCULAR LEFT GREATER SAPH BK COMPRESS: NORMAL
BH CV LOWER VASCULAR LEFT MID FEMORAL AUGMENT: NORMAL
BH CV LOWER VASCULAR LEFT MID FEMORAL COMPETENT: NORMAL
BH CV LOWER VASCULAR LEFT MID FEMORAL COMPRESS: NORMAL
BH CV LOWER VASCULAR LEFT MID FEMORAL PHASIC: NORMAL
BH CV LOWER VASCULAR LEFT MID FEMORAL SPONT: NORMAL
BH CV LOWER VASCULAR LEFT POPLITEAL AUGMENT: NORMAL
BH CV LOWER VASCULAR LEFT POPLITEAL COMPETENT: NORMAL
BH CV LOWER VASCULAR LEFT POPLITEAL COMPRESS: NORMAL
BH CV LOWER VASCULAR LEFT POPLITEAL PHASIC: NORMAL
BH CV LOWER VASCULAR LEFT POPLITEAL SPONT: NORMAL
BH CV LOWER VASCULAR LEFT POSTERIOR TIBIAL COMPRESS: NORMAL
BH CV LOWER VASCULAR LEFT PROXIMAL FEMORAL COMPRESS: NORMAL
BH CV LOWER VASCULAR LEFT SAPHENOFEMORAL JUNCTION AUGMENT: NORMAL
BH CV LOWER VASCULAR LEFT SAPHENOFEMORAL JUNCTION COMPETENT: NORMAL
BH CV LOWER VASCULAR LEFT SAPHENOFEMORAL JUNCTION COMPRESS: NORMAL
BH CV LOWER VASCULAR LEFT SAPHENOFEMORAL JUNCTION PHASIC: NORMAL
BH CV LOWER VASCULAR LEFT SAPHENOFEMORAL JUNCTION SPONT: NORMAL
BH CV LOWER VASCULAR RIGHT COMMON FEMORAL AUGMENT: NORMAL
BH CV LOWER VASCULAR RIGHT COMMON FEMORAL COMPETENT: NORMAL
BH CV LOWER VASCULAR RIGHT COMMON FEMORAL COMPRESS: NORMAL
BH CV LOWER VASCULAR RIGHT COMMON FEMORAL PHASIC: NORMAL
BH CV LOWER VASCULAR RIGHT COMMON FEMORAL SPONT: NORMAL

## 2019-05-09 PROCEDURE — 93971 EXTREMITY STUDY: CPT

## 2019-05-09 PROCEDURE — 99213 OFFICE O/P EST LOW 20 MIN: CPT | Performed by: NURSE PRACTITIONER

## 2019-05-09 RX ORDER — CEPHALEXIN 250 MG/1
250 CAPSULE ORAL 3 TIMES DAILY
Qty: 30 CAPSULE | Refills: 0 | Status: SHIPPED | OUTPATIENT
Start: 2019-05-09 | End: 2019-05-19

## 2019-05-09 NOTE — PROGRESS NOTES
Subjective   Jonathan Trejo is a 84 y.o. male.   Chief Complaint   Patient presents with   • Foot Swelling     since last night       Patient presents for evaluation of left lower extremity heat and edema.  This is an 84-year-old male patient of Dr. barton.  This is a new problem as of yesterday.  He endorses progressive edema, redness and heat to the left lower leg and ankle that began yesterday.  He states that it is slightly painful to the touch but not with ambulation.  He denies shortness of breath, fever, chills, palpitations.  He denies any history of DVT or PE.  He takes a baby aspirin daily but no other blood thinners.  He denies development of any other new issues today.         The following portions of the patient's history were reviewed and updated as appropriate: allergies, current medications, past family history, past medical history, past social history, past surgical history and problem list.    Review of Systems   Constitutional: Negative for activity change, chills, fatigue, fever, unexpected weight gain and unexpected weight loss.   HENT: Negative for congestion, hearing loss, postnasal drip, sinus pressure, sneezing, sore throat and tinnitus.    Eyes: Negative for photophobia, pain and visual disturbance.   Respiratory: Negative for cough, chest tightness, shortness of breath and wheezing.    Cardiovascular: Positive for leg swelling (  Left lower leg with erythema and heat). Negative for chest pain and palpitations.   Gastrointestinal: Negative for abdominal distention, abdominal pain, constipation, diarrhea, nausea and vomiting.   Endocrine: Negative for polydipsia, polyphagia and polyuria.   Genitourinary: Negative for dysuria, frequency, hematuria and urgency.   Skin: Positive for color change (  Left lower leg redness).   Neurological: Negative for dizziness, weakness, numbness and headache.   All other systems reviewed and are negative.      Objective    /82 (BP Location: Left arm,  "Patient Position: Sitting, Cuff Size: Adult)   Pulse 77   Temp 99 °F (37.2 °C) (Oral)   Resp 16   Ht 170.2 cm (67\")   Wt 84.4 kg (186 lb)   SpO2 95%   BMI 29.13 kg/m²     Physical Exam   Constitutional: He is oriented to person, place, and time. He appears well-developed and well-nourished. No distress.   HENT:   Head: Normocephalic and atraumatic.   Right Ear: External ear normal.   Left Ear: External ear normal.   Nose: Nose normal.   Mouth/Throat: Oropharynx is clear and moist. No oropharyngeal exudate.   Eyes: Conjunctivae and EOM are normal. Pupils are equal, round, and reactive to light. Right eye exhibits no discharge. Left eye exhibits no discharge.   Neck: Normal range of motion. Neck supple.   Cardiovascular: Normal rate, regular rhythm, normal heart sounds and intact distal pulses. Exam reveals no gallop and no friction rub.   No murmur heard.  Left lower leg erythema, heat, redness.   Pulmonary/Chest: Effort normal and breath sounds normal. No stridor. No respiratory distress. He has no wheezes. He has no rales. He exhibits no tenderness.   Lungs are CTA bilaterally   Musculoskeletal: Normal range of motion.   Neurological: He is alert and oriented to person, place, and time.   Skin: Skin is warm and dry. Capillary refill takes less than 2 seconds. He is not diaphoretic.   Psychiatric: He has a normal mood and affect. His behavior is normal. Judgment and thought content normal.   Nursing note and vitals reviewed.    Current outpatient and discharge medications have been reconciled for the patient.  Reviewed by: SAMI Anton      Assessment/Plan   Jonathan was seen today for foot swelling.    Diagnoses and all orders for this visit:    Left leg swelling  -     Duplex Venous Lower Extremity - Left CAR      -DVT versus cellulitis: We will rule out DVT with a stat Doppler of the left lower extremity.  If it is negative we will treat for cellulitis.    -We will let patient know the plan after the " scan.  Follow-up in 1 week if we treat for cellulitis.  Follow-up PRN in the meantime and routinely with PCP, Dr. barton.

## 2019-05-13 NOTE — PROGRESS NOTES
Please call patient and have him make an appointment toward the end of this week to follow-up on cellulitis and ensure that he got the antibiotic prescription.

## 2019-05-14 RX ORDER — FLUDROCORTISONE ACETATE 0.1 MG/1
0.1 TABLET ORAL DAILY
Qty: 30 TABLET | Refills: 5 | Status: SHIPPED | OUTPATIENT
Start: 2019-05-14 | End: 2019-10-12 | Stop reason: SDUPTHER

## 2019-05-14 RX ORDER — BUPROPION HYDROCHLORIDE 300 MG/1
300 TABLET ORAL DAILY
Qty: 30 TABLET | Refills: 5 | Status: SHIPPED | OUTPATIENT
Start: 2019-05-14 | End: 2019-10-25 | Stop reason: SDUPTHER

## 2019-05-24 RX ORDER — ESCITALOPRAM OXALATE 5 MG/1
5 TABLET ORAL DAILY
Qty: 30 TABLET | Refills: 3 | Status: SHIPPED | OUTPATIENT
Start: 2019-05-24 | End: 2019-10-25 | Stop reason: SDUPTHER

## 2019-06-13 ENCOUNTER — OFFICE VISIT (OUTPATIENT)
Dept: INTERNAL MEDICINE | Facility: CLINIC | Age: 84
End: 2019-06-13

## 2019-06-13 VITALS
SYSTOLIC BLOOD PRESSURE: 94 MMHG | BODY MASS INDEX: 29.44 KG/M2 | OXYGEN SATURATION: 92 % | DIASTOLIC BLOOD PRESSURE: 58 MMHG | WEIGHT: 188 LBS | HEART RATE: 85 BPM | TEMPERATURE: 96.4 F

## 2019-06-13 DIAGNOSIS — M76.891 HAMSTRING TENDONITIS OF RIGHT THIGH: ICD-10-CM

## 2019-06-13 DIAGNOSIS — M75.21 BICEPS TENDONITIS ON RIGHT: ICD-10-CM

## 2019-06-13 DIAGNOSIS — I42.9 CARDIOMYOPATHY, UNSPECIFIED TYPE (HCC): ICD-10-CM

## 2019-06-13 DIAGNOSIS — I48.3 TYPICAL ATRIAL FLUTTER (HCC): ICD-10-CM

## 2019-06-13 DIAGNOSIS — R22.42 MASS OF LEFT LOWER LEG: Primary | ICD-10-CM

## 2019-06-13 PROCEDURE — 99213 OFFICE O/P EST LOW 20 MIN: CPT | Performed by: FAMILY MEDICINE

## 2019-06-13 RX ORDER — PREDNISONE 10 MG/1
10 TABLET ORAL DAILY
Qty: 5 TABLET | Refills: 0 | Status: SHIPPED | OUTPATIENT
Start: 2019-06-13 | End: 2019-07-25

## 2019-06-13 RX ORDER — CEPHALEXIN 500 MG/1
500 CAPSULE ORAL 3 TIMES DAILY
Qty: 21 CAPSULE | Refills: 0 | Status: SHIPPED | OUTPATIENT
Start: 2019-06-13 | End: 2019-07-25

## 2019-06-13 NOTE — PROGRESS NOTES
Subjective   Jonathan Trejo is a 84 y.o. male.     Chief Complaint   Patient presents with   • Pain   • Leg Pain         History of Present Illness   Patient was seen by nurse practitioner with a mass at the left lower medial leg.  Doppler of the leg showed this to be a nonvascular mass and improved with Keflex 250 3 times daily.  We will give him 7 more days of Keflex to 50 3 times daily and see if it disappears altogether although I do not know exactly what it is.  It is not a DVT or vascular anyway.  Otherwise he has some pain at the right biceps tendon on examination with evidence of mild biceps tendinitis as well as hamstring tendinitis of the right leg.  He denies any injury.  He is able to ambulate with a cane.    He states his inhaler is helped his stamina as far as breathing.      The following portions of the patient's history were reviewed and updated as appropriate: allergies, current medications, past social history and problem list.    Review of Systems   Constitutional: Negative.    HENT: Negative.    Eyes: Negative.    Respiratory: Positive for shortness of breath.    Cardiovascular: Negative.    Gastrointestinal: Negative.    Endocrine: Negative.    Genitourinary: Negative.    Musculoskeletal: Positive for arthralgias.   Skin: Negative.    Allergic/Immunologic: Negative.    Neurological: Negative.    Hematological: Negative.    Psychiatric/Behavioral: Negative.        Objective   Vitals:    06/13/19 1638   BP: 94/58   Pulse: 85   Temp: 96.4 °F (35.8 °C)   SpO2: 92%     Physical Exam   Constitutional: He is oriented to person, place, and time. He appears well-developed and well-nourished.   HENT:   Head: Normocephalic and atraumatic.   Right Ear: Tympanic membrane and external ear normal.   Left Ear: Tympanic membrane and external ear normal.   Nose: Nose normal.   Mouth/Throat: Oropharynx is clear and moist.   Eyes: Conjunctivae and EOM are normal. Pupils are equal, round, and reactive to light.    Neck: Normal range of motion. Neck supple. No JVD present. No thyromegaly present.   Cardiovascular: Normal rate, regular rhythm, normal heart sounds and intact distal pulses.   Pulmonary/Chest: Effort normal and breath sounds normal.   Abdominal: Soft. Bowel sounds are normal.   Musculoskeletal:        Right shoulder: He exhibits decreased range of motion and tenderness.        Right upper leg: He exhibits tenderness.   Lymphadenopathy:     He has no cervical adenopathy.   Neurological: He is alert and oriented to person, place, and time. No cranial nerve deficit. Coordination normal.   Skin: Skin is warm and dry. No rash noted.   Psychiatric: He has a normal mood and affect. His behavior is normal. Judgment and thought content normal.   Vitals reviewed.      Assessment/Plan   Problem List Items Addressed This Visit        Cardiovascular and Mediastinum    Cardiomyopathy (CMS/HCC)    Typical atrial flutter (CMS/HCC)       Other    Mass of left lower leg - Primary      Other Visit Diagnoses     Biceps tendonitis on right        Hamstring tendonitis of right thigh          Plan: Prednisone 10 mg daily for 5 days Keflex 250 3 times daily #21 recheck in 4 months sooner if needed.  MRI of the left lower leg at the mass recurs.

## 2019-07-25 ENCOUNTER — OFFICE VISIT (OUTPATIENT)
Dept: INTERNAL MEDICINE | Facility: CLINIC | Age: 84
End: 2019-07-25

## 2019-07-25 VITALS
HEART RATE: 76 BPM | DIASTOLIC BLOOD PRESSURE: 73 MMHG | WEIGHT: 190 LBS | RESPIRATION RATE: 16 BRPM | TEMPERATURE: 98.4 F | SYSTOLIC BLOOD PRESSURE: 144 MMHG | OXYGEN SATURATION: 94 % | HEIGHT: 67 IN | BODY MASS INDEX: 29.82 KG/M2

## 2019-07-25 DIAGNOSIS — R35.0 URINARY FREQUENCY: ICD-10-CM

## 2019-07-25 DIAGNOSIS — M54.31 SCIATICA OF RIGHT SIDE: Primary | ICD-10-CM

## 2019-07-25 PROCEDURE — 99213 OFFICE O/P EST LOW 20 MIN: CPT | Performed by: NURSE PRACTITIONER

## 2019-07-25 RX ORDER — METHYLPREDNISOLONE 4 MG/1
TABLET ORAL
Qty: 21 TABLET | Refills: 0 | Status: SHIPPED | OUTPATIENT
Start: 2019-07-25 | End: 2019-08-23 | Stop reason: ALTCHOICE

## 2019-07-25 RX ORDER — ATORVASTATIN CALCIUM 10 MG/1
10 TABLET, FILM COATED ORAL DAILY
Qty: 90 TABLET | Refills: 1 | Status: SHIPPED | OUTPATIENT
Start: 2019-07-25 | End: 2020-02-07

## 2019-07-25 NOTE — PROGRESS NOTES
Subjective   Jonathan Trejo is a 84 y.o. male.   CC: Right groin pain, right low back pain    Patient presents for evaluation of low back pain and right groin pain.  This is an 84-year-old male patient of Dr. barton.  He presents with a 4 to 5-week history of pain in the right groin and lower back.  He states that he has had sciatic pain before.  The pain in his lower back is present mostly on position changes and ambulation and radiates down the right buttock and into the right groin.  He denies any swelling in the area or falls, mechanism of injury.  He has been using a cane for ambulatory assistance.  The pain fluctuates throughout the day.  He has had some urinary frequency, but this is his baseline.  He is not having any trouble having bowel movements.  He denies development of any other new issues today.         The following portions of the patient's history were reviewed and updated as appropriate: allergies, current medications, past family history, past medical history, past social history, past surgical history and problem list.    Review of Systems   Constitutional: Negative for activity change, chills, fatigue, fever, unexpected weight gain and unexpected weight loss.   HENT: Negative for congestion, hearing loss, postnasal drip, sinus pressure, sneezing, sore throat and tinnitus.    Eyes: Negative for photophobia, pain and visual disturbance.   Respiratory: Negative for cough, chest tightness, shortness of breath and wheezing.    Cardiovascular: Negative for chest pain, palpitations and leg swelling.   Gastrointestinal: Negative for abdominal distention, abdominal pain, constipation, diarrhea, nausea and vomiting.   Endocrine: Negative for polydipsia, polyphagia and polyuria.   Genitourinary: Negative for dysuria, frequency, hematuria and urgency.   Musculoskeletal: Positive for back pain (  Right low back pain radiating to the right buttock and right groin).   Neurological: Negative for dizziness,  "weakness, numbness and headache.   All other systems reviewed and are negative.      Objective    /73 (BP Location: Left arm, Patient Position: Sitting, Cuff Size: Adult)   Pulse 76   Temp 98.4 °F (36.9 °C) (Oral)   Resp 16   Ht 170.2 cm (67\")   Wt 86.2 kg (190 lb)   SpO2 94%   BMI 29.76 kg/m²     Physical Exam   Constitutional: He is oriented to person, place, and time. He appears well-developed and well-nourished. No distress.   HENT:   Head: Normocephalic and atraumatic.   Eyes: EOM are normal. Pupils are equal, round, and reactive to light.   Neck: Normal range of motion. Neck supple.   Cardiovascular: Normal rate, regular rhythm, normal heart sounds and intact distal pulses. Exam reveals no gallop and no friction rub.   No murmur heard.  Pulmonary/Chest: Effort normal and breath sounds normal. No stridor. No respiratory distress. He has no wheezes. He has no rales. He exhibits no tenderness.   Lungs are CTA bilaterally   Abdominal: Soft. Bowel sounds are normal. He exhibits no distension and no mass. There is no tenderness. There is no rebound and no guarding. No hernia.   Bowel sounds active x4 quadrants.  No pain to light or deep palpation x4 quadrants.  No inguinal hernias palpated or visualized.   Musculoskeletal: Normal range of motion.        Lumbar back: He exhibits pain and spasm. He exhibits normal range of motion, no tenderness, no bony tenderness, no swelling, no edema, no deformity, no laceration and normal pulse.   Neurological: He is alert and oriented to person, place, and time.   Skin: Skin is warm and dry. Capillary refill takes less than 2 seconds. He is not diaphoretic.   Psychiatric: He has a normal mood and affect. His behavior is normal. Judgment and thought content normal.   Nursing note and vitals reviewed.    Current outpatient and discharge medications have been reconciled for the patient.  Reviewed by: SAMI Anton      Assessment/Plan   Jonathan was seen today for leg " pain and groin pain.    Diagnoses and all orders for this visit:    Sciatica of right side  -     methylPREDNISolone (MEDROL, [place],) 4 MG tablet; Take as directed on package instructions.  -     CBC & Differential  -     Comprehensive metabolic panel  -     Urinalysis With Culture If Indicated -    Urinary frequency  -     CBC & Differential  -     Comprehensive metabolic panel  -     Urinalysis With Culture If Indicated -      -The pain the patient is describing sounds like it involves the sciatic nerve.  It originates in the lower back and radiates around the buttock and into the groin.  We will provide a Medrol Dosepak for relief of pain and inflammation, as well as have him try heat and 4% lidocaine patches over-the-counter.  He will let me know if symptoms persist or worsen despite current therapy at which time we will get x-rays of the lumbar spine.    -We will get a CBC and CMP today as well as urinalysis with culture if indicated as well.    -We will contact patient with results of his labs and any further recommend agents.  Follow-up PRN if symptoms persist or worsen and routinely with PCP, Dr. barton.

## 2019-07-26 LAB
ALBUMIN SERPL-MCNC: 3.8 G/DL (ref 3.5–5.2)
ALBUMIN/GLOB SERPL: 1.3 G/DL
ALP SERPL-CCNC: 135 U/L (ref 39–117)
ALT SERPL-CCNC: 16 U/L (ref 1–41)
APPEARANCE UR: CLEAR
AST SERPL-CCNC: 15 U/L (ref 1–40)
BACTERIA #/AREA URNS HPF: ABNORMAL /HPF
BASOPHILS # BLD AUTO: 0.06 10*3/MM3 (ref 0–0.2)
BASOPHILS NFR BLD AUTO: 0.7 % (ref 0–1.5)
BILIRUB SERPL-MCNC: 0.8 MG/DL (ref 0.2–1.2)
BILIRUB UR QL STRIP: NEGATIVE
BUN SERPL-MCNC: 19 MG/DL (ref 8–23)
BUN/CREAT SERPL: 12.4 (ref 7–25)
CALCIUM SERPL-MCNC: 9.2 MG/DL (ref 8.6–10.5)
CASTS URNS MICRO: ABNORMAL
CASTS URNS QL MICRO: PRESENT /LPF
CHLORIDE SERPL-SCNC: 108 MMOL/L (ref 98–107)
CO2 SERPL-SCNC: 23.7 MMOL/L (ref 22–29)
COLOR UR: YELLOW
CREAT SERPL-MCNC: 1.53 MG/DL (ref 0.76–1.27)
CRYSTALS URNS MICRO: ABNORMAL
EOSINOPHIL # BLD AUTO: 0.14 10*3/MM3 (ref 0–0.4)
EOSINOPHIL NFR BLD AUTO: 1.5 % (ref 0.3–6.2)
EPI CELLS #/AREA URNS HPF: ABNORMAL /HPF
ERYTHROCYTE [DISTWIDTH] IN BLOOD BY AUTOMATED COUNT: 14.2 % (ref 12.3–15.4)
GLOBULIN SER CALC-MCNC: 3 GM/DL
GLUCOSE SERPL-MCNC: 99 MG/DL (ref 65–99)
GLUCOSE UR QL: NEGATIVE
HCT VFR BLD AUTO: 45.9 % (ref 37.5–51)
HGB BLD-MCNC: 14.3 G/DL (ref 13–17.7)
HGB UR QL STRIP: NEGATIVE
IMM GRANULOCYTES # BLD AUTO: 0.16 10*3/MM3 (ref 0–0.05)
IMM GRANULOCYTES NFR BLD AUTO: 1.8 % (ref 0–0.5)
KETONES UR QL STRIP: NEGATIVE
LEUKOCYTE ESTERASE UR QL STRIP: NEGATIVE
LYMPHOCYTES # BLD AUTO: 1.2 10*3/MM3 (ref 0.7–3.1)
LYMPHOCYTES NFR BLD AUTO: 13.2 % (ref 19.6–45.3)
MCH RBC QN AUTO: 31.8 PG (ref 26.6–33)
MCHC RBC AUTO-ENTMCNC: 31.2 G/DL (ref 31.5–35.7)
MCV RBC AUTO: 102.2 FL (ref 79–97)
MICRO URNS: NORMAL
MICRO URNS: NORMAL
MONOCYTES # BLD AUTO: 1.32 10*3/MM3 (ref 0.1–0.9)
MONOCYTES NFR BLD AUTO: 14.5 % (ref 5–12)
MUCOUS THREADS URNS QL MICRO: PRESENT /HPF
NEUTROPHILS # BLD AUTO: 6.21 10*3/MM3 (ref 1.7–7)
NEUTROPHILS NFR BLD AUTO: 68.3 % (ref 42.7–76)
NITRITE UR QL STRIP: NEGATIVE
NRBC BLD AUTO-RTO: 0.1 /100 WBC (ref 0–0.2)
PH UR STRIP: 5.5 [PH] (ref 5–7.5)
PLATELET # BLD AUTO: 318 10*3/MM3 (ref 140–450)
POTASSIUM SERPL-SCNC: 4.6 MMOL/L (ref 3.5–5.2)
PROT SERPL-MCNC: 6.8 G/DL (ref 6–8.5)
PROT UR QL STRIP: NORMAL
RBC # BLD AUTO: 4.49 10*6/MM3 (ref 4.14–5.8)
RBC #/AREA URNS HPF: ABNORMAL /HPF
SODIUM SERPL-SCNC: 143 MMOL/L (ref 136–145)
SP GR UR: 1.02 (ref 1–1.03)
UNIDENT CRYS URNS QL MICRO: PRESENT /LPF
URINALYSIS REFLEX: NORMAL
UROBILINOGEN UR STRIP-MCNC: 1 MG/DL (ref 0.2–1)
WBC # BLD AUTO: 9.09 10*3/MM3 (ref 3.4–10.8)
WBC #/AREA URNS HPF: ABNORMAL /HPF

## 2019-07-26 NOTE — PROGRESS NOTES
Please notify patient that his labs look stable.  He is not anemic.  He does have one elevated liver enzyme that we need to watch, and it looks like it was a bit elevated on last check as well.

## 2019-08-15 RX ORDER — OMEPRAZOLE 40 MG/1
40 CAPSULE, DELAYED RELEASE ORAL DAILY
Qty: 30 CAPSULE | Refills: 5 | Status: SHIPPED | OUTPATIENT
Start: 2019-08-15 | End: 2020-02-14

## 2019-08-23 ENCOUNTER — OFFICE VISIT (OUTPATIENT)
Dept: INTERNAL MEDICINE | Facility: CLINIC | Age: 84
End: 2019-08-23

## 2019-08-23 VITALS
TEMPERATURE: 97.3 F | HEART RATE: 79 BPM | OXYGEN SATURATION: 91 % | HEIGHT: 67 IN | DIASTOLIC BLOOD PRESSURE: 81 MMHG | RESPIRATION RATE: 16 BRPM | BODY MASS INDEX: 29.98 KG/M2 | WEIGHT: 191 LBS | SYSTOLIC BLOOD PRESSURE: 137 MMHG

## 2019-08-23 DIAGNOSIS — G89.29 CHRONIC RIGHT HIP PAIN: Primary | ICD-10-CM

## 2019-08-23 DIAGNOSIS — G89.29 CHRONIC RIGHT-SIDED LOW BACK PAIN WITH RIGHT-SIDED SCIATICA: ICD-10-CM

## 2019-08-23 DIAGNOSIS — R06.02 CHRONIC SHORTNESS OF BREATH: ICD-10-CM

## 2019-08-23 DIAGNOSIS — M54.41 CHRONIC RIGHT-SIDED LOW BACK PAIN WITH RIGHT-SIDED SCIATICA: ICD-10-CM

## 2019-08-23 DIAGNOSIS — M25.551 CHRONIC RIGHT HIP PAIN: Primary | ICD-10-CM

## 2019-08-23 PROCEDURE — 99213 OFFICE O/P EST LOW 20 MIN: CPT | Performed by: NURSE PRACTITIONER

## 2019-08-23 RX ORDER — METHYLPREDNISOLONE 4 MG/1
TABLET ORAL
Qty: 21 TABLET | Refills: 0 | Status: SHIPPED | OUTPATIENT
Start: 2019-08-23 | End: 2019-10-08

## 2019-08-23 NOTE — PROGRESS NOTES
Subjective   Jonathan Trejo is a 84 y.o. male.   CC: Chronic shortness of breath, right hip pain, low back pain    Patient presents for evaluation of right hip pain/low back pain.  This is an 84-year-old male patient of Dr. barton.  I saw him on 7/25/2019 for the same issue.  At that time he was prescribed a Medrol Dosepak and urged to try lidocaine patches for the pain.  This has been a chronic issue.  He states that it is intermittent, but made worse with ambulation.  The pain begins in his right lower back and radiates to the right hip.  He denies any recent falls or mechanism of injury.  He reports that the Medrol Dosepak helped tremendously and provided some much needed relief.  He reports that as soon as it stopped though, the pain returned and seems to be a bit worse now.  He has no swelling, erythema or heat in the hip or leg.    He also presents with some questions regarding his inhaler.  He has a history of chronic shortness of breath and has been following with pulmonology, Dr. Medina.  He reports that he does not wish to follow with pulmonology any further.  He has had imaging that has shown signs of emphysema.  He takes umeclidinium bromide 1 puff daily, and states that this helps greatly with his chronic shortness of breath, but he states that he called the number on the box to speak with the representative to see if there is anything else that would help with his shortness of breath, and was told that he should try Anoro Ellipta, and he is very excited to try this today.  He would like me to prescribe this.  He denies any cough, palpitations, chest discomfort.    He denies development of any other new issues today.         The following portions of the patient's history were reviewed and updated as appropriate: allergies, current medications, past family history, past medical history, past social history, past surgical history and problem list.    Review of Systems   Constitutional: Negative for activity  "change, appetite change, chills, fatigue, fever, unexpected weight gain and unexpected weight loss.   HENT: Negative for congestion, hearing loss, postnasal drip, sinus pressure, sneezing, sore throat, swollen glands and tinnitus.    Eyes: Negative for photophobia, pain and visual disturbance.   Respiratory: Positive for shortness of breath. Negative for cough, chest tightness and wheezing.    Cardiovascular: Negative for chest pain, palpitations and leg swelling.   Gastrointestinal: Negative for abdominal distention, abdominal pain, constipation, diarrhea, nausea and vomiting.   Endocrine: Negative for polydipsia, polyphagia and polyuria.   Genitourinary: Negative for dysuria, frequency, hematuria and urgency.   Musculoskeletal: Positive for arthralgias and back pain.   Neurological: Negative for dizziness, weakness, numbness and headache.   All other systems reviewed and are negative.      Objective    /81 (BP Location: Left arm, Patient Position: Sitting, Cuff Size: Small Adult)   Pulse 79   Temp 97.3 °F (36.3 °C) (Oral)   Resp 16   Ht 170.2 cm (67\")   Wt 86.6 kg (191 lb)   SpO2 91%   BMI 29.91 kg/m²     Physical Exam   Constitutional: He is oriented to person, place, and time. He appears well-developed and well-nourished. No distress.   HENT:   Head: Normocephalic and atraumatic.   Right Ear: External ear normal.   Left Ear: External ear normal.   Nose: Nose normal.   Mouth/Throat: Oropharynx is clear and moist.   Eyes: EOM are normal. Pupils are equal, round, and reactive to light.   Neck: Normal range of motion. Neck supple.   Cardiovascular: Normal rate, regular rhythm, normal heart sounds and intact distal pulses. Exam reveals no gallop and no friction rub.   No murmur heard.  Pulmonary/Chest: Effort normal and breath sounds normal. No stridor. No respiratory distress. He has no wheezes. He has no rales. He exhibits no tenderness.   Lungs are CTA bilaterally   Abdominal: Soft. Bowel sounds are " normal. He exhibits no distension. There is no tenderness.   Musculoskeletal:        Right hip: He exhibits decreased range of motion. He exhibits normal strength, no tenderness, no bony tenderness, no swelling, no crepitus, no deformity and no laceration.        Lumbar back: He exhibits pain. He exhibits normal range of motion, no tenderness, no bony tenderness, no swelling, no edema, no deformity, no laceration, no spasm and normal pulse.   Neurological: He is alert and oriented to person, place, and time.   Skin: Skin is warm and dry. Capillary refill takes less than 2 seconds. He is not diaphoretic.   Psychiatric: He has a normal mood and affect. His behavior is normal. Judgment and thought content normal.   Nursing note and vitals reviewed.    Current outpatient and discharge medications have been reconciled for the patient.  Reviewed by: SAMI Anton    Assessment/Plan   Diagnoses and all orders for this visit:    Chronic right hip pain  -     MRI hip right wo contrast; Future  -     MRI Lumbar Spine Without Contrast; Future  -     diclofenac (VOLTAREN) 1 % gel gel; Apply 4 g topically to the appropriate area as directed 3 (Three) Times a Day.  -     methylPREDNISolone (MEDROL, [place],) 4 MG tablet; Take as directed on package instructions.    Chronic right-sided low back pain with right-sided sciatica  -     MRI hip right wo contrast; Future  -     MRI Lumbar Spine Without Contrast; Future  -     diclofenac (VOLTAREN) 1 % gel gel; Apply 4 g topically to the appropriate area as directed 3 (Three) Times a Day.  -     methylPREDNISolone (MEDROL, [place],) 4 MG tablet; Take as directed on package instructions.    Chronic shortness of breath    -Right hip pain, low back pain: We will refill the Medrol Dosepak x1, and provide Voltaren gel.  We will get an MRI of the right hip as well as lumbar spine for further evaluation of this chronic pain.    -Chronic shortness of breath: He has seen Dr. Tafoya, and is showing  signs of emphysema.  He does not wish to follow-up with pulmonology any longer per patient.  He has been taking umeclidinium bromide 62.5 mcg/inh. I provided him with sample of an Anoro Ellipta.  He wants to try this for a few days first.  He will discontinue the other inhaler.  If the Anoro is working well for him, he will call back and have me send this is a prescription.    -We will get patient the results of his MRI and any further recommendations.  Follow-up PRN and he will keep his existing complaint with Dr. barton in October 2019.

## 2019-08-28 ENCOUNTER — TELEPHONE (OUTPATIENT)
Dept: INTERNAL MEDICINE | Facility: CLINIC | Age: 84
End: 2019-08-28

## 2019-08-28 NOTE — TELEPHONE ENCOUNTER
"  Care Coordinator Progress Note    Admission Date/Time:  6/11/2018  Attending MD:  Haresh Redding MD    Data  Chart reviewed, discussed with interdisciplinary team.   Patient was admitted for: Low Plt, rash, hypothermia, falls with increase frequency    Concerns with insurance coverage for discharge needs: None.  Current Living Situation: Patient lives in a group home.  Support System: Supportive and Involved  Services Involved: Group home staffed with 1 aide to 4 residents  Transportation at Discharge:  staff will come to [facility] for dc ride  Transportation to Medical Appointments:   - Name of caregiver: Abdon- brother and Legal Guardian  Barriers to Discharge: Needs to be able to be mainly independant with walking, stairs, and grooming.     Medical work up continues.  Pt is not yet ready to be considered for DC, pending continued medical work up, PT to eval and work with pt once md team deems appropriate.      Current Living Situation: Patient lives in a group home.  [facility] in [place]. Main phone: [phone]. Ely Renteria, Nurse or Rosalina Jaime, Director.       Coordination of Care and Referrals  Abdon, juder,  is pt's legal guardian. - this was scanned in \"Scan on 3/4/2018  9:40 AM : ORDER APPOINTING GENERAL GUARDIAN OF THE PERSON 1/6/95\".  Pt has been at [facility] in [place] for about 20 years. It is a split level home. Pt's mobility has declined in the last few weeks.  PT to be consulted here with stair work necessary and due to recent increase in falls at the . MITCH reached out to the group home, [facility], and spoke with SANDY Graves about pts cares @ [phone].  Pts mobility is a big concern for this home.  States pt was a 2 person transfer yesterday, but needs to be able to walk up stairs in home- split level.  In the last 2 weeks the  notes increased falls at home, and are concerned due to her low Plt level.  Prior to this illness she was able to walker " Pt called and stated that he needs a prescription for the inhaler , as well as the steroids you prescribed helped. Pt also stated that he would like you to call him in regard to the diclofenac gel you prescribed   and do stairs with minimal assist. She was able to feed herself; toilet herself and make her needs known. Staff assisted with washing hair and supervised bathing.   Ely said to call the  Group [facility]. Phone: [phone] when pt is nearing dc to discuss disposition.  Transport provided by staff at  upon dc from the hospital and to/ from appointments.  Pt cannot dc over the weekend unless the dc papers are completed Friday for the RN to review and NO medications or cares change for the Weekend dc.        Per discussion in interdisciplinary rounds, the primary team states that the patient continues to require hospitalization for the following reasons: continued medical work up.  Team states that the patient will require hospitalization for 3-4 days.      Pharmacy of [facility] [employer] in [place].     RNCC reached out to Abdon via phone, gave him my contact information, and discussed RNCC role.  He states he will be in the hospital today about 2 pm and would like to talk with the md team.  RNCC paged MD to update him.      6/13/2018 12:10 PM message received from  supervisor that all calls for dc are to go through her [facility], . Phone: [phone] .        MITCH Pablo, BSN    [facility]    Medicine Group  [address] [place] [address]    [email or web address]  [email or web address]    Office: [phone] Pager: [phone]

## 2019-09-05 DIAGNOSIS — M51.26 HERNIATED INTERVERTEBRAL DISC OF LUMBAR SPINE: ICD-10-CM

## 2019-09-05 DIAGNOSIS — M16.0 BILATERAL PRIMARY OSTEOARTHRITIS OF HIP: Primary | ICD-10-CM

## 2019-09-18 ENCOUNTER — OFFICE VISIT (OUTPATIENT)
Dept: ORTHOPEDIC SURGERY | Facility: CLINIC | Age: 84
End: 2019-09-18

## 2019-09-18 VITALS — TEMPERATURE: 97.6 F | HEIGHT: 60 IN | BODY MASS INDEX: 37.5 KG/M2 | WEIGHT: 191 LBS

## 2019-09-18 DIAGNOSIS — M70.62 TROCHANTERIC BURSITIS OF LEFT HIP: ICD-10-CM

## 2019-09-18 DIAGNOSIS — M25.551 BILATERAL HIP PAIN: Primary | ICD-10-CM

## 2019-09-18 DIAGNOSIS — M25.552 BILATERAL HIP PAIN: Primary | ICD-10-CM

## 2019-09-18 DIAGNOSIS — M51.36 DDD (DEGENERATIVE DISC DISEASE), LUMBAR: ICD-10-CM

## 2019-09-18 DIAGNOSIS — M70.61 TROCHANTERIC BURSITIS OF RIGHT HIP: ICD-10-CM

## 2019-09-18 PROCEDURE — 99204 OFFICE O/P NEW MOD 45 MIN: CPT | Performed by: ORTHOPAEDIC SURGERY

## 2019-09-18 PROCEDURE — 73521 X-RAY EXAM HIPS BI 2 VIEWS: CPT | Performed by: ORTHOPAEDIC SURGERY

## 2019-09-18 NOTE — PROGRESS NOTES
Patient Name: Jonathan Trejo   YOB: 1935  Referring Primary Care Physician: Panda Mccall Jr., MD  BMI: Body mass index is 218.29 kg/m².    Chief Complaint:    Chief Complaint   Patient presents with   • Right Hip - Pain   • Left Hip - Pain        HPI:     Jonathan Trejo is a 84 y.o. male who presents today for evaluation of   Chief Complaint   Patient presents with   • Right Hip - Pain   • Left Hip - Pain   .  Seen today complaining of bilateral hip pain this been going on for a while.  It is mainly right lateral and posterior hip pain.  Viewing his chart he has appointment with Dr. Ramirez in 2 weeks he has a history of low back pain and says his current pain basically starts in the low back radiates around to sacroiliac and trochanteric region and sometimes down the foot and sometimes to the knee.  He has been in exercise classes a senior living arrangement seems to have been helping over the last couple of months.  Is not supposed to take anti-inflammatory medications because of medical problems that are he is retired from [employer]    This problem is new to this examiner.     Subjective   Medications:   Home Medications:  Current Outpatient Medications on File Prior to Visit   Medication Sig   • aspirin 81 MG chewable tablet Chew 81 mg Daily.   • atorvastatin (LIPITOR) 10 MG tablet Take 1 tablet by mouth Daily.   • buPROPion XL (WELLBUTRIN XL) 300 MG 24 hr tablet TAKE 1 TABLET BY MOUTH DAILY.   • Cholecalciferol (VITAMIN D3) 2000 UNITS tablet Take 2,000 Units by mouth daily.   • diclofenac (VOLTAREN) 1 % gel gel Apply 4 g topically to the appropriate area as directed 3 (Three) Times a Day.   • escitalopram (LEXAPRO) 5 MG tablet TAKE 1 TABLET BY MOUTH DAILY.   • Flaxseed, Linseed, (FLAXSEED OIL) 1000 MG capsule Take 1,000 mg by mouth Daily.   • omeprazole (priLOSEC) 40 MG capsule TAKE 1 CAPSULE BY MOUTH DAILY.   • umeclidinium-vilanterol (ANORO ELLIPTA) 62.5-25 MCG/INH aerosol powder  inhaler Inhale 1  puff Daily.   • vitamin B-12 (CYANOCOBALAMIN) 1000 MCG tablet Take 1 tablet by mouth Daily. For b12 def   • fludrocortisone 0.1 MG tablet TAKE 1 TABLET BY MOUTH DAILY.   • methylPREDNISolone (MEDROL, ISRAEL,) 4 MG tablet Take as directed on package instructions.     No current facility-administered medications on file prior to visit.      Current Medications:  Scheduled Meds:  Continuous Infusions:  No current facility-administered medications for this visit.   PRN Meds:.    I have reviewed the patient's medical history in detail and updated the computerized patient record.  Review and summarization of old records includes:    Past Medical History:   Diagnosis Date   • Abnormal electrocardiogram    • Arm pain    • Atrial fibrillation (CMS/HCC)    • Atrial flutter (CMS/HCC)    • BPH (benign prostatic hyperplasia)    • BPH associated with nocturia    • Chronic kidney disease    • Colon cancer (CMS/HCC)     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   • Colon polyp 11/22/2015   • Depression    • Dyspnea    • Episode of generalized weakness     knees were weak - had to be helped by wife to sit down   • Esophagitis, reflux    • Fatigue    • Hyperlipidemia    • Inguinal hernia    • Loss of hearing    • Lumbar radiculopathy    • Obesity    • Osteoarthritis cervical spine     doc on [facility] 08/16   • Osteopenia    • Osteoporosis    • Overweight    • Tobacco dependence in remission    • Urge incontinence of urine    • Vitamin D deficiency    • Wheezing         Past Surgical History:   Procedure Laterality Date   • CARDIAC CATHETERIZATION N/A 7/27/2017    Procedure: Valve assessment;  Surgeon: Adonis Solis MD;  Location:  [facility] CATH INVASIVE LOCATION;  Service:    • CARDIAC CATHETERIZATION N/A 10/1/2018    Procedure: Left Heart Cath;  Surgeon: Bogdan Vargas MD;  Location: [facility] CATH INVASIVE LOCATION;  Service: Cardiology   • CARDIAC CATHETERIZATION N/A 10/1/2018    Procedure: Coronary angiography;  Surgeon: Bogdan Vargas  MD MIRANDA;  Location: [facility] CATH INVASIVE LOCATION;  Service: Cardiology   • CARDIAC CATHETERIZATION N/A 10/1/2018    Procedure: Left ventriculography;  Surgeon: Bogdan Vargas MD;  Location: [facility] CATH INVASIVE LOCATION;  Service: Cardiology   • CARDIAC CATHETERIZATION N/A 10/1/2018    Procedure: Right Heart Cath;  Surgeon: Bogdan Vargas MD;  Location: [facility] CATH INVASIVE LOCATION;  Service: Cardiology   • CARDIAC ELECTROPHYSIOLOGY PROCEDURE N/A 2017    Procedure: Ablation atrial flutter Will need to have 3 -4 weeks of therapeutic INR's ;  Surgeon: Adonis Solis MD;  Location: [facility] CATH INVASIVE LOCATION;  Service:    • CARDIOVERSION     • CATARACT EXTRACTION W/ INTRAOCULAR LENS  IMPLANT, BILATERAL     • COLON RESECTION     • COLONOSCOPY      07/15 redo 5 years  Segun   • KNEE SURGERY      benign tumor removed   • REFRACTIVE SURGERY     • REPAIR PERONEAL TENDONS ANKLE W/ FIBULAR OSTEOTOMY Right 2013    Dr. Banks        Social History     Occupational History   • Occupation: RETIRED   Tobacco Use   • Smoking status: Former Smoker     Packs/day: 0.50     Years: 55.00     Pack years: 27.50     Types: Cigarettes     Last attempt to quit: 2008     Years since quittin.3   • Smokeless tobacco: Never Used   • Tobacco comment: QUIT 10 YRS   Substance and Sexual Activity   • Alcohol use: Yes     Comment: SOCIALLY   • Drug use: No   • Sexual activity: Defer      Social History     Social History Narrative   • Not on file        Family History   Problem Relation Age of Onset   • Breast cancer Mother    • Heart disease Father    • Hypertension Father    • Hypertension Sister        ROS: 14 point review of systems was performed and all other systems were reviewed and are negative except for documented findings in HPI and today's encounter.     Allergies: No Known Allergies  Constitutional:  Denies fever, shaking or chills   Eyes:  Denies change in visual acuity   HENT:  Denies nasal  "congestion or sore throat   Respiratory:  Denies cough or shortness of breath   Cardiovascular:  Denies chest pain or severe LE edema   GI:  Denies abdominal pain, nausea, vomiting, bloody stools or diarrhea   Musculoskeletal:  Numbness, tingling, pain, or loss of motor function only as noted above in history of present illness.  : Denies painful urination or hematuria  Integument:  Denies rash, lesion or ulceration   Neurologic:  Denies headache or focal weakness  Endocrine:  Denies lymphadenopathy  Psych:  Denies confusion or change in mental status   Hem:  Denies active bleeding    OBJECTIVE:  Physical Exam: 84 y.o. male  Wt Readings from Last 3 Encounters:   09/18/19 86.6 kg (191 lb)   08/23/19 86.6 kg (191 lb)   07/25/19 86.2 kg (190 lb)     Ht Readings from Last 1 Encounters:   09/18/19 63 cm (24.8\")     Body mass index is 218.29 kg/m².  Vitals:    09/18/19 1409   Temp: 97.6 °F (36.4 °C)     Vital signs reviewed.     General Appearance:    Alert, cooperative, in no acute distress                  Eyes: conjunctiva clear  ENT: external ears and nose atraumatic  CV: no peripheral edema  Resp: normal respiratory effort  Skin: no rashes or wounds; normal turgor  Psych: mood and affect appropriate  Lymph: no nodes appreciated  Neuro: gross sensation intact  Vascular:  Palpable peripheral pulse in noted extremity  Musculoskeletal Extremities: I am today shows pleasant man does not limp he has point tenderness over his right greater trochanter and sacroiliac joint diffuse tenderness to the lumbosacral area Stinchfield test is negative decreased rotation of the hips which is not painful    Radiology:   AP of the hips lateral bilateral hips taken the office today show moderate arthritic change with some degenerative changes lower lumbar sacral region.  There are no comparison views.    Assessment:     ICD-10-CM ICD-9-CM   1. Bilateral hip pain M25.551 719.45    M25.552    2. DDD (degenerative disc disease), lumbar " M51.36 722.52   3. Trochanteric bursitis of left hip M70.62 726.5   4. Trochanteric bursitis of right hip M70.61 726.5        Procedures       Plan: Biomechanics of pertinent body area discussed.  Risks, benefits, alternatives, comparisons, and complications of accepted medicines, injections, recommendations, surgical procedures, and therapies explained and education provided in laymen's terms. Natural history and expected course of this patient's diagnosis discussed along with evaluation of therapies. Questions answered. When appropriate I also discussed proper use of cane, walker, trekking poles.   EXERCISES:  Advice on benefits of, and types of regular/moderate exercise including biomechanical forces involved as it pertains to this complaint.  RICE: Rest, ice, compression, and elevation therapy, Cryotherapy/brachy therapy, and or OTC linaments as indicated with instructions.   PT referral.  MEDICAL RECORDS reviewed from other provider(s) for past and current medical history pertinent to this complaint.  If not better with PT could see sports med for a troch injection    9/18/2019    Much of this encounter note is an electronic transcription/translation of spoken language to printed text. The electronic translation of spoken language may permit erroneous, or at times, nonsensical words or phrases to be inadvertently transcribed; Although I have reviewed the note for such errors, some may still exist

## 2019-10-04 ENCOUNTER — HOSPITAL ENCOUNTER (OUTPATIENT)
Dept: PHYSICAL THERAPY | Facility: HOSPITAL | Age: 84
Setting detail: THERAPIES SERIES
Discharge: HOME | End: 2019-10-04

## 2019-10-04 DIAGNOSIS — M51.36 DDD (DEGENERATIVE DISC DISEASE), LUMBAR: Primary | ICD-10-CM

## 2019-10-04 DIAGNOSIS — M70.61 TROCHANTERIC BURSITIS OF RIGHT HIP: ICD-10-CM

## 2019-10-04 PROCEDURE — 97110 THERAPEUTIC EXERCISES: CPT | Performed by: PHYSICAL THERAPIST

## 2019-10-04 PROCEDURE — 97162 PT EVAL MOD COMPLEX 30 MIN: CPT | Performed by: PHYSICAL THERAPIST

## 2019-10-04 NOTE — THERAPY EVALUATION
Outpatient Physical Therapy Ortho Initial Evaluation  [facility]     Patient Name: Jonathan Trejo  : 1935  MRN: [id number]  Today's Date: 10/4/2019      Visit Date: 10/04/2019    Patient Active Problem List   Diagnosis   • Benign prostatic hyperplasia with urinary obstruction   • Chronic renal impairment, stage 3 (moderate) (CMS/HCC)   • Depression   • Gastroesophageal reflux disease with esophagitis   • Hyperlipidemia   • Nocturia   • Obesity (BMI 30-39.9)   • Osteopenia   • Osteoporosis   • Seborrhea   • Abnormal EKG   • Bradycardia   • Degenerative disc disease, cervical   • Typical atrial flutter (CMS/HCC)   • Lumbar radiculopathy   • 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CMS/HCC)   • S/P ablation of atrial flutter   • S/P catheter ablation of slow pathway   • Cardiomyopathy (CMS/HCC)   • GARCIA (dyspnea on exertion)   • Urinary tract infection without hematuria   • Right foot drop   • Frequent falls   • Metabolic encephalopathy   • Acute renal failure (CMS/HCC)   • Other secondary parkinsonism (CMS/HCC)   • Moderate single current episode of major depressive disorder (CMS/HCC)   • Mass of left lower leg        Past Medical History:   Diagnosis Date   • Abnormal electrocardiogram    • Arm pain    • Atrial fibrillation (CMS/HCC)    • Atrial flutter (CMS/HCC)    • BPH (benign prostatic hyperplasia)    • BPH associated with nocturia    • Chronic kidney disease    • Colon cancer (CMS/HCC)     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   • Colon polyp 2015   • Depression    • Dyspnea    • Episode of generalized weakness     knees were weak - had to be helped by wife to sit down   • Esophagitis, reflux    • Fatigue    • Hyperlipidemia    • Inguinal hernia    • Loss of hearing    • Lumbar radiculopathy    • Obesity    • Osteoarthritis cervical spine     doc on [facility]    • Osteopenia    • Osteoporosis    • Overweight    • Tobacco dependence in remission    • Urge incontinence of urine    • Vitamin D  deficiency    • Wheezing         Past Surgical History:   Procedure Laterality Date   • CARDIAC CATHETERIZATION N/A 7/27/2017    Procedure: Valve assessment;  Surgeon: Adonis Solis MD;  Location:  [facility] CATH INVASIVE LOCATION;  Service:    • CARDIAC CATHETERIZATION N/A 10/1/2018    Procedure: Left Heart Cath;  Surgeon: Bogdan Vargas MD;  Location: [facility] CATH INVASIVE LOCATION;  Service: Cardiology   • CARDIAC CATHETERIZATION N/A 10/1/2018    Procedure: Coronary angiography;  Surgeon: Bogdan Vargas MD;  Location:  [facility] CATH INVASIVE LOCATION;  Service: Cardiology   • CARDIAC CATHETERIZATION N/A 10/1/2018    Procedure: Left ventriculography;  Surgeon: Bogdan Vargas MD;  Location: [facility] CATH INVASIVE LOCATION;  Service: Cardiology   • CARDIAC CATHETERIZATION N/A 10/1/2018    Procedure: Right Heart Cath;  Surgeon: Bogdan Vargas MD;  Location: [facility] CATH INVASIVE LOCATION;  Service: Cardiology   • CARDIAC ELECTROPHYSIOLOGY PROCEDURE N/A 7/27/2017    Procedure: Ablation atrial flutter Will need to have 3 -4 weeks of therapeutic INR's ;  Surgeon: Adonis Solis MD;  Location: [facility] CATH INVASIVE LOCATION;  Service:    • CARDIOVERSION     • CATARACT EXTRACTION W/ INTRAOCULAR LENS  IMPLANT, BILATERAL     • COLON RESECTION     • COLONOSCOPY      07/15 redo 5 years  [facility]   • KNEE SURGERY      benign tumor removed   • REFRACTIVE SURGERY  2007   • REPAIR PERONEAL TENDONS ANKLE W/ FIBULAR OSTEOTOMY Right 03/2013    Dr. Banks       Visit Dx:     ICD-10-CM ICD-9-CM   1. DDD (degenerative disc disease), lumbar M51.36 722.52   2. Trochanteric bursitis of right hip M70.61 726.5         Patient History     Row Name 10/04/19 1600             History    Chief Complaint  Pain  -RA      Type of Pain  Hip pain;Back pain  -RA      Date Current Problem(s) Began  09/18/19 MD referral   -RA      Brief Description of Current Complaint  HX of lower back pain and R hip pain for last several months, no  precipitating injury per patient.  States he had a fall about 2 years ago but doesn't think this has anything to do with it.  Symptoms vary from day to day and states back pain is always in lower back but hip pain can move moving from R hip to groin to thigh.  Also c/o some balance issues with hx of falls but denies any in the past year.  He lives in a senior living facility/community and reports he started doing ex class a couple of times a week about 5 weeks ago.  He indicates sometimes his SOA affects his activity level as much, if not more than his pain.   -RA      Patient/Caregiver Goals  Relieve pain  -RA      Patient seeing anyone else for problem(s)?  PCP, ortho MD  -RA      How has patient tried to help current problem?  nothing specific  -RA      What clinical tests have you had for this problem?  X-ray;MRI  -RA      Results of Clinical Tests  DDD, hip arthritis  -RA         Pain     Pain Location  Hip;Back  -RA      Pain at Present  6  -RA      Pain at Best  2  -RA      Pain at Worst  7  -RA      Pain Frequency  Intermittent  -RA      Pain Description  Aching  -RA      What Performance Factors Make the Current Problem(s) WORSE?  extended standing, walking, stairs, worse as day goes on, transitional movements, bending   -RA      What Performance Factors Make the Current Problem(s) BETTER?  sitting, lying down  -RA      Is your sleep disturbed?  No  -RA      Difficulties with ADL's?  denies difficulty just has pain   -RA      Difficulties with recreational activities?  exercise  -RA         Fall Risk Assessment    Any falls in the past year:  No  -RA         Daily Activities    Primary Language  English  -RA      How does patient learn best?  Listening  -RA      Teaching needs identified  Home Exercise Program;Management of Condition  -RA      Patient is concerned about/has problems with  Climbing Stairs;Standing;Walking;Transfers (getting out of a chair, bed)  -RA      Explanation of Functional Status  "Problem  independent with pain and/or modification   -RA      Pt Participated in POC and Goals  Yes  -RA         Safety    Are you being hurt, hit, or frightened by anyone at home or in your life?  No  -RA      Are you being neglected by a caregiver  No  -RA        User Key  (r) = Recorded By, (t) = Taken By, (c) = Cosigned By    Initials Name Provider Type    AYSHA Rene Arpita L, PT Physical Therapist          PT Ortho     Row Name 10/04/19 1600       Posture/Observations    Posture/Observations Comments  FH/rounded shoulder posture, altered gait using SPC, B LE ER R>L   -RA       Quarter Clearing    Quarter Clearing  --  -RA       Myotomal Screen- Lower Quarter Clearing    Hip flexion (L2)  Bilateral:;4+ (Good +)  -RA    Knee extension (L3)  Bilateral:;4+ (Good +)  -RA    Ankle DF (L4)  Right:;4 (Good);Left:;4- (Good -)  -RA    Ankle PF (S1)  3+ (Fair +);4- (Good -);Bilateral: WB   -RA    Knee flexion (S2)  Bilateral:;4 (Good);4+ (Good +)  -RA       Lumbar ROM Screen- Lower Quarter Clearing    Lumbar Flexion  Impaired 65%  -RA    Lumbar Extension  Impaired almost neutral, \"actually feels kind of good\" per pt.  -RA    Lumbar Lateral Flexion  Impaired </= 25%, noted flex compensation to increase reach  -RA    Lumbar Rotation  Impaired grossly 50%, pain with L rotation   -RA       SI/Hip Screen- Lower Quarter Clearing    Nataliya's/Bakari's test  Negative  -RA       Lumbar/SI Special Tests    SLR (Neural Tension)  Negative  -RA       Lumbosacral Palpation    Lumbosacral Palpation?  Yes TTP R QL   -RA       Hip/Thigh Palpation    Hip/Thigh Palpation?  Yes TTP R glutes/piriformis  -RA       Hip Accessory Motions    Hip Accessory Motions Tested?  Yes  -RA       Hip Special Tests    NATALIYA (hip vs SI pathology)  Negative  -RA    FAIR test (piriformis syndrome)  Negative  -RA    Stinchfield test (hip vs back pathology)  -- hip pain   -RA       General ROM    GENERAL ROM COMMENTS  decreased L ankle DF with AROM, noted " overpronation L, decreased hip mobility but not painful  -RA       MMT (Manual Muscle Testing)    General MMT Comments  hip ext strength grossly 3+/5 to 4-/5 (pain in R hip with bridge), hip abd R 4-/5, L 4/5;  hip ER R 4-/5, L 4/5.  Pain on R hip MMT  -RA       Sensation    Additional Comments  N/T in soles of feet at times (1-2x week)  -RA       Lower Extremity Flexibility    LE Flexibility Comments  tightness of HS, hip flexors, hip rotators, L calf  -RA       Pathomechanics    Spine Pathomechanics  Bends knees with attempted lumbar extension  -RA       Balance Skills Training    SLS  not able to maintain SLS R or L without UE support  -RA       Transfers    Comment (Transfers)  able to rise from standar chair without UE assist  -RA       Gait/Stairs Assessment/Training    Bilateral Gait Deviations  forward flexed posture  -RA    Left Sided Gait Deviations  heel strike decreased  -RA    Comment (Gait/Stairs)  gait with SPC in R hand, increased lateral trunk sway, decreased lumbopelvic dissociation, reports ability to go up/down a few stairs reciprocally with railing (limited by SOA)  -RA      User Key  (r) = Recorded By, (t) = Taken By, (c) = Cosigned By    Initials Name Provider Type    RA Arpita López, PT Physical Therapist                      Therapy Education  Given: HEP, Symptoms/condition management, Posture/body mechanics  Program: New  How Provided: Verbal, Demonstration, Written  Provided to: Patient  Level of Understanding: Teach back education performed, Verbalized     PT OP Goals     Row Name 10/04/19 1800          PT Short Term Goals    STG 1  Patient will be independent and compliant with initial HEP without increased symptoms  -RA     STG 2  Patient will be educated on and demonstrate knowledge of optimal posture, decompression strategies, good body mechanics, and proper lifting techniques to minimize stresses to spine and associated soft tissues with daily functional activities.  -RA     STG  3  Patient will report >/= 25% increased ease and/or decreased pain with ADL's/transitional movements for improved daily function  -RA     STG 4  Patient will demonstrate improvement in gait pattern using SPC in L hand  -RA        Long Term Goals    LTG 1  Patient will be independent with established HEP for strength/stabilization to aid in self management of symptoms/condition  -RA     LTG 2  Patient will have improved core/hip strength/stabilization for greater ease/tolerance with prolonged positioning/activity  -RA     LTG 3  Patient will report pain </= 4/10 with standing/walking   -RA     LTG 4  Patient will improve score on Modified Oswestry Outcome Measures from 38% to </= 28% indicating reduced functional disability   -RA     LTG 5  Patient will have R/L SLS 3-5 seconds for improved balance/stability with dynamic mobility.  -RA       User Key  (r) = Recorded By, (t) = Taken By, (c) = Cosigned By    Initials Name Provider Type    RA Arpita López, PT Physical Therapist          PT Assessment/Plan     Row Name 10/04/19 0429          PT Assessment    Functional Limitations  Impaired gait;Limitations in community activities;Performance in leisure activities  -RA     Impairments  Gait;Balance;Impaired flexibility;Muscle strength;Pain;Posture;Range of motion  -RA     Assessment Comments  Patient is an 83 yo M referred to therapy for evolving condition of LBP/R hip pain which limits his tolerance with standing/walking activity.  He has comorbidities/personal factors including but not limited to DDD, hx of R ankle ORIF (about 6 years ago), SOA on exertion, and depression that may impact his therapy plan of care.  He demonstrates FF posture and ambulates with altered gait mechanics using SPC (mild foot slap noted as he fatigued).  He has limited/painful trunk ROM, core/R>L hip/L>R ankle strength deficits, LE muscle tightness, balance deficits, and taut/tender tissues with palpation over R lateral hip, glutes,  piriformis, and QL tissues.  He would benefit from skilled PT for education, ROM/flexibility, strength/stabilization, and manual/modalities prn to help reduce pain, improve functional WB activity tolerance, and facilitate independent self-management of symptoms/condition.  -RA     Please refer to paper survey for additional self-reported information  Yes  -RA     Rehab Potential  Good  -RA     Patient/caregiver participated in establishment of treatment plan and goals  Yes  -RA     Patient would benefit from skilled therapy intervention  Yes  -RA        PT Plan    PT Frequency  2x/week  -RA     Predicted Duration of Therapy Intervention (Therapy Eval)  30-90 days  -RA     Planned CPT's?  PT EVAL MOD COMPLELITY: 78807;PT THER PROC EA 15 MIN: 76621;PT THER ACT EA 15 MIN: 83230;PT MANUAL THERAPY EA 15 MIN: 65096;PT NEUROMUSC RE-EDUCATION EA 15 MIN: 22605;PT GAIT TRAINING EA 15 MIN: 43190;PT AQUATIC THERAPY EA 15 MIN: 25520;PT SELF CARE/HOME MGMT/TRAIN EA 15: 38033;PT HOT OR COLD PACK TREAT MCARE;PT ELECTRICAL STIM UNATTEND: ;PT ULTRASOUND EA 15 MIN: 65534;PT TRACTION LUMBAR: 62775  -RA     PT Plan Comments  Skilled PT for LBP/R hip pain working on flexibility, mobility, and core/hip strength/stabilization.  -RA       User Key  (r) = Recorded By, (t) = Taken By, (c) = Cosigned By    Initials Name Provider Type    Arpita Schneider, PT Physical Therapist            OP Exercises     Row Name 10/04/19 1800             Total Minutes    43488 - PT Therapeutic Exercise Minutes  18  -RA         Exercise 1    Exercise Name 1  Instructed in initial HEP including PPT, glute sets, hip add squeeze, TB HL hip abd (red issued as green was painful to R hip), piriformis stretch, and seated HS stretch  -RA        User Key  (r) = Recorded By, (t) = Taken By, (c) = Cosigned By    Initials Name Provider Type    Arpita Schneider, PT Physical Therapist                        Outcome Measure Options: Modifed Owestry  Modified  Oswestry  Modified Oswestry Score/Comments: score = 19/50 or 38% disability      Time Calculation:     Start Time: 1628  Stop Time: 1723  Time Calculation (min): 55 min     Therapy Charges for Today     Code Description Service Date Service Provider Modifiers Qty    29233255990  PT THER PROC EA 15 MIN 10/4/2019 Arpita López, PT GP 1    57689647844 HC PT EVAL MOD COMPLEXITY 2 10/4/2019 Arpita López, PT GP 1          PT G-Codes  Outcome Measure Options: Modifed Owestry  Modified Oswestry Score/Comments: score = 19/50 or 38% disability         Arpita López, PT  10/4/2019

## 2019-10-08 ENCOUNTER — OFFICE VISIT (OUTPATIENT)
Dept: NEUROSURGERY | Facility: CLINIC | Age: 84
End: 2019-10-08

## 2019-10-08 VITALS
DIASTOLIC BLOOD PRESSURE: 70 MMHG | WEIGHT: 191 LBS | HEIGHT: 60 IN | BODY MASS INDEX: 37.5 KG/M2 | SYSTOLIC BLOOD PRESSURE: 128 MMHG | HEART RATE: 81 BPM

## 2019-10-08 DIAGNOSIS — M54.16 LUMBAR RADICULOPATHY: Primary | ICD-10-CM

## 2019-10-08 PROCEDURE — 99203 OFFICE O/P NEW LOW 30 MIN: CPT | Performed by: NEUROLOGICAL SURGERY

## 2019-10-09 ENCOUNTER — APPOINTMENT (OUTPATIENT)
Dept: PHYSICAL THERAPY | Facility: HOSPITAL | Age: 84
End: 2019-10-09

## 2019-10-09 ENCOUNTER — TELEPHONE (OUTPATIENT)
Dept: PHYSICAL THERAPY | Facility: HOSPITAL | Age: 84
End: 2019-10-09

## 2019-10-11 ENCOUNTER — TELEPHONE (OUTPATIENT)
Dept: PHYSICAL THERAPY | Facility: HOSPITAL | Age: 84
End: 2019-10-11

## 2019-10-11 NOTE — TELEPHONE ENCOUNTER
Spoke with pt. Re: missed visit in outpatient PT today as well as his missed visit on 10/9.      He appeared somewhat confused and unsure of why he had PT appointments established.  He decided to cancel all remaining appointments.    He reported that he would discuss further with his PCP.

## 2019-10-14 RX ORDER — FLUDROCORTISONE ACETATE 0.1 MG/1
0.1 TABLET ORAL DAILY
Qty: 30 TABLET | Refills: 0 | Status: SHIPPED | OUTPATIENT
Start: 2019-10-14 | End: 2019-11-15 | Stop reason: SDUPTHER

## 2019-10-16 ENCOUNTER — APPOINTMENT (OUTPATIENT)
Dept: PHYSICAL THERAPY | Facility: HOSPITAL | Age: 84
End: 2019-10-16

## 2019-10-22 ENCOUNTER — APPOINTMENT (OUTPATIENT)
Dept: PHYSICAL THERAPY | Facility: HOSPITAL | Age: 84
End: 2019-10-22

## 2019-10-25 ENCOUNTER — APPOINTMENT (OUTPATIENT)
Dept: PHYSICAL THERAPY | Facility: HOSPITAL | Age: 84
End: 2019-10-25

## 2019-10-25 RX ORDER — BUPROPION HYDROCHLORIDE 300 MG/1
300 TABLET ORAL DAILY
Qty: 30 TABLET | Refills: 3 | Status: SHIPPED | OUTPATIENT
Start: 2019-10-25 | End: 2020-02-28

## 2019-10-25 RX ORDER — ESCITALOPRAM OXALATE 5 MG/1
5 TABLET ORAL DAILY
Qty: 30 TABLET | Refills: 3 | Status: SHIPPED | OUTPATIENT
Start: 2019-10-25 | End: 2020-02-21

## 2019-10-30 ENCOUNTER — APPOINTMENT (OUTPATIENT)
Dept: PHYSICAL THERAPY | Facility: HOSPITAL | Age: 84
End: 2019-10-30

## 2019-11-01 ENCOUNTER — APPOINTMENT (OUTPATIENT)
Dept: PHYSICAL THERAPY | Facility: HOSPITAL | Age: 84
End: 2019-11-01

## 2019-11-15 RX ORDER — FLUDROCORTISONE ACETATE 0.1 MG/1
0.1 TABLET ORAL DAILY
Qty: 30 TABLET | Refills: 5 | Status: SHIPPED | OUTPATIENT
Start: 2019-11-15 | End: 2022-05-24

## 2019-12-23 ENCOUNTER — OFFICE VISIT (OUTPATIENT)
Dept: INTERNAL MEDICINE | Facility: CLINIC | Age: 84
End: 2019-12-23

## 2019-12-23 VITALS
HEART RATE: 82 BPM | OXYGEN SATURATION: 88 % | BODY MASS INDEX: 224.06 KG/M2 | DIASTOLIC BLOOD PRESSURE: 60 MMHG | TEMPERATURE: 97.4 F | WEIGHT: 196 LBS | SYSTOLIC BLOOD PRESSURE: 130 MMHG

## 2019-12-23 DIAGNOSIS — N18.30 CHRONIC RENAL IMPAIRMENT, STAGE 3 (MODERATE) (HCC): ICD-10-CM

## 2019-12-23 DIAGNOSIS — M25.511 ACUTE PAIN OF RIGHT SHOULDER: ICD-10-CM

## 2019-12-23 DIAGNOSIS — J84.112: Primary | ICD-10-CM

## 2019-12-23 DIAGNOSIS — R06.09 DOE (DYSPNEA ON EXERTION): ICD-10-CM

## 2019-12-23 DIAGNOSIS — J43.9 PULMONARY EMPHYSEMA, UNSPECIFIED EMPHYSEMA TYPE (HCC): ICD-10-CM

## 2019-12-23 PROCEDURE — 99214 OFFICE O/P EST MOD 30 MIN: CPT | Performed by: FAMILY MEDICINE

## 2019-12-23 NOTE — PROGRESS NOTES
Subjective   Jonathan Trejo is a 84 y.o. male.     Chief Complaint   Patient presents with   • COPD   • Shortness of Breath   Right shoulder pain      History of Present Illness   Patient is upset with a previous pulmonologist.  I reviewed all of his data he has evidence of some degree of interstitial fibrosis dyspnea on exertion evidence of emphysema on CT.  I will follow recommendation get high definition high resolution CT of the chest for comparison also get pulmonary function test with bronchodilator.  I discussed this with the patient at length and will switch him from Anoro to Trelegy inhaler.  Also referral to pulmonary again for a different pulmonologist for second opinion.    He has had increasing pain in the right shoulder without injury.  The pain is with elevation of the right shoulder it sounds like he has bursitis or rotator cuff problem.  We will get a referral to orthopedics as well.    The following portions of the patient's history were reviewed and updated as appropriate: allergies, current medications, past social history and problem list.    Review of Systems   Constitutional: Negative.    HENT: Negative.    Eyes: Negative.    Respiratory: Negative.    Cardiovascular: Negative.    Gastrointestinal: Negative.    Endocrine: Negative.    Genitourinary: Negative.    Musculoskeletal: Negative.    Skin: Negative.    Allergic/Immunologic: Negative.    Neurological: Negative.    Hematological: Negative.    Psychiatric/Behavioral: Negative.        Objective   Vitals:    12/23/19 1421   BP: 130/60   Pulse: 82   Temp: 97.4 °F (36.3 °C)   SpO2: (!) 88%     Physical Exam   Constitutional: He is oriented to person, place, and time. He appears well-developed and well-nourished.   HENT:   Head: Normocephalic and atraumatic.   Right Ear: Tympanic membrane and external ear normal.   Left Ear: Tympanic membrane and external ear normal.   Nose: Nose normal.   Mouth/Throat: Oropharynx is clear and moist.   Eyes:  Pupils are equal, round, and reactive to light. Conjunctivae and EOM are normal.   Neck: Normal range of motion. Neck supple. No JVD present. No thyromegaly present.   Cardiovascular: Normal rate, regular rhythm, normal heart sounds and intact distal pulses.   Pulmonary/Chest: Effort normal. He has decreased breath sounds in the right lower field and the left lower field.   Abdominal: Soft. Bowel sounds are normal.   Musculoskeletal:        Right shoulder: He exhibits decreased range of motion and tenderness.        Right hip: He exhibits decreased range of motion.        Left hip: He exhibits decreased range of motion.   Lymphadenopathy:     He has no cervical adenopathy.   Neurological: He is alert and oriented to person, place, and time. No cranial nerve deficit. Coordination normal.   Skin: Skin is warm and dry. No rash noted.   Psychiatric: He has a normal mood and affect. His behavior is normal. Judgment and thought content normal.   Vitals reviewed.      Assessment/Plan   Problem List Items Addressed This Visit        Respiratory    GARCIA (dyspnea on exertion)    Relevant Orders    CT Chest Hi Resolution    Full Pulmonary Function Test With Bronchodilator & ABG    Ambulatory Referral to Pulmonology       Genitourinary    Chronic renal impairment, stage 3 (moderate) (CMS/[facility])      Other Visit Diagnoses     Idiopathic pulmonary fibrosis, subacute form (CMS/[facility])    -  Primary    Relevant Medications    Fluticasone-Umeclidin-Vilant (TRELEGY ELLIPTA) 100-62.5-25 MCG/INH aerosol powder     Other Relevant Orders    CT Chest Hi Resolution    Full Pulmonary Function Test With Bronchodilator & ABG    Ambulatory Referral to Pulmonology    Pulmonary emphysema, unspecified emphysema type (CMS/[facility])        Relevant Medications    Fluticasone-Umeclidin-Vilant (TRELEGY ELLIPTA) 100-62.5-25 MCG/INH aerosol powder     Other Relevant Orders    Ambulatory Referral to Pulmonology    Acute pain of right shoulder        Relevant  Orders    Ambulatory Referral to Orthopedic Surgery      Plan: We will get labs in next visit.  We will see him back in 3 months for regular follow-up try Trelegy inhaler 1 puff daily referral to pulmonary CT high-resolution full pulmonary function test with bronchodilator and ABG.  Referral to orthopedics concerning the right shoulder.

## 2019-12-27 ENCOUNTER — HOSPITAL ENCOUNTER (OUTPATIENT)
Dept: RESPIRATORY THERAPY | Facility: HOSPITAL | Age: 84
Discharge: HOME | End: 2019-12-27
Admitting: FAMILY MEDICINE

## 2019-12-27 DIAGNOSIS — R06.09 DOE (DYSPNEA ON EXERTION): ICD-10-CM

## 2019-12-27 DIAGNOSIS — J84.112: ICD-10-CM

## 2019-12-27 LAB
ARTERIAL PATENCY WRIST A: POSITIVE
ATMOSPHERIC PRESS: 756.8 MMHG
BASE EXCESS BLDA CALC-SCNC: -1.5 MMOL/L (ref 0–2)
BDY SITE: ABNORMAL
BDY SITE: NORMAL
HCO3 BLDA-SCNC: 21.2 MMOL/L (ref 22–28)
HGB BLDA-MCNC: 14.2 G/DL
MODALITY: ABNORMAL
PCO2 BLDA: 30 MM HG (ref 35–45)
PH BLDA: 7.46 PH UNITS (ref 7.35–7.45)
PO2 BLDA: 67 MM HG (ref 80–100)
SAO2 % BLDCOA: 94.3 % (ref 92–99)
TOTAL RATE: 20 BREATHS/MINUTE

## 2019-12-27 PROCEDURE — 36600 WITHDRAWAL OF ARTERIAL BLOOD: CPT

## 2019-12-27 PROCEDURE — 94640 AIRWAY INHALATION TREATMENT: CPT

## 2019-12-27 PROCEDURE — 94729 DIFFUSING CAPACITY: CPT

## 2019-12-27 PROCEDURE — 82803 BLOOD GASES ANY COMBINATION: CPT

## 2019-12-27 PROCEDURE — 82820 HEMOGLOBIN-OXYGEN AFFINITY: CPT | Performed by: FAMILY MEDICINE

## 2019-12-27 PROCEDURE — 94726 PLETHYSMOGRAPHY LUNG VOLUMES: CPT

## 2019-12-27 PROCEDURE — 94060 EVALUATION OF WHEEZING: CPT

## 2019-12-27 RX ORDER — ALBUTEROL SULFATE 2.5 MG/3ML
2.5 SOLUTION RESPIRATORY (INHALATION) ONCE
Status: COMPLETED | OUTPATIENT
Start: 2019-12-27 | End: 2019-12-27

## 2019-12-27 RX ADMIN — ALBUTEROL SULFATE 2.5 MG: 2.5 SOLUTION RESPIRATORY (INHALATION) at 15:19

## 2020-01-27 ENCOUNTER — TRANSCRIBE ORDERS (OUTPATIENT)
Dept: CT IMAGING | Facility: HOSPITAL | Age: 85
End: 2020-01-27

## 2020-01-27 DIAGNOSIS — J84.9 INTERSTITIAL LUNG DISEASE (HCC): Primary | ICD-10-CM

## 2020-02-03 ENCOUNTER — OFFICE VISIT (OUTPATIENT)
Dept: ORTHOPEDIC SURGERY | Facility: CLINIC | Age: 85
End: 2020-02-03

## 2020-02-03 VITALS — BODY MASS INDEX: 32.99 KG/M2 | WEIGHT: 198 LBS | HEIGHT: 65 IN | TEMPERATURE: 97.9 F

## 2020-02-03 DIAGNOSIS — G89.29 CHRONIC RIGHT SHOULDER PAIN: Primary | ICD-10-CM

## 2020-02-03 DIAGNOSIS — M25.511 CHRONIC RIGHT SHOULDER PAIN: Primary | ICD-10-CM

## 2020-02-03 DIAGNOSIS — M19.90 ARTHRITIS: ICD-10-CM

## 2020-02-03 DIAGNOSIS — M19.011 PRIMARY LOCALIZED OSTEOARTHROSIS OF RIGHT SHOULDER REGION: ICD-10-CM

## 2020-02-03 PROCEDURE — 73030 X-RAY EXAM OF SHOULDER: CPT | Performed by: ORTHOPAEDIC SURGERY

## 2020-02-03 PROCEDURE — 20610 DRAIN/INJ JOINT/BURSA W/O US: CPT | Performed by: ORTHOPAEDIC SURGERY

## 2020-02-03 PROCEDURE — 99214 OFFICE O/P EST MOD 30 MIN: CPT | Performed by: ORTHOPAEDIC SURGERY

## 2020-02-03 RX ADMIN — METHYLPREDNISOLONE ACETATE 80 MG: 80 INJECTION, SUSPENSION INTRA-ARTICULAR; INTRALESIONAL; INTRAMUSCULAR; SOFT TISSUE at 15:01

## 2020-02-03 NOTE — PROGRESS NOTES
New Right Shoulder      Patient: Jonathan Trejo        YOB: 1935    Medical Record Number: [id number]        Chief Complaints: Right shoulder pain      History of Present Illness: This is a 84-year-old right-hand-dominant male presents complaining of right shoulder pain states he is had it before but usually gets better after period of time this is not it is worsening no history injury change in activity has significant night pain is worse symptoms have been for the last year and a half.  His current symptoms are severe intermittent stabbing grinding clicking worse with driving and reaching and some range of motion's better with rest he is retired his past medical history is significant for the multiple pertinent positives listed below and reviewed by me      Allergies: No Known Allergies    Medications:   Home Medications:  Current Outpatient Medications on File Prior to Visit   Medication Sig   • aspirin 81 MG chewable tablet Chew 81 mg Daily.   • atorvastatin (LIPITOR) 10 MG tablet Take 1 tablet by mouth Daily.   • buPROPion XL (WELLBUTRIN XL) 300 MG 24 hr tablet TAKE 1 TABLET BY MOUTH DAILY.   • Cholecalciferol (VITAMIN D3) 2000 UNITS tablet Take 2,000 Units by mouth daily.   • diclofenac (VOLTAREN) 1 % gel gel Apply 4 g topically to the appropriate area as directed 3 (Three) Times a Day.   • escitalopram (LEXAPRO) 5 MG tablet TAKE 1 TABLET BY MOUTH DAILY.   • Flaxseed, Linseed, (FLAXSEED OIL) 1000 MG capsule Take 1,000 mg by mouth Daily.   • fludrocortisone 0.1 MG tablet TAKE 1 TABLET BY MOUTH DAILY.   • Fluticasone-Umeclidin-Vilant (TRELEGY ELLIPTA) 100-62.5-25 MCG/INH aerosol powder  Inhale 1 puff Daily.   • omeprazole (priLOSEC) 40 MG capsule TAKE 1 CAPSULE BY MOUTH DAILY.   • vitamin B-12 (CYANOCOBALAMIN) 1000 MCG tablet Take 1 tablet by mouth Daily. For b12 def     No current facility-administered medications on file prior to visit.      Current Medications:  Scheduled Meds:  Continuous  Infusions:  No current facility-administered medications for this visit.   PRN Meds:.    Past Medical History:   Diagnosis Date   • Abnormal electrocardiogram    • Arm pain    • Atrial fibrillation (CMS/HCC)    • Atrial flutter (CMS/HCC)    • BPH (benign prostatic hyperplasia)    • BPH associated with nocturia    • Chronic kidney disease    • Colon cancer (CMS/HCC)     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   • Colon polyp 11/22/2015   • Depression    • Dyspnea    • Episode of generalized weakness     knees were weak - had to be helped by wife to sit down   • Esophagitis, reflux    • Fatigue    • Hyperlipidemia    • Inguinal hernia    • Loss of hearing    • Lumbar radiculopathy    • Obesity    • Osteoarthritis cervical spine     doc on [facility] 08/16   • Osteopenia    • Osteoporosis    • Overweight    • Tobacco dependence in remission    • Urge incontinence of urine    • Vitamin D deficiency    • Wheezing         Past Surgical History:   Procedure Laterality Date   • CARDIAC CATHETERIZATION N/A 7/27/2017    Procedure: Valve assessment;  Surgeon: Adonis Solis MD;  Location: [facility] CATH INVASIVE LOCATION;  Service:    • CARDIAC CATHETERIZATION N/A 10/1/2018    Procedure: Left Heart Cath;  Surgeon: Bogdan Vargas MD;  Location: [facility] CATH INVASIVE LOCATION;  Service: Cardiology   • CARDIAC CATHETERIZATION N/A 10/1/2018    Procedure: Coronary angiography;  Surgeon: Bogdan Vargas MD;  Location: [facility] CATH INVASIVE LOCATION;  Service: Cardiology   • CARDIAC CATHETERIZATION N/A 10/1/2018    Procedure: Left ventriculography;  Surgeon: Bogdan Vargas MD;  Location: [facility] CATH INVASIVE LOCATION;  Service: Cardiology   • CARDIAC CATHETERIZATION N/A 10/1/2018    Procedure: Right Heart Cath;  Surgeon: Bogdan Vargas MD;  Location: [facility] CATH INVASIVE LOCATION;  Service: Cardiology   • CARDIAC ELECTROPHYSIOLOGY PROCEDURE N/A 7/27/2017    Procedure: Ablation atrial flutter Will need to have 3 -4 weeks of  "therapeutic INR's ;  Surgeon: Adonis Solis MD;  Location: [facility] INVASIVE LOCATION;  Service:    • CARDIOVERSION     • CATARACT EXTRACTION W/ INTRAOCULAR LENS  IMPLANT, BILATERAL     • COLON RESECTION     • COLONOSCOPY      07/15 redo 5 years  [facility]   • KNEE SURGERY      benign tumor removed   • REFRACTIVE SURGERY     • REPAIR PERONEAL TENDONS ANKLE W/ FIBULAR OSTEOTOMY Right 2013    Dr. Banks        Social History     Occupational History   • Occupation: RETIRED   Tobacco Use   • Smoking status: Former Smoker     Packs/day: 0.50     Years: 55.00     Pack years: 27.50     Types: Cigarettes     Last attempt to quit: 2008     Years since quittin.7   • Smokeless tobacco: Never Used   • Tobacco comment: QUIT 10 YRS   Substance and Sexual Activity   • Alcohol use: Yes     Comment: SOCIALLY   • Drug use: No   • Sexual activity: Defer      Social History     Social History Narrative   • Not on file        Family History   Problem Relation Age of Onset   • Breast cancer Mother    • Heart disease Father    • Hypertension Father    • Hypertension Sister              Review of Systems: 14 point review of systems are remarkable for the pertinent positives listed in the chart by the patient the remainder negative    Review of Systems      Physical Exam: 84 y.o. male  General Appearance:    Alert, cooperative, in no acute distress                   Vitals:    20 1429   Temp: 97.9 °F (36.6 °C)   Weight: 89.8 kg (198 lb)   Height: 165.1 cm (65\")   PainSc:   8      Patient is alert and read ×3 no acute distress appears her above-listed at height weight and age.  Affect is normal respiratory rate is normal unlabored. Heart rate regular rate rhythm, sclera, dentition and hearing are normal for the purpose of this exam.    Ortho Exam Physical exam the right shoulder reveals no overlying skin changes no lymphedema lymphadenopathy the patient can actively flex to about 150 passively I get them to 160 " abduction is similar external rotation is 40 internal rotation to there buttock.  Rotator cuff strength is 4+ over 5 with isometric strength testing no overlying skin changes.  Patient has reasonable cervical range of motion for their age no radicular symptoms and a normal elbow exam.  There are good distal pulses.    Large Joint Arthrocentesis: R glenohumeral  Date/Time: 2/3/2020 3:01 PM  Consent given by: patient  Site marked: site marked  Timeout: Immediately prior to procedure a time out was called to verify the correct patient, procedure, equipment, support staff and site/side marked as required   Supporting Documentation  Indications: pain   Procedure Details  Location: shoulder - R glenohumeral  Preparation: Patient was prepped and draped in the usual sterile fashion  Needle size: 22 G  Approach: posterior  Medications administered: 4 mL lidocaine (cardiac); 80 mg methylPREDNISolone acetate 80 MG/ML  Patient tolerance: patient tolerated the procedure well with no immediate complications                Radiology:   AP, Scapular Y and Axillary Lateral of the right shoulder were ordered/reviewed to evauate shoulder pain.  I have no comparative film he has significant glenohumeral arthritis with flattening of the humeral head and osteophyte formation and a large osteophyte sitting inferiorly  Imaging Results (Most Recent)     Procedure Component Value Units Date/Time    XR Shoulder 2+ View Right [[id number]] Resulted:  02/03/20 1415     Updated:  02/03/20 1415    Impression:       Ordering physician's impression is located in the Encounter Note dated 02/03/20. X-ray performed in the DR room.          Assessment/Plan: Right shoulder pain which is arthritis in origin plan is to proceed with an injection hopefully this will give him some element of relief if it does we could repeat in minimum of 3 months if not we could discuss arthroplasty  Cortisone Injection. See procedure note.  Cortisone Injection for DIAGNOSTIC  and THERAPUTIC purposes.

## 2020-02-04 RX ORDER — METHYLPREDNISOLONE ACETATE 80 MG/ML
80 INJECTION, SUSPENSION INTRA-ARTICULAR; INTRALESIONAL; INTRAMUSCULAR; SOFT TISSUE
Status: COMPLETED | OUTPATIENT
Start: 2020-02-03 | End: 2020-02-03

## 2020-02-07 RX ORDER — ATORVASTATIN CALCIUM 10 MG/1
10 TABLET, FILM COATED ORAL DAILY
Qty: 90 TABLET | Refills: 1 | Status: SHIPPED | OUTPATIENT
Start: 2020-02-07

## 2020-02-11 ENCOUNTER — LAB (OUTPATIENT)
Dept: LAB | Facility: HOSPITAL | Age: 85
End: 2020-02-11

## 2020-02-11 ENCOUNTER — TRANSCRIBE ORDERS (OUTPATIENT)
Dept: LAB | Facility: HOSPITAL | Age: 85
End: 2020-02-11

## 2020-02-11 ENCOUNTER — HOSPITAL ENCOUNTER (OUTPATIENT)
Dept: CT IMAGING | Facility: HOSPITAL | Age: 85
Discharge: HOME | End: 2020-02-11
Admitting: INTERNAL MEDICINE

## 2020-02-11 DIAGNOSIS — J84.9 INTERSTITIAL LUNG DISEASE (HCC): ICD-10-CM

## 2020-02-11 DIAGNOSIS — J84.9 ILD (INTERSTITIAL LUNG DISEASE) (HCC): Primary | ICD-10-CM

## 2020-02-11 DIAGNOSIS — J84.9 ILD (INTERSTITIAL LUNG DISEASE) (HCC): ICD-10-CM

## 2020-02-11 LAB
CHROMATIN AB SERPL-ACNC: <10 IU/ML (ref 0–14)
CRP SERPL-MCNC: <0.03 MG/DL (ref 0–0.5)
ERYTHROCYTE [SEDIMENTATION RATE] IN BLOOD: 4 MM/HR (ref 0–20)

## 2020-02-11 PROCEDURE — 86140 C-REACTIVE PROTEIN: CPT

## 2020-02-11 PROCEDURE — 86200 CCP ANTIBODY: CPT

## 2020-02-11 PROCEDURE — 86038 ANTINUCLEAR ANTIBODIES: CPT

## 2020-02-11 PROCEDURE — 82164 ANGIOTENSIN I ENZYME TEST: CPT

## 2020-02-11 PROCEDURE — 86225 DNA ANTIBODY NATIVE: CPT

## 2020-02-11 PROCEDURE — 71250 CT THORAX DX C-: CPT

## 2020-02-11 PROCEDURE — 85652 RBC SED RATE AUTOMATED: CPT

## 2020-02-11 PROCEDURE — 86256 FLUORESCENT ANTIBODY TITER: CPT

## 2020-02-11 PROCEDURE — 83520 IMMUNOASSAY QUANT NOS NONAB: CPT

## 2020-02-11 PROCEDURE — 36415 COLL VENOUS BLD VENIPUNCTURE: CPT

## 2020-02-11 PROCEDURE — 86431 RHEUMATOID FACTOR QUANT: CPT

## 2020-02-11 PROCEDURE — 86235 NUCLEAR ANTIGEN ANTIBODY: CPT

## 2020-02-12 LAB
ACE SERPL-CCNC: 40 U/L (ref 14–82)
ANA SER QL: POSITIVE
DSDNA AB SER-ACNC: <1 IU/ML (ref 0–9)
ENA SS-A AB SER-ACNC: <0.2 AI (ref 0–0.9)
ENA SS-B AB SER-ACNC: <0.2 AI (ref 0–0.9)
Lab: NORMAL

## 2020-02-13 LAB
C-ANCA TITR SER IF: NORMAL TITER
CCP IGA+IGG SERPL IA-ACNC: 8 UNITS (ref 0–19)
MYELOPEROXIDASE AB SER-ACNC: <9 U/ML (ref 0–9)
P-ANCA ATYPICAL TITR SER IF: NORMAL TITER
P-ANCA TITR SER IF: NORMAL TITER
PROTEINASE3 AB SER IA-ACNC: <3.5 U/ML (ref 0–3.5)

## 2020-02-14 RX ORDER — OMEPRAZOLE 40 MG/1
40 CAPSULE, DELAYED RELEASE ORAL DAILY
Qty: 30 CAPSULE | Refills: 5 | Status: SHIPPED | OUTPATIENT
Start: 2020-02-14 | End: 2020-09-14

## 2020-02-21 RX ORDER — ESCITALOPRAM OXALATE 5 MG/1
5 TABLET ORAL DAILY
Qty: 30 TABLET | Refills: 3 | Status: SHIPPED | OUTPATIENT
Start: 2020-02-21 | End: 2020-06-29

## 2020-02-28 RX ORDER — BUPROPION HYDROCHLORIDE 300 MG/1
300 TABLET ORAL DAILY
Qty: 30 TABLET | Refills: 3 | Status: SHIPPED | OUTPATIENT
Start: 2020-02-28 | End: 2020-07-31

## 2020-05-06 ENCOUNTER — TELEPHONE (OUTPATIENT)
Dept: ORTHOPEDIC SURGERY | Facility: CLINIC | Age: 85
End: 2020-05-06

## 2020-05-06 NOTE — TELEPHONE ENCOUNTER
PAT 85, PATIENT CXD FOR NEXT WEEK FOR INJ IN SHOULD DUE TO VIRUS. WANTS TO KNOW WHEN YOU CAN WORK HIM IN, HE IS IN A LOT OF PAIN

## 2020-05-08 ENCOUNTER — CLINICAL SUPPORT (OUTPATIENT)
Dept: ORTHOPEDIC SURGERY | Facility: CLINIC | Age: 85
End: 2020-05-08

## 2020-05-08 VITALS — WEIGHT: 195 LBS | HEIGHT: 65 IN | TEMPERATURE: 97.2 F | BODY MASS INDEX: 32.49 KG/M2

## 2020-05-08 DIAGNOSIS — G89.29 CHRONIC RIGHT SHOULDER PAIN: Primary | ICD-10-CM

## 2020-05-08 DIAGNOSIS — M25.511 CHRONIC RIGHT SHOULDER PAIN: Primary | ICD-10-CM

## 2020-05-08 DIAGNOSIS — I48.3 TYPICAL ATRIAL FLUTTER (HCC): ICD-10-CM

## 2020-05-08 DIAGNOSIS — J43.9 PULMONARY EMPHYSEMA, UNSPECIFIED EMPHYSEMA TYPE (HCC): ICD-10-CM

## 2020-05-08 DIAGNOSIS — G21.8 OTHER SECONDARY PARKINSONISM (HCC): ICD-10-CM

## 2020-05-08 PROCEDURE — 20610 DRAIN/INJ JOINT/BURSA W/O US: CPT | Performed by: NURSE PRACTITIONER

## 2020-05-08 PROCEDURE — 99213 OFFICE O/P EST LOW 20 MIN: CPT | Performed by: NURSE PRACTITIONER

## 2020-05-08 RX ORDER — METHYLPREDNISOLONE ACETATE 80 MG/ML
80 INJECTION, SUSPENSION INTRA-ARTICULAR; INTRALESIONAL; INTRAMUSCULAR; SOFT TISSUE
Status: COMPLETED | OUTPATIENT
Start: 2020-05-08 | End: 2020-05-08

## 2020-05-08 RX ADMIN — METHYLPREDNISOLONE ACETATE 80 MG: 80 INJECTION, SUSPENSION INTRA-ARTICULAR; INTRALESIONAL; INTRAMUSCULAR; SOFT TISSUE at 09:58

## 2020-05-08 NOTE — PATIENT INSTRUCTIONS
Shoulder Exercises  Ask your health care provider which exercises are safe for you. Do exercises exactly as told by your health care provider and adjust them as directed. It is normal to feel mild stretching, pulling, tightness, or discomfort as you do these exercises, but you should stop right away if you feel sudden pain or your pain gets worse. Do not begin these exercises until told by your health care provider.  Range of Motion Exercises              These exercises warm up your muscles and joints and improve the movement and flexibility of your shoulder. These exercises also help to relieve pain, numbness, and tingling. These exercises involve stretching your injured shoulder directly.  Exercise A: Pendulum  1. Stand near a wall or a surface that you can hold onto for balance.  2. Bend at the waist and let your left / right arm hang straight down. Use your other arm to support you. Keep your back straight and do not lock your knees.  3. Relax your left / right arm and shoulder muscles, and move your hips and your trunk so your left / right arm swings freely. Your arm should swing because of the motion of your body, not because you are using your arm or shoulder muscles.  4. Keep moving your body so your arm swings in the following directions, as told by your health care provider:  ? Side to side.  ? Forward and backward.  ? In clockwise and counterclockwise circles.  5. Continue each motion for __________ seconds, or for as long as told by your health care provider.  6. Slowly return to the starting position.  Repeat __________ times. Complete this exercise __________ times a day.  Exercise B: Flexion, Standing  1. Stand and hold a broomstick, a cane, or a similar object. Place your hands a little more than shoulder-width apart on the object. Your left / right hand should be palm-up, and your other hand should be palm-down.  2. Keep your elbow straight and keep your shoulder muscles relaxed. Push the stick  down with your healthy arm to raise your left / right arm in front of your body, and then over your head until you feel a stretch in your shoulder.  ? Avoid shrugging your shoulder while you raise your arm. Keep your shoulder blade tucked down toward the middle of your back.  3. Hold for __________ seconds.  4. Slowly return to the starting position.  Repeat __________ times. Complete this exercise __________ times a day.  Exercise C: Abduction, Standing  1. Stand and hold a broomstick, a cane, or a similar object. Place your hands a little more than shoulder-width apart on the object. Your left / right hand should be palm-up, and your other hand should be palm-down.  2. While keeping your elbow straight and your shoulder muscles relaxed, push the stick across your body toward your left / right side. Raise your left / right arm to the side of your body and then over your head until you feel a stretch in your shoulder.  ? Do not raise your arm above shoulder height, unless your health care provider tells you to do that.  ? Avoid shrugging your shoulder while you raise your arm. Keep your shoulder blade tucked down toward the middle of your back.  3. Hold for __________ seconds.  4. Slowly return to the starting position.  Repeat __________ times. Complete this exercise __________ times a day.  Exercise D: Internal Rotation  1. Place your left / right hand behind your back, palm-up.  2. Use your other hand to dangle an exercise band, a towel, or a similar object over your shoulder. Grasp the band with your left / right hand so you are holding onto both ends.  3. Gently pull up on the band until you feel a stretch in the front of your left / right shoulder.  ? Avoid shrugging your shoulder while you raise your arm. Keep your shoulder blade tucked down toward the middle of your back.  4. Hold for __________ seconds.  5. Release the stretch by letting go of the band and lowering your hands.  Repeat __________ times.  Complete this exercise __________ times a day.  Stretching Exercises    These exercises warm up your muscles and joints and improve the movement and flexibility of your shoulder. These exercises also help to relieve pain, numbness, and tingling. These exercises are done using your healthy shoulder to help stretch the muscles of your injured shoulder.  Exercise E: Corner Stretch (External Rotation and Abduction)  1.  a doorway with one of your feet slightly in front of the other. This is called a staggered stance. If you cannot reach your forearms to the door frame, stand facing a corner of a room.  2. Choose one of the following positions as told by your health care provider:  ? Place your hands and forearms on the door frame above your head.  ? Place your hands and forearms on the door frame at the height of your head.  ? Place your hands on the door frame at the height of your elbows.  3. Slowly move your weight onto your front foot until you feel a stretch across your chest and in the front of your shoulders. Keep your head and chest upright and keep your abdominal muscles tight.  4. Hold for __________ seconds.  5. To release the stretch, shift your weight to your back foot.  Repeat __________ times. Complete this stretch __________ times a day.  Exercise F: Extension, Standing  1. Stand and hold a broomstick, a cane, or a similar object behind your back.  ? Your hands should be a little wider than shoulder-width apart.  ? Your palms should face away from your back.  2. Keeping your elbows straight and keeping your shoulder muscles relaxed, move the stick away from your body until you feel a stretch in your shoulder.  ? Avoid shrugging your shoulders while you move the stick. Keep your shoulder blade tucked down toward the middle of your back.  3. Hold for __________ seconds.  4. Slowly return to the starting position.  Repeat __________ times. Complete this exercise __________ times a  day.  Strengthening Exercises                   These exercises build strength and endurance in your shoulder. Endurance is the ability to use your muscles for a long time, even after they get tired.  Exercise G: External Rotation  1. Sit in a stable chair without armrests.  2. Secure an exercise band at elbow height on your left / right side.  3. Place a soft object, such as a folded towel or a small pillow, between your left / right upper arm and your body to move your elbow a few inches away (about 10 cm) from your side.  4. Hold the end of the band so it is tight and there is no slack.  5. Keeping your elbow pressed against the soft object, move your left / right forearm out, away from your abdomen. Keep your body steady so only your forearm moves.  6. Hold for __________ seconds.  7. Slowly return to the starting position.  Repeat __________ times. Complete this exercise __________ times a day.  Exercise H: Shoulder Abduction  1. Sit in a stable chair without armrests, or stand.  2. Hold a __________ weight in your left / right hand, or hold an exercise band with both hands.  3. Start with your arms straight down and your left / right palm facing in, toward your body.  4. Slowly lift your left / right hand out to your side. Do not lift your hand above shoulder height unless your health care provider tells you that this is safe.  ? Keep your arms straight.  ? Avoid shrugging your shoulder while you do this movement. Keep your shoulder blade tucked down toward the middle of your back.  5. Hold for __________ seconds.  6. Slowly lower your arm, and return to the starting position.  Repeat __________ times. Complete this exercise __________ times a day.  Exercise I: Shoulder Extension  1. Sit in a stable chair without armrests, or stand.  2. Secure an exercise band to a stable object in front of you where it is at shoulder height.  3. Hold one end of the exercise band in each hand. Your palms should face each  "other.  4. Straighten your elbows and lift your hands up to shoulder height.  5. Step back, away from the secured end of the exercise band, until the band is tight and there is no slack.  6. Squeeze your shoulder blades together as you pull your hands down to the sides of your thighs. Stop when your hands are straight down by your sides. Do not let your hands go behind your body.  7. Hold for __________ seconds.  8. Slowly return to the starting position.  Repeat __________ times. Complete this exercise __________ times a day.  Exercise J: Standing Shoulder Row  1. Sit in a stable chair without armrests, or stand.  2. Secure an exercise band to a stable object in front of you so it is at waist height.  3. Hold one end of the exercise band in each hand. Your palms should be in a thumbs-up position.  4. Bend each of your elbows to an \"L\" shape (about 90 degrees) and keep your upper arms at your sides.  5. Step back until the band is tight and there is no slack.  6. Slowly pull your elbows back behind you.  7. Hold for __________ seconds.  8. Slowly return to the starting position.  Repeat __________ times. Complete this exercise __________ times a day.  Exercise K: Shoulder Press-Ups  1. Sit in a stable chair that has armrests. Sit upright, with your feet flat on the floor.  2. Put your hands on the armrests so your elbows are bent and your fingers are pointing forward. Your hands should be about even with the sides of your body.  3. Push down on the armrests and use your arms to lift yourself off of the chair. Straighten your elbows and lift yourself up as much as you comfortably can.  ? Move your shoulder blades down, and avoid letting your shoulders move up toward your ears.  ? Keep your feet on the ground. As you get stronger, your feet should support less of your body weight as you lift yourself up.  4. Hold for __________ seconds.  5. Slowly lower yourself back into the chair.  Repeat __________ times. Complete " this exercise __________ times a day.  Exercise L: Wall Push-Ups  1. Stand so you are facing a stable wall. Your feet should be about one arm-length away from the wall.  2. Lean forward and place your palms on the wall at shoulder height.  3. Keep your feet flat on the floor as you bend your elbows and lean forward toward the wall.  4. Hold for __________ seconds.  5. Straighten your elbows to push yourself back to the starting position.  Repeat __________ times. Complete this exercise __________ times a day.  This information is not intended to replace advice given to you by your health care provider. Make sure you discuss any questions you have with your health care provider.  Document Released: 11/01/2006 Document Revised: 04/23/2019 Document Reviewed: 08/28/2016  [employer] Interactive Patient Education © 2020 [employer]

## 2020-05-08 NOTE — PROGRESS NOTES
Patient Name: Jonathan Trejo   YOB: 1935  Referring Primary Care Physician: Panda Mccall Jr., MD  BMI: Body mass index is 32.45 kg/m².    Chief Complaint:    Chief Complaint   Patient presents with   • Right Shoulder - Follow-up, Injections        HPI: This is a 84-year-old right-hand-dominant male pt [facility] presents complaining of right shoulder pain states he is had it before but usually gets better after activity has significant night pain is worse symptoms have been for the last year and a half.  His current symptoms are severe intermittent stabbing grinding clicking worse with driving and reaching and some range of motion's better with rest he is retired his past medical history is significant for the multiple pertinent positives listed below and reviewed by me. Injections help.        Jonathan Trejo is a 85 y.o. male who presents today for evaluation of   Chief Complaint   Patient presents with   • Right Shoulder - Follow-up, Injections       This problem is new to this examiner.     Subjective   Medications:   Home Medications:  Current Outpatient Medications on File Prior to Visit   Medication Sig   • aspirin 81 MG chewable tablet Chew 81 mg Daily.   • atorvastatin (LIPITOR) 10 MG tablet TAKE 1 TABLET BY MOUTH DAILY.   • buPROPion XL (WELLBUTRIN XL) 300 MG 24 hr tablet TAKE 1 TABLET BY MOUTH DAILY.   • Cholecalciferol (VITAMIN D3) 2000 UNITS tablet Take 2,000 Units by mouth daily.   • diclofenac (VOLTAREN) 1 % gel gel Apply 4 g topically to the appropriate area as directed 3 (Three) Times a Day.   • escitalopram (LEXAPRO) 5 MG tablet TAKE 1 TABLET BY MOUTH DAILY.   • Flaxseed, Linseed, (FLAXSEED OIL) 1000 MG capsule Take 1,000 mg by mouth Daily.   • fludrocortisone 0.1 MG tablet TAKE 1 TABLET BY MOUTH DAILY.   • Fluticasone-Umeclidin-Vilant (TRELEGY ELLIPTA) 100-62.5-25 MCG/INH aerosol powder  Inhale 1 puff Daily.   • omeprazole (priLOSEC) 40 MG capsule TAKE 1 CAPSULE BY MOUTH DAILY.   • vitamin B-12  (CYANOCOBALAMIN) 1000 MCG tablet Take 1 tablet by mouth Daily. For b12 def     No current facility-administered medications on file prior to visit.      Current Medications:  Scheduled Meds:  Continuous Infusions:  No current facility-administered medications for this visit.   PRN Meds:.    I have reviewed the patient's medical history in detail and updated the computerized patient record.  Review and summarization of old records includes:    Past Medical History:   Diagnosis Date   • Abnormal electrocardiogram    • Arm pain    • Atrial fibrillation (CMS/HCC)    • Atrial flutter (CMS/HCC)    • BPH (benign prostatic hyperplasia)    • BPH associated with nocturia    • Chronic kidney disease    • Colon cancer (CMS/HCC)     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   • Colon polyp 11/22/2015   • Depression    • Dyspnea    • Episode of generalized weakness     knees were weak - had to be helped by wife to sit down   • Esophagitis, reflux    • Fatigue    • Hyperlipidemia    • Inguinal hernia    • Loss of hearing    • Lumbar radiculopathy    • Obesity    • Osteoarthritis cervical spine     doc on [facility] 08/16   • Osteopenia    • Osteoporosis    • Overweight    • Tobacco dependence in remission    • Urge incontinence of urine    • Vitamin D deficiency    • Wheezing         Past Surgical History:   Procedure Laterality Date   • CARDIAC CATHETERIZATION N/A 7/27/2017    Procedure: Valve assessment;  Surgeon: Adonis Solis MD;  Location: [facility] CATH INVASIVE LOCATION;  Service:    • CARDIAC CATHETERIZATION N/A 10/1/2018    Procedure: Left Heart Cath;  Surgeon: Bogdan Vargas MD;  Location: [facility] CATH INVASIVE LOCATION;  Service: Cardiology   • CARDIAC CATHETERIZATION N/A 10/1/2018    Procedure: Coronary angiography;  Surgeon: Bogdan Vargas MD;  Location: [facility] CATH INVASIVE LOCATION;  Service: Cardiology   • CARDIAC CATHETERIZATION N/A 10/1/2018    Procedure: Left ventriculography;  Surgeon: Bogdan Vargas MD;   Location:  [facility] CATH INVASIVE LOCATION;  Service: Cardiology   • CARDIAC CATHETERIZATION N/A 10/1/2018    Procedure: Right Heart Cath;  Surgeon: Bogdan Vargas MD;  Location:  [facility] CATH INVASIVE LOCATION;  Service: Cardiology   • CARDIAC ELECTROPHYSIOLOGY PROCEDURE N/A 2017    Procedure: Ablation atrial flutter Will need to have 3 -4 weeks of therapeutic INR's ;  Surgeon: Adonis Solis MD;  Location:  [facility] CATH INVASIVE LOCATION;  Service:    • CARDIOVERSION     • CATARACT EXTRACTION W/ INTRAOCULAR LENS  IMPLANT, BILATERAL     • COLON RESECTION     • COLONOSCOPY      07/15 redo 5 years  Segun   • KNEE SURGERY      benign tumor removed   • REFRACTIVE SURGERY     • REPAIR PERONEAL TENDONS ANKLE W/ FIBULAR OSTEOTOMY Right 2013    Dr. Banks        Social History     Occupational History   • Occupation: RETIRED   Tobacco Use   • Smoking status: Former Smoker     Packs/day: 0.50     Years: 55.00     Pack years: 27.50     Types: Cigarettes     Last attempt to quit: 2008     Years since quittin.0   • Smokeless tobacco: Never Used   • Tobacco comment: QUIT 10 YRS   Substance and Sexual Activity   • Alcohol use: Yes     Comment: SOCIALLY   • Drug use: No   • Sexual activity: Defer      Social History     Social History Narrative   • Not on file        Family History   Problem Relation Age of Onset   • Breast cancer Mother    • Heart disease Father    • Hypertension Father    • Hypertension Sister        ROS: 14 point review of systems was performed and all other systems were reviewed and are negative except for documented findings in HPI and today's encounter.     Allergies: No Known Allergies  Constitutional:  Denies fever, shaking or chills   Eyes:  Denies change in visual acuity   HENT:  Denies nasal congestion or sore throat   Respiratory:  Denies cough or shortness of breath   Cardiovascular:  Denies chest pain or severe LE edema   GI:  Denies abdominal pain, nausea, vomiting, bloody  "stools or diarrhea   Musculoskeletal:  Numbness, tingling, pain, or loss of motor function only as noted above in history of present illness.  : Denies painful urination or hematuria  Integument:  Denies rash, lesion or ulceration   Neurologic:  Denies headache or focal weakness  Endocrine:  Denies lymphadenopathy  Psych:  Denies confusion or change in mental status   Hem:  Denies active bleeding    OBJECTIVE:  Physical Exam:   Temp 97.2 °F (36.2 °C) (Temporal)   Ht 165.1 cm (65\")   Wt 88.5 kg (195 lb)   BMI 32.45 kg/m²     General Appearance:    Alert, cooperative, in no acute distress                  Eyes: conjunctiva clear  ENT: external ears and nose atraumatic  CV: no peripheral edema  Resp: normal respiratory effort  Skin: no rashes or wounds; normal turgor  Psych: mood and affect appropriate  Lymph: no nodes appreciated  Neuro: gross sensation intact  Vascular:  Palpable peripheral pulse in noted extremity  Musculoskeletal Extremities:   Ortho Exam Physical exam the right shoulder reveals no overlying skin changes no lymphedema lymphadenopathy the patient can actively flex to about 150 passively I get them to 160 abduction is similar external rotation is 40 internal rotation to there buttock.  Rotator cuff strength is 4+ over 5 with isometric strength testing no overlying skin changes.  Patient has reasonable cervical range of motion for their age no radicular symptoms and a normal elbow exam.  There are good distal pulses.  Radiology:   Reviewed from past    Assessment:     ICD-10-CM ICD-9-CM   1. Chronic right shoulder pain M25.511 719.41    G89.29 338.29   2. Typical atrial flutter (CMS/HCC) I48.3 427.32   3. 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 Pulmonary emphysema, unspecified emphysema type (CMS/[facility]) J43.9 492.8        Large Joint Arthrocentesis: R glenohumeral  Date/Time: 5/8/2020 9:58 AM  Consent given by: patient  Site marked: site marked  Timeout: Immediately prior to " procedure a time out was called to verify the correct patient, procedure, equipment, support staff and site/side marked as required   Supporting Documentation  Indications: pain   Procedure Details  Location: shoulder - R glenohumeral  Preparation: Patient was prepped and draped in the usual sterile fashion  Needle gauge: 21.  Approach: posterior  Medications administered: 4 mL lidocaine (cardiac); 80 mg methylPREDNISolone acetate 80 MG/ML               Plan: Biomechanics of pertinent body area discussed.  Risks, benefits, alternatives, comparisons, and complications of accepted medicines, injections, recommendations, surgical procedures, and therapies explained and education provided in laymen's terms. Natural history and expected course of this patient's diagnosis discussed along with evaluation of therapies. Questions answered. When appropriate I also discussed proper use of cane, walker, trekking poles.   EXERCISES:  Advice on benefits of, and types of regular/moderate exercise including biomechanical forces involved as it pertains to this complaint.  RICE: Rest, ice, compression, and elevation therapy, Cryotherapy/brachy therapy, and or OTC linaments as indicated with instructions.   Cortisone Injection. See procedure note.      5/8/2020    Much of this encounter note is an electronic transcription/translation of spoken language to printed text. The electronic translation of spoken language may permit erroneous, or at times, nonsensical words or phrases to be inadvertently transcribed; Although I have reviewed the note for such errors, some may still exist

## 2020-05-14 RX ORDER — FLUDROCORTISONE ACETATE 0.1 MG/1
0.1 TABLET ORAL DAILY
Qty: 30 TABLET | Refills: 5 | OUTPATIENT
Start: 2020-05-14

## 2020-06-05 ENCOUNTER — TELEPHONE (OUTPATIENT)
Dept: ORTHOPEDIC SURGERY | Facility: CLINIC | Age: 85
End: 2020-06-05

## 2020-06-05 NOTE — TELEPHONE ENCOUNTER
SHAKIRA patient seen most recently by [facility] on 5/8/20 for right shoulder cortisone injection. Patient will be due next injection 8/8/20 or after. Due to COVID-19, facility is asking if instead of patient coming out, would [facility] be ok with the patient getting injection in house by a Physician Assistant from an Ortho group they use.

## 2020-06-29 RX ORDER — ESCITALOPRAM OXALATE 5 MG/1
5 TABLET ORAL DAILY
Qty: 30 TABLET | Refills: 1 | Status: SHIPPED | OUTPATIENT
Start: 2020-06-29 | End: 2020-08-21

## 2020-07-31 RX ORDER — BUPROPION HYDROCHLORIDE 300 MG/1
300 TABLET ORAL DAILY
Qty: 30 TABLET | Refills: 0 | Status: SHIPPED | OUTPATIENT
Start: 2020-07-31 | End: 2020-09-01

## 2020-08-21 RX ORDER — ESCITALOPRAM OXALATE 5 MG/1
5 TABLET ORAL DAILY
Qty: 30 TABLET | Refills: 1 | Status: SHIPPED | OUTPATIENT
Start: 2020-08-21 | End: 2021-06-08

## 2020-09-01 ENCOUNTER — TRANSCRIBE ORDERS (OUTPATIENT)
Dept: CT IMAGING | Facility: HOSPITAL | Age: 85
End: 2020-09-01

## 2020-09-01 DIAGNOSIS — J84.112 IDIOPATHIC FIBROSING ALVEOLITIS, SUBACUTE FORM (HCC): Primary | ICD-10-CM

## 2020-09-01 RX ORDER — BUPROPION HYDROCHLORIDE 300 MG/1
TABLET ORAL
Qty: 30 TABLET | Refills: 0 | Status: SHIPPED | OUTPATIENT
Start: 2020-09-01 | End: 2020-09-28

## 2020-09-14 RX ORDER — OMEPRAZOLE 40 MG/1
40 CAPSULE, DELAYED RELEASE ORAL DAILY
Qty: 30 CAPSULE | Refills: 1 | Status: SHIPPED | OUTPATIENT
Start: 2020-09-14

## 2020-09-28 RX ORDER — BUPROPION HYDROCHLORIDE 300 MG/1
TABLET ORAL
Qty: 30 TABLET | Refills: 0 | Status: SHIPPED | OUTPATIENT
Start: 2020-09-28 | End: 2021-06-08

## 2020-10-05 ENCOUNTER — TRANSCRIBE ORDERS (OUTPATIENT)
Dept: ADMINISTRATIVE | Facility: HOSPITAL | Age: 85
End: 2020-10-05

## 2020-10-05 DIAGNOSIS — Z01.818 OTHER SPECIFIED PRE-OPERATIVE EXAMINATION: Primary | ICD-10-CM

## 2020-10-08 ENCOUNTER — TELEPHONE (OUTPATIENT)
Dept: INTERNAL MEDICINE | Facility: CLINIC | Age: 85
End: 2020-10-08

## 2020-10-08 NOTE — TELEPHONE ENCOUNTER
CHARLES FROM [facility] CALLED TO CHECK IF WE HAVE RECEIVED SOME FORMS FOR THIS PATIENT, WILL BE ADMITTED AT THEIR FACILITY ON 10/15 AND NEED THE FORMS FAXED BACK TO THEM    PLEASE ADVISE    [phone]

## 2020-10-09 ENCOUNTER — TELEPHONE (OUTPATIENT)
Dept: INTERNAL MEDICINE | Facility: CLINIC | Age: 85
End: 2020-10-09

## 2020-10-09 NOTE — TELEPHONE ENCOUNTER
PATIENT HAS PHYSICAL APPT ON 10/12 TO GET FORMS FILLED OUT FOR MOVE TO [facility]IST[facility]IV[facility] FAC[facility], BUT DAUGHTER WAS TOLD DR IS ALREADY FILLING OUT FORMS OR ALREADY HAS    IS THIS APPOINTMENT STILL NEEDED? SHE HAS TO HAVE PATIENT AT FACILITY Monday AND WOULD BE EASIER AND NOT MISS WORK IF HE DID NOT HAVE TO COME IN     PLEASE ADVISE  [phone]

## 2020-10-14 ENCOUNTER — OFFICE VISIT (OUTPATIENT)
Dept: INTERNAL MEDICINE | Facility: CLINIC | Age: 85
End: 2020-10-14

## 2020-10-14 VITALS
TEMPERATURE: 98.1 F | WEIGHT: 187 LBS | DIASTOLIC BLOOD PRESSURE: 78 MMHG | HEART RATE: 92 BPM | HEIGHT: 65 IN | OXYGEN SATURATION: 98 % | SYSTOLIC BLOOD PRESSURE: 118 MMHG | BODY MASS INDEX: 31.16 KG/M2

## 2020-10-14 DIAGNOSIS — E78.2 MIXED HYPERLIPIDEMIA: ICD-10-CM

## 2020-10-14 DIAGNOSIS — I42.9 CARDIOMYOPATHY, UNSPECIFIED TYPE (HCC): ICD-10-CM

## 2020-10-14 DIAGNOSIS — Z02.2 ENCOUNTER FOR EXAMINATION FOR ADMISSION TO ASSISTED LIVING FACILITY: Primary | ICD-10-CM

## 2020-10-14 DIAGNOSIS — R06.09 DOE (DYSPNEA ON EXERTION): ICD-10-CM

## 2020-10-14 DIAGNOSIS — J43.9 PULMONARY EMPHYSEMA, UNSPECIFIED EMPHYSEMA TYPE (HCC): ICD-10-CM

## 2020-10-14 PROCEDURE — 99214 OFFICE O/P EST MOD 30 MIN: CPT | Performed by: NURSE PRACTITIONER

## 2020-10-14 NOTE — PROGRESS NOTES
"The ABCs of the Annual Wellness Visit  Subsequent Medicare Wellness Visit    Chief Complaint   Patient presents with   • Medicare Wellness-subsequent       Subjective   History of Present Illness:  Jonathan Trejo is a 85 y.o. male who presents for a Subsequent Medicare Wellness Visit.     HEALTH RISK ASSESSMENT    Recent Hospitalizations:  {Hospitalization history:[id number]::\"No hospitalization(s) within the last year.\"}    Current Medical Providers:  Patient Care Team:  Panda Mccall Jr., MD as PCP - General (Family Medicine)  Panda Mccall Jr., MD as PCP - Claims Attributed    Smoking Status:  Social History     Tobacco Use   Smoking Status Former Smoker   • Packs/day: 0.50   • Years: 55.00   • Pack years: 27.50   • Types: Cigarettes   • Quit date: 2008   • Years since quittin.4   Smokeless Tobacco Never Used   Tobacco Comment    QUIT 10 YRS       Alcohol Consumption:  Social History     Substance and Sexual Activity   Alcohol Use Yes    Comment: SOCIALLY       Depression Screen:   PHQ-2/PHQ-9 Depression Screening 10/14/2020   Little interest or pleasure in doing things 0   Feeling down, depressed, or hopeless 0   Trouble falling or staying asleep, or sleeping too much 0   Feeling tired or having little energy 1   Poor appetite or overeating 0   Feeling bad about yourself - or that you are a failure or have let yourself or your family down 0   Trouble concentrating on things, such as reading the newspaper or watching television 0   Moving or speaking so slowly that other people could have noticed. Or the opposite - being so fidgety or restless that you have been moving around a lot more than usual 0   Thoughts that you would be better off dead, or of hurting yourself in some way 0   Total Score 1   If you checked off any problems, how difficult have these problems made it for you to do your work, take care of things at home, or get along with other people? Not difficult at all       Fall Risk Screen:  GUNNAR " "Fall Risk Assessment was completed, and patient is at LOW risk for falls.Assessment completed on:10/14/2020    Health Habits and Functional and Cognitive Screening:  Functional & Cognitive Status 10/14/2020   Do you have difficulty preparing food and eating? No   Do you have difficulty bathing yourself, getting dressed or grooming yourself? No   Do you have difficulty using the toilet? No   Do you have difficulty moving around from place to place? No   Do you have trouble with steps or getting out of a bed or a chair? No   Current Diet Well Balanced Diet   Dental Exam Up to date   Eye Exam Up to date   Exercise (times per week) 3 times per week   Current Exercises Include No Regular Exercise   Current Exercise Activities Include -   Do you need help using the phone?  No   Are you deaf or do you have serious difficulty hearing?  No   Do you need help with transportation? No   Do you need help shopping? No   Do you need help preparing meals?  No   Do you need help with housework?  No   Do you need help with laundry? No   Do you need help taking your medications? No   Do you need help managing money? Yes   Do you ever drive or ride in a car without wearing a seat belt? No   Have you felt unusual stress, anger or loneliness in the last month? No   Who do you live with? Other   If you need help, do you have trouble finding someone available to you? (No Data)   Have you been bothered in the last four weeks by sexual problems? No   Do you have difficulty concentrating, remembering or making decisions? No         Does the patient have evidence of cognitive impairment? {Yes/No w/ pre-defaulted No:60673::\"No\"}    Asprin use counseling:{Aspirin :77762}    Age-appropriate Screening Schedule:  Refer to the list below for future screening recommendations based on patient's age, sex and/or medical conditions. Orders for these recommended tests are listed in the plan section. The patient has been provided with a written " "plan.    Health Maintenance   Topic Date Due   • ZOSTER VACCINE (2 of 3) 05/10/2009   • DXA SCAN  09/22/2018   • LIPID PANEL  06/05/2019   • COLONOSCOPY  07/15/2020   • INFLUENZA VACCINE  08/01/2020   • TDAP/TD VACCINES (2 - Td) 03/18/2024          The following portions of the patient's history were reviewed and updated as appropriate: {history reviewed:20406::\"allergies\",\"current medications\",\"past family history\",\"past medical history\",\"past social history\",\"past surgical history\",\"problem list\"}.    Outpatient Medications Prior to Visit   Medication Sig Dispense Refill   • aspirin 81 MG chewable tablet Chew 81 mg Daily.     • atorvastatin (LIPITOR) 10 MG tablet TAKE 1 TABLET BY MOUTH DAILY. 90 tablet 1   • buPROPion XL (WELLBUTRIN XL) 300 MG 24 hr tablet TAKE 1 TABLET BY MOUTH DAILY. *NEED APPOINTMENT* 30 tablet 0   • Cholecalciferol (VITAMIN D3) 2000 UNITS tablet Take 2,000 Units by mouth daily.     • diclofenac (VOLTAREN) 1 % gel gel Apply 4 g topically to the appropriate area as directed 3 (Three) Times a Day. 100 g 2   • escitalopram (LEXAPRO) 5 MG tablet TAKE 1 TABLET BY MOUTH DAILY. 30 tablet 1   • Flaxseed, Linseed, (FLAXSEED OIL) 1000 MG capsule Take 1,000 mg by mouth Daily.     • fludrocortisone 0.1 MG tablet TAKE 1 TABLET BY MOUTH DAILY. 30 tablet 5   • Fluticasone-Umeclidin-Vilant (TRELEGY ELLIPTA) 100-62.5-25 MCG/INH aerosol powder  Inhale 1 puff Daily. 1 each 5   • omeprazole (priLOSEC) 40 MG capsule TAKE 1 CAPSULE BY MOUTH DAILY. 30 capsule 1   • vitamin B-12 (CYANOCOBALAMIN) 1000 MCG tablet Take 1 tablet by mouth Daily. For b12 def 90 tablet 3     No facility-administered medications prior to visit.        Patient Active Problem List   Diagnosis   • Benign prostatic hyperplasia with urinary obstruction   • Chronic renal impairment, stage 3 (moderate)   • Depression   • Gastroesophageal reflux disease with esophagitis   • Hyperlipidemia   • Nocturia   • Obesity (BMI 30-39.9)   • Osteopenia   • " "Osteoporosis   • Seborrhea   • Abnormal EKG   • Bradycardia   • Degenerative disc disease, cervical   • Typical atrial flutter (CMS/HCC)   • Lumbar radiculopathy   • 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CMS/HCC)   • S/P ablation of atrial flutter   • S/P catheter ablation of slow pathway   • Cardiomyopathy (CMS/HCC)   • GARCIA (dyspnea on exertion)   • Urinary tract infection without hematuria   • Right foot drop   • Frequent falls   • Metabolic encephalopathy   • Acute renal failure (CMS/HCC)   • Other secondary parkinsonism (CMS/HCC)   • Moderate single current episode of major depressive disorder (CMS/HCC)   • Mass of left lower leg   • Pulmonary emphysema (CMS/HCC)       Advanced Care Planning:  {Advanced Directive Status:32867}    Review of Systems    Compared to one year ago, the patient feels his physical health is {better worse same:03131}.  Compared to one year ago, the patient feels his mental health is {better worse same:43985}.    Reviewed chart for potential of high risk medication in the elderly: {Response;Yes/No/NA:1892206026::\"yes\"}  Reviewed chart for potential of harmful drug interactions in the elderly:{Response;Yes/No/NA:[id number]::\"yes\"}    Objective         Vitals:    10/14/20 1059   BP: 118/78   BP Location: Left arm   Patient Position: Sitting   Cuff Size: Adult   Pulse: 92   Temp: 98.1 °F (36.7 °C)   TempSrc: Oral   SpO2: 98%   Weight: 84.8 kg (187 lb)   Height: 165.1 cm (65\")   PainSc: 0-No pain       Body mass index is 31.12 kg/m².  Discussed the patient's BMI with him. The BMI {[facility]79755}.    Physical Exam          Assessment/Plan   Medicare Risks and Personalized Health Plan  CMS Preventative Services Quick Reference  {Medicare Wellness Risk Factors and Personalized Health Plan:78677}    The above risks/problems have been discussed with the patient.  Pertinent information has been shared with the patient in the After Visit Summary.  Follow up plans and orders " are seen below in the Assessment/Plan Section.    There are no diagnoses linked to this encounter.  Follow Up:  No follow-ups on file.     An After Visit Summary and PPPS were given to the patient.

## 2020-10-14 NOTE — PROGRESS NOTES
Subjective     Jonathan Trejo is a 85 y.o. male.         Pt presents for an encounter for admission to assisted living facility. He currently resides at an assisted living facility but will be moving to a [facility] and needs paperwork filled out. Pt is doing well and has no acute complaints. He is normally followed by Dr. Mccall and is new to me. He does have PMH of COPD, GARCIA, HLD and cardiomyopathy. He is followed by pulmonology and cardiology.       The following portions of the patient's history were reviewed and updated as appropriate: allergies, current medications, past social history and problem list.    Past Medical History:   Diagnosis Date   • Abnormal electrocardiogram    • Arm pain    • Atrial fibrillation (CMS/HCC)    • Atrial flutter (CMS/HCC)    • BPH (benign prostatic hyperplasia)    • BPH associated with nocturia    • Chronic kidney disease    • Colon cancer (CMS/HCC)     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   • Colon polyp 11/22/2015   • Depression    • Dyspnea    • Episode of generalized weakness     knees were weak - had to be helped by wife to sit down   • Esophagitis, reflux    • Fatigue    • Hyperlipidemia    • Inguinal hernia    • Loss of hearing    • Lumbar radiculopathy    • Obesity    • Osteoarthritis cervical spine     doc on [facility] 08/16   • Osteopenia    • Osteoporosis    • Overweight    • Tobacco dependence in remission    • Urge incontinence of urine    • Vitamin D deficiency    • Wheezing          Current Outpatient Medications:   •  aspirin 81 MG chewable tablet, Chew 81 mg Daily., Disp: , Rfl:   •  atorvastatin (LIPITOR) 10 MG tablet, TAKE 1 TABLET BY MOUTH DAILY., Disp: 90 tablet, Rfl: 1  •  buPROPion XL (WELLBUTRIN XL) 300 MG 24 hr tablet, TAKE 1 TABLET BY MOUTH DAILY. *NEED APPOINTMENT*, Disp: 30 tablet, Rfl: 0  •  Cholecalciferol (VITAMIN D3) 2000 UNITS tablet, Take 2,000 Units by mouth daily., Disp: , Rfl:   •  diclofenac (VOLTAREN) 1 % gel gel, Apply 4 g topically to the appropriate  "area as directed 3 (Three) Times a Day., Disp: 100 g, Rfl: 2  •  escitalopram (LEXAPRO) 5 MG tablet, TAKE 1 TABLET BY MOUTH DAILY., Disp: 30 tablet, Rfl: 1  •  Flaxseed, Linseed, (FLAXSEED OIL) 1000 MG capsule, Take 1,000 mg by mouth Daily., Disp: , Rfl:   •  fludrocortisone 0.1 MG tablet, TAKE 1 TABLET BY MOUTH DAILY., Disp: 30 tablet, Rfl: 5  •  Fluticasone-Umeclidin-Vilant (TRELEGY ELLIPTA) 100-62.5-25 MCG/INH aerosol powder , Inhale 1 puff Daily., Disp: 1 each, Rfl: 5  •  omeprazole (priLOSEC) 40 MG capsule, TAKE 1 CAPSULE BY MOUTH DAILY., Disp: 30 capsule, Rfl: 1  •  vitamin B-12 (CYANOCOBALAMIN) 1000 MCG tablet, Take 1 tablet by mouth Daily. For b12 def, Disp: 90 tablet, Rfl: 3    No Known Allergies    Review of Systems   Constitutional: Negative for fatigue and fever.   HENT: Negative for congestion, hearing loss and trouble swallowing.    Eyes: Negative for visual disturbance.   Respiratory: Positive for shortness of breath (on exertion). Negative for apnea, cough, chest tightness and wheezing.    Cardiovascular: Negative for chest pain, palpitations and leg swelling.   Gastrointestinal: Negative for abdominal distention, abdominal pain, constipation, diarrhea, nausea and vomiting.   Endocrine: Negative for cold intolerance, heat intolerance, polydipsia, polyphagia and polyuria.   Genitourinary: Negative for difficulty urinating, dysuria, frequency and urgency.   Musculoskeletal: Negative for gait problem.   Skin: Negative for pallor and rash.   Neurological: Negative for speech difficulty, weakness and headaches.   Psychiatric/Behavioral: Negative for agitation, behavioral problems, confusion and sleep disturbance. The patient is not nervous/anxious.        Objective     /78 (BP Location: Left arm, Patient Position: Sitting, Cuff Size: Adult)   Pulse 92   Temp 98.1 °F (36.7 °C) (Oral)   Ht 165.1 cm (65\")   Wt 84.8 kg (187 lb)   SpO2 98%   BMI 31.12 kg/m²   Wt Readings from Last 3 Encounters: "   10/14/20 84.8 kg (187 lb)   05/08/20 88.5 kg (195 lb)   02/03/20 89.8 kg (198 lb)     Temp Readings from Last 3 Encounters:   10/14/20 98.1 °F (36.7 °C) (Oral)   05/08/20 97.2 °F (36.2 °C) (Temporal)   02/03/20 97.9 °F (36.6 °C)     BP Readings from Last 3 Encounters:   10/14/20 118/78   12/23/19 130/60   10/08/19 128/70     Pulse Readings from Last 3 Encounters:   10/14/20 92   12/23/19 82   10/08/19 81       Physical Exam  Vitals signs reviewed.   Constitutional:       Appearance: He is well-developed.   HENT:      Head: Normocephalic and atraumatic.      Right Ear: Hearing and tympanic membrane normal.      Left Ear: Hearing and tympanic membrane normal.      Nose: Nose normal.      Mouth/Throat:      Pharynx: Uvula midline.      Tonsils: No tonsillar exudate.   Eyes:      General: Lids are normal.      Conjunctiva/sclera: Conjunctivae normal.      Pupils: Pupils are equal, round, and reactive to light.   Neck:      Musculoskeletal: Normal range of motion.      Thyroid: No thyromegaly.      Vascular: No carotid bruit.   Cardiovascular:      Rate and Rhythm: Normal rate and regular rhythm.      Pulses:           Carotid pulses are 2+ on the right side and 2+ on the left side.       Dorsalis pedis pulses are 2+ on the right side and 2+ on the left side.      Heart sounds: Normal heart sounds. No murmur.   Pulmonary:      Effort: Pulmonary effort is normal. No respiratory distress.      Breath sounds: Normal breath sounds. No decreased breath sounds, wheezing or rhonchi.   Abdominal:      General: Bowel sounds are normal. There is no distension.      Palpations: Abdomen is soft.      Tenderness: There is no abdominal tenderness. There is no guarding or rebound.   Musculoskeletal: Normal range of motion.         General: No tenderness or deformity.   Lymphadenopathy:      Head:      Right side of head: No submental, submandibular or tonsillar adenopathy.      Left side of head: No submental, submandibular or  tonsillar adenopathy.   Skin:     General: Skin is warm and dry.   Neurological:      Mental Status: He is alert and oriented to person, place, and time.      Cranial Nerves: No cranial nerve deficit.      Sensory: No sensory deficit.      Coordination: Coordination normal.      Gait: Gait normal.      Deep Tendon Reflexes: Reflexes are normal and symmetric.   Psychiatric:         Attention and Perception: He is attentive.         Speech: Speech normal.         Behavior: Behavior normal.         Thought Content: Thought content normal.         Judgment: Judgment normal.         Assessment/Plan     Diagnoses and all orders for this visit:    1. Encounter for examination for admission to assisted living facility (Primary)    2. Pulmonary emphysema, unspecified emphysema type (CMS/HCC)    3. GARCIA (dyspnea on exertion)    4. Mixed hyperlipidemia    5. Cardiomyopathy, unspecified type (CMS/HCC)    Pt doing very well. Exam is normal. Paperwork for assisted living filled out and given to pt and pt's daughter, who accompanies him today. Also scanned to pt's chart.    He will schedule routine f/u with Dr. Mccall in 3-4 months.

## 2020-10-15 ENCOUNTER — TELEPHONE (OUTPATIENT)
Dept: INTERNAL MEDICINE | Facility: CLINIC | Age: 85
End: 2020-10-15

## 2020-10-15 NOTE — TELEPHONE ENCOUNTER
Nurse said the pt stated he no longer uses a Trilogy inhaler and that he takes Methylprednisone 4mg 1 daily?  The last time Dr Mccall Rx'd that RX was 8/2019 they will need to talk to the pt and ask who he gets that from .

## 2020-10-15 NOTE — TELEPHONE ENCOUNTER
DREW CALLED FROM [facility]    SHE SPOKE TO SOMEONE EARLIER REGARDING PATIENT'S CURRENT MEDICATIONS, BUT PATIENT SAYS HE IS ON ADDITIONAL MEDICATIONS    PLEASE CALL DREW TO GO OVER THOSE ADDITIONAL MEDS     [phone]

## 2020-10-19 ENCOUNTER — HOSPITAL ENCOUNTER (EMERGENCY)
Facility: HOSPITAL | Age: 85
Discharge: NURSING, REHAB OR LONG-TERM CARE | End: 2020-10-19
Attending: EMERGENCY MEDICINE | Admitting: EMERGENCY MEDICINE

## 2020-10-19 ENCOUNTER — APPOINTMENT (OUTPATIENT)
Dept: CT IMAGING | Facility: HOSPITAL | Age: 85
End: 2020-10-19

## 2020-10-19 VITALS
HEIGHT: 65 IN | BODY MASS INDEX: 31.16 KG/M2 | RESPIRATION RATE: 15 BRPM | HEART RATE: 79 BPM | OXYGEN SATURATION: 98 % | WEIGHT: 187 LBS | SYSTOLIC BLOOD PRESSURE: 132 MMHG | TEMPERATURE: 99.1 F | DIASTOLIC BLOOD PRESSURE: 84 MMHG

## 2020-10-19 DIAGNOSIS — S09.90XA CLOSED HEAD INJURY, INITIAL ENCOUNTER: Primary | ICD-10-CM

## 2020-10-19 DIAGNOSIS — S00.01XA ABRASION OF SCALP, INITIAL ENCOUNTER: ICD-10-CM
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
## 2020-10-19 PROCEDURE — 70450 CT HEAD/BRAIN W/O DYE: CPT

## 2020-10-19 PROCEDURE — 90715 TDAP VACCINE 7 YRS/> IM: CPT | Performed by: EMERGENCY MEDICINE

## 2020-10-19 PROCEDURE — 90471 IMMUNIZATION ADMIN: CPT | Performed by: EMERGENCY MEDICINE

## 2020-10-19 PROCEDURE — 25010000002 TDAP 5-2.5-18.5 LF-MCG/0.5 SUSPENSION: Performed by: EMERGENCY MEDICINE

## 2020-10-19 PROCEDURE — 99283 EMERGENCY DEPT VISIT LOW MDM: CPT

## 2020-10-19 RX ADMIN — TETANUS TOXOID, REDUCED DIPHTHERIA TOXOID AND ACELLULAR PERTUSSIS VACCINE, ADSORBED 0.5 ML: 5; 2.5; 8; 8; 2.5 SUSPENSION INTRAMUSCULAR at 16:00

## 2020-10-19 NOTE — ED PROVIDER NOTES
EMERGENCY DEPARTMENT ENCOUNTER  Patient was placed in face mask in first look and the following protective measures were taken unless additional measures were taken and documented below in the ED course. Patient was wearing facemask when I entered the room and throughout our encounter. I wore full protective equipment throughout this patient encounter including a face mask, and gloves. Hand hygiene was performed before donning protective equipment and after removal when leaving the room.    Room Number:  36/36  Date of encounter:  10/19/2020  PCP: Panda Mccall Jr., MD    HPI:  Context: Jonathan Trejo is a 85 y.o. male who presents to the ED c/o chief complaint of his head injury.  Patient is from [facility].  Patient had unwitnessed fall.  Patient does not remember how the fall happened.  Patient did sustain a small abrasion on his head.  Patient denies any headache, no visual disturbances, no nausea vomiting.  Patient denies any neck pain, no radicular pain, no weakness or numbness.  Patient reports prior to fall he was at baseline without complaint.  Patient has no complaints at present.    MEDICAL HISTORY REVIEW  Reviewed in EPIC    PAST MEDICAL HISTORY  Active Ambulatory Problems     Diagnosis Date Noted   • Benign prostatic hyperplasia with urinary obstruction 11/22/2015   • Chronic renal impairment, stage 3 (moderate) 11/22/2015   • Depression 11/22/2015   • Gastroesophageal reflux disease with esophagitis 11/22/2015   • Hyperlipidemia 11/22/2015   • Nocturia 11/22/2015   • Obesity (BMI 30-39.9) 11/22/2015   • Osteopenia 11/22/2015   • Osteoporosis 11/22/2015   • Seborrhea 04/04/2016   • Abnormal EKG 07/19/2016   • Bradycardia 09/02/2016   • Degenerative disc disease, cervical 10/02/2016   • Typical atrial flutter (CMS/HCC) 04/06/2017   • Lumbar radiculopathy 06/06/2017   • 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   • S/P ablation of atrial flutter 09/19/2017   • S/P catheter  ablation of slow pathway 09/19/2017   • Cardiomyopathy (CMS/HCC) 09/19/2017   • GARCIA (dyspnea on exertion) 09/19/2017   • Urinary tract infection without hematuria 01/02/2018   • Right foot drop 01/03/2018   • Frequent falls 01/03/2018   • Metabolic encephalopathy 01/03/2018   • Acute renal failure (CMS/[facility]) 01/03/2018   • 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   • Moderate single current episode of major depressive disorder (CMS/[facility]) 03/12/2019   • Mass of left lower leg 06/13/2019   • Pulmonary emphysema (CMS/[facility]) 05/08/2020     Resolved Ambulatory Problems     Diagnosis Date Noted   • Pain of upper extremity 11/22/2015   • Colon polyp 11/22/2015   • Hearing loss 11/22/2015   • Obesity (BMI 30-39.9) 11/22/2015   • Acute on chronic systolic heart failure (CMS/[facility]) 09/02/2016     Past Medical History:   Diagnosis Date   • Abnormal electrocardiogram    • Arm pain    • Atrial fibrillation (CMS/[facility])    • Atrial flutter (CMS/[facility])    • BPH (benign prostatic hyperplasia)    • BPH associated with nocturia    • Chronic kidney disease    • Colon cancer (CMS/[facility])    • Dyspnea    • Episode of generalized weakness    • Esophagitis, reflux    • Fatigue    • Inguinal hernia    • Loss of hearing    • Obesity    • Osteoarthritis cervical spine    • Overweight    • Tobacco dependence in remission    • Urge incontinence of urine    • Vitamin D deficiency    • Wheezing        PAST SURGICAL HISTORY  Past Surgical History:   Procedure Laterality Date   • CARDIAC CATHETERIZATION N/A 7/27/2017    Procedure: Valve assessment;  Surgeon: Adonis Slois MD;  Location: [facility] INVASIVE LOCATION;  Service:    • CARDIAC CATHETERIZATION N/A 10/1/2018    Procedure: Left Heart Cath;  Surgeon: Bogdan Vargas MD;  Location: [facility] CATH INVASIVE LOCATION;  Service: Cardiology   • CARDIAC CATHETERIZATION N/A 10/1/2018    Procedure: Coronary angiography;  Surgeon: Bogdan Vargas MD;  Location: [facility] INVASIVE LOCATION;   Service: Cardiology   • CARDIAC CATHETERIZATION N/A 10/1/2018    Procedure: Left ventriculography;  Surgeon: Bogdan Vargas MD;  Location:  [facility] CATH INVASIVE LOCATION;  Service: Cardiology   • CARDIAC CATHETERIZATION N/A 10/1/2018    Procedure: Right Heart Cath;  Surgeon: Bogdan Vargas MD;  Location:  [facility] CATH INVASIVE LOCATION;  Service: Cardiology   • CARDIAC ELECTROPHYSIOLOGY PROCEDURE N/A 2017    Procedure: Ablation atrial flutter Will need to have 3 -4 weeks of therapeutic INR's ;  Surgeon: Adonis Solis MD;  Location:  [facility] CATH INVASIVE LOCATION;  Service:    • CARDIOVERSION     • CATARACT EXTRACTION W/ INTRAOCULAR LENS  IMPLANT, BILATERAL     • COLON RESECTION     • COLONOSCOPY      07/15 redo 5 years  [facility]   • KNEE SURGERY      benign tumor removed   • REFRACTIVE SURGERY     • REPAIR PERONEAL TENDONS ANKLE W/ FIBULAR OSTEOTOMY Right 2013    Dr. Banks       FAMILY HISTORY  Family History   Problem Relation Age of Onset   • Breast cancer Mother    • Heart disease Father    • Hypertension Father    • Hypertension Sister        SOCIAL HISTORY  Social History     Socioeconomic History   • Marital status:      Spouse name: Not on file   • Number of children: 2   • Years of education: COLLEGE GRADUATE   • Highest education level: Not on file   Occupational History   • Occupation: RETIRED   Tobacco Use   • Smoking status: Former Smoker     Packs/day: 0.50     Years: 55.00     Pack years: 27.50     Types: Cigarettes     Quit date: 2008     Years since quittin.4   • Smokeless tobacco: Never Used   • Tobacco comment: QUIT 10 YRS   Substance and Sexual Activity   • Alcohol use: Yes     Comment: SOCIALLY   • Drug use: No   • Sexual activity: Defer       ALLERGIES  Patient has no known allergies.    The patient's allergies have been reviewed    REVIEW OF SYSTEMS  All systems reviewed and negative except for those discussed in HPI.     PHYSICAL EXAM  I have reviewed the  triage vital signs and nursing notes.  ED Triage Vitals [10/19/20 1202]   Temp Heart Rate Resp BP SpO2   99.1 °F (37.3 °C) 85 14 145/77 97 %      Temp src Heart Rate Source Patient Position BP Location FiO2 (%)   Oral -- -- -- --     General: No acute distress  HENT: Small abrasion on right lateral occiput, no crepitus or deformity, no raccoon eyes, no kingsley sign, extraocular motion intact, visual acuity grossly normal,, PERRL, Nares patent  Eyes: no scleral icterus  Neck: trachea midline, no ROM limitations  CV: regular rhythm, regular rate  Respiratory: normal effort, CTAB  Abdomen: soft, nondistended, nontender to palpation, no rebound tenderness, no guarding or rigidity  : deferred  Musculoskeletal: no deformity  Neuro: Alert and oriented x2, extraocular motion intact, pupils are equal and round reactive to light, cranial nerves II through XII are grossly intact, normal speech, moves all extremities well, 5 out of 5 strength all 4 extremities, sensation intact light touch all 4 extremities, no ataxia.  Skin: warm, dry    LAB RESULTS  No results found for this or any previous visit (from the past 24 hour(s)).    I ordered the above labs and reviewed the results.    RADIOLOGY  No Radiology Exams Resulted Within Past 24 Hours    I ordered the above noted radiological studies. I reviewed the images and results. I agree with the radiologist interpretation.    PROCEDURES  Procedures    MEDICATIONS GIVEN IN ER  Medications   Tdap (BOOSTRIX) injection 0.5 mL (has no administration in time range)       PROGRESS, DATA ANALYSIS, CONSULTS, AND MEDICAL DECISION MAKING  A complete history and physical exam have been performed.  All available laboratory and imaging results have been reviewed by myself prior to disposition.    MDM  After the initial H&P, I discussed pertinent information from history and physical exam with patient/family.  Discussed differential diagnosis.  Discussed plan for ED evaluation/work-up/treatment.   All questions answered.  Patient/family is agreeable with plan.  ED Course as of Oct 19 1635   Mon Oct 19, 2020   1634 Phone call with radiologist.  I had previously reviewed the images. I discussed the patient, imaging, and their interpretation.  CT head neg for acute pathology. See dictated report for final interpretation.        [JG]   1635 The patient was reexamined.  They have had symptomatic improvement during their ED stay.  I discussed today's findings with the patient, explaining the pertinent positives and negatives from today's visit, and the plan of care.  Discussed plan for discharge as there is no emergent indication for admission.  Discussed limitation of the ED work-up and that this is to rule out life-threatening emergencies but that they could require further testing as determined by their primary care and or any referred specialist patient is agreeable and understands need for follow-up and repeat exam/testing.  Patient is aware that discharge does not mean there is nothing wrong, indicates no emergency is present, and that they must continue their care with their primary care physician and/or any referred specialist.  They were given appropriate follow-up with their primary care physician and/or specialist.  I had an extensive discussion on the expected clinical course and return precautions.  Patient understands to return to the emergency department for continuation, worsening, or new symptoms.  I answered any of the patient's questions. Patient was discharged home in a stable condition.        [JG]      ED Course User Index  [JG] Bandar Meyer MD       AS OF 16:35 EDT VITALS:    BP - 145/77  HR - 85  TEMP - 99.1 °F (37.3 °C) (Oral)  O2 SATS - 97%    DIAGNOSIS  Final diagnoses:   Closed head injury, initial encounter   Abrasion of scalp, initial encounter   Fall, initial encounter         DISPOSITION  DISCHARGE    Patient discharged in stable condition.    Reviewed implications of results,  diagnosis, meds, responsibility to follow up, warning signs and symptoms of possible worsening, potential complications and reasons to return to ER.    Patient/Family voiced understanding of above instructions.    Discussed plan for discharge, as there is no emergent indication for admission. Patient referred to primary care provider for BP management due to today's BP. Pt/family is agreeable and understands need for follow up and repeat testing.  Pt is aware that discharge does not mean that nothing is wrong but it indicates no emergency is present that requires admission and they must continue care with follow-up as given below or physician of their choice.     FOLLOW-UP  Panda Mccall Jr., MD  [address] [address]  [phone]    Schedule an appointment as soon as possible for a visit in 2 days  even if well         Medication List      No changes were made to your prescriptions during this visit.          Bandar Meyer MD  10/19/20 7600

## 2020-10-19 NOTE — ED NOTES
Patient given turkey box lunch and sprite.  CCP arranging transport back to [facility].       Carine Amaya, RN  10/19/20 [id number]

## 2020-10-20 ENCOUNTER — EPISODE CHANGES (OUTPATIENT)
Dept: CASE MANAGEMENT | Facility: OTHER | Age: 85
End: 2020-10-20

## 2020-11-02 ENCOUNTER — EPISODE CHANGES (OUTPATIENT)
Dept: CASE MANAGEMENT | Facility: OTHER | Age: 85
End: 2020-11-02

## 2020-11-02 ENCOUNTER — PATIENT OUTREACH (OUTPATIENT)
Dept: CASE MANAGEMENT | Facility: OTHER | Age: 85
End: 2020-11-02

## 2020-11-02 NOTE — OUTREACH NOTE
Care Coordination Note    In basket message to Dr. Cal Koch's practice Manager,  Informed of unsuccessful attempts to contact patient to offer ACM services.  Aware available to assist in the future as needed.  Informed related to ED visit s/p fall and sustained a  [facility], need for patient to follow-up with PCP per ED discharge instructions.      Timothy Ulloa RN  Ambulatory     11/2/2020, 14:20 EST

## 2020-11-03 ENCOUNTER — EPISODE CHANGES (OUTPATIENT)
Dept: CASE MANAGEMENT | Facility: OTHER | Age: 85
End: 2020-11-03

## 2020-12-08 ENCOUNTER — LAB (OUTPATIENT)
Dept: LAB | Facility: HOSPITAL | Age: 85
End: 2020-12-08

## 2020-12-08 DIAGNOSIS — Z01.818 OTHER SPECIFIED PRE-OPERATIVE EXAMINATION: ICD-10-CM

## 2020-12-10 ENCOUNTER — APPOINTMENT (OUTPATIENT)
Dept: RESPIRATORY THERAPY | Facility: HOSPITAL | Age: 85
End: 2020-12-10

## 2021-03-02 DIAGNOSIS — Z23 IMMUNIZATION DUE: ICD-10-CM

## 2021-06-08 ENCOUNTER — OFFICE VISIT (OUTPATIENT)
Dept: NEUROLOGY | Facility: CLINIC | Age: 86
End: 2021-06-08

## 2021-06-08 VITALS
DIASTOLIC BLOOD PRESSURE: 70 MMHG | SYSTOLIC BLOOD PRESSURE: 138 MMHG | HEART RATE: 85 BPM | BODY MASS INDEX: 32.15 KG/M2 | WEIGHT: 193 LBS | OXYGEN SATURATION: 98 % | HEIGHT: 65 IN

## 2021-06-08 DIAGNOSIS — R73.9 HYPERGLYCEMIA, UNSPECIFIED: ICD-10-CM

## 2021-06-08 DIAGNOSIS — G60.9 IDIOPATHIC PERIPHERAL NEUROPATHY: Primary | ICD-10-CM

## 2021-06-08 PROCEDURE — 99204 OFFICE O/P NEW MOD 45 MIN: CPT | Performed by: PSYCHIATRY & NEUROLOGY

## 2021-06-08 RX ORDER — MIRTAZAPINE 7.5 MG/1
TABLET, FILM COATED ORAL
COMMUNITY
Start: 2021-05-01 | End: 2023-03-27

## 2021-06-08 NOTE — PROGRESS NOTES
CC: Balance issues and extremity weakness    HPI:  Jonathan Trejo is a  86 y.o.  right-handed white male who was sent as a neurologic consultation by Coby Walton NP regarding balance issues and extremity weakness.  The patient is a resident of [facility].  When I asked him about the symptoms described in the request including balance issues, weakness and visual changes he basically denied all but on further questioning he did admit that he has to use a rolling walker because of predominantly right-sided hip pain which he has gotten 2 cortisone shots for in the past few months.  He states he is coming due for another shot since they can be given every 3 months.  He then admits to having some degree of back pain but he states it only occurs when he overdoes it with his back.  He then admits to some pain radiates into his right leg but tends to stop by the knee.  When asked about numbness and tingling in the feet he initially indicates no but then says the balls of his feet tingle some intermittently.  He describes nocturia and urinary frequency of 3 or 4 times per night along with some degree of urgency.  He denies that there is rocio incontinence but says he may lose a few drops of urine at times.  He indicates that weightbearing on his right leg does not immediately cause hip pain but after short while the hip begins to hurt.  He does indicate the right leg seems to be weaker than the left.    He denies history of significant head or spine trauma meningitis seizures stroke or syncope.  He denies a family history of stroke, brain tumor, brain hemorrhage or aneurysm of the brain artery.  He also indicates he had been sent to [facility] rehab about 6 or 8 months ago and seemed to make improvements.  There had been some question of possible Parkinson's disease but he was told by the therapist that she did not think he had it.  I asked about memory loss and he says no troubles with memory.  He continues to  drive and has had no moving violations or accidents.  He states there is some controversy with his daughters (or at least one of them) regarding his driving.    He has had a head CT 10/19/2020 showing atrophy.  I reviewed the films and agree there is mild atrophy.  The temporal tips of the lateral ventricles are a bit ballooned but there is also some greater than expected atrophy in the anterior temporal poles.  The cerebral aqueduct appears perhaps a little large.  Back in 2019 he had an MRI of the lumbar spine.  I reviewed the reports his films were not available.  Specifically he has 7 mm canal as L3/4 with severe right-sided foraminal stenosis which included a synovial cyst occupying some of that space.  There was moderate foraminal stenosis on the left.  There is lipomatosis at L5/S1 with moderate to severe neuroforaminal stenoses bilaterally.    Looking back several years ago he had a vitamin B12 level that was a little less than 300.  It appears from his medication record from [facility] he is on 1000 mcg of B12 daily.  I do not see other recent determinations.        Past Medical History:   Diagnosis Date   • Abnormal electrocardiogram    • Arm pain    • Atrial fibrillation (CMS/HCC)    • Atrial flutter (CMS/HCC)    • BPH (benign prostatic hyperplasia)    • BPH associated with nocturia    • Chronic kidney disease    • Colon cancer (CMS/HCC)     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   • Colon polyp 11/22/2015   • Depression    • Dyspnea    • Episode of generalized weakness     knees were weak - had to be helped by wife to sit down   • Esophagitis, reflux    • Fatigue    • Hyperlipidemia    • Inguinal hernia    • Loss of hearing    • Lumbar radiculopathy    • Obesity    • Osteoarthritis cervical spine     doc on [facility] 08/16   • Osteopenia    • Osteoporosis    • Overweight    • Tobacco dependence in remission    • Urge incontinence of urine    • Vitamin D deficiency    • Wheezing          Past Surgical  History:   Procedure Laterality Date   • CARDIAC CATHETERIZATION N/A 7/27/2017    Procedure: Valve assessment;  Surgeon: Adonis Solis MD;  Location:  [facility];  Service:    • CARDIAC CATHETERIZATION N/A 10/1/2018    Procedure: Left Heart Cath;  Surgeon: Bogdan Vargas MD;  Location:  [facility];  Service: Cardiology   • CARDIAC CATHETERIZATION N/A 10/1/2018    Procedure: Coronary angiography;  Surgeon: Bogdan Vargas MD;  Location:  [facility];  Service: Cardiology   • CARDIAC CATHETERIZATION N/A 10/1/2018    Procedure: Left ventriculography;  Surgeon: Bogdan Vargas MD;  Location:  [facility];  Service: Cardiology   • CARDIAC CATHETERIZATION N/A 10/1/2018    Procedure: Right Heart Cath;  Surgeon: Bogdan Vargas MD;  Location: [facility] C[facility];  Service: Cardiology   • CARDIAC ELECTROPHYSIOLOGY PROCEDURE N/A 7/27/2017    Procedure: Ablation atrial flutter Will need to have 3 -4 weeks of therapeutic INR's ;  Surgeon: Adonis Solis MD;  Location:  [facility];  Service:    • CARDIOVERSION     • CATARACT EXTRACTION W/ INTRAOCULAR LENS  IMPLANT, BILATERAL     • COLON RESECTION     • COLONOSCOPY      07/15 redo 5 years  [facility]   • KNEE SURGERY      benign tumor removed   • REFRACTIVE SURGERY  2007   • REPAIR PERONEAL TENDONS ANKLE W/ FIBULAR OSTEOTOMY Right 03/2013    Dr. Banks           Current Outpatient Medications:   •  aspirin 81 MG chewable tablet, Chew 81 mg Daily., Disp: , Rfl:   •  atorvastatin (LIPITOR) 10 MG tablet, TAKE 1 TABLET BY MOUTH DAILY., Disp: 90 tablet, Rfl: 1  •  Cholecalciferol (VITAMIN D3) 2000 UNITS tablet, Take 2,000 Units by mouth daily., Disp: , Rfl:   •  diclofenac (VOLTAREN) 1 % gel gel, Apply 4 g topically to the appropriate area as directed 3 (Three) Times a Day., Disp: 100 g, Rfl: 2  •  mirtazapine (REMERON) 7.5 MG tablet, , Disp: , Rfl:   •  omeprazole  (priLOSEC) 40 MG capsule, TAKE 1 CAPSULE BY MOUTH DAILY., Disp: 30 capsule, Rfl: 1  •  vitamin B-12 (CYANOCOBALAMIN) 1000 MCG tablet, Take 1 tablet by mouth Daily. For b12 def, Disp: 90 tablet, Rfl: 3  •  Flaxseed, Linseed, (FLAXSEED OIL) 1000 MG capsule, Take 1,000 mg by mouth Daily., Disp: , Rfl:   •  fludrocortisone 0.1 MG tablet, TAKE 1 TABLET BY MOUTH DAILY., Disp: 30 tablet, Rfl: 5  •  Fluticasone-Umeclidin-Vilant (TRELEGY ELLIPTA) 100-62.5-25 MCG/INH aerosol powder , Inhale 1 puff Daily., Disp: 1 each, Rfl: 5      Family History   Problem Relation Age of Onset   • Breast cancer Mother    • Heart disease Father    • Hypertension Father    • Hypertension Sister          Social History     Socioeconomic History   • Marital status:      Spouse name: Not on file   • Number of children: 2   • Years of education: COLLEGE GRADUATE   • Highest education level: Not on file   Tobacco Use   • Smoking status: Former Smoker     Packs/day: 0.50     Years: 55.00     Pack years: 27.50     Types: Cigarettes     Quit date: 2008     Years since quittin.1   • Smokeless tobacco: Never Used   • Tobacco comment: QUIT 10 YRS   Substance and Sexual Activity   • Alcohol use: Yes     Comment: SOCIALLY   • Drug use: No   • Sexual activity: Defer         No Known Allergies      Pain Scale: 3 or 4/10 predominantly on the right hip        ROS:  Review of Systems   Constitutional: Negative for activity change, appetite change and fatigue.   HENT: Negative for ear pain, facial swelling and hearing loss.    Eyes: Negative for pain, redness and itching.   Respiratory: Positive for shortness of breath. Negative for cough and choking.    Cardiovascular: Negative for chest pain and leg swelling.   Gastrointestinal: Negative for abdominal pain, nausea and vomiting.   Endocrine: Negative for cold intolerance and heat intolerance.   Musculoskeletal: Negative for arthralgias, back pain and joint swelling.   Skin: Negative for color  "change, pallor and rash.   Allergic/Immunologic: Negative for environmental allergies and food allergies.   Neurological: Negative for dizziness, tremors, seizures, syncope, facial asymmetry, speech difficulty, weakness, light-headedness, numbness and headaches.   Psychiatric/Behavioral: Negative for agitation, behavioral problems, confusion, decreased concentration, dysphoric mood, hallucinations, self-injury, sleep disturbance and suicidal ideas. The patient is not nervous/anxious and is not hyperactive.          I have reviewed and agree with the above ROS completed by the medical assistant.      Physical Exam:  Vitals:    06/08/21 1314   BP: 138/70   Pulse: 85   SpO2: 98%   Weight: 87.5 kg (193 lb)   Height: 165.1 cm (65\")     Orthostatic BP:    Body mass index is 32.12 kg/m².    Physical Exam  General: Obese white male no acute distress  HEENT: Normocephalic no evidence of trauma.  The left disc appears flat.  I am not able to see the right disc well due to myosis.  Throat negative  Neck: Supple.  No Lhermitte sign.  No thyromegaly.  No cervical bruits.  Heart: Regular rate and rhythm without murmurs  Extremities: No appreciable pedal edema.  Radial pulses were strong and simultaneous.      Neurological Exam:   Mental Status: Awake, alert, oriented to person, place and time.  Conversant without evidence of an affective disorder, thought disorder, delusions or hallucinations.  Attention span and concentration are normal.  HCF: No aphasia, apraxia or dysarthria.  Recent and remote memory appear intact.  Knowledge of recent events intact.  CN: I:   II: Visual fields full without left inattention   III, IV, VI: Eye movements intact without nystagmus or ptosis.  Pupils equal  round and reactive to light.   V,VII: Light touch and pinprick intact all 3 divisions of V.  Facial muscles symmetrical.   VIII: Hearing intact to finger rub   IX,X: Soft palate elevates symmetrically   XI: Sternomastoid and trapezius are " strong.   XII: Tongue midline without atrophy or fasciculations  Motor: Normal tone and bulk in the upper and lower extremities   Power testing: Mild weakness of the interossei and abductor pollicis brevis muscles bilaterally.  Minor weakness of the tibialis anterior and mild weakness of the toe extensors.  There is also mild weakness of the right quadriceps and weakness of the right psoas without much pain inhibition issue  Reflexes: Upper extremities: +1 diffusely        Lower extremities: +1 knee jerks trace ankle jerk        Toe signs: Downgoing bilaterally  Sensory: Light touch: Essentially normal in the upper extremities.  Some reduction along the right medial lower leg.        Pinprick: Sharp all over the upper extremities.  Minor patchy reductions in the feet        Vibration: Absent at the ankles and at the knees        Position: Intact at the great toes    Cerebellar: Finger-to-nose: Intact           Rapid movement: Intact           Heel-to-shin: Intact  Gait and Station: Casual walk is mildly antalgic.  It is mildly broad-based and he uses a rolling walker    Results:      Lab Results   Component Value Date    GLUCOSE 96 10/01/2018    BUN 19 07/25/2019    CREATININE 1.53 (H) 07/25/2019    EGFRIFNONA 44 (L) 07/25/2019    EGFRIFAFRI 53 (L) 07/25/2019    BCR 12.4 07/25/2019    CO2 23.7 07/25/2019    CALCIUM 9.2 07/25/2019    PROTENTOTREF 6.8 07/25/2019    ALBUMIN 3.80 07/25/2019    LABIL2 1.3 07/25/2019    AST 15 07/25/2019    ALT 16 07/25/2019       Lab Results   Component Value Date    WBC 9.09 07/25/2019    HGB 14.3 07/25/2019    HCT 45.9 07/25/2019    .2 (H) 07/25/2019     07/25/2019         .No results found for: RPR      Lab Results   Component Value Date    TSH 0.721 06/05/2018         Lab Results   Component Value Date    RLIIFNLS85 268 06/05/2018         Lab Results   Component Value Date    FOLATE 11.29 06/05/2018         No results found for: HGBA1C      Lab Results   Component Value  Date    GLUCOSE 96 10/01/2018    BUN 19 07/25/2019    CREATININE 1.53 (H) 07/25/2019    EGFRIFNONA 44 (L) 07/25/2019    EGFRIFAFRI 53 (L) 07/25/2019    BCR 12.4 07/25/2019    K 4.6 07/25/2019    CO2 23.7 07/25/2019    CALCIUM 9.2 07/25/2019    PROTENTOTREF 6.8 07/25/2019    ALBUMIN 3.80 07/25/2019    LABIL2 1.3 07/25/2019    AST 15 07/25/2019    ALT 16 07/25/2019         Lab Results   Component Value Date    WBC 9.09 07/25/2019    HGB 14.3 07/25/2019    HCT 45.9 07/25/2019    .2 (H) 07/25/2019     07/25/2019       CT and MRIs were reviewed as above      Assessment:   1.  Peripheral neuropathy, idiopathic, moderate severity  2.  Spinal stenosis L3/4 worse on the right with synovial cyst likely causing an L4 radiculopathy  3.  Right hip pain  4.  Cerebral atrophy with some predilection towards the anterior temporal lobes and anterior temporal atrophy-clinical pattern is not typical of NPH and the remaining ventricles do not appear significantly enlarged.        Plan:  1.  I had a lengthy discussion with the patient.  He agrees that he is not a candidate for a lumbar disc operation given his age.  Further work-up of this is deferred  2.  Labs for treatable causes of neuropathy including a sed rate, RPR, B12 and folate, thyroid functions, SPE/IEP, cryoglobulins, heavy metal screen and copper level.  Hemoglobin A1c.  We discussed an EMG but I doubt it will change treatment at this point  3.  Follow-up with WILDA Ortiz in about a month.  4.  He will follow up with orthopedics regarding his right hip pain.            Time: 45 minutes              Dictated utilizing Dragon dictation.

## 2021-07-08 ENCOUNTER — LAB (OUTPATIENT)
Dept: LAB | Facility: HOSPITAL | Age: 86
End: 2021-07-08

## 2021-07-08 ENCOUNTER — OFFICE VISIT (OUTPATIENT)
Dept: NEUROLOGY | Facility: CLINIC | Age: 86
End: 2021-07-08

## 2021-07-08 VITALS
DIASTOLIC BLOOD PRESSURE: 70 MMHG | SYSTOLIC BLOOD PRESSURE: 130 MMHG | WEIGHT: 192 LBS | BODY MASS INDEX: 31.99 KG/M2 | HEIGHT: 65 IN

## 2021-07-08 DIAGNOSIS — M48.062 SPINAL STENOSIS OF LUMBAR REGION WITH NEUROGENIC CLAUDICATION: ICD-10-CM

## 2021-07-08 DIAGNOSIS — G60.9 IDIOPATHIC PERIPHERAL NEUROPATHY: Primary | ICD-10-CM

## 2021-07-08 DIAGNOSIS — G60.9 IDIOPATHIC PERIPHERAL NEUROPATHY: ICD-10-CM

## 2021-07-08 DIAGNOSIS — M54.16 LUMBAR RADICULOPATHY: ICD-10-CM

## 2021-07-08 LAB
ALBUMIN SERPL-MCNC: 4.1 G/DL (ref 3.5–5.2)
ALBUMIN/GLOB SERPL: 1.4 G/DL
ALP SERPL-CCNC: 97 U/L (ref 39–117)
ALT SERPL W P-5'-P-CCNC: 15 U/L (ref 1–41)
ANION GAP SERPL CALCULATED.3IONS-SCNC: 9.6 MMOL/L (ref 5–15)
AST SERPL-CCNC: 16 U/L (ref 1–40)
BILIRUB SERPL-MCNC: 0.9 MG/DL (ref 0–1.2)
BUN SERPL-MCNC: 22 MG/DL (ref 8–23)
BUN/CREAT SERPL: 15.3 (ref 7–25)
CALCIUM SPEC-SCNC: 9.3 MG/DL (ref 8.6–10.5)
CHLORIDE SERPL-SCNC: 106 MMOL/L (ref 98–107)
CO2 SERPL-SCNC: 24.4 MMOL/L (ref 22–29)
CREAT SERPL-MCNC: 1.44 MG/DL (ref 0.76–1.27)
ERYTHROCYTE [SEDIMENTATION RATE] IN BLOOD: 16 MM/HR (ref 0–20)
FOLATE SERPL-MCNC: 17.7 NG/ML (ref 4.78–24.2)
GFR SERPL CREATININE-BSD FRML MDRD: 47 ML/MIN/1.73
GLOBULIN UR ELPH-MCNC: 2.9 GM/DL
GLUCOSE SERPL-MCNC: 95 MG/DL (ref 65–99)
HBA1C MFR BLD: 5.61 % (ref 4.8–5.6)
POTASSIUM SERPL-SCNC: 4.6 MMOL/L (ref 3.5–5.2)
PROT SERPL-MCNC: 7 G/DL (ref 6–8.5)
RPR SER QL: NORMAL
SODIUM SERPL-SCNC: 140 MMOL/L (ref 136–145)
T4 FREE SERPL-MCNC: 1.25 NG/DL (ref 0.93–1.7)
TSH SERPL DL<=0.05 MIU/L-ACNC: 1.42 UIU/ML (ref 0.27–4.2)
VIT B12 BLD-MCNC: 1366 PG/ML (ref 211–946)

## 2021-07-08 PROCEDURE — 36415 COLL VENOUS BLD VENIPUNCTURE: CPT | Performed by: PSYCHIATRY & NEUROLOGY

## 2021-07-08 PROCEDURE — 82175 ASSAY OF ARSENIC: CPT | Performed by: PSYCHIATRY & NEUROLOGY

## 2021-07-08 PROCEDURE — 80053 COMPREHEN METABOLIC PANEL: CPT | Performed by: PSYCHIATRY & NEUROLOGY

## 2021-07-08 PROCEDURE — 99214 OFFICE O/P EST MOD 30 MIN: CPT | Performed by: PHYSICIAN ASSISTANT

## 2021-07-08 PROCEDURE — 83036 HEMOGLOBIN GLYCOSYLATED A1C: CPT | Performed by: PSYCHIATRY & NEUROLOGY

## 2021-07-08 PROCEDURE — 82746 ASSAY OF FOLIC ACID SERUM: CPT | Performed by: PSYCHIATRY & NEUROLOGY

## 2021-07-08 PROCEDURE — 85652 RBC SED RATE AUTOMATED: CPT | Performed by: PSYCHIATRY & NEUROLOGY

## 2021-07-08 PROCEDURE — 82525 ASSAY OF COPPER: CPT

## 2021-07-08 PROCEDURE — 86592 SYPHILIS TEST NON-TREP QUAL: CPT | Performed by: PSYCHIATRY & NEUROLOGY

## 2021-07-08 PROCEDURE — 82595 ASSAY OF CRYOGLOBULIN: CPT

## 2021-07-08 PROCEDURE — 86334 IMMUNOFIX E-PHORESIS SERUM: CPT

## 2021-07-08 PROCEDURE — 84165 PROTEIN E-PHORESIS SERUM: CPT | Performed by: PSYCHIATRY & NEUROLOGY

## 2021-07-08 PROCEDURE — 83825 ASSAY OF MERCURY: CPT | Performed by: PSYCHIATRY & NEUROLOGY

## 2021-07-08 PROCEDURE — 82784 ASSAY IGA/IGD/IGG/IGM EACH: CPT

## 2021-07-08 PROCEDURE — 84439 ASSAY OF FREE THYROXINE: CPT | Performed by: PSYCHIATRY & NEUROLOGY

## 2021-07-08 PROCEDURE — 83655 ASSAY OF LEAD: CPT | Performed by: PSYCHIATRY & NEUROLOGY

## 2021-07-08 PROCEDURE — 84443 ASSAY THYROID STIM HORMONE: CPT | Performed by: PSYCHIATRY & NEUROLOGY

## 2021-07-08 PROCEDURE — 82607 VITAMIN B-12: CPT | Performed by: PSYCHIATRY & NEUROLOGY

## 2021-07-08 RX ORDER — MIRABEGRON 25 MG/1
TABLET, FILM COATED, EXTENDED RELEASE ORAL
COMMUNITY
Start: 2021-06-17 | End: 2023-03-27

## 2021-07-08 RX ORDER — GABAPENTIN 300 MG/1
CAPSULE ORAL
Qty: 60 CAPSULE | Refills: 0 | Status: SHIPPED | OUTPATIENT
Start: 2021-07-08 | End: 2021-07-12 | Stop reason: SDUPTHER

## 2021-07-08 NOTE — PROGRESS NOTES
"CC: Follow-up peripheral neuropathy, lumbar spinal stenosis    HPI:  Jonathan Trejo is a  86 y.o.  right-handed male who I am seeing in follow-up today regarding some peripheral neuropathy as well as lumbar stenosis with neurogenic claudication.  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  Patient was last seen in our office on 6/8/2021 per Dr. Campa, a summary of his condition is taken from previous note with amendments and additions as needed:    Patient was initially referred to us for some balance issues, gait dysfunction and numbness and tingling symptoms involving his feet.  Patient ambulates with rolling walker because of predominantly right-sided posterior hip/buttock pain.  He currently receives steroid injections in his right hip about every 3 months.   He states the pain in his right hip/buttock becomes especially noticeable with prolonged ambulation, he also states associated with this pain he has some radiation down his posterior right leg toward his knee.  Additionally, he does indicate the right leg seems to be weaker than the left. He states he has some low back pain but states this is only occasional when he has \"overdone it with his back\".   He describes the numbness/tingling sensation in the feet is quite mild and only very apparent intermittently.  He does endorse an abnormal sensation in his feet as though he is walking on a brush or Brillo pad. He describes nocturia and urinary frequency of 3 or 4 times per night along with some degree of urgency.  He denies that there is rocio incontinence but says he may lose a few drops of urine at times.       He denies history of significant head or spine trauma, no meningitis, seizures, stroke or syncope.  He denies a family history of stroke, brain tumor, brain hemorrhage or aneurysm of the brain " artery.  He also indicates he had been sent to [facility] rehab about 6 or 8 months ago and seemed to make improvements.  There had been some question of possible Parkinson's disease but he was told by the therapist that she did not think he had it.  I asked about memory loss and he says no troubles with memory.  He continues to drive and has had no moving violations or accidents.  He states there is some controversy with his daughters (or at least one of them) regarding his driving.     He has had a head CT 10/19/2020 showing atrophy.  I reviewed the films and agree there is mild atrophy.  The temporal tips of the lateral ventricles are a bit ballooned but there is also some greater than expected atrophy in the anterior temporal poles.  The cerebral aqueduct appears perhaps a little large. Back in 2019 he had an MRI of the lumbar spine.  I reviewed the reports his films were not available.  Specifically he has 7 mm canal as L3/4 with severe right-sided foraminal stenosis which included a synovial cyst occupying some of that space.  There was moderate foraminal stenosis on the left.  There is lipomatosis at L5/S1 with moderate to severe neuroforaminal stenoses bilaterally.     Looking back several years ago he had a vitamin B12 level that was a little less than 300.  It appears from his medication record he is on 1000 mcg of B12 daily.  I do not see other recent determinations. The patient is a resident of [facility].    Interim history  At his last appointment patient was ordered to have labs done for treatable causes, he had these today and as such several are still pending.  Studies did show hemoglobin A1c normal at 5.6, vitamin B12 and folate 1366 and 17.7 respectively, TSH and free T4 1.4 and 1.25 respectively, sed rate normal at 16.  CMP did show some impaired renal function, patient does have known CKD, his creatinine today was 1.44 and GFR 47, BUN 22.  Patient reports that symptoms are more or  less unchanged, he is still bothered mostly by the pain in his posterior right hip which does radiate down his right leg with prolonged ambulation.  He has some tingling symptoms in his bilateral feet but no symptoms in his hands.  Does report some imbalance but no recent falls.  Still ambulating with rolling walker.       Past Medical History:   Diagnosis Date   • Abnormal electrocardiogram    • Arm pain    • Atrial fibrillation (CMS/HCC)    • Atrial flutter (CMS/HCC)    • BPH (benign prostatic hyperplasia)    • BPH associated with nocturia    • Chronic kidney disease    • Colon cancer (CMS/HCC)     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   • Colon polyp 11/22/2015   • Depression    • Dyspnea    • Episode of generalized weakness     knees were weak - had to be helped by wife to sit down   • Esophagitis, reflux    • Fatigue    • Hyperlipidemia    • Inguinal hernia    • Loss of hearing    • Lumbar radiculopathy    • Obesity    • Osteoarthritis cervical spine     doc on [facility] 08/16   • Osteopenia    • Osteoporosis    • Overweight    • Tobacco dependence in remission    • Urge incontinence of urine    • Vitamin D deficiency    • Wheezing          Past Surgical History:   Procedure Laterality Date   • CARDIAC CATHETERIZATION N/A 7/27/2017    Procedure: Valve assessment;  Surgeon: Adonis Solis MD;  Location: [facility] INVASIVE LOCATION;  Service:    • CARDIAC CATHETERIZATION N/A 10/1/2018    Procedure: Left Heart Cath;  Surgeon: Bogdan Vargas MD;  Location: [facility] INVASIVE LOCATION;  Service: Cardiology   • CARDIAC CATHETERIZATION N/A 10/1/2018    Procedure: Coronary angiography;  Surgeon: Bogdan Vargas MD;  Location: [facility] CATH INVASIVE LOCATION;  Service: Cardiology   • CARDIAC CATHETERIZATION N/A 10/1/2018    Procedure: Left ventriculography;  Surgeon: Bogdan Vargas MD;  Location: [facility] INVASIVE LOCATION;  Service: Cardiology   • CARDIAC CATHETERIZATION N/A 10/1/2018    Procedure:  Right Heart Cath;  Surgeon: Bogdan Vargas MD;  Location:  [facility] CATH INVASIVE LOCATION;  Service: Cardiology   • CARDIAC ELECTROPHYSIOLOGY PROCEDURE N/A 7/27/2017    Procedure: Ablation atrial flutter Will need to have 3 -4 weeks of therapeutic INR's ;  Surgeon: Adonis Solis MD;  Location:  [facility] CATH INVASIVE LOCATION;  Service:    • CARDIOVERSION     • CATARACT EXTRACTION W/ INTRAOCULAR LENS  IMPLANT, BILATERAL     • COLON RESECTION     • COLONOSCOPY      07/15 redo 5 years  [facility]   • KNEE SURGERY      benign tumor removed   • REFRACTIVE SURGERY  2007   • REPAIR PERONEAL TENDONS ANKLE W/ FIBULAR OSTEOTOMY Right 03/2013    Dr. Banks           Current Outpatient Medications:   •  aspirin 81 MG chewable tablet, Chew 81 mg Daily., Disp: , Rfl:   •  atorvastatin (LIPITOR) 10 MG tablet, TAKE 1 TABLET BY MOUTH DAILY., Disp: 90 tablet, Rfl: 1  •  Cholecalciferol (VITAMIN D3) 2000 UNITS tablet, Take 2,000 Units by mouth daily., Disp: , Rfl:   •  diclofenac (VOLTAREN) 1 % gel gel, Apply 4 g topically to the appropriate area as directed 3 (Three) Times a Day., Disp: 100 g, Rfl: 2  •  mirtazapine (REMERON) 7.5 MG tablet, , Disp: , Rfl:   •  Myrbetriq 25 MG tablet sustained-release 24 hour 24 hr tablet, , Disp: , Rfl:   •  omeprazole (priLOSEC) 40 MG capsule, TAKE 1 CAPSULE BY MOUTH DAILY., Disp: 30 capsule, Rfl: 1  •  vitamin B-12 (CYANOCOBALAMIN) 1000 MCG tablet, Take 1 tablet by mouth Daily. For b12 def, Disp: 90 tablet, Rfl: 3  •  Flaxseed, Linseed, (FLAXSEED OIL) 1000 MG capsule, Take 1,000 mg by mouth Daily., Disp: , Rfl:   •  fludrocortisone 0.1 MG tablet, TAKE 1 TABLET BY MOUTH DAILY., Disp: 30 tablet, Rfl: 5  •  Fluticasone-Umeclidin-Vilant (TRELEGY ELLIPTA) 100-62.5-25 MCG/INH aerosol powder , Inhale 1 puff Daily., Disp: 1 each, Rfl: 5  •  gabapentin (NEURONTIN) 300 MG capsule, Take 1 tab PO qd x 1 week, then take 1 tab PO bid, Disp: 60 capsule, Rfl: 0      Family History   Problem Relation Age of Onset   •  "Breast cancer Mother    • Heart disease Father    • Hypertension Father    • Hypertension Sister          Social History     Socioeconomic History   • Marital status:      Spouse name: Not on file   • Number of children: 2   • Years of education: COLLEGE GRADUATE   • Highest education level: Not on file   Tobacco Use   • Smoking status: Former Smoker     Packs/day: 0.50     Years: 55.00     Pack years: 27.50     Types: Cigarettes     Quit date: 2008     Years since quittin.1   • Smokeless tobacco: Never Used   • Tobacco comment: QUIT 10 YRS   Substance and Sexual Activity   • Alcohol use: Yes     Comment: SOCIALLY   • Drug use: No   • Sexual activity: Defer         No Known Allergies      Pain Scale: 3/10        ROS:  Review of Systems   Constitutional: Negative for activity change, appetite change and fatigue.   Eyes: Negative for pain, redness and itching.   Respiratory: Positive for shortness of breath. Negative for cough and choking.    Neurological: Positive for numbness. Negative for dizziness, tremors, seizures, syncope, facial asymmetry, speech difficulty, weakness, light-headedness and headaches.   Psychiatric/Behavioral: Negative for agitation, behavioral problems, confusion, decreased concentration, dysphoric mood, hallucinations, self-injury, sleep disturbance and suicidal ideas. The patient is not nervous/anxious and is not hyperactive.        I reviewed the above ROS put in by medical assistant and am in agreement    Physical Exam:  Vitals:    21 0946   BP: 130/70   Weight: 87.1 kg (192 lb)   Height: 165.1 cm (65\")         Body mass index is 31.95 kg/m².    Physical Exam  General: Obese white male no acute distress  HEENT: Normocephalic, atraumatic  Neck: Supple.    Heart: Regular rate and rhythm without murmurs  Extremities: No pedal edema.          Neurological Exam:   Mental Status: Awake, alert, oriented to person, place and time.  Conversant without evidence of an affective " disorder, thought disorder, delusions or hallucinations.  Attention span and concentration are normal.  HCF: No aphasia, apraxia or dysarthria.  Recent and remote memory appear intact.  Knowledge of recent events intact.  CN:      I:              II: Visual fields full without left inattention              III, IV, VI: Eye movements intact without nystagmus or ptosis.  Pupils equal  round and reactive to light.              V,VII: Light touch and pinprick intact all 3 divisions of V.  Facial muscles symmetrical.              VIII: Hearing intact to finger rub              IX,X: Soft palate elevates symmetrically              XI: Sternomastoid and trapezius are strong.              XII: Tongue midline without atrophy or fasciculations  Motor: Normal tone and bulk in the upper and lower extremities              Power testing:  No proximal muscle weakness in upper extremities.  There is mild weakness of the right quadriceps and weakness of the right psoas   Reflexes: Upper extremities: +1 diffusely                   Lower extremities: +1 diffusely        Sensory: Light touch:                    Pinprick:      Cerebellar: Finger-to-nose: Intact                      Rapid movement: Intact               Gait and Station: Casual walk is mildly antalgic.  It is mildly broad-based and he uses a rolling walker  Results:      Lab Results   Component Value Date    GLUCOSE 95 07/08/2021    BUN 22 07/08/2021    CREATININE 1.44 (H) 07/08/2021    EGFRIFNONA 47 (L) 07/08/2021    EGFRIFAFRI 53 (L) 07/25/2019    BCR 15.3 07/08/2021    CO2 24.4 07/08/2021    CALCIUM 9.3 07/08/2021    PROTENTOTREF 6.8 07/25/2019    ALBUMIN 4.10 07/08/2021    LABIL2 1.3 07/25/2019    AST 16 07/08/2021    ALT 15 07/08/2021       Lab Results   Component Value Date    WBC 9.09 07/25/2019    HGB 14.3 07/25/2019    HCT 45.9 07/25/2019    .2 (H) 07/25/2019     07/25/2019         .No results found for: RPR      Lab Results   Component Value Date     TSH 1.420 07/08/2021         Lab Results   Component Value Date    CKGWHVVF60 1,366 (H) 07/08/2021         Lab Results   Component Value Date    FOLATE 17.70 07/08/2021         Lab Results   Component Value Date    HGBA1C 5.61 (H) 07/08/2021         Lab Results   Component Value Date    GLUCOSE 95 07/08/2021    BUN 22 07/08/2021    CREATININE 1.44 (H) 07/08/2021    EGFRIFNONA 47 (L) 07/08/2021    EGFRIFAFRI 53 (L) 07/25/2019    BCR 15.3 07/08/2021    K 4.6 07/08/2021    CO2 24.4 07/08/2021    CALCIUM 9.3 07/08/2021    PROTENTOTREF 6.8 07/25/2019    ALBUMIN 4.10 07/08/2021    LABIL2 1.3 07/25/2019    AST 16 07/08/2021    ALT 15 07/08/2021         Lab Results   Component Value Date    WBC 9.09 07/25/2019    HGB 14.3 07/25/2019    HCT 45.9 07/25/2019    .2 (H) 07/25/2019     07/25/2019             Assessment:   1.  Peripheral neuropathy, likely idiopathic in etiology  2.  Spinal stenosis L3/4 worse on the right with synovial cyst likely causing an L4 radiculopathy  3.  Right hip pain    Plan:  1.  In terms of patient's peripheral neuropathy, it seems though he does not have much painful symptoms.  Has not yet completed all labs for treatable causes of neuropathy, we will call him with results once they are available next week.  -As I suspect his neuropathy is idiopathic explained that usually treatment is supportive/symptomatic.  We will try him on gabapentin 300 mg once daily times a week then may advance to twice daily.  Note the patient does have some renal insufficiency secondary to his chronic kidney disease.  2.  Hopefully the gabapentin will help with the pain both from his peripheral neuropathy and also from his spinal stenosis.  If not we will consider referral to pain management for further eval and treatment  3.  Continue to follow-up with Ortho regarding his right hip pain, which is multifactorial  4.  Plan for an office follow-up in about 3 months        Time 30  minutes              Dictated utilizing Dragon dictation.

## 2021-07-09 ENCOUNTER — TELEPHONE (OUTPATIENT)
Dept: NEUROLOGY | Facility: CLINIC | Age: 86
End: 2021-07-09

## 2021-07-09 LAB
ALBUMIN SERPL ELPH-MCNC: 3.9 G/DL (ref 2.9–4.4)
ALBUMIN/GLOB SERPL: 1.4 {RATIO} (ref 0.7–1.7)
ALPHA1 GLOB SERPL ELPH-MCNC: 0.2 G/DL (ref 0–0.4)
ALPHA2 GLOB SERPL ELPH-MCNC: 0.8 G/DL (ref 0.4–1)
B-GLOBULIN SERPL ELPH-MCNC: 0.9 G/DL (ref 0.7–1.3)
GAMMA GLOB SERPL ELPH-MCNC: 0.9 G/DL (ref 0.4–1.8)
GLOBULIN SER CALC-MCNC: 2.8 G/DL (ref 2.2–3.9)
IGA SERPL-MCNC: 147 MG/DL (ref 61–437)
IGG SERPL-MCNC: 963 MG/DL (ref 603–1613)
IGM SERPL-MCNC: 78 MG/DL (ref 15–143)
LABORATORY COMMENT REPORT: ABNORMAL
M PROTEIN SERPL ELPH-MCNC: 0.3 G/DL
PROT PATTERN SERPL ELPH-IMP: ABNORMAL
PROT PATTERN SERPL IFE-IMP: ABNORMAL
PROT SERPL-MCNC: 6.7 G/DL (ref 6–8.5)

## 2021-07-09 NOTE — TELEPHONE ENCOUNTER
Caller: NORMA     Relationship: Self    Best call back number: [phone]    What medications are you currently taking:   Current Outpatient Medications on File Prior to Visit   Medication Sig Dispense Refill   • aspirin 81 MG chewable tablet Chew 81 mg Daily.     • atorvastatin (LIPITOR) 10 MG tablet TAKE 1 TABLET BY MOUTH DAILY. 90 tablet 1   • Cholecalciferol (VITAMIN D3) 2000 UNITS tablet Take 2,000 Units by mouth daily.     • diclofenac (VOLTAREN) 1 % gel gel Apply 4 g topically to the appropriate area as directed 3 (Three) Times a Day. 100 g 2   • Flaxseed, Linseed, (FLAXSEED OIL) 1000 MG capsule Take 1,000 mg by mouth Daily.     • fludrocortisone 0.1 MG tablet TAKE 1 TABLET BY MOUTH DAILY. 30 tablet 5   • Fluticasone-Umeclidin-Vilant (TRELEGY ELLIPTA) 100-62.5-25 MCG/INH aerosol powder  Inhale 1 puff Daily. 1 each 5   • gabapentin (NEURONTIN) 300 MG capsule Take 1 tab PO qd x 1 week, then take 1 tab PO bid 60 capsule 0   • mirtazapine (REMERON) 7.5 MG tablet      • Myrbetriq 25 MG tablet sustained-release 24 hour 24 hr tablet      • omeprazole (priLOSEC) 40 MG capsule TAKE 1 CAPSULE BY MOUTH DAILY. 30 capsule 1   • vitamin B-12 (CYANOCOBALAMIN) 1000 MCG tablet Take 1 tablet by mouth Daily. For b12 def 90 tablet 3     No current facility-administered medications on file prior to visit.          NORMA FROM [facility] CALLED REGARDING JOSE'S  RX . YOU WANTED TO START HIM ON GABAPENTIN BUT DID NOT  WRITE A SCRIPT. PLEASE SEND TO PHARMACY LISTED BELOW.     [facility]   [address] , [facility] [place] [address] [phone] PHONE NUMBER

## 2021-07-10 LAB — COPPER SERPL-MCNC: 102 UG/DL (ref 69–132)

## 2021-07-12 DIAGNOSIS — G60.9 IDIOPATHIC PERIPHERAL NEUROPATHY: ICD-10-CM

## 2021-07-12 LAB — CRYOGLOB SER QL 1D COLD INC: NORMAL

## 2021-07-12 RX ORDER — GABAPENTIN 300 MG/1
CAPSULE ORAL
Qty: 60 CAPSULE | Refills: 0 | Status: SHIPPED | OUTPATIENT
Start: 2021-07-12 | End: 2021-09-30

## 2021-07-13 LAB
ARSENIC BLD-MCNC: 7 UG/L (ref 2–23)
LEAD BLDV-MCNC: 2 UG/DL (ref 0–4)
MERCURY BLD-MCNC: <1 UG/L (ref 0–14.9)

## 2021-07-20 ENCOUNTER — TELEPHONE (OUTPATIENT)
Dept: NEUROLOGY | Facility: CLINIC | Age: 86
End: 2021-07-20

## 2021-07-20 DIAGNOSIS — D47.2 MGUS (MONOCLONAL GAMMOPATHY OF UNKNOWN SIGNIFICANCE): Primary | ICD-10-CM

## 2021-07-20 NOTE — TELEPHONE ENCOUNTER
Called patient today, discussed results of recent labs -hemoglobin A1c was very slightly elevated at 5.61. Discussed with patient that this is indicative of some prediabetes, which can be associated with peripheral neuropathy and he should work on improving diet so as not to progress further.  B12 level was quite elevated at 1300+, folate level was normal.  Instructed patient that should he wish to, he can discontinue his supplemental B12 vitamin.  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     For this reason we will additionally order a motor and sensory neuropathy panel.  Also send referral to heme-onc for further eval and treatment. - Chrissie Quinonez PA-C

## 2021-08-02 NOTE — PROGRESS NOTES
.     REASON FOR CONSULTATION:   Monoclonal gammopathy  Provide an opinion on any further workup or treatment                             REQUESTING PHYSICIAN: Dunia Quinonez PA-C  RECORDS OBTAINED:  Records of the patient's history including those obtained from the referring provider were reviewed and summarized in detail.    HISTORY OF PRESENT ILLNESS:  The patient is a 86 y.o. year old male  who is here for follow-up with the above-mentioned history.    Reviewed last neurology, PA note, 7/8/2021, Debby Quinonez.  This states the patient initially referred to them for balance issues, gait dysfunction, numbness and tingling in feet.  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  He also has some peripheral neuropathy.  They note some tingling bilateral feet but no symptoms in hands.  Lab work revealed IgG kappa monoclonal protein on S MADDY with M spike 0.3 on SPEP, 7/8/2021.  Cryoglobulin negative.  Serum copper normal.    Patient tells me he has not had any issues with numbness.  He does have tingling bilateral soles of feet but states this is mostly on the right side.  He states he also has pain down the right side of his leg which he states the neurologist told him is originating from his low back.  States he has no symptoms in the hands.  States the tingling that he has on bilateral soles of feet, right more so than left, is intermittent and not particularly bothersome.    Has had the tingling and unsteadiness on his feet requiring a walker for about 1 year.  He states it is not getting any worse and might be getting better.    Past Medical History:   Diagnosis Date   • Abnormal electrocardiogram    • Arm pain    • Atrial fibrillation (CMS/HCC)    • Atrial flutter (CMS/HCC)    • BPH (benign prostatic hyperplasia)    • BPH associated with nocturia    • Chronic kidney disease    • Colon cancer (CMS/HCC)     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   • Colon polyp 11/22/2015   • Depression     • Dyspnea    • Enlarged prostate without lower urinary tract symptoms (luts)    • Episode of generalized weakness     knees were weak - had to be helped by wife to sit down   • Esophagitis, reflux    • Fatigue    • Fracture of ankle     right   • GERD (gastroesophageal reflux disease)    • Hyperlipidemia    • Inguinal hernia    • Loss of hearing    • Lumbar radiculopathy    • Obesity    • Osteoarthritis cervical spine     doc on [facility] 08/16   • Osteopenia    • Osteoporosis    • Overweight    • Tobacco dependence in remission    • Urge incontinence of urine    • Vitamin D deficiency    • Wheezing      Past Surgical History:   Procedure Laterality Date   • CARDIAC CATHETERIZATION N/A 7/27/2017    Procedure: Valve assessment;  Surgeon: Adonis Solis MD;  Location: [facility] CATH INVASIVE LOCATION;  Service:    • CARDIAC CATHETERIZATION N/A 10/1/2018    Procedure: Left Heart Cath;  Surgeon: Bogdan Vargas MD;  Location: [facility] CATH INVASIVE LOCATION;  Service: Cardiology   • CARDIAC CATHETERIZATION N/A 10/1/2018    Procedure: Coronary angiography;  Surgeon: Bogdna Vargas MD;  Location: [facility] CATH INVASIVE LOCATION;  Service: Cardiology   • CARDIAC CATHETERIZATION N/A 10/1/2018    Procedure: Left ventriculography;  Surgeon: Bogdan Vargas MD;  Location: [facility] CATH INVASIVE LOCATION;  Service: Cardiology   • CARDIAC CATHETERIZATION N/A 10/1/2018    Procedure: Right Heart Cath;  Surgeon: Bogdan Vargas MD;  Location: [facility] CATH INVASIVE LOCATION;  Service: Cardiology   • CARDIAC ELECTROPHYSIOLOGY PROCEDURE N/A 7/27/2017    Procedure: Ablation atrial flutter Will need to have 3 -4 weeks of therapeutic INR's ;  Surgeon: Adonis Solis MD;  Location: [facility] CATH INVASIVE LOCATION;  Service:    • CARDIOVERSION     • CATARACT EXTRACTION W/ INTRAOCULAR LENS  IMPLANT, BILATERAL     • COLON RESECTION     • COLON SURGERY      polyp removed   • COLONOSCOPY      07/15 redo 5 years  [facility]   • KNEE SURGERY       benign tumor removed, age 6   • LASIK     • REFRACTIVE SURGERY     • REPAIR PERONEAL TENDONS ANKLE W/ FIBULAR OSTEOTOMY Right 2013    Dr. Banks       MEDICATIONS    Current Outpatient Medications:   •  aspirin 81 MG chewable tablet, Chew 81 mg Daily., Disp: , Rfl:   •  atorvastatin (LIPITOR) 10 MG tablet, TAKE 1 TABLET BY MOUTH DAILY., Disp: 90 tablet, Rfl: 1  •  Cholecalciferol (VITAMIN D3) 2000 UNITS tablet, Take 2,000 Units by mouth daily., Disp: , Rfl:   •  diclofenac (VOLTAREN) 1 % gel gel, Apply 4 g topically to the appropriate area as directed 3 (Three) Times a Day., Disp: 100 g, Rfl: 2  •  escitalopram (LEXAPRO) 5 MG tablet, 10 mg., Disp: , Rfl:   •  Flaxseed, Linseed, (FLAXSEED OIL) 1000 MG capsule, Take 1,000 mg by mouth Daily., Disp: , Rfl:   •  fludrocortisone 0.1 MG tablet, TAKE 1 TABLET BY MOUTH DAILY., Disp: 30 tablet, Rfl: 5  •  Fluticasone-Umeclidin-Vilant (TRELEGY ELLIPTA) 100-62.5-25 MCG/INH aerosol powder , Inhale 1 puff Daily., Disp: 1 each, Rfl: 5  •  gabapentin (NEURONTIN) 300 MG capsule, Take 1 tab PO qd x 1 week, then take 1 tab PO bid, Disp: 60 capsule, Rfl: 0  •  mirtazapine (REMERON) 7.5 MG tablet, , Disp: , Rfl:   •  Myrbetriq 25 MG tablet sustained-release 24 hour 24 hr tablet, , Disp: , Rfl:   •  omeprazole (priLOSEC) 40 MG capsule, TAKE 1 CAPSULE BY MOUTH DAILY., Disp: 30 capsule, Rfl: 1  •  vitamin B-12 (CYANOCOBALAMIN) 1000 MCG tablet, Take 1 tablet by mouth Daily. For b12 def, Disp: 90 tablet, Rfl: 3    ALLERGIES:   No Known Allergies    SOCIAL HISTORY:       Social History     Socioeconomic History   • Marital status:      Spouse name: Not on file   • Number of children: 2   • Years of education: COLLEGE GRADUATE   • Highest education level: Not on file   Tobacco Use   • Smoking status: Former Smoker     Packs/day: 0.50     Years: 55.00     Pack years: 27.50     Types: Cigarettes     Quit date: 2008     Years since quittin.2   • Smokeless tobacco:  "Never Used   • Tobacco comment: QUIT 10 YRS   Substance and Sexual Activity   • Alcohol use: Yes     Comment: SOCIALLY   • Drug use: No   • Sexual activity: Defer         FAMILY HISTORY:  Family History   Problem Relation Age of Onset   • Breast cancer Mother    • Heart disease Father    • Hypertension Father    • Hypertension Sister        REVIEW OF SYSTEMS:  Review of Systems   Constitutional: Negative for activity change.   HENT: Negative for nosebleeds and trouble swallowing.    Respiratory: Negative for shortness of breath and wheezing.    Cardiovascular: Negative for chest pain and palpitations.   Gastrointestinal: Negative for constipation, diarrhea and nausea.   Genitourinary: Negative for dysuria and hematuria.   Musculoskeletal: Negative for arthralgias and myalgias.   Neurological: Negative for seizures and syncope.   Hematological: Negative for adenopathy. Does not bruise/bleed easily.   Psychiatric/Behavioral: Negative for confusion.              Vitals:    08/04/21 1002   BP: 92/57   Pulse: 95   Resp: 17   Temp: 97.9 °F (36.6 °C)   TempSrc: Temporal   SpO2: 91%   Weight: 88.2 kg (194 lb 8 oz)   Height: 165.1 cm (65\")   PainSc: 0-No pain     Current Status 8/4/2021   ECOG score 0      PHYSICAL EXAM:        CONSTITUTIONAL:  Vital signs reviewed.  No distress, looks comfortable.  EYES:  Conjunctiva and lids unremarkable.  PERRLA  EARS,NOSE,MOUTH,THROAT:  Ears and nose appear unremarkable.  Lips, teeth, gums appear unremarkable.  RESPIRATORY:  Normal respiratory effort.  Lungs clear to auscultation bilaterally.  CARDIOVASCULAR:  Normal S1, S2.  No murmurs rubs or gallops.  No significant lower extremity edema.  GASTROINTESTINAL: Abdomen appears unremarkable.  Nontender.  No hepatomegaly.  No splenomegaly.  LYMPHATIC:  No cervical, supraclavicular, axillary lymphadenopathy.  SKIN:  Warm.  No rashes.  PSYCHIATRIC:  Normal judgment and insight.  Normal mood and affect.          RECENT LABS:        WBC   Date " Value Ref Range Status   08/04/2021 14.50 (H) 3.40 - 10.80 10*3/mm3 Final   07/25/2019 9.09 3.40 - 10.80 10*3/mm3 Final   10/01/2018 9.50 4.50 - 10.70 10*3/mm3 Final   07/27/2018 11.28 (H) 4.50 - 10.70 10*3/mm3 Final   06/05/2018 8.72 4.50 - 10.70 10*3/mm3 Final   01/04/2018 8.17 4.50 - 10.70 10*3/mm3 Final   01/03/2018 8.04 4.50 - 10.70 10*3/mm3 Final   01/02/2018 11.36 (H) 4.50 - 10.70 10*3/mm3 Final   09/26/2017 9.08 4.50 - 10.70 10*3/mm3 Final   07/26/2017 9.70 4.50 - 10.70 10*3/mm3 Final   08/15/2016 8.37 4.50 - 10.70 10*3/mm3 Final   03/31/2016 7.73 4.50 - 10.70 10*3/mm3 Final   03/26/2015 10.49 4.50 - 10.70 K/Cumm Final     Hemoglobin   Date Value Ref Range Status   08/04/2021 14.3 13.0 - 17.7 g/dL Final   07/25/2019 14.3 13.0 - 17.7 g/dL Final   10/01/2018 12.9 12.0 - 17.0 g/dL Final   10/01/2018 13.3 12.0 - 17.0 g/dL Final   10/01/2018 13.9 13.7 - 17.6 g/dL Final   07/27/2018 13.8 13.7 - 17.6 g/dL Final   06/05/2018 14.1 13.7 - 17.6 g/dL Final   01/04/2018 12.2 (L) 13.7 - 17.6 g/dL Final   01/03/2018 12.5 (L) 13.7 - 17.6 g/dL Final   01/02/2018 13.0 (L) 13.7 - 17.6 g/dL Final   09/26/2017 15.4 13.7 - 17.6 g/dL Final   07/26/2017 14.5 13.7 - 17.6 g/dL Final   08/15/2016 15.6 13.7 - 17.6 g/dL Final   03/31/2016 15.8 13.7 - 17.6 g/dL Final   03/26/2015 15.4 13.7 - 17.6 g/dL Final     Platelets   Date Value Ref Range Status   08/04/2021 317 140 - 450 10*3/mm3 Final   07/25/2019 318 140 - 450 10*3/mm3 Final   10/01/2018 323 140 - 500 10*3/mm3 Final   07/27/2018 309 140 - 500 10*3/mm3 Final   06/05/2018 344 140 - 500 10*3/mm3 Final   01/04/2018 325 140 - 500 10*3/mm3 Final   01/03/2018 321 140 - 500 10*3/mm3 Final   01/02/2018 390 140 - 500 10*3/mm3 Final   09/26/2017 288 140 - 500 10*3/mm3 Final   07/26/2017 289 140 - 500 10*3/mm3 Final   08/15/2016 328 140 - 500 10*3/mm3 Final   03/31/2016 355 140 - 500 10*3/mm3 Final   03/26/2015 343 140 - 500 K/Cumm Final       Assessment/Plan   Monoclonal gammopathy  -  MADDY,PE and FLC, Serum  - MADDY+Protein Electro, 24-Hr Urine - Urine, Clean Catch  - Kappa / Lambda Light Chains, Urine, 24 Hour - Urine, Clean Catch  - XR Bone Survey Complete  - CBC & Differential        Jonathan Trejo   *IgG kappa monoclonal gammopathy  · Initially found on work-up for neuropathy by neurology:  · 7/8/2021: M spike 0.3, IgG kappa.  · Cryoglobulin negative.  Serum copper normal.  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  However, needs work-up for multiple myeloma  · Offered bone marrow biopsy and fat pad biopsy looking for amyloidosis since patient might have some peripheral neuropathy as well.  Patient declines.  He denies numbness and states he just has some intermittent bilateral tingling of feet, right more so than left which is not particularly bothersome.  He states it is not worsening and might be getting better.    *Peripheral neuropathy  · Balance issues requiring a walker and bilateral tingling of feet, right more than left.  No numbness.  No hand symptoms.  Pain down right leg.  Patient states neurologist told him this originates in the back.  Symptoms present since around July 2020    *Leukocytosis  Predominance of mature segmented neutrophils.  Therefore, appears reactive.  Follow.    Plan  · Serum protein studies  · 24-hour urine studies  · Bone survey  · MD CBC in a few weeks to review the results of the above  · I told him if work-up appears unremarkable I will likely follow him every 6 months for the first 2 years (through August 2023), then annually    All problems new to me.

## 2021-08-03 ENCOUNTER — TELEPHONE (OUTPATIENT)
Dept: ONCOLOGY | Facility: OTHER | Age: 86
End: 2021-08-03

## 2021-08-03 NOTE — TELEPHONE ENCOUNTER
PATIENT HAS APPT. 8/4/21. [facility]IV[facility] CALLED TO CONFIRM APPOINTMENT. UNABLE TO DO TRAVEL SCREEN WITH PATIENT

## 2021-08-04 ENCOUNTER — LAB (OUTPATIENT)
Dept: LAB | Facility: HOSPITAL | Age: 86
End: 2021-08-04

## 2021-08-04 ENCOUNTER — CONSULT (OUTPATIENT)
Dept: ONCOLOGY | Facility: CLINIC | Age: 86
End: 2021-08-04

## 2021-08-04 VITALS
BODY MASS INDEX: 32.4 KG/M2 | SYSTOLIC BLOOD PRESSURE: 92 MMHG | DIASTOLIC BLOOD PRESSURE: 57 MMHG | WEIGHT: 194.5 LBS | HEIGHT: 65 IN | OXYGEN SATURATION: 91 % | HEART RATE: 95 BPM | TEMPERATURE: 97.9 F | RESPIRATION RATE: 17 BRPM

## 2021-08-04 DIAGNOSIS — D47.2 MONOCLONAL GAMMOPATHY OF UNKNOWN SIGNIFICANCE: Primary | ICD-10-CM

## 2021-08-04 DIAGNOSIS — D47.2 MONOCLONAL GAMMOPATHY: Primary | ICD-10-CM

## 2021-08-04 LAB
BASOPHILS # BLD AUTO: 0.05 10*3/MM3 (ref 0–0.2)
BASOPHILS NFR BLD AUTO: 0.3 % (ref 0–1.5)
DEPRECATED RDW RBC AUTO: 47.9 FL (ref 37–54)
EOSINOPHIL # BLD AUTO: 0 10*3/MM3 (ref 0–0.4)
EOSINOPHIL NFR BLD AUTO: 0 % (ref 0.3–6.2)
ERYTHROCYTE [DISTWIDTH] IN BLOOD BY AUTOMATED COUNT: 13.4 % (ref 12.3–15.4)
HCT VFR BLD AUTO: 44.6 % (ref 37.5–51)
HGB BLD-MCNC: 14.3 G/DL (ref 13–17.7)
IMM GRANULOCYTES # BLD AUTO: 0.16 10*3/MM3 (ref 0–0.05)
IMM GRANULOCYTES NFR BLD AUTO: 1.1 % (ref 0–0.5)
LYMPHOCYTES # BLD AUTO: 1.24 10*3/MM3 (ref 0.7–3.1)
LYMPHOCYTES NFR BLD AUTO: 8.6 % (ref 19.6–45.3)
MCH RBC QN AUTO: 31.4 PG (ref 26.6–33)
MCHC RBC AUTO-ENTMCNC: 32.1 G/DL (ref 31.5–35.7)
MCV RBC AUTO: 97.8 FL (ref 79–97)
MONOCYTES # BLD AUTO: 1.4 10*3/MM3 (ref 0.1–0.9)
MONOCYTES NFR BLD AUTO: 9.7 % (ref 5–12)
NEUTROPHILS NFR BLD AUTO: 11.65 10*3/MM3 (ref 1.7–7)
NEUTROPHILS NFR BLD AUTO: 80.3 % (ref 42.7–76)
NRBC BLD AUTO-RTO: 0 /100 WBC (ref 0–0.2)
PLATELET # BLD AUTO: 317 10*3/MM3 (ref 140–450)
PMV BLD AUTO: 11.5 FL (ref 6–12)
RBC # BLD AUTO: 4.56 10*6/MM3 (ref 4.14–5.8)
WBC # BLD AUTO: 14.5 10*3/MM3 (ref 3.4–10.8)

## 2021-08-04 PROCEDURE — 85025 COMPLETE CBC W/AUTO DIFF WBC: CPT

## 2021-08-04 PROCEDURE — 36415 COLL VENOUS BLD VENIPUNCTURE: CPT

## 2021-08-04 PROCEDURE — 99204 OFFICE O/P NEW MOD 45 MIN: CPT | Performed by: INTERNAL MEDICINE

## 2021-08-04 RX ORDER — ESCITALOPRAM OXALATE 5 MG/1
10 TABLET ORAL
COMMUNITY
Start: 2021-07-21 | End: 2021-09-23

## 2021-08-05 LAB
ALBUMIN SERPL ELPH-MCNC: 4 G/DL (ref 2.9–4.4)
ALBUMIN/GLOB SERPL: 1.3 {RATIO} (ref 0.7–1.7)
ALPHA1 GLOB SERPL ELPH-MCNC: 0.3 G/DL (ref 0–0.4)
ALPHA2 GLOB SERPL ELPH-MCNC: 0.9 G/DL (ref 0.4–1)
B-GLOBULIN SERPL ELPH-MCNC: 1 G/DL (ref 0.7–1.3)
GAMMA GLOB SERPL ELPH-MCNC: 1 G/DL (ref 0.4–1.8)
GLOBULIN SER-MCNC: 3.2 G/DL (ref 2.2–3.9)
IGA SERPL-MCNC: 153 MG/DL (ref 61–437)
IGG SERPL-MCNC: 998 MG/DL (ref 603–1613)
IGM SERPL-MCNC: 80 MG/DL (ref 15–143)
INTERPRETATION SERPL IEP-IMP: ABNORMAL
KAPPA LC FREE SER-MCNC: 25.4 MG/L (ref 3.3–19.4)
KAPPA LC FREE/LAMBDA FREE SER: 1.27 {RATIO} (ref 0.26–1.65)
LABORATORY COMMENT REPORT: ABNORMAL
LAMBDA LC FREE SERPL-MCNC: 20 MG/L (ref 5.7–26.3)
M PROTEIN SERPL ELPH-MCNC: 0.3 G/DL
PROT SERPL-MCNC: 7.2 G/DL (ref 6–8.5)

## 2021-08-09 ENCOUNTER — TELEPHONE (OUTPATIENT)
Dept: NEUROLOGY | Facility: CLINIC | Age: 86
End: 2021-08-09

## 2021-08-09 NOTE — TELEPHONE ENCOUNTER
JOSE BUCKNER   [phone]    THE PT WANTED TO LET DOMITILA KNOW THAT HE DOES NOT WANT TO SEE DR YOU. HE SAID HE IS NOT COMFORTABLE WITH THAT PROVIDER AND HE WANTS TO SEE SOMEONE WHERE HE FEELS COMFORTABLE. PLEASE GIVE HIM A CALL BACK TO DISCUSS THIS.

## 2021-08-10 NOTE — TELEPHONE ENCOUNTER
Spoke with patient, advised him I think he should complete the labs that Dr. Johnson ordered.  Then may see about scheduling follow-up with alternate provider in that group

## 2021-08-12 ENCOUNTER — TELEPHONE (OUTPATIENT)
Dept: ONCOLOGY | Facility: CLINIC | Age: 86
End: 2021-08-12

## 2021-08-12 NOTE — TELEPHONE ENCOUNTER
PATIENT CALLED COMPLETED MEDICAL COLLECTION OF 24 HOUR URINE SAMPLE, INSTRUCTIONS DID NOT SAY WHERE TO TAKE THE SAMPLE      CALLED AND SPOKE WITH MOE CHACON , AND SHE ADV PT CAN BRING TO  AT [facility] OR [place] AND JUST LET THEM KNOW HE IS BRINGING IN FOR THE LAB AND THEY WILL TAKE THE COLLECTION.      I ADVISED PATIENT OF THIS AND HE V/U. HE STATED HE WOULD BRING TO THE [facility] LOCATION .

## 2021-08-13 ENCOUNTER — TELEPHONE (OUTPATIENT)
Dept: ONCOLOGY | Facility: CLINIC | Age: 86
End: 2021-08-13

## 2021-08-13 NOTE — TELEPHONE ENCOUNTER
Caller: TREVON    Relationship: PATIENT    Best call back number: [phone]     What is the best time to reach you: ANYTIME    What was the call regarding: JOSE CALLED TO CANCEL HIS 09/08 APPT. HE SAYS WE WOULD NEED TO CONTACT HIS NEUROLOGIST, DR DOMITILA CAMPOS' OFFICE TO RESCHEDULE THE APPT. HE DID NOT WANT TO SCHEDULE IT HIMSELF.     Do you require a callback: YES

## 2021-08-13 NOTE — TELEPHONE ENCOUNTER
Caller: Jonathan Trejo    Relationship: Self    Best call back number: [phone]    Who are you requesting to speak with (clinical staff, provider,  specific staff member): DARLENE FINN    Do you know the name of the person who called: DARLENE FINN    What was the call regarding: JONATHAN WAS RETURNING A CALL TO DARLENE.    Do you require a callback: YES

## 2021-08-19 ENCOUNTER — TELEPHONE (OUTPATIENT)
Dept: ONCOLOGY | Facility: CLINIC | Age: 86
End: 2021-08-19

## 2021-08-19 NOTE — TELEPHONE ENCOUNTER
Caller: JOSE BUCKNER    Relationship to patient: SELF    Best call back number: [phone]    Chief complaint: PT STATES THAT HE CANNOT MAKE HIS APPT AS SCHEDULED. PT CAN ONLY GET TRANSPORTATION ON A Tuesday OR Thursday, AND NEEDS TO R/S FOR ONE OF THOSE DAYS. CANNOT R/S WITHIN HUB TIMEFRAME    Type of visit: LAB AND FOLLOW UP    Requested date: NEXT AVAILABLE Tuesday OR THURSDAY    If rescheduling, when is the original appointment: 09/13/21

## 2021-08-19 NOTE — TELEPHONE ENCOUNTER
Provider: SAMI GRAHAM  Caller: JOSE BUCKNER  Relationship to Patient: SELF      Reason for Call: PT CALLING JUST TO LET SAMI GRAHAM KNOW THAT HE HAS A MEETING WITH DR. WHEELER ON 9-9-21@8:50AM

## 2021-09-08 NOTE — PROGRESS NOTES
.     REASON FOR FOLLOW-UP:   Monoclonal gammopathy           HISTORY OF PRESENT ILLNESS:  The patient is a 86 y.o. year old male  who is here for follow-up with the above-mentioned history.        Record reviewed after previous assessment by [facility] colleague including review of last neurology, PA note, 7/8/2021, Debby Quinonez.  We have also reviewed his initial assessment by Dr. Teddy Campa with peripheral neuropathy and spinal stenosis documented.  Again his record states the patient initially was referred to them for balance issues, gait dysfunction, numbness and tingling in feet.  He, again, is felt to have lumbar stenosis with neurogenic claudication.  He also has some peripheral neuropathy.  They note some tingling bilateral feet but no symptoms in hands.  Lab work revealed IgG kappa monoclonal protein on S MADDY with M spike 0.3 on SPEP, 7/8/2021.  Cryoglobulin negative.  Serum copper normal.        We had seen him in consultation 8/4/2021 with patient indicating that he has not had any issues with numbness.  He does have tingling bilateral soles of feet but states this is mostly on the right side.  He states he also has pain down the right side of his leg which he states the neurologist told him is originating from his low back.  States he has no symptoms in the hands.  States the tingling that he has on bilateral soles of feet, right more so than left, is intermittent and not particularly bothersome.    Has had the tingling and unsteadiness on his feet requiring a walker for about 1 year.  He states it is not getting any worse and might be getting better.      We recommended a number of serum, urine and radiologic studies.  Repeat MADDY, PE free serum light chains included an M spike of 0.3 g/dL, immunofixation with IgG monoclonal protein and kappa light chain specificity, kappa lambda ratio 1.27.  He did not undergo the serum studies, bone survey nor assessment for potential amyloidosis.    He is now seen in  follow-up consultation and we have discussed the reasons he would be offered the above evaluation somewhat extensively.  He had been confused about the initial offering and uncertain as to whether he should proceed with any of his additional testing.  Past Medical History:   Diagnosis Date   • Abnormal electrocardiogram    • Arm pain    • Atrial fibrillation (CMS/HCC)    • Atrial flutter (CMS/HCC)    • BPH (benign prostatic hyperplasia)    • BPH associated with nocturia    • Chronic kidney disease    • Colon cancer (CMS/HCC)     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   • Colon polyp 11/22/2015   • Depression    • Dyspnea    • Enlarged prostate without lower urinary tract symptoms (luts)    • Episode of generalized weakness     knees were weak - had to be helped by wife to sit down   • Esophagitis, reflux    • Fatigue    • Fracture of ankle     right   • GERD (gastroesophageal reflux disease)    • Hyperlipidemia    • Inguinal hernia    • Loss of hearing    • Lumbar radiculopathy    • Obesity    • Osteoarthritis cervical spine     doc on [facility] 08/16   • Osteopenia    • Osteoporosis    • Overweight    • Tobacco dependence in remission    • Urge incontinence of urine    • Vitamin D deficiency    • Wheezing      Past Surgical History:   Procedure Laterality Date   • CARDIAC CATHETERIZATION N/A 7/27/2017    Procedure: Valve assessment;  Surgeon: Adonis Solis MD;  Location: [facility] INVASIVE LOCATION;  Service:    • CARDIAC CATHETERIZATION N/A 10/1/2018    Procedure: Left Heart Cath;  Surgeon: Bogdan Vargas MD;  Location: [facility] CATH INVASIVE LOCATION;  Service: Cardiology   • CARDIAC CATHETERIZATION N/A 10/1/2018    Procedure: Coronary angiography;  Surgeon: Bogdan Vargas MD;  Location: [facility] CATH INVASIVE LOCATION;  Service: Cardiology   • CARDIAC CATHETERIZATION N/A 10/1/2018    Procedure: Left ventriculography;  Surgeon: Bogdan Vargas MD;  Location: [facility] CATH INVASIVE LOCATION;  Service:  Cardiology   • CARDIAC CATHETERIZATION N/A 10/1/2018    Procedure: Right Heart Cath;  Surgeon: Bogdan Vargas MD;  Location:  [facility] CATH INVASIVE LOCATION;  Service: Cardiology   • CARDIAC ELECTROPHYSIOLOGY PROCEDURE N/A 7/27/2017    Procedure: Ablation atrial flutter Will need to have 3 -4 weeks of therapeutic INR's ;  Surgeon: Adonis Solis MD;  Location:  [facility] CATH INVASIVE LOCATION;  Service:    • CARDIOVERSION     • CATARACT EXTRACTION W/ INTRAOCULAR LENS  IMPLANT, BILATERAL     • COLON RESECTION     • COLON SURGERY      polyp removed   • COLONOSCOPY      07/15 redo 5 years  [facility]   • KNEE SURGERY      benign tumor removed, age 6   • LASIK     • REFRACTIVE SURGERY  2007   • REPAIR PERONEAL TENDONS ANKLE W/ FIBULAR OSTEOTOMY Right 03/2013    Dr. Banks       MEDICATIONS    Current Outpatient Medications:   •  acetaminophen (TYLENOL) 325 MG tablet, Take 650 mg by mouth Every 6 (Six) Hours As Needed for Mild Pain ., Disp: , Rfl:   •  aspirin 81 MG chewable tablet, Chew 81 mg Daily., Disp: , Rfl:   •  atorvastatin (LIPITOR) 10 MG tablet, TAKE 1 TABLET BY MOUTH DAILY., Disp: 90 tablet, Rfl: 1  •  Cholecalciferol (VITAMIN D3) 2000 UNITS tablet, Take 2,000 Units by mouth daily., Disp: , Rfl:   •  diclofenac (VOLTAREN) 1 % gel gel, Apply 4 g topically to the appropriate area as directed 3 (Three) Times a Day., Disp: 100 g, Rfl: 2  •  Docusate Sodium (STOOL SOFTENER LAXATIVE PO), Take  by mouth every night at bedtime., Disp: , Rfl:   •  escitalopram (LEXAPRO) 5 MG tablet, 10 mg., Disp: , Rfl:   •  Flaxseed, Linseed, (FLAXSEED OIL) 1000 MG capsule, Take 1,000 mg by mouth Daily., Disp: , Rfl:   •  fludrocortisone 0.1 MG tablet, TAKE 1 TABLET BY MOUTH DAILY., Disp: 30 tablet, Rfl: 5  •  gabapentin (NEURONTIN) 300 MG capsule, Take 1 tab PO qd x 1 week, then take 1 tab PO bid, Disp: 60 capsule, Rfl: 0  •  mirtazapine (REMERON) 7.5 MG tablet, , Disp: , Rfl:   •  Myrbetriq 25 MG tablet sustained-release 24 hour 24 hr  "tablet, , Disp: , Rfl:   •  omeprazole (priLOSEC) 40 MG capsule, TAKE 1 CAPSULE BY MOUTH DAILY., Disp: 30 capsule, Rfl: 1  •  vitamin B-12 (CYANOCOBALAMIN) 1000 MCG tablet, Take 1 tablet by mouth Daily. For b12 def, Disp: 90 tablet, Rfl: 3    ALLERGIES:   No Known Allergies    SOCIAL HISTORY:       Social History     Socioeconomic History   • Marital status:      Spouse name: Not on file   • Number of children: 2   • Years of education: COLLEGE GRADUATE   • Highest education level: Not on file   Tobacco Use   • Smoking status: Former Smoker     Packs/day: 0.50     Years: 55.00     Pack years: 27.50     Types: Cigarettes     Quit date: 2008     Years since quittin.3   • Smokeless tobacco: Never Used   • Tobacco comment: QUIT 10 YRS   Substance and Sexual Activity   • Alcohol use: Yes     Comment: SOCIALLY   • Drug use: No   • Sexual activity: Defer         FAMILY HISTORY:  Family History   Problem Relation Age of Onset   • Breast cancer Mother    • Heart disease Father    • Hypertension Father    • Heart attack Father    • Hypertension Sister        REVIEW OF SYSTEMS:  Review of Systems   Constitutional: Negative for activity change.   HENT: Negative for nosebleeds and trouble swallowing.    Respiratory: Negative for shortness of breath and wheezing.    Cardiovascular: Negative for chest pain and palpitations.   Gastrointestinal: Negative for constipation, diarrhea and nausea.   Genitourinary: Negative for dysuria and hematuria.   Musculoskeletal: Negative for arthralgias and myalgias.   Neurological: Negative for seizures and syncope.   Hematological: Negative for adenopathy. Does not bruise/bleed easily.   Psychiatric/Behavioral: Negative for confusion.              Vitals:    21 0904   BP: 93/59   Pulse: 85   Resp: 20   Temp: 97.3 °F (36.3 °C)   TempSrc: Infrared   SpO2: 92%   Weight: 89.1 kg (196 lb 8 oz)   Height: 165.1 cm (65\")   PainSc:   8   PainLoc: Hip  Comment: rt hip and leg "     Current Status 9/9/2021   ECOG score 1      PHYSICAL EXAM:    Physical Exam  Constitutional:       Appearance: Normal appearance. He is normal weight.   HENT:      Head: Normocephalic and atraumatic.      Nose: Nose normal.      Mouth/Throat:      Mouth: Mucous membranes are moist.      Pharynx: Oropharynx is clear.   Eyes:      Extraocular Movements: Extraocular movements intact.      Pupils: Pupils are equal, round, and reactive to light.   Cardiovascular:      Rate and Rhythm: Normal rate and regular rhythm.      Pulses: Normal pulses.      Heart sounds: Normal heart sounds.   Pulmonary:      Effort: Pulmonary effort is normal.      Breath sounds: Normal breath sounds.   Abdominal:      General: Bowel sounds are normal.      Palpations: Abdomen is soft.   Musculoskeletal:         General: Normal range of motion.      Cervical back: Normal range of motion and neck supple.   Skin:     General: Skin is warm and dry.   Neurological:      General: No focal deficit present.      Mental Status: He is alert and oriented to person, place, and time.      Sensory: Sensory deficit present.             RECENT LABS:        WBC   Date Value Ref Range Status   09/09/2021 9.73 3.40 - 10.80 10*3/mm3 Final   08/04/2021 14.50 (H) 3.40 - 10.80 10*3/mm3 Final   07/25/2019 9.09 3.40 - 10.80 10*3/mm3 Final   10/01/2018 9.50 4.50 - 10.70 10*3/mm3 Final   07/27/2018 11.28 (H) 4.50 - 10.70 10*3/mm3 Final   06/05/2018 8.72 4.50 - 10.70 10*3/mm3 Final   01/04/2018 8.17 4.50 - 10.70 10*3/mm3 Final   01/03/2018 8.04 4.50 - 10.70 10*3/mm3 Final   01/02/2018 11.36 (H) 4.50 - 10.70 10*3/mm3 Final   09/26/2017 9.08 4.50 - 10.70 10*3/mm3 Final   07/26/2017 9.70 4.50 - 10.70 10*3/mm3 Final   08/15/2016 8.37 4.50 - 10.70 10*3/mm3 Final   03/31/2016 7.73 4.50 - 10.70 10*3/mm3 Final   03/26/2015 10.49 4.50 - 10.70 K/Cumm Final     Hemoglobin   Date Value Ref Range Status   09/09/2021 13.8 13.0 - 17.7 g/dL Final   08/04/2021 14.3 13.0 - 17.7 g/dL  Final   07/25/2019 14.3 13.0 - 17.7 g/dL Final   10/01/2018 12.9 12.0 - 17.0 g/dL Final   10/01/2018 13.3 12.0 - 17.0 g/dL Final   10/01/2018 13.9 13.7 - 17.6 g/dL Final   07/27/2018 13.8 13.7 - 17.6 g/dL Final   06/05/2018 14.1 13.7 - 17.6 g/dL Final   01/04/2018 12.2 (L) 13.7 - 17.6 g/dL Final   01/03/2018 12.5 (L) 13.7 - 17.6 g/dL Final   01/02/2018 13.0 (L) 13.7 - 17.6 g/dL Final   09/26/2017 15.4 13.7 - 17.6 g/dL Final   07/26/2017 14.5 13.7 - 17.6 g/dL Final   08/15/2016 15.6 13.7 - 17.6 g/dL Final   03/31/2016 15.8 13.7 - 17.6 g/dL Final   03/26/2015 15.4 13.7 - 17.6 g/dL Final     Platelets   Date Value Ref Range Status   09/09/2021 321 140 - 450 10*3/mm3 Final   08/04/2021 317 140 - 450 10*3/mm3 Final   07/25/2019 318 140 - 450 10*3/mm3 Final   10/01/2018 323 140 - 500 10*3/mm3 Final   07/27/2018 309 140 - 500 10*3/mm3 Final   06/05/2018 344 140 - 500 10*3/mm3 Final   01/04/2018 325 140 - 500 10*3/mm3 Final   01/03/2018 321 140 - 500 10*3/mm3 Final   01/02/2018 390 140 - 500 10*3/mm3 Final   09/26/2017 288 140 - 500 10*3/mm3 Final   07/26/2017 289 140 - 500 10*3/mm3 Final   08/15/2016 328 140 - 500 10*3/mm3 Final   03/31/2016 355 140 - 500 10*3/mm3 Final   03/26/2015 343 140 - 500 K/Cumm Final       Assessment/Plan   Monoclonal gammopathy of unknown significance (MGUS)  - MADDY,PE and FLC, Serum  - Comprehensive Metabolic Panel  - Beta 2 Microglobulin, Serum  - Lactate Dehydrogenase        Jonathan Trejo   *IgG kappa monoclonal gammopathy  · Initially found on work-up for neuropathy by neurology:  · 7/8/2021: M spike 0.3, IgG kappa.  · Cryoglobulin negative.  Serum copper normal.  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  However, needs work-up for multiple myeloma  · Offered bone marrow biopsy and fat pad biopsy looking for amyloidosis since patient might have some peripheral neuropathy as well.  Patient declines.  He denies numbness and states he just has some  intermittent bilateral tingling of feet, right more so than left which is not particularly bothersome.  He states it is not worsening and might be getting better.  · Patient reassessed by additional physicians at 9/9/2021 with above laboratory studies planned-suspect MGUS only at this point.    *Peripheral neuropathy  · Balance issues requiring a walker and bilateral tingling of feet, right more than left.  No numbness.  No hand symptoms.  Pain down right leg.  Patient states neurologist told him this originates in the back.  Symptoms present since around July 2020.  As he is assessed 9/9/2021 his symptoms have significantly improved.    *Leukocytosis  Predominance of mature segmented neutrophils.  Therefore, appears reactive.  Follow.      Plan:  *Patient to return to laboratory today for the above examinations    *2 to 4-week follow-up MD    *Suspect follow-up every 6 months thereafter

## 2021-09-09 ENCOUNTER — LAB (OUTPATIENT)
Dept: LAB | Facility: HOSPITAL | Age: 86
End: 2021-09-09

## 2021-09-09 ENCOUNTER — OFFICE VISIT (OUTPATIENT)
Dept: ONCOLOGY | Facility: CLINIC | Age: 86
End: 2021-09-09

## 2021-09-09 VITALS
HEART RATE: 85 BPM | DIASTOLIC BLOOD PRESSURE: 59 MMHG | TEMPERATURE: 97.3 F | SYSTOLIC BLOOD PRESSURE: 93 MMHG | BODY MASS INDEX: 32.74 KG/M2 | WEIGHT: 196.5 LBS | OXYGEN SATURATION: 92 % | HEIGHT: 65 IN | RESPIRATION RATE: 20 BRPM

## 2021-09-09 DIAGNOSIS — D47.2 MONOCLONAL GAMMOPATHY OF UNKNOWN SIGNIFICANCE (MGUS): Primary | ICD-10-CM

## 2021-09-09 DIAGNOSIS — D47.2 MONOCLONAL GAMMOPATHY: ICD-10-CM

## 2021-09-09 LAB
ALBUMIN SERPL-MCNC: 3.7 G/DL (ref 3.5–5.2)
ALBUMIN/GLOB SERPL: 1.4 G/DL (ref 1.1–2.4)
ALP SERPL-CCNC: 101 U/L (ref 38–116)
ALT SERPL W P-5'-P-CCNC: 8 U/L (ref 0–41)
ANION GAP SERPL CALCULATED.3IONS-SCNC: 8.6 MMOL/L (ref 5–15)
AST SERPL-CCNC: 14 U/L (ref 0–40)
B2 MICROGLOB SERPL-MCNC: 4 MG/L (ref 0.8–2.2)
BASOPHILS # BLD AUTO: 0.1 10*3/MM3 (ref 0–0.2)
BASOPHILS NFR BLD AUTO: 1 % (ref 0–1.5)
BILIRUB SERPL-MCNC: 0.7 MG/DL (ref 0.2–1.2)
BUN SERPL-MCNC: 25 MG/DL (ref 6–20)
BUN/CREAT SERPL: 15.9 (ref 7.3–30)
CALCIUM SPEC-SCNC: 9.4 MG/DL (ref 8.5–10.2)
CHLORIDE SERPL-SCNC: 107 MMOL/L (ref 98–107)
CO2 SERPL-SCNC: 24.4 MMOL/L (ref 22–29)
CREAT SERPL-MCNC: 1.57 MG/DL (ref 0.7–1.3)
DEPRECATED RDW RBC AUTO: 45.9 FL (ref 37–54)
EOSINOPHIL # BLD AUTO: 0.12 10*3/MM3 (ref 0–0.4)
EOSINOPHIL NFR BLD AUTO: 1.2 % (ref 0.3–6.2)
ERYTHROCYTE [DISTWIDTH] IN BLOOD BY AUTOMATED COUNT: 12.8 % (ref 12.3–15.4)
GFR SERPL CREATININE-BSD FRML MDRD: 42 ML/MIN/1.73
GLOBULIN UR ELPH-MCNC: 2.7 GM/DL (ref 1.8–3.5)
GLUCOSE SERPL-MCNC: 100 MG/DL (ref 74–124)
HCT VFR BLD AUTO: 43.5 % (ref 37.5–51)
HGB BLD-MCNC: 13.8 G/DL (ref 13–17.7)
IMM GRANULOCYTES # BLD AUTO: 0.23 10*3/MM3 (ref 0–0.05)
IMM GRANULOCYTES NFR BLD AUTO: 2.4 % (ref 0–0.5)
LDH SERPL-CCNC: 246 U/L (ref 99–259)
LYMPHOCYTES # BLD AUTO: 1.51 10*3/MM3 (ref 0.7–3.1)
LYMPHOCYTES NFR BLD AUTO: 15.5 % (ref 19.6–45.3)
MCH RBC QN AUTO: 31.1 PG (ref 26.6–33)
MCHC RBC AUTO-ENTMCNC: 31.7 G/DL (ref 31.5–35.7)
MCV RBC AUTO: 98 FL (ref 79–97)
MONOCYTES # BLD AUTO: 1.27 10*3/MM3 (ref 0.1–0.9)
MONOCYTES NFR BLD AUTO: 13.1 % (ref 5–12)
NEUTROPHILS NFR BLD AUTO: 6.5 10*3/MM3 (ref 1.7–7)
NEUTROPHILS NFR BLD AUTO: 66.8 % (ref 42.7–76)
NRBC BLD AUTO-RTO: 0.2 /100 WBC (ref 0–0.2)
PLATELET # BLD AUTO: 321 10*3/MM3 (ref 140–450)
PMV BLD AUTO: 11.4 FL (ref 6–12)
POTASSIUM SERPL-SCNC: 4.4 MMOL/L (ref 3.5–4.7)
PROT SERPL-MCNC: 6.4 G/DL (ref 6.3–8)
RBC # BLD AUTO: 4.44 10*6/MM3 (ref 4.14–5.8)
SODIUM SERPL-SCNC: 140 MMOL/L (ref 134–145)
WBC # BLD AUTO: 9.73 10*3/MM3 (ref 3.4–10.8)

## 2021-09-09 PROCEDURE — 82232 ASSAY OF BETA-2 PROTEIN: CPT | Performed by: INTERNAL MEDICINE

## 2021-09-09 PROCEDURE — 80053 COMPREHEN METABOLIC PANEL: CPT | Performed by: INTERNAL MEDICINE

## 2021-09-09 PROCEDURE — 36415 COLL VENOUS BLD VENIPUNCTURE: CPT

## 2021-09-09 PROCEDURE — 85025 COMPLETE CBC W/AUTO DIFF WBC: CPT | Performed by: INTERNAL MEDICINE

## 2021-09-09 PROCEDURE — 99214 OFFICE O/P EST MOD 30 MIN: CPT | Performed by: INTERNAL MEDICINE

## 2021-09-09 PROCEDURE — 83615 LACTATE (LD) (LDH) ENZYME: CPT | Performed by: INTERNAL MEDICINE

## 2021-09-09 RX ORDER — ACETAMINOPHEN 325 MG/1
650 TABLET ORAL EVERY 6 HOURS PRN
COMMUNITY

## 2021-09-10 LAB
ALBUMIN SERPL ELPH-MCNC: 3.5 G/DL (ref 2.9–4.4)
ALBUMIN/GLOB SERPL: 1.3 {RATIO} (ref 0.7–1.7)
ALPHA1 GLOB SERPL ELPH-MCNC: 0.2 G/DL (ref 0–0.4)
ALPHA2 GLOB SERPL ELPH-MCNC: 0.9 G/DL (ref 0.4–1)
B-GLOBULIN SERPL ELPH-MCNC: 0.9 G/DL (ref 0.7–1.3)
GAMMA GLOB SERPL ELPH-MCNC: 0.8 G/DL (ref 0.4–1.8)
GLOBULIN SER-MCNC: 2.8 G/DL (ref 2.2–3.9)
IGA SERPL-MCNC: 156 MG/DL (ref 61–437)
IGG SERPL-MCNC: 882 MG/DL (ref 603–1613)
IGM SERPL-MCNC: 75 MG/DL (ref 15–143)
INTERPRETATION SERPL IEP-IMP: ABNORMAL
KAPPA LC FREE SER-MCNC: 38.8 MG/L (ref 3.3–19.4)
KAPPA LC FREE/LAMBDA FREE SER: 1.72 {RATIO} (ref 0.26–1.65)
LABORATORY COMMENT REPORT: ABNORMAL
LAMBDA LC FREE SERPL-MCNC: 22.6 MG/L (ref 5.7–26.3)
M PROTEIN SERPL ELPH-MCNC: 0.2 G/DL
PROT SERPL-MCNC: 6.3 G/DL (ref 6–8.5)

## 2021-09-20 DIAGNOSIS — D47.2 MONOCLONAL GAMMOPATHY OF UNKNOWN SIGNIFICANCE (MGUS): Primary | ICD-10-CM

## 2021-09-23 ENCOUNTER — LAB (OUTPATIENT)
Dept: LAB | Facility: HOSPITAL | Age: 86
End: 2021-09-23

## 2021-09-23 ENCOUNTER — OFFICE VISIT (OUTPATIENT)
Dept: ONCOLOGY | Facility: CLINIC | Age: 86
End: 2021-09-23

## 2021-09-23 VITALS
OXYGEN SATURATION: 92 % | SYSTOLIC BLOOD PRESSURE: 114 MMHG | DIASTOLIC BLOOD PRESSURE: 69 MMHG | WEIGHT: 197.9 LBS | BODY MASS INDEX: 32.97 KG/M2 | TEMPERATURE: 97.1 F | RESPIRATION RATE: 16 BRPM | HEART RATE: 80 BPM | HEIGHT: 65 IN

## 2021-09-23 DIAGNOSIS — D47.2 MONOCLONAL GAMMOPATHY OF UNKNOWN SIGNIFICANCE (MGUS): Primary | ICD-10-CM

## 2021-09-23 DIAGNOSIS — D47.2 MONOCLONAL GAMMOPATHY OF UNKNOWN SIGNIFICANCE (MGUS): ICD-10-CM

## 2021-09-23 LAB
BASOPHILS # BLD AUTO: 0.08 10*3/MM3 (ref 0–0.2)
BASOPHILS NFR BLD AUTO: 0.8 % (ref 0–1.5)
DEPRECATED RDW RBC AUTO: 50 FL (ref 37–54)
EOSINOPHIL # BLD AUTO: 0.17 10*3/MM3 (ref 0–0.4)
EOSINOPHIL NFR BLD AUTO: 1.6 % (ref 0.3–6.2)
ERYTHROCYTE [DISTWIDTH] IN BLOOD BY AUTOMATED COUNT: 13.7 % (ref 12.3–15.4)
HCT VFR BLD AUTO: 43.3 % (ref 37.5–51)
HGB BLD-MCNC: 13.6 G/DL (ref 13–17.7)
IMM GRANULOCYTES # BLD AUTO: 0.23 10*3/MM3 (ref 0–0.05)
IMM GRANULOCYTES NFR BLD AUTO: 2.2 % (ref 0–0.5)
LYMPHOCYTES # BLD AUTO: 2.02 10*3/MM3 (ref 0.7–3.1)
LYMPHOCYTES NFR BLD AUTO: 19.5 % (ref 19.6–45.3)
MCH RBC QN AUTO: 31.1 PG (ref 26.6–33)
MCHC RBC AUTO-ENTMCNC: 31.4 G/DL (ref 31.5–35.7)
MCV RBC AUTO: 99.1 FL (ref 79–97)
MONOCYTES # BLD AUTO: 1.55 10*3/MM3 (ref 0.1–0.9)
MONOCYTES NFR BLD AUTO: 15 % (ref 5–12)
NEUTROPHILS NFR BLD AUTO: 6.3 10*3/MM3 (ref 1.7–7)
NEUTROPHILS NFR BLD AUTO: 60.9 % (ref 42.7–76)
NRBC BLD AUTO-RTO: 0.2 /100 WBC (ref 0–0.2)
PLATELET # BLD AUTO: 295 10*3/MM3 (ref 140–450)
PMV BLD AUTO: 11 FL (ref 6–12)
RBC # BLD AUTO: 4.37 10*6/MM3 (ref 4.14–5.8)
WBC # BLD AUTO: 10.35 10*3/MM3 (ref 3.4–10.8)

## 2021-09-23 PROCEDURE — 85025 COMPLETE CBC W/AUTO DIFF WBC: CPT

## 2021-09-23 PROCEDURE — 36415 COLL VENOUS BLD VENIPUNCTURE: CPT

## 2021-09-23 PROCEDURE — 99213 OFFICE O/P EST LOW 20 MIN: CPT | Performed by: INTERNAL MEDICINE

## 2021-09-23 RX ORDER — ESCITALOPRAM OXALATE 10 MG/1
10 TABLET ORAL DAILY
COMMUNITY

## 2021-09-23 NOTE — PROGRESS NOTES
.     REASON FOR FOLLOW-UP:   Monoclonal gammopathy of unknown significance           HISTORY OF PRESENT ILLNESS:  The patient is a 86 y.o. year old male  who is here for follow-up with the above-mentioned history.        Record reviewed after previous assessment by [facility] colleague including review of last neurology, PA note, 7/8/2021, Debby Quinonez.  We have also reviewed his initial assessment by Dr. Teddy Campa with peripheral neuropathy and spinal stenosis documented.  Again his record states the patient initially was referred to them for balance issues, gait dysfunction, numbness and tingling in feet.  He, again, is felt to have lumbar stenosis with neurogenic claudication.  He also has some peripheral neuropathy.  They note some tingling bilateral feet but no symptoms in hands.  Lab work revealed IgG kappa monoclonal protein on S MADDY with M spike 0.3 on SPEP, 7/8/2021.  Cryoglobulin negative.  Serum copper normal.        We had seen him in consultation 8/4/2021 with patient indicating that he has not had any issues with numbness.  He does have tingling bilateral soles of feet but states this is mostly on the right side.  He states he also has pain down the right side of his leg which he states the neurologist told him is originating from his low back.  States he has no symptoms in the hands.  States the tingling that he has on bilateral soles of feet, right more so than left, is intermittent and not particularly bothersome.    Has had the tingling and unsteadiness on his feet requiring a walker for about 1 year.  He states it is not getting any worse and might be getting better.      We recommended a number of serum, urine and radiologic studies.  Repeat MADDY, PE free serum light chains included an M spike of 0.3 g/dL, immunofixation with IgG monoclonal protein and kappa light chain specificity, kappa lambda ratio 1.27.  He did not undergo the serum studies, bone survey nor assessment for potential  amyloidosis.    He is now seen in follow-up consultation and we have discussed the reasons he would be offered the above evaluation somewhat extensively.  He had been confused about the initial offering and uncertain as to whether he should proceed with any of his additional testing.    We did proceed to complete his testing finally normal CBC, paraprotein analysis with M spike of 0.2 g/dL-IgG monoclonal protein with kappa light chain specificity, normal IgG, IgA and IgM and kappa/lambda ratio of 1.72, CMP with BUN/creatinine 25 and 1.57, beta-2 microglobulin 4.0 and normal LDH.  The patient is next seen back 9/23/2021 with a normal CBC.  Clinically he is also stable.  We have discussed that he has a monoclonal gammopathy of unknown significance and that follow-up is necessary but that it is unlikely that he has a hematologic disorder such as a plasma cell dyscrasia.  Past Medical History:   Diagnosis Date   • Abnormal electrocardiogram    • Arm pain    • Atrial fibrillation (CMS/HCC)    • Atrial flutter (CMS/HCC)    • BPH (benign prostatic hyperplasia)    • BPH associated with nocturia    • Chronic kidney disease    • Colon cancer (CMS/HCC)     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   • Colon polyp 11/22/2015   • Depression    • Dyspnea    • Enlarged prostate without lower urinary tract symptoms (luts)    • Episode of generalized weakness     knees were weak - had to be helped by wife to sit down   • Esophagitis, reflux    • Fatigue    • Fracture of ankle     right   • GERD (gastroesophageal reflux disease)    • Hyperlipidemia    • Inguinal hernia    • Loss of hearing    • Lumbar radiculopathy    • Obesity    • Osteoarthritis cervical spine     doc on [facility] 08/16   • Osteopenia    • Osteoporosis    • Overweight    • Tobacco dependence in remission    • Urge incontinence of urine    • Vitamin D deficiency    • Wheezing      Past Surgical History:   Procedure Laterality Date   • CARDIAC CATHETERIZATION N/A 7/27/2017     Procedure: Valve assessment;  Surgeon: Adonis Solis MD;  Location:  [facility] CATH INVASIVE LOCATION;  Service:    • CARDIAC CATHETERIZATION N/A 10/1/2018    Procedure: Left Heart Cath;  Surgeon: Bogdan Vargas MD;  Location: [facility] C[facility];  Service: Cardiology   • CARDIAC CATHETERIZATION N/A 10/1/2018    Procedure: Coronary angiography;  Surgeon: Bogdan Vargas MD;  Location[facility];  Service: Cardiology   • CARDIAC CATHETERIZATION N/A 10/1/2018    Procedure: Left ventriculography;  Surgeon: Bogdan Vargas MD;  Location[facility];  Service: Cardiology   • CARDIAC CATHETERIZATION N/A 10/1/2018    Procedure: Right Heart Cath;  Surgeon: Bogdan Vargas MD;  Location: [facility];  Service: Cardiology   • CARDIAC ELECTROPHYSIOLOGY PROCEDURE N/A 7/27/2017    Procedure: Ablation atrial flutter Will need to have 3 -4 weeks of therapeutic INR's ;  Surgeon: Adonis Solis MD;  Location: [facility];  Service:    • CARDIOVERSION     • CATARACT EXTRACTION W/ INTRAOCULAR LENS  IMPLANT, BILATERAL     • COLON RESECTION     • COLON SURGERY      polyp removed   • COLONOSCOPY      07/15 redo 5 years  [facility]   • KNEE SURGERY      benign tumor removed, age 6   • LASIK     • REFRACTIVE SURGERY  2007   • REPAIR PERONEAL TENDONS ANKLE W/ FIBULAR OSTEOTOMY Right 03/2013    Dr. Banks       MEDICATIONS    Current Outpatient Medications:   •  acetaminophen (TYLENOL) 325 MG tablet, Take 650 mg by mouth Every 6 (Six) Hours As Needed for Mild Pain ., Disp: , Rfl:   •  aspirin 81 MG chewable tablet, Chew 81 mg Daily., Disp: , Rfl:   •  atorvastatin (LIPITOR) 10 MG tablet, TAKE 1 TABLET BY MOUTH DAILY., Disp: 90 tablet, Rfl: 1  •  Cholecalciferol (VITAMIN D3) 2000 UNITS tablet, Take 2,000 Units by mouth daily., Disp: , Rfl:   •  diclofenac (VOLTAREN) 1 % gel gel, Apply 4 g topically to the appropriate area as directed 3 (Three)  Times a Day., Disp: 100 g, Rfl: 2  •  Docusate Sodium (STOOL SOFTENER LAXATIVE PO), Take  by mouth every night at bedtime., Disp: , Rfl:   •  escitalopram (LEXAPRO) 10 MG tablet, , Disp: , Rfl:   •  Flaxseed, Linseed, (FLAXSEED OIL) 1000 MG capsule, Take 1,000 mg by mouth Daily., Disp: , Rfl:   •  fludrocortisone 0.1 MG tablet, TAKE 1 TABLET BY MOUTH DAILY., Disp: 30 tablet, Rfl: 5  •  gabapentin (NEURONTIN) 300 MG capsule, Take 1 tab PO qd x 1 week, then take 1 tab PO bid, Disp: 60 capsule, Rfl: 0  •  mirtazapine (REMERON) 7.5 MG tablet, , Disp: , Rfl:   •  Myrbetriq 25 MG tablet sustained-release 24 hour 24 hr tablet, , Disp: , Rfl:   •  omeprazole (priLOSEC) 40 MG capsule, TAKE 1 CAPSULE BY MOUTH DAILY., Disp: 30 capsule, Rfl: 1  •  vitamin B-12 (CYANOCOBALAMIN) 1000 MCG tablet, Take 1 tablet by mouth Daily. For b12 def, Disp: 90 tablet, Rfl: 3    ALLERGIES:   No Known Allergies    SOCIAL HISTORY:       Social History     Socioeconomic History   • Marital status:      Spouse name: Not on file   • Number of children: 2   • Years of education: COLLEGE GRADUATE   • Highest education level: Not on file   Tobacco Use   • Smoking status: Former Smoker     Packs/day: 0.50     Years: 55.00     Pack years: 27.50     Types: Cigarettes     Quit date: 2008     Years since quittin.4   • Smokeless tobacco: Never Used   • Tobacco comment: QUIT 10 YRS   Substance and Sexual Activity   • Alcohol use: Yes     Comment: SOCIALLY   • Drug use: No   • Sexual activity: Defer         FAMILY HISTORY:  Family History   Problem Relation Age of Onset   • Breast cancer Mother    • Heart disease Father    • Hypertension Father    • Heart attack Father    • Hypertension Sister        REVIEW OF SYSTEMS:  Review of Systems   Constitutional: Negative for activity change.   HENT: Negative for nosebleeds and trouble swallowing.    Respiratory: Negative for shortness of breath and wheezing.    Cardiovascular: Negative for chest pain  "and palpitations.   Gastrointestinal: Negative for constipation, diarrhea and nausea.   Genitourinary: Negative for dysuria and hematuria.   Musculoskeletal: Negative for arthralgias and myalgias.   Neurological: Negative for seizures and syncope.   Hematological: Negative for adenopathy. Does not bruise/bleed easily.   Psychiatric/Behavioral: Negative for confusion.              Vitals:    09/23/21 1619   BP: 114/69   Pulse: 80   Resp: 16   Temp: 97.1 °F (36.2 °C)   TempSrc: Temporal   SpO2: 92%   Weight: 89.8 kg (197 lb 14.4 oz)   Height: 165.1 cm (65\")   PainSc: 0-No pain     Current Status 9/23/2021   ECOG score 1      PHYSICAL EXAM:    Physical Exam  Constitutional:       Appearance: Normal appearance. He is normal weight.   HENT:      Head: Normocephalic and atraumatic.      Nose: Nose normal.      Mouth/Throat:      Mouth: Mucous membranes are moist.      Pharynx: Oropharynx is clear.   Eyes:      Extraocular Movements: Extraocular movements intact.      Pupils: Pupils are equal, round, and reactive to light.   Cardiovascular:      Rate and Rhythm: Normal rate and regular rhythm.      Pulses: Normal pulses.      Heart sounds: Normal heart sounds.   Pulmonary:      Effort: Pulmonary effort is normal.      Breath sounds: Normal breath sounds.   Abdominal:      General: Bowel sounds are normal.      Palpations: Abdomen is soft.   Musculoskeletal:         General: Normal range of motion.      Cervical back: Normal range of motion and neck supple.   Skin:     General: Skin is warm and dry.   Neurological:      General: No focal deficit present.      Mental Status: He is alert and oriented to person, place, and time.      Sensory: Sensory deficit present.             RECENT LABS:        WBC   Date Value Ref Range Status   09/23/2021 10.35 3.40 - 10.80 10*3/mm3 Final   09/09/2021 9.73 3.40 - 10.80 10*3/mm3 Final   08/04/2021 14.50 (H) 3.40 - 10.80 10*3/mm3 Final   07/25/2019 9.09 3.40 - 10.80 10*3/mm3 Final "   10/01/2018 9.50 4.50 - 10.70 10*3/mm3 Final   07/27/2018 11.28 (H) 4.50 - 10.70 10*3/mm3 Final   06/05/2018 8.72 4.50 - 10.70 10*3/mm3 Final   01/04/2018 8.17 4.50 - 10.70 10*3/mm3 Final   01/03/2018 8.04 4.50 - 10.70 10*3/mm3 Final   01/02/2018 11.36 (H) 4.50 - 10.70 10*3/mm3 Final   09/26/2017 9.08 4.50 - 10.70 10*3/mm3 Final   07/26/2017 9.70 4.50 - 10.70 10*3/mm3 Final   08/15/2016 8.37 4.50 - 10.70 10*3/mm3 Final   03/31/2016 7.73 4.50 - 10.70 10*3/mm3 Final   03/26/2015 10.49 4.50 - 10.70 K/Cumm Final     Hemoglobin   Date Value Ref Range Status   09/23/2021 13.6 13.0 - 17.7 g/dL Final   09/09/2021 13.8 13.0 - 17.7 g/dL Final   08/04/2021 14.3 13.0 - 17.7 g/dL Final   07/25/2019 14.3 13.0 - 17.7 g/dL Final   10/01/2018 12.9 12.0 - 17.0 g/dL Final   10/01/2018 13.3 12.0 - 17.0 g/dL Final   10/01/2018 13.9 13.7 - 17.6 g/dL Final   07/27/2018 13.8 13.7 - 17.6 g/dL Final   06/05/2018 14.1 13.7 - 17.6 g/dL Final   01/04/2018 12.2 (L) 13.7 - 17.6 g/dL Final   01/03/2018 12.5 (L) 13.7 - 17.6 g/dL Final   01/02/2018 13.0 (L) 13.7 - 17.6 g/dL Final   09/26/2017 15.4 13.7 - 17.6 g/dL Final   07/26/2017 14.5 13.7 - 17.6 g/dL Final   08/15/2016 15.6 13.7 - 17.6 g/dL Final   03/31/2016 15.8 13.7 - 17.6 g/dL Final   03/26/2015 15.4 13.7 - 17.6 g/dL Final     Platelets   Date Value Ref Range Status   09/23/2021 295 140 - 450 10*3/mm3 Final   09/09/2021 321 140 - 450 10*3/mm3 Final   08/04/2021 317 140 - 450 10*3/mm3 Final   07/25/2019 318 140 - 450 10*3/mm3 Final   10/01/2018 323 140 - 500 10*3/mm3 Final   07/27/2018 309 140 - 500 10*3/mm3 Final   06/05/2018 344 140 - 500 10*3/mm3 Final   01/04/2018 325 140 - 500 10*3/mm3 Final   01/03/2018 321 140 - 500 10*3/mm3 Final   01/02/2018 390 140 - 500 10*3/mm3 Final   09/26/2017 288 140 - 500 10*3/mm3 Final   07/26/2017 289 140 - 500 10*3/mm3 Final   08/15/2016 328 140 - 500 10*3/mm3 Final   03/31/2016 355 140 - 500 10*3/mm3 Final   03/26/2015 343 140 - 500 K/Cumm Final        Assessment/Plan         Jonathan Trejo   *IgG kappa monoclonal gammopathy  · Initially found on work-up for neuropathy by neurology:  · 7/8/2021: M spike 0.3, IgG kappa.  · Cryoglobulin negative.  Serum copper normal.  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  However, needs work-up for multiple myeloma  · Offered bone marrow biopsy and fat pad biopsy looking for amyloidosis since patient might have some peripheral neuropathy as well.  Patient declines.  He denies numbness and states he just has some intermittent bilateral tingling of feet, right more so than left which is not particularly bothersome.  He states it is not worsening and might be getting better.  · Patient reassessed by additional physicians at 9/9/2021 with studies confirming a monoclonal gammopathy of unknown significance.    *Peripheral neuropathy  · Balance issues requiring a walker and bilateral tingling of feet, right more than left.  No numbness.  No hand symptoms.  Pain down right leg.  Patient states neurologist told him this originates in the back.  Symptoms present since around July 2020.  As he is assessed 9/9/2021 his symptoms have significantly improved.    *Leukocytosis  Predominance of mature segmented neutrophils.  Therefore, appears reactive.  Follow.      Plan:  *The patient is advised these findings and is requested to have repeat assessment at 22 weeks seeing the physician in 24 weeks.  *No intervention for the patient's MGUS is currently necessary.

## 2021-09-29 ENCOUNTER — TELEPHONE (OUTPATIENT)
Dept: NEUROLOGY | Facility: CLINIC | Age: 86
End: 2021-09-29

## 2021-09-29 NOTE — TELEPHONE ENCOUNTER
PT CALLED REQUESTING CHANGING HIS APPT BACK TO TOMORROW 9-30 @ 11:00 [facility]/ DOMITILA. OK'D BY FRANKIE. PT WILL BE THERE AND HAS ALREADY BEEN SCREENED

## 2021-09-30 ENCOUNTER — TELEPHONE (OUTPATIENT)
Dept: NEUROLOGY | Facility: CLINIC | Age: 86
End: 2021-09-30

## 2021-09-30 ENCOUNTER — OFFICE VISIT (OUTPATIENT)
Dept: NEUROLOGY | Facility: CLINIC | Age: 86
End: 2021-09-30

## 2021-09-30 VITALS
WEIGHT: 198 LBS | SYSTOLIC BLOOD PRESSURE: 134 MMHG | OXYGEN SATURATION: 95 % | DIASTOLIC BLOOD PRESSURE: 74 MMHG | HEIGHT: 65 IN | BODY MASS INDEX: 32.99 KG/M2 | HEART RATE: 70 BPM

## 2021-09-30 DIAGNOSIS — G60.9 IDIOPATHIC PERIPHERAL NEUROPATHY: ICD-10-CM

## 2021-09-30 DIAGNOSIS — M54.16 LUMBAR RADICULOPATHY: ICD-10-CM

## 2021-09-30 DIAGNOSIS — M48.062 SPINAL STENOSIS OF LUMBAR REGION WITH NEUROGENIC CLAUDICATION: Primary | ICD-10-CM

## 2021-09-30 DIAGNOSIS — D47.2 NEUROPATHY ASSOCIATED WITH MGUS (HCC): ICD-10-CM

## 2021-09-30 DIAGNOSIS — R06.02 SHORTNESS OF BREATH: ICD-10-CM

## 2021-09-30 DIAGNOSIS — G63 NEUROPATHY ASSOCIATED WITH MGUS (HCC): ICD-10-CM

## 2021-09-30 PROCEDURE — 99215 OFFICE O/P EST HI 40 MIN: CPT | Performed by: PHYSICIAN ASSISTANT

## 2021-09-30 RX ORDER — CALCIUM CARBONATE 160(400)MG
1 TABLET,CHEWABLE ORAL DAILY
Qty: 1 EACH | Refills: 0 | Status: SHIPPED | OUTPATIENT
Start: 2021-09-30 | End: 2023-03-27

## 2021-09-30 RX ORDER — GABAPENTIN 400 MG/1
CAPSULE ORAL
Qty: 60 CAPSULE | Refills: 2 | Status: SHIPPED | OUTPATIENT
Start: 2021-09-30 | End: 2021-11-10

## 2021-09-30 NOTE — TELEPHONE ENCOUNTER
Provider: DOMITILA CAMPOS    Caller: JOSE    Relationship to Patient: SELF    Reason for Call: JOSE WAS IN TODAY AND SAW DOMITILA CAMPOS.  HE STATES THAT THERE IS AN ERROR IN THE MEDICATION GABAPENTIN ON THE PRINTOUT HE WAS GIVEN AT THE END OF HIS VISIT.  HE STATES THE CHANGE IS NOT CORRECT.    PLEASE CALL TO ADVISE AND DISCUSS.

## 2021-09-30 NOTE — PROGRESS NOTES
CC: Peripheral neuropathy associated with MGUS    HPI:  Jonathan Trejo is a  86 y.o.  right-handed male  who I am seeing in follow-up today regarding some peripheral neuropathy as well as lumbar stenosis with neurogenic claudication.  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  Patient was last seen in our office on 7/8/2021,  a summary of his condition is taken from previous note with amendments and additions as needed:     Patient was initially referred to us for some balance issues, gait dysfunction and numbness and tingling symptoms involving his feet.  Patient ambulates with rolling walker because of predominantly right-sided posterior hip/buttock pain.  He currently receives steroid injections in his right hip about every 3 months (states his last injection was probably about 2 months ago July/August 2021).   He states the pain in his right hip/buttock becomes especially noticeable with prolonged ambulation, he also states associated with this pain he has some radiation down his posterior right leg toward his knee.  Additionally, he does indicate the right leg seems to be weaker than the left.   He also endorses some low back pain, this too is worse with prolonged standing or activity.  Work-up to date has included an MRI of his lumbar spine from 2019, images are unavailable but reports conclude that some central stenosis, specifically canal is 7 mm at L3/4 with severe right-sided foraminal stenosis which included a synovial cyst occupying some of that space.  There was moderate foraminal stenosis on the left.  There is lipomatosis at L5/S1 with moderate to severe neuroforaminal stenoses bilaterally.  At his last appointment 3 months ago I started him on gabapentin, initially he was put on 300 mg once daily, we escalated this to twice daily.  Patient  reports he has seen improvement in pain in both his back and legs with the gabapentin.  Additionally he appears to be tolerating it well with no significant side effects.  Specifically he denies any dizziness or sedation    In terms of his peripheral neuropathy, patient describes the numbness/tingling sensation in the feet remains fairly mild.   Denies any symptoms in his hands. He still endorses an abnormal sensation in his feet as though he is walking on a brush or Brillo pad.  There has been no major changes in his gait or balance, no recent falls.  Still ambulating with rolling walker.  His work-up for this has included labs done for treatable causes including:   Cryoglobulins negative  RPR non-reactive  Copper and heavy metals all within normal limits  Hemoglobin A1c normal at 5.6,   Vitamin B12 and folate 1366 and 17.7 respectively,   TSH and free T4 1.4 and 1.25 respectively,   Sed rate normal at 16.    CMP did show some impaired renal function, patient does have known CKD, his creatinine was 1.44 and GFR 47, BUN 22.    SPE/IEP demonstrates an MGUS IgG kappa at concentration 0.3 g/dL*    *For this patient has been ordered to have a motor and sensory neuropathy panel which is still pending.  Also instructed to follow-up with hematology oncology.  Patient was seen by Dr. Gamboa on 9/23/2021, who advised that he should haves work-up for multiple myeloma, patient was offered bone marrow biopsy and fat pad biopsy looking for amyloidosis however he declined. Instead he has opted to repeat routine labs in 6 months.         Past Medical History:   Diagnosis Date   • Abnormal electrocardiogram    • Arm pain    • Atrial fibrillation (CMS/HCC)    • Atrial flutter (CMS/HCC)    • BPH (benign prostatic hyperplasia)    • BPH associated with nocturia    • Chronic kidney disease    • Colon cancer (CMS/HCC)     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   • Colon polyp 11/22/2015   • Depression    • Dyspnea    • Enlarged prostate  without lower urinary tract symptoms (luts)    • Episode of generalized weakness     knees were weak - had to be helped by wife to sit down   • Esophagitis, reflux    • Fatigue    • Fracture of ankle     right   • GERD (gastroesophageal reflux disease)    • Hyperlipidemia    • Inguinal hernia    • Loss of hearing    • Lumbar radiculopathy    • Obesity    • Osteoarthritis cervical spine     doc on [facility] 08/16   • Osteopenia    • Osteoporosis    • Overweight    • Tobacco dependence in remission    • Urge incontinence of urine    • Vitamin D deficiency    • Wheezing          Past Surgical History:   Procedure Laterality Date   • CARDIAC CATHETERIZATION N/A 7/27/2017    Procedure: Valve assessment;  Surgeon: Adonis Solis MD;  Location[facility] C[facility]VASIVE LOC[facility];  Service:    • CARDIAC CATHETERIZATION N/A 10/1/2018    Procedure: Left Heart Cath;  Surgeon: Bogdan Vargas MD;  Location: [facility] CATH [facility]AS[facility] LOC[facility];  Service: Cardiology   • CARDIAC CATHETERIZATION N/A 10/1/2018    Procedure: Coronary angiography;  Surgeon: Bogdan Vargas MD;  Location: [facility] CATH [facility]ASIVE LOC[facility];  Service: Cardiology   • CARDIAC CATHETERIZATION N/A 10/1/2018    Procedure: Left ventriculography;  Surgeon: Bogdan Vargas MD;  Location: [facility] CATH INVASIVE LOCATION;  Service: Cardiology   • CARDIAC CATHETERIZATION N/A 10/1/2018    Procedure: Right Heart Cath;  Surgeon: Bogdan Vargas MD;  Location: [facility] CATH INVASIVE LOCATION;  Service: Cardiology   • CARDIAC ELECTROPHYSIOLOGY PROCEDURE N/A 7/27/2017    Procedure: Ablation atrial flutter Will need to have 3 -4 weeks of therapeutic INR's ;  Surgeon: Adonis Solis MD;  Location: [facility] CATH INVASIVE LOCATION;  Service:    • CARDIOVERSION     • CATARACT EXTRACTION W/ INTRAOCULAR LENS  IMPLANT, BILATERAL     • COLON RESECTION     • COLON SURGERY      polyp removed   • COLONOSCOPY      07/15 redo 5 years  [facility]   • KNEE SURGERY      benign tumor removed, age 6    • LASIK     • REFRACTIVE SURGERY  2007   • REPAIR PERONEAL TENDONS ANKLE W/ FIBULAR OSTEOTOMY Right 03/2013    Dr. Banks           Current Outpatient Medications:   •  atorvastatin (LIPITOR) 10 MG tablet, TAKE 1 TABLET BY MOUTH DAILY., Disp: 90 tablet, Rfl: 1  •  Cholecalciferol (VITAMIN D3) 2000 UNITS tablet, Take 2,000 Units by mouth daily., Disp: , Rfl:   •  Docusate Sodium (STOOL SOFTENER LAXATIVE PO), Take  by mouth every night at bedtime., Disp: , Rfl:   •  escitalopram (LEXAPRO) 10 MG tablet, , Disp: , Rfl:   •  Flaxseed, Linseed, (FLAXSEED OIL) 1000 MG capsule, Take 1,000 mg by mouth Daily., Disp: , Rfl:   •  fludrocortisone 0.1 MG tablet, TAKE 1 TABLET BY MOUTH DAILY., Disp: 30 tablet, Rfl: 5  •  gabapentin (NEURONTIN) 400 MG capsule, 1 tab PO bid, Disp: 60 capsule, Rfl: 2  •  omeprazole (priLOSEC) 40 MG capsule, TAKE 1 CAPSULE BY MOUTH DAILY., Disp: 30 capsule, Rfl: 1  •  vitamin B-12 (CYANOCOBALAMIN) 1000 MCG tablet, Take 1 tablet by mouth Daily. For b12 def, Disp: 90 tablet, Rfl: 3  •  acetaminophen (TYLENOL) 325 MG tablet, Take 650 mg by mouth Every 6 (Six) Hours As Needed for Mild Pain ., Disp: , Rfl:   •  aspirin 81 MG chewable tablet, Chew 81 mg Daily., Disp: , Rfl:   •  diclofenac (VOLTAREN) 1 % gel gel, Apply 4 g topically to the appropriate area as directed 3 (Three) Times a Day., Disp: 100 g, Rfl: 2  •  mirtazapine (REMERON) 7.5 MG tablet, , Disp: , Rfl:   •  Myrbetriq 25 MG tablet sustained-release 24 hour 24 hr tablet, , Disp: , Rfl:       Family History   Problem Relation Age of Onset   • Breast cancer Mother    • Heart disease Father    • Hypertension Father    • Heart attack Father    • Hypertension Sister          Social History     Socioeconomic History   • Marital status:      Spouse name: Not on file   • Number of children: 2   • Years of education: COLLEGE GRADUATE   • Highest education level: Not on file   Tobacco Use   • Smoking status: Former Smoker     Packs/day: 0.50     " Years: 55.00     Pack years: 27.50     Types: Cigarettes     Quit date: 2008     Years since quittin.4   • Smokeless tobacco: Never Used   • Tobacco comment: QUIT 10 YRS   Substance and Sexual Activity   • Alcohol use: Yes     Comment: SOCIALLY   • Drug use: No   • Sexual activity: Defer         No Known Allergies      ROS:  Review of Systems   Constitutional: Negative for activity change, appetite change and fatigue.   Respiratory: Positive for shortness of breath. Negative for cough and choking.    Allergic/Immunologic: Negative for environmental allergies and food allergies.   Neurological: Positive for numbness. Negative for dizziness, tremors, seizures, syncope, facial asymmetry, speech difficulty, weakness, light-headedness and headaches.   Psychiatric/Behavioral: Negative for agitation, behavioral problems, confusion, decreased concentration, dysphoric mood, hallucinations, self-injury, sleep disturbance and suicidal ideas. The patient is not nervous/anxious and is not hyperactive.      I have reviewed the above ROS put in by the medical assistant and am in agreement.     Physical Exam:  Vitals:    21 1059   BP: 134/74   Pulse: 70   SpO2: 95%   Weight: 89.8 kg (198 lb)   Height: 165.1 cm (65\")       Body mass index is 32.95 kg/m².    Physical Exam  General: Obese white male no acute distress  HEENT: Normocephalic, atraumatic  Neck: Supple.    Heart: Regular rate and rhythm without murmurs  Lungs: Good air movement throughout, there is some interstitial lung sounds bilateral bases  Extremities: No pedal edema.       Neurological Exam:   Mental Status: Awake, alert, oriented to person, place and time.  Conversant without evidence of an affective disorder, thought disorder, delusions or hallucinations.  Attention span and concentration are normal.  HCF: No aphasia, apraxia or dysarthria.  Recent and remote memory appear intact.  Knowledge of recent events intact.  CN:      I:              II: " Visual fields full without left inattention              III, IV, VI: Eye movements intact without nystagmus or ptosis.  Pupils equal  round and reactive to light.              V,VII: Light touch and pinprick intact all 3 divisions of V.  Facial muscles symmetrical.              VIII: Hearing intact to finger rub              IX,X: Soft palate elevates symmetrically              XI: Sternomastoid and trapezius are strong.              XII: Tongue midline without atrophy or fasciculations   Motor: Normal tone and bulk in the upper and lower extremities              Power testing: Mild weakness of the interossei and abductor pollicis brevis muscles bilaterally.  Minor weakness of the tibialis anterior and mild weakness of the toe extensors.  There is also mild weakness of the right quadriceps and weakness of the right psoas without much pain inhibition issue  Reflexes: Upper extremities: +1 diffusely                   Lower extremities: +1 knee jerks trace ankle jerk                   Toe signs: Downgoing bilaterally  Sensory: Light touch: Essentially normal in the upper extremities.  Some reduction along the right medial lower leg.                   Pinprick: Sharp all over the upper extremities.  Minor patchy reductions in the feet                   Vibration:  Intact at right ankle though slightly diminished, absent at left ankle                   Position: Intact at the great toes   Cerebellar: Finger-to-nose: Intact                      Rapid movement: Intact                      Heel-to-shin: Intact  Gait and Station: Comes to stand slowly and with some difficulty.  Casual walk is mildly antalgic.  It is mildly broad-based and he uses a rolling walker  Results:      Lab Results   Component Value Date    GLUCOSE 100 09/09/2021    BUN 25 (H) 09/09/2021    CREATININE 1.57 (H) 09/09/2021    EGFRIFNONA 42 (L) 09/09/2021    EGFRIFAFRI 53 (L) 07/25/2019    BCR 15.9 09/09/2021    CO2 24.4 09/09/2021    CALCIUM 9.4  09/09/2021    PROTENTOTREF 6.3 09/09/2021    ALBUMIN 3.5 09/09/2021    ALBUMIN 3.70 09/09/2021    LABIL2 1.3 09/09/2021    AST 14 09/09/2021    ALT 8 09/09/2021       Lab Results   Component Value Date    WBC 10.35 09/23/2021    HGB 13.6 09/23/2021    HCT 43.3 09/23/2021    MCV 99.1 (H) 09/23/2021     09/23/2021         .  Lab Results   Component Value Date    RPR Non-Reactive 07/08/2021         Lab Results   Component Value Date    TSH 1.420 07/08/2021         Lab Results   Component Value Date    LVCUHFME67 1,366 (H) 07/08/2021         Lab Results   Component Value Date    FOLATE 17.70 07/08/2021         Lab Results   Component Value Date    HGBA1C 5.61 (H) 07/08/2021         Lab Results   Component Value Date    GLUCOSE 100 09/09/2021    BUN 25 (H) 09/09/2021    CREATININE 1.57 (H) 09/09/2021    EGFRIFNONA 42 (L) 09/09/2021    EGFRIFAFRI 53 (L) 07/25/2019    BCR 15.9 09/09/2021    K 4.4 09/09/2021    CO2 24.4 09/09/2021    CALCIUM 9.4 09/09/2021    PROTENTOTREF 6.3 09/09/2021    ALBUMIN 3.5 09/09/2021    ALBUMIN 3.70 09/09/2021    LABIL2 1.3 09/09/2021    AST 14 09/09/2021    ALT 8 09/09/2021         Lab Results   Component Value Date    WBC 10.35 09/23/2021    HGB 13.6 09/23/2021    HCT 43.3 09/23/2021    MCV 99.1 (H) 09/23/2021     09/23/2021             Assessment:   1.  Lumbar spinal stenosis with neurogenic claudication, worse at L3/4  2.  Right-sided L4 radiculopathy  3.  Neuropathy associated with MGUS  4.  Gait dysfunction/imbalance associated with above diagnoses  5.  Shortness of breath/dyspnea on exertion    Plan:  1.  Patient still complains of worsening low back pain and radicular symptoms involving his right leg with prolonged standing or ambulation.  He does report he has seen improvement since starting the gabapentin however.  We will try escalating this further to 400 mg twice daily.  Advised that I would not want to exceed 600 mg twice daily given his renal insufficiency.   Recommend patient continue to work with PT at [facility]    2.  In terms of his neuropathy with the IgG kappa gammopathy, overall he looks pretty stable only has some mild weakness in his toes but sensory changes distally. Patient has been seen by heme-onc opted not to investigate for multiple myeloma.  He will follow up with Dr. Gamboa in 6 for repeat labs.    3.  Patient's gait today remains quite poor, and that likely he will be using a walker from now on, and perhaps as he is having more with prolonged trips he should get a walker with seat so that he can take breaks.  Will send in prescription to [facility] on his behalf today.     4.  Finally, patient comments today that he is having some worsening shortness of breath/dyspnea with even minimal exertion.  He is known to have some interstitial lung disease, smoker (smoked 1/2 pack a day for probably 15 years, quit about 20 years ago).  He reports he has not been seen by pulmonologist, will refer him for further eval and treatment    - Follow-up in 6 months or sooner should need arise      Time 40 minutes                Dictated utilizing Dragon dictation.

## 2021-11-08 ENCOUNTER — TELEPHONE (OUTPATIENT)
Dept: NEUROLOGY | Facility: CLINIC | Age: 86
End: 2021-11-08

## 2021-11-08 DIAGNOSIS — G60.9 IDIOPATHIC PERIPHERAL NEUROPATHY: ICD-10-CM

## 2021-11-08 DIAGNOSIS — N18.32 STAGE 3B CHRONIC KIDNEY DISEASE (HCC): Primary | ICD-10-CM

## 2021-11-08 NOTE — TELEPHONE ENCOUNTER
Caller: JOSE    Relationship: SELF    Best call back number: [phone]    Which medication are you concerned about: gabapentin (NEURONTIN) 400 MG capsule    Who prescribed you this medication: DOMITILA CAMPOS PA-C    What are your concerns: PATIENT FEELS LIKE HE NEEDS A STRONGER DOSE OF MEDICATION. PATIENT STATES THE PAIN RELIEF HELPED FOR A LITTLE BIT NOT CURRENTLY, HE ALSO STATES THAT THE PAIN IS STARTING TO GO UP HIS BACK, AND HIS ANKLES ARE SWOLLEN.     How long have you had these concerns: SINCE THE BEGINNING.       PATIENT IS ALSO WONDERING ABOUT PRESCRIPTION/LETTER FOR THE NEW WALKER THAT SENT INTO [facility]?     PATIENT IS REQUESTING A DIRECT CALL FROM DOMITILA WHEN POSSIBLE. THANK YOU.

## 2021-11-08 NOTE — TELEPHONE ENCOUNTER
Called patient to discuss increasing gabapentin, no answer, LMOM.    I see that we sent over prescription to [facility] at his last appointment on 9/30 -can you see if this was ever received? THANKS

## 2021-11-09 NOTE — TELEPHONE ENCOUNTER
Caller: Jonathan Trejo    Relationship: Self    Best call back number: [phone]    What was the call regarding: PATIENT RETURNING DOMITILA CAMPOS CALL- HE SAID HE HAS A MEETING AT 10AM, HE WILL WAIT TIL THEN AND THEN HE WILL TRY TO CALL BACK AGAIN LATER TODAY.     Do you require a callback: YES

## 2021-11-09 NOTE — TELEPHONE ENCOUNTER
Caller: Jonathan Trejo    Relationship: Self    Best call back number:     What was the call regarding: PT CALLING BACK AGAIN- PER CHELSEA ZUNIGA IS RUNNING BEHIND NOW AND NEEDS TO CALL PT TOMORROW.     PT WANTS TO LET CHELSEA KNOW THE REASON FOR HIS CALL- HE WOULD LIKE TO INCREASE GABAPENTIN MORE THAN 500 MG PER DAY, HE FEELS HE NEEDS MORE THAN WHAT DOMITILA ADVISED LAST.     HE WOULD ALSO LIKE TO REMIND HER ABOUT THE NEW ROLATOR    HE WOULD LIKE TO TOUCH ON BOTH THESE TOPICS WHEN HE GETS A CALLBACK.       Do you require a callback: YES    PT HAS AN APPT TOMORROW FROM 10AM-11:30AM AND WONT BE BY THE PHONE DURING THESE TIMES.

## 2021-11-09 NOTE — TELEPHONE ENCOUNTER
Spoke with [facility] it was noted that patient had received a rollator in the past 5 years they had reached out to patient to offer private pay but didn't answer the phone.

## 2021-11-10 RX ORDER — GABAPENTIN 600 MG/1
600 TABLET ORAL 2 TIMES DAILY
Qty: 60 TABLET | Refills: 2 | Status: SHIPPED | OUTPATIENT
Start: 2021-11-10 | End: 2021-11-15 | Stop reason: SDUPTHER

## 2021-11-10 NOTE — TELEPHONE ENCOUNTER
Do you mind to call [facility] and let them know the patient does still want the Rollator, will pay out-of-pocket

## 2021-11-10 NOTE — TELEPHONE ENCOUNTER
Spoke with patient today, we will try increasing his gabapentin slightly to 600 mg twice daily.    I do note he has history of CKD, his last lab in September indicated a GFR of 42.  I think it is reasonable for him to be followed by nephrology at this point, he has never before been seen by them.  We will put in referral for this today as well.    In terms of the Rollator -patient states he would like for insurance to cover it but if they will not he will pay out-of-pocket.  Advised him we would contact [facility] to let them know to reach back out.

## 2021-11-11 NOTE — TELEPHONE ENCOUNTER
Spoke with [facility] told them patient wanted to do private pay for the walker. They said pt can came to the store to  the walker and pay at that time. I called patient and made him aware.

## 2021-11-15 DIAGNOSIS — G60.9 IDIOPATHIC PERIPHERAL NEUROPATHY: ICD-10-CM

## 2021-11-15 RX ORDER — GABAPENTIN 600 MG/1
600 TABLET ORAL 2 TIMES DAILY
Qty: 60 TABLET | Refills: 2 | Status: SHIPPED | OUTPATIENT
Start: 2021-11-15 | End: 2023-03-27

## 2021-11-15 NOTE — TELEPHONE ENCOUNTER
PATIENCE WITH [facility], ASKING FOR PTS PRESCRIPTION TO BE RESENT TO [facility]-[address] [place] [address]  [phone]

## 2021-12-09 ENCOUNTER — TELEPHONE (OUTPATIENT)
Dept: NEUROLOGY | Facility: CLINIC | Age: 86
End: 2021-12-09

## 2021-12-09 NOTE — TELEPHONE ENCOUNTER
Provider: sid   Caller: shelby  Relationship to Patient: pt  Phone Number: [phone]  Reason for Call: pt called in stating that physical therapy is working however small of his back is painful and is hoping the sid would be able to extend the therapy.     Pt would like a call back   .    Please advise

## 2021-12-10 DIAGNOSIS — M48.062 SPINAL STENOSIS OF LUMBAR REGION WITH NEUROGENIC CLAUDICATION: Primary | ICD-10-CM

## 2021-12-10 DIAGNOSIS — G63 NEUROPATHY ASSOCIATED WITH MGUS (HCC): ICD-10-CM

## 2021-12-10 DIAGNOSIS — D47.2 NEUROPATHY ASSOCIATED WITH MGUS (HCC): ICD-10-CM

## 2021-12-10 PROBLEM — G70.00 MYASTHENIA GRAVIS WITHOUT EXACERBATION: Status: ACTIVE | Noted: 2021-12-10

## 2022-02-08 ENCOUNTER — TELEPHONE (OUTPATIENT)
Dept: ONCOLOGY | Facility: CLINIC | Age: 87
End: 2022-02-08

## 2022-02-08 NOTE — TELEPHONE ENCOUNTER
Caller: Jonathan Trejo    Relationship: Self    Best call back number: [phone]      What was the call regarding: PATIENT MISSED A CALL FROM THE OFFICE, NO MESSAGE

## 2022-03-31 ENCOUNTER — APPOINTMENT (OUTPATIENT)
Dept: LAB | Facility: HOSPITAL | Age: 87
End: 2022-03-31

## 2022-04-12 DIAGNOSIS — D47.2 MONOCLONAL GAMMOPATHY OF UNKNOWN SIGNIFICANCE (MGUS): Primary | ICD-10-CM

## 2022-04-14 ENCOUNTER — APPOINTMENT (OUTPATIENT)
Dept: LAB | Facility: HOSPITAL | Age: 87
End: 2022-04-14

## 2022-04-22 ENCOUNTER — TELEPHONE (OUTPATIENT)
Dept: ONCOLOGY | Facility: CLINIC | Age: 87
End: 2022-04-22

## 2022-04-22 NOTE — TELEPHONE ENCOUNTER
Caller: Jonathan Trejo    Relationship: Self    Best call back number: [phone]      What was the call regarding: PATIENT CALLED TO DISCUSS A LETTER HE RECEIVED FROM DR WHEELER    Do you require a callback: YES

## 2022-05-03 ENCOUNTER — OFFICE VISIT (OUTPATIENT)
Dept: NEUROLOGY | Facility: CLINIC | Age: 87
End: 2022-05-03

## 2022-05-03 VITALS
OXYGEN SATURATION: 95 % | HEIGHT: 65 IN | HEART RATE: 75 BPM | BODY MASS INDEX: 33.99 KG/M2 | WEIGHT: 204 LBS | DIASTOLIC BLOOD PRESSURE: 60 MMHG | SYSTOLIC BLOOD PRESSURE: 112 MMHG

## 2022-05-03 DIAGNOSIS — M48.062 SPINAL STENOSIS OF LUMBAR REGION WITH NEUROGENIC CLAUDICATION: Primary | ICD-10-CM

## 2022-05-03 DIAGNOSIS — G60.9 IDIOPATHIC PERIPHERAL NEUROPATHY: ICD-10-CM

## 2022-05-03 DIAGNOSIS — D47.2 MGUS (MONOCLONAL GAMMOPATHY OF UNKNOWN SIGNIFICANCE): ICD-10-CM

## 2022-05-03 PROCEDURE — 99214 OFFICE O/P EST MOD 30 MIN: CPT | Performed by: PHYSICIAN ASSISTANT

## 2022-05-03 NOTE — PROGRESS NOTES
CC: Follow-up peripheral neuropathy, lumbar stenosis with neurogenic claudication    HPI:  Jonathan Trejo is a  87 y.o.  right-handed male who I am seeing in follow-up today regarding some peripheral neuropathy as well as lumbar stenosis with neurogenic claudication.  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  Patient was last seen in our office on 9/30/2021,  a summary of his condition is taken from previous note with amendments and additions as needed:     Patient was initially referred to us for some balance issues, gait dysfunction and numbness and tingling symptoms involving his feet.  Patient ambulates with rolling walker because of predominantly right-sided posterior hip/buttock pain.  He states the pain in his right hip/buttock becomes especially noticeable with prolonged ambulation, he also states associated with this pain he has some radiation down his posterior right leg toward his knee.  Additionally, he does indicate the right leg seems to be weaker than the left.   He also endorses some low back pain, this too is worse with prolonged standing or activity.  Work-up to date has included an MRI of his lumbar spine from 2019, images are unavailable but reports conclude that some central stenosis, specifically canal is 7 mm at L3/4 with severe right-sided foraminal stenosis which included a synovial cyst occupying some of that space.  There was moderate foraminal stenosis on the left.  There is lipomatosis at L5/S1 with moderate to severe neuroforaminal stenoses bilaterally.    Since have seen him last he reports worsening pain in his low back and particularly low back with radiation into his right lower extremity.  This is worse with prolonged standing or ambulation.  He also reports some perceived weakness in his legs with prolonged  standing/ambulation.  Does have some bladder issues -sounds mostly like overactive bladder but no bowel dysfunction.     In terms of his peripheral neuropathy, patient describes the numbness/tingling sensation in the feet remains fairly mild.   Denies any symptoms in his hands. He still endorses an abnormal sensation in his feet as though he is walking on a brush or Brillo pad.  There has been no major changes in his gait or balance, no recent falls.  Still ambulating with rolling walker.  His work-up for this has included labs done for treatable causes including:   Cryoglobulins negative  RPR non-reactive  Copper and heavy metals all within normal limits  Hemoglobin A1c normal at 5.6,   Vitamin B12 and folate 1366 and 17.7 respectively,   TSH and free T4 1.4 and 1.25 respectively,   Sed rate normal at 16.    CMP did show some impaired renal function, patient does have known CKD, his creatinine was 1.44 and GFR 47, BUN 22.    SPE/IEP demonstrates an MGUS IgG kappa at concentration 0.3 g/dL*     For this patient was referred to hematology/oncology.  Patient was seen by Dr. Gamboa on 9/23/2021, who advised that he should haves work-up for multiple myeloma, patient was offered bone marrow biopsy and fat pad biopsy looking for amyloidosis however he declined. Instead he has opted to repeat routine labs in 6 months.   It looks like he was scheduled for an appointment in March but he no showed.  I asked him about this today and he states he was under the impression he did not need to follow-up.  We reviewed last note and the plan was for patient to repeat lab assessment and then follow-up with him in the office.  Patient states he will certainly reschedule.     In terms of treatment he continues taking gabapentin he is currently on 600 mg twice daily.  Reports it does help pain in his legs/feet by probably about 50%, maybe helps a little bit less so with the pain in his back.  He still is tolerating it well with no  significant side effects      Past Medical History:   Diagnosis Date   • Abnormal electrocardiogram    • Arm pain    • Atrial fibrillation (HCC)    • Atrial flutter (HCC)    • BPH (benign prostatic hyperplasia)    • BPH associated with nocturia    • Chronic kidney disease    • Colon cancer (HCC)     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   • Colon polyp 11/22/2015   • Depression    • Dyspnea    • Enlarged prostate without lower urinary tract symptoms (luts)    • Episode of generalized weakness     knees were weak - had to be helped by wife to sit down   • Esophagitis, reflux    • Fatigue    • Fracture of ankle     right   • GERD (gastroesophageal reflux disease)    • Hyperlipidemia    • Inguinal hernia    • Loss of hearing    • Lumbar radiculopathy    • Obesity    • Osteoarthritis cervical spine     doc on [facility] 08/16   • Osteopenia    • Osteoporosis    • Overweight    • Tobacco dependence in remission    • Urge incontinence of urine    • Vitamin D deficiency    • Wheezing          Past Surgical History:   Procedure Laterality Date   • CARDIAC CATHETERIZATION N/A 7/27/2017    Procedure: Valve assessment;  Surgeon: Adonis Solis MD;  Location: [facility] INVASIVE LOCATION;  Service:    • CARDIAC CATHETERIZATION N/A 10/1/2018    Procedure: Left Heart Cath;  Surgeon: Bogdan Vargas MD;  Location: [facility] CATH INVASIVE LOCATION;  Service: Cardiology   • CARDIAC CATHETERIZATION N/A 10/1/2018    Procedure: Coronary angiography;  Surgeon: Bogdan Vargas MD;  Location: [facility] CATH INVASIVE LOCATION;  Service: Cardiology   • CARDIAC CATHETERIZATION N/A 10/1/2018    Procedure: Left ventriculography;  Surgeon: Bogdan Vargas MD;  Location: [facility] CATH INVASIVE LOCATION;  Service: Cardiology   • CARDIAC CATHETERIZATION N/A 10/1/2018    Procedure: Right Heart Cath;  Surgeon: Bogdan Vargas MD;  Location: [facility] CATH INVASIVE LOCATION;  Service: Cardiology   • CARDIAC ELECTROPHYSIOLOGY PROCEDURE N/A  7/27/2017    Procedure: Ablation atrial flutter Will need to have 3 -4 weeks of therapeutic INR's ;  Surgeon: Adonis Solis MD;  Location: [facility] INVASIVE LOCATION;  Service:    • CARDIOVERSION     • CATARACT EXTRACTION W/ INTRAOCULAR LENS  IMPLANT, BILATERAL     • COLON RESECTION     • COLON SURGERY      polyp removed   • COLONOSCOPY      07/15 redo 5 years  [facility]   • KNEE SURGERY      benign tumor removed, age 6   • LASIK     • REFRACTIVE SURGERY  2007   • REPAIR PERONEAL TENDONS ANKLE W/ FIBULAR OSTEOTOMY Right 03/2013    Dr. Banks           Current Outpatient Medications:   •  acetaminophen (TYLENOL) 325 MG tablet, Take 650 mg by mouth Every 6 (Six) Hours As Needed for Mild Pain ., Disp: , Rfl:   •  aspirin 81 MG chewable tablet, Chew 81 mg Daily., Disp: , Rfl:   •  atorvastatin (LIPITOR) 10 MG tablet, TAKE 1 TABLET BY MOUTH DAILY., Disp: 90 tablet, Rfl: 1  •  Cholecalciferol (VITAMIN D3) 2000 UNITS tablet, Take 2,000 Units by mouth daily., Disp: , Rfl:   •  diclofenac (VOLTAREN) 1 % gel gel, Apply 4 g topically to the appropriate area as directed 3 (Three) Times a Day., Disp: 100 g, Rfl: 2  •  escitalopram (LEXAPRO) 10 MG tablet, , Disp: , Rfl:   •  omeprazole (priLOSEC) 40 MG capsule, TAKE 1 CAPSULE BY MOUTH DAILY., Disp: 30 capsule, Rfl: 1  •  vitamin B-12 (CYANOCOBALAMIN) 1000 MCG tablet, Take 1 tablet by mouth Daily. For b12 def, Disp: 90 tablet, Rfl: 3  •  Docusate Sodium (STOOL SOFTENER LAXATIVE PO), Take  by mouth every night at bedtime., Disp: , Rfl:   •  Flaxseed, Linseed, (FLAXSEED OIL) 1000 MG capsule, Take 1,000 mg by mouth Daily., Disp: , Rfl:   •  fludrocortisone 0.1 MG tablet, TAKE 1 TABLET BY MOUTH DAILY., Disp: 30 tablet, Rfl: 5  •  gabapentin (Neurontin) 600 MG tablet, Take 1 tablet by mouth 2 (Two) Times a Day for 30 days., Disp: 60 tablet, Rfl: 2  •  mirtazapine (REMERON) 7.5 MG tablet, , Disp: , Rfl:   •  Misc. Devices (Rollator Ultra-Light) misc, 1 Units Daily., Disp: 1 each, Rfl:  "0  •  Myrbetriq 25 MG tablet sustained-release 24 hour 24 hr tablet, , Disp: , Rfl:       Family History   Problem Relation Age of Onset   • Breast cancer Mother    • Heart disease Father    • Hypertension Father    • Heart attack Father    • Hypertension Sister          Social History     Socioeconomic History   • Marital status:    • Number of children: 2   • Years of education: COLLEGE GRADUATE   Tobacco Use   • Smoking status: Former Smoker     Packs/day: 0.50     Years: 55.00     Pack years: 27.50     Types: Cigarettes     Quit date: 2008     Years since quittin.0   • Smokeless tobacco: Never Used   • Tobacco comment: QUIT 10 YRS   Substance and Sexual Activity   • Alcohol use: Yes     Comment: SOCIALLY   • Drug use: No   • Sexual activity: Defer         No Known Allergies        ROS:  Review of Systems   Constitutional: Negative for activity change, appetite change and fatigue.   Eyes: Negative for pain, redness and itching.   Respiratory: Negative for cough, choking and shortness of breath.    Allergic/Immunologic: Negative for environmental allergies and food allergies.   Neurological: Positive for numbness. Negative for dizziness, tremors, seizures, syncope, facial asymmetry, speech difficulty, weakness, light-headedness and headaches.   Psychiatric/Behavioral: Negative for agitation, behavioral problems, confusion, decreased concentration, dysphoric mood, hallucinations, self-injury, sleep disturbance and suicidal ideas. The patient is not nervous/anxious and is not hyperactive.      I have reviewed the above ROS put in by the medical assistant and am in agreement.     Physical Exam:  Vitals:    22 1013   BP: 112/60   Pulse: 75   SpO2: 95%   Weight: 92.5 kg (204 lb)   Height: 165.1 cm (65\")       Body mass index is 33.95 kg/m².    Physical Exam  General: Overweight white male, no acute distress  HEENT: Normocephalic, atraumatic  Neck: Supple.    Heart: Regular rate and rhythm without " murmurs  Extremities: Mild pedal edema noted bilaterally     Neurological Exam:   Mental Status: Awake, alert, oriented to person, place and time.  Conversant without evidence of an affective disorder, thought disorder, delusions or hallucinations.  Attention span and concentration are normal.  HCF: No aphasia, apraxia or dysarthria.  Recent and remote memory appear intact.  Knowledge of recent events intact.  CN:      I:              II: Visual fields full without left inattention              III, IV, VI: Eye movements intact without nystagmus or ptosis.  Pupils equal  round and reactive to light.              V,VII: Light touch and pinprick intact all 3 divisions of V.  Facial muscles symmetrical.              VIII: Hearing is diminished bilaterally              IX,X: Soft palate elevates symmetrically              XI: Sternomastoid and trapezius are strong.              XII: Tongue midline without atrophy or fasciculations   Motor: Normal tone and bulk in the upper and lower extremities              Power testing: There is still very mild weakness in his interosseous and APB's bilaterally, proximally in the upper extremities he is quite strong.  Some weakness with toe flexion and extension, though proximally he is quite strong in his lower extremities as well  Reflexes: Upper extremities: +1 diffusely                   Lower extremities: +1 knee jerks trace ankle jerk  Sensory: Light touch:                    Pinprick:    Cerebellar: Finger-to-nose: Intact                      Rapid movement: Intact                      Heel-to-shin: Intact  Gait and Station: Patient is ambulated without his walker today.  He comes to stand slowly with mild difficulty.  He is slightly kyphotic in his posture and wide-based, casual walk is antalgic and does appear slightly weak.      Results:      Lab Results   Component Value Date    GLUCOSE 100 09/09/2021    BUN 25 (H) 09/09/2021    CREATININE 1.57 (H) 09/09/2021    EGFRIFNONA  42 (L) 09/09/2021    EGFRIFAFRI 53 (L) 07/25/2019    BCR 15.9 09/09/2021    CO2 24.4 09/09/2021    CALCIUM 9.4 09/09/2021    PROTENTOTREF 6.3 09/09/2021    ALBUMIN 3.5 09/09/2021    ALBUMIN 3.70 09/09/2021    LABIL2 1.3 09/09/2021    AST 14 09/09/2021    ALT 8 09/09/2021       Lab Results   Component Value Date    WBC 10.35 09/23/2021    HGB 13.6 09/23/2021    HCT 43.3 09/23/2021    MCV 99.1 (H) 09/23/2021     09/23/2021         .  Lab Results   Component Value Date    RPR Non-Reactive 07/08/2021         Lab Results   Component Value Date    TSH 1.420 07/08/2021         Lab Results   Component Value Date    MPZZVLVA93 1,366 (H) 07/08/2021         Lab Results   Component Value Date    FOLATE 17.70 07/08/2021         Lab Results   Component Value Date    HGBA1C 5.61 (H) 07/08/2021         Lab Results   Component Value Date    GLUCOSE 100 09/09/2021    BUN 25 (H) 09/09/2021    CREATININE 1.57 (H) 09/09/2021    EGFRIFNONA 42 (L) 09/09/2021    EGFRIFAFRI 53 (L) 07/25/2019    BCR 15.9 09/09/2021    K 4.4 09/09/2021    CO2 24.4 09/09/2021    CALCIUM 9.4 09/09/2021    PROTENTOTREF 6.3 09/09/2021    ALBUMIN 3.5 09/09/2021    ALBUMIN 3.70 09/09/2021    LABIL2 1.3 09/09/2021    AST 14 09/09/2021    ALT 8 09/09/2021         Lab Results   Component Value Date    WBC 10.35 09/23/2021    HGB 13.6 09/23/2021    HCT 43.3 09/23/2021    MCV 99.1 (H) 09/23/2021     09/23/2021         Assessment:   1.  Lumbar spinal stenosis with right-sided radiculopathy and neurogenic claudication, worse at L3/4 -patient does indicate worsening pain since he was last seen  2.  Neuropathy associated with MGUS, symptoms pretty stable  3.  Gait dysfunction/imbalance associated with above diagnoses, stable    Plan:  1.  Patient continues to report worsening low back pain with radiation down into his legs.  He still states pain is worse with prolonged ambulation or standing.  We tried escalating his gabapentin which did not seem to be  terribly beneficial.  Given his age and comorbidities patient is hesitant to pursue surgical interventions.  Instead will refer him to pain management, see if they could offer him anything.   -For now we will continue current dose of gabapentin 600 mg twice daily, again note that given his renal insufficiency I would not want to escalate dose any further    2.  In terms of his neuropathy patient denies any significant progression of the symptoms.  We discussed the follow-up with hematology oncology, looks like he was a no-show for his appointment in March.  Patient states that he must have misunderstood the instruction and did not think he needed to follow-up.  Discussed that certainly for surveillance of the MGUS he should.  Advised to call Dr. Gamboa's office and reschedule, note he will need to have labs done prior to appointment  - Also patient still has not gotten his motor and sensory neuropathy panel, he can have this drawn when he have his labs drawn for hematology oncology    3.  Patient recently completed some PT within his long-term care facility.  Perhaps if he gets a better handle on his pain he could be picked up by them again to further work on gait and balance.      We will plan for follow-up in 6 months or sooner should need arise.     I spent 30 minutes face-to-face with patient/family today, 20 minutes of that time was counseling patient on disease course and plan of care and answering any questions that they had.           Dictated utilizing Dragon dictation.

## 2022-05-17 ENCOUNTER — OFFICE VISIT (OUTPATIENT)
Dept: PAIN MEDICINE | Facility: CLINIC | Age: 87
End: 2022-05-17

## 2022-05-17 ENCOUNTER — PREP FOR SURGERY (OUTPATIENT)
Dept: SURGERY | Facility: SURGERY CENTER | Age: 87
End: 2022-05-17

## 2022-05-17 DIAGNOSIS — M51.26 DISPLACEMENT OF LUMBAR INTERVERTEBRAL DISC WITHOUT MYELOPATHY: ICD-10-CM

## 2022-05-17 DIAGNOSIS — M54.16 LUMBAR RADICULITIS: Primary | ICD-10-CM

## 2022-05-17 PROCEDURE — 99204 OFFICE O/P NEW MOD 45 MIN: CPT | Performed by: ANESTHESIOLOGY

## 2022-05-17 RX ORDER — SODIUM CHLORIDE 0.9 % (FLUSH) 0.9 %
10 SYRINGE (ML) INJECTION EVERY 12 HOURS SCHEDULED
Status: CANCELLED | OUTPATIENT
Start: 2022-05-17

## 2022-05-17 RX ORDER — SODIUM CHLORIDE 0.9 % (FLUSH) 0.9 %
10 SYRINGE (ML) INJECTION AS NEEDED
Status: CANCELLED | OUTPATIENT
Start: 2022-05-17

## 2022-05-18 ENCOUNTER — TELEPHONE (OUTPATIENT)
Dept: NEUROLOGY | Facility: CLINIC | Age: 87
End: 2022-05-18

## 2022-05-18 ENCOUNTER — TELEPHONE (OUTPATIENT)
Dept: PAIN MEDICINE | Facility: CLINIC | Age: 87
End: 2022-05-18

## 2022-05-18 NOTE — TELEPHONE ENCOUNTER
I spoke with the patient already,not sure where the 23rd came in to play. The patient specifically told Dr. Brown the 24th or 26th.

## 2022-05-18 NOTE — TELEPHONE ENCOUNTER
Provider: DOMITILA CAMPOS  Caller: PATIENT  Relationship to Patient: SELF  Pharmacy: NA  Phone Number: [phone]  Reason for Call: PATIENT STATES HE WAS CALLED BY PAIN MANAGEMENT. THEY DID NOT GIVE HIM AN APPOINTMENT AND SAID THEY ARE COMPLETELY FULL. THEY SAID THEY COULD CALL HIM IF SOMEONE CANCELS. PATIENT IS UPSET HE COULD NOT GET THE APPOINTMENT. PLEASE ADVISE.   When was the patient last seen: 5-3-22

## 2022-05-18 NOTE — TELEPHONE ENCOUNTER
HUB UNSUCCESSFULLY ATTEMPTED TO WARM TRANSFER.     Caller: JOSE BUCKNER    Relationship: SELF    Best call back number: [phone]    What is the best time to reach you: ANY    Who are you requesting to speak with (clinical staff, provider,  specific staff member): FRANKIE    What was the call regarding: SCHEDULING A PROCEDURE ON 5/23/2022 @ 10AM    Do you require a callback: NA

## 2022-05-19 ENCOUNTER — TRANSCRIBE ORDERS (OUTPATIENT)
Dept: SURGERY | Facility: SURGERY CENTER | Age: 87
End: 2022-05-19

## 2022-05-19 DIAGNOSIS — Z41.9 SURGERY, ELECTIVE: Primary | ICD-10-CM

## 2022-05-19 PROBLEM — M51.26 DISPLACEMENT OF LUMBAR INTERVERTEBRAL DISC WITHOUT MYELOPATHY: Status: ACTIVE | Noted: 2022-05-19

## 2022-05-20 NOTE — TELEPHONE ENCOUNTER
I spoke with patient, he has another appointment on 5-. Still unsure what message was about, but patient is fine.

## 2022-05-25 ENCOUNTER — HOSPITAL ENCOUNTER (OUTPATIENT)
Facility: SURGERY CENTER | Age: 87
Setting detail: HOSPITAL OUTPATIENT SURGERY
Discharge: HOME | End: 2022-05-25
Attending: ANESTHESIOLOGY | Admitting: ANESTHESIOLOGY

## 2022-05-25 ENCOUNTER — HOSPITAL ENCOUNTER (OUTPATIENT)
Dept: GENERAL RADIOLOGY | Facility: SURGERY CENTER | Age: 87
Setting detail: HOSPITAL OUTPATIENT SURGERY
End: 2022-05-25

## 2022-05-25 VITALS
SYSTOLIC BLOOD PRESSURE: 143 MMHG | OXYGEN SATURATION: 94 % | HEART RATE: 72 BPM | RESPIRATION RATE: 16 BRPM | TEMPERATURE: 98 F | DIASTOLIC BLOOD PRESSURE: 73 MMHG

## 2022-05-25 DIAGNOSIS — Z41.9 SURGERY, ELECTIVE: ICD-10-CM

## 2022-05-25 DIAGNOSIS — M51.26 DISPLACEMENT OF LUMBAR INTERVERTEBRAL DISC WITHOUT MYELOPATHY: ICD-10-CM

## 2022-05-25 DIAGNOSIS — M54.16 LUMBAR RADICULITIS: ICD-10-CM

## 2022-05-25 PROCEDURE — 0 IOHEXOL 300 MG/ML SOLUTION 10 ML VIAL: Performed by: ANESTHESIOLOGY

## 2022-05-25 PROCEDURE — 25010000002 DEXAMETHASONE SODIUM PHOSPHATE 100 MG/10ML SOLUTION 10 ML VIAL: Performed by: ANESTHESIOLOGY

## 2022-05-25 PROCEDURE — 76000 FLUOROSCOPY <1 HR PHYS/QHP: CPT

## 2022-05-25 PROCEDURE — 62323 NJX INTERLAMINAR LMBR/SAC: CPT | Performed by: ANESTHESIOLOGY

## 2022-05-25 PROCEDURE — 77002 NEEDLE LOCALIZATION BY XRAY: CPT

## 2022-05-25 RX ORDER — SODIUM CHLORIDE 0.9 % (FLUSH) 0.9 %
10 SYRINGE (ML) INJECTION AS NEEDED
Status: DISCONTINUED | OUTPATIENT
Start: 2022-05-25 | End: 2022-05-25 | Stop reason: HOSPADM

## 2022-05-25 RX ORDER — SODIUM CHLORIDE 0.9 % (FLUSH) 0.9 %
10 SYRINGE (ML) INJECTION EVERY 12 HOURS SCHEDULED
Status: DISCONTINUED | OUTPATIENT
Start: 2022-05-25 | End: 2022-05-25 | Stop reason: HOSPADM

## 2022-06-23 ENCOUNTER — OFFICE VISIT (OUTPATIENT)
Dept: PAIN MEDICINE | Facility: CLINIC | Age: 87
End: 2022-06-23

## 2022-06-23 VITALS
DIASTOLIC BLOOD PRESSURE: 70 MMHG | SYSTOLIC BLOOD PRESSURE: 115 MMHG | TEMPERATURE: 96.9 F | HEART RATE: 63 BPM | BODY MASS INDEX: 34.05 KG/M2 | RESPIRATION RATE: 18 BRPM | OXYGEN SATURATION: 96 % | WEIGHT: 204.4 LBS | HEIGHT: 65 IN

## 2022-06-23 DIAGNOSIS — M54.16 LUMBAR RADICULITIS: ICD-10-CM

## 2022-06-23 DIAGNOSIS — M51.26 DISPLACEMENT OF LUMBAR INTERVERTEBRAL DISC WITHOUT MYELOPATHY: Primary | ICD-10-CM

## 2022-06-23 PROCEDURE — 99213 OFFICE O/P EST LOW 20 MIN: CPT

## 2022-06-23 NOTE — PROGRESS NOTES
CHIEF COMPLAINT  Back pain    Subjective   Jonathan Trejo is a 87 y.o. male  who presents to the office for follow-up of procedure.  He completed a L3/L4 Interlaminar Lumbar Epidural Steroid Injection on 5/25/2022 performed by Dr. Brown for management of back pain. Patient reports 50% ongoing relief from the procedure. He states that he is able to walk further and engage in physical activity with less pain.    Today pain is 0/10VAS in severity. Pain is generally located in his lower back and radiates into right lateral thigh. Describes this pain as intermittent ache. Pain is worsened by prolonged standing/walking and improves with rest, Gabapentin, and PRN Tylenol. He reports some weakness to bilateral legs but says that he has been doing PT exercises at his assisted living facility to help with this.    Patient remained masked during entire encounter. No cough present. I donned a mask and eye protection throughout entire visit. Prior to donning mask and eye protection, hand hygiene was performed, as well as when it was doffed.  I was closer than 6 feet, but not for an extended period of time. No obvious exposure to any bodily fluids.    Back Pain  This is a chronic problem. The current episode started more than 1 year ago. The problem occurs intermittently. The problem has been gradually improving since onset. The pain is present in the lumbar spine. The quality of the pain is described as aching. The pain radiates to the right thigh (does not go past knee). The pain is at a severity of 0/10. The patient is experiencing no pain. The symptoms are aggravated by standing (prolonged walking/standing). Associated symptoms include numbness (bilateral feet). Pertinent negatives include no chest pain or weakness. Treatments tried: rest, reposition, Gabapentin, Tylenol. The treatment provided moderate relief.      PEG Assessment   What number best describes your pain on average in the past week?4  What number best describes  "how, during the past week, pain has interfered with your enjoyment of life?0  What number best describes how, during the past week, pain has interfered with your general activity?  5    Review of Pertinent Medical Data ---  Reviewed office note from Bonnie Brown MD on 5/17/22. Patient presented as a new patient. He referred by Dunia Quinonez PA-C for a history of chronic back pain. He reports this pain started 2-3 years ago. No inciting event noted. His pain is managed by Tylenol, Voltaren gel, and Gabapentin. He has not tried any other conservative measures for his back pain. He receives shoulder injections every 3 months and has a history of knee surgery.    The following portions of the patient's history were reviewed and updated as appropriate: allergies, current medications, past family history, past medical history, past social history, past surgical history and problem list.    Review of Systems   Constitutional: Negative for fatigue.   HENT: Negative for congestion.    Eyes: Negative for visual disturbance.   Respiratory: Positive for shortness of breath.    Cardiovascular: Negative for chest pain.   Gastrointestinal: Negative for constipation and diarrhea.   Genitourinary: Negative for difficulty urinating.   Musculoskeletal: Positive for gait problem (walker). Negative for back pain.   Neurological: Positive for numbness (bilateral feet). Negative for weakness.   Psychiatric/Behavioral: Negative for sleep disturbance and suicidal ideas. The patient is not nervous/anxious.      I have reviewed and confirmed the accuracy of the ROS as documented by the MA/LPPERLA/RN SAMI Spear     Vitals:    06/23/22 1339   BP: 115/70   Pulse: 63   Resp: 18   Temp: 96.9 °F (36.1 °C)   SpO2: 96%   Weight: 92.7 kg (204 lb 6.4 oz)   Height: 165.1 cm (65\")   PainSc: 0-No pain     Objective   Physical Exam  Constitutional:       Appearance: Normal appearance.   HENT:      Head: Normocephalic.   Cardiovascular:      Rate " and Rhythm: Normal rate and regular rhythm.   Pulmonary:      Effort: Pulmonary effort is normal.      Breath sounds: Normal breath sounds.   Musculoskeletal:      Cervical back: Normal range of motion.      Lumbar back: Tenderness present. Decreased range of motion. Positive right straight leg raise test.   Skin:     General: Skin is warm and dry.      Capillary Refill: Capillary refill takes less than 2 seconds.   Neurological:      General: No focal deficit present.      Mental Status: He is alert and oriented to person, place, and time.      Gait: Gait abnormal (walker).   Psychiatric:         Mood and Affect: Mood normal.         Behavior: Behavior normal.         Thought Content: Thought content normal.         Cognition and Memory: Cognition normal.         Judgment: Judgment normal.       Assessment & Plan   Diagnoses and all orders for this visit:    1. Displacement of lumbar intervertebral disc without myelopathy (Primary)    2. Lumbar radiculitis    --- Continue with Gabapetin regimen (prescribed by another provider)  --- Continue with PT exercises as tolerated  --- Repeat L3-L4 LESI as needed   --- Follow-up as needed for pain and/or procedures      I spent greater than 20 minutes caring for Jonathan on this date of service. This time includes time spent by me in the following activities: preparing for the visit, reviewing tests, obtaining and/or reviewing a separately obtained history, performing a medically appropriate examination and/or evaluation, counseling and educating the patient/family/caregiver, documenting information in the medical record, independently interpreting results and communicating that information with the patient/family/caregiver and care coordination     MERISSA REPORT  As the clinician, I personally reviewed the MERISSA from 6/23/22 while the patient was in the office today (Scanned into chart).    Dictated utilizing Dragon dictation.      This document is intended for medical expert  use only. Reading of this document by patients and/or patient's family without participating medical staff guidance may result in misinterpretation and unintended morbidity.   Any interpretation of such data is the responsibility of the patient and/or family member responsible for the patient in concert with their primary or specialist providers, not to be left for sources of online searches such as [employer], [employer] or similar queries. Relying on these approaches to knowledge may result in misinterpretation, misguided goals of care and even death should patients or family members try recommendations outside of the realm of professional medical care in a supervised way.

## 2022-09-13 ENCOUNTER — TELEPHONE (OUTPATIENT)
Dept: NEUROLOGY | Facility: CLINIC | Age: 87
End: 2022-09-13

## 2022-09-13 NOTE — TELEPHONE ENCOUNTER
PT IS SCHEDULED FOR APPT ON 2-20-22 W/DR JAMES AS NEW PT BUT THE APPT SHOULD BE DONE AS FOLLOW UP.    WHEN PT CALLS BACK, PLEASE RESCHEDULE AS F/U.

## 2023-02-20 ENCOUNTER — LAB (OUTPATIENT)
Dept: LAB | Facility: HOSPITAL | Age: 88
End: 2023-02-20
Payer: MEDICARE

## 2023-02-20 ENCOUNTER — OFFICE VISIT (OUTPATIENT)
Dept: NEUROLOGY | Facility: CLINIC | Age: 88
End: 2023-02-20
Payer: MEDICARE

## 2023-02-20 VITALS
HEART RATE: 54 BPM | SYSTOLIC BLOOD PRESSURE: 132 MMHG | BODY MASS INDEX: 34.01 KG/M2 | DIASTOLIC BLOOD PRESSURE: 74 MMHG | OXYGEN SATURATION: 94 % | HEIGHT: 65 IN

## 2023-02-20 DIAGNOSIS — D47.2 NEUROPATHY ASSOCIATED WITH MONOCLONAL GAMMOPATHY OF UNKNOWN SIGNIFICANCE (MGUS): ICD-10-CM

## 2023-02-20 DIAGNOSIS — D47.2 NEUROPATHY ASSOCIATED WITH MONOCLONAL GAMMOPATHY OF UNKNOWN SIGNIFICANCE (MGUS): Primary | ICD-10-CM

## 2023-02-20 DIAGNOSIS — G63 NEUROPATHY ASSOCIATED WITH MONOCLONAL GAMMOPATHY OF UNKNOWN SIGNIFICANCE (MGUS): ICD-10-CM

## 2023-02-20 DIAGNOSIS — G63 NEUROPATHY ASSOCIATED WITH MONOCLONAL GAMMOPATHY OF UNKNOWN SIGNIFICANCE (MGUS): Primary | ICD-10-CM

## 2023-02-20 PROCEDURE — 82784 ASSAY IGA/IGD/IGG/IGM EACH: CPT

## 2023-02-20 PROCEDURE — 84155 ASSAY OF PROTEIN SERUM: CPT | Performed by: PSYCHIATRY & NEUROLOGY

## 2023-02-20 PROCEDURE — 99214 OFFICE O/P EST MOD 30 MIN: CPT | Performed by: PSYCHIATRY & NEUROLOGY

## 2023-02-20 PROCEDURE — 84165 PROTEIN E-PHORESIS SERUM: CPT | Performed by: PSYCHIATRY & NEUROLOGY

## 2023-02-20 PROCEDURE — 36415 COLL VENOUS BLD VENIPUNCTURE: CPT

## 2023-02-20 PROCEDURE — 86334 IMMUNOFIX E-PHORESIS SERUM: CPT

## 2023-02-20 RX ORDER — GABAPENTIN 600 MG/1
600 TABLET ORAL 2 TIMES DAILY
COMMUNITY

## 2023-02-20 RX ORDER — SENNOSIDES 8.6 MG
650 CAPSULE ORAL 2 TIMES DAILY
COMMUNITY
End: 2023-03-30 | Stop reason: HOSPADM

## 2023-02-20 NOTE — PROGRESS NOTES
CC: Peripheral neuropathy associated with an MGUS    HPI:  Jonathan Trejo is a  87 y.o. right-handed white male who I am seeing in follow-up with neuropathy associated with an MGUS.  The patient was seen last 5/3/2022 by WILDA Ortiz with the following history taken from that note with additions/modifications as indicated:    He has peripheral neuropathy as well as lumbar stenosis with neurogenic claudication.  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 Patient ambulates with rolling walker because of predominantly right-sided posterior hip/buttock pain.  He states the pain in his right hip/buttock becomes especially noticeable with prolonged ambulation, he also states associated with this pain he has some radiation down his posterior right leg toward his knee.  Additionally, he does indicate the right leg seems to be weaker than the left.   He also endorses some low back pain, this too is worse with prolonged standing or activity.  Work-up to date has included an MRI of his lumbar spine from 2019, images are unavailable but reports conclude that some central stenosis, specifically canal is 7 mm at L3/4 with severe right-sided foraminal stenosis which included a synovial cyst occupying some of that space.  There was moderate foraminal stenosis on the left.  There is lipomatosis at L5/S1 with moderate to severe neuroforaminal stenoses bilaterally.        He has peripheral neuropathy, patient describes the numbness/tingling sensation in the feet remains fairly mild.   Denies any symptoms in his hands. He still endorses an abnormal sensation in his feet as though he is walking on a brush or Brillo pad.  There has been no major changes in his gait or balance, no recent falls.  Still ambulating with rolling walker.  His work-up for this has included labs done  for treatable causes including:   Cryoglobulins negative  RPR non-reactive  Copper and heavy metals all within normal limits  Hemoglobin A1c normal at 5.6,   Vitamin B12 and folate 1366 and 17.7 respectively,   TSH and free T4 1.4 and 1.25 respectively,   Sed rate normal at 16.    CMP did show some impaired renal function, patient does have known CKD, his creatinine was 1.44 and GFR 47, BUN 22.    SPE/IEP demonstrates an MGUS IgG kappa at concentration 0.3 g/dL    A follow-up SPE/IEP demonstrated the same MGUS with concentration of 0.2 g/dL.    Patient indicates that he has increasing fatigue and shortness of breath.  He has had shortness of breath for at least a couple of years he states.  He does not get short of breath lying supine.  He denies any other chest discomfort or neck or throat discomfort.  He describes numbness in the legs and the feet he denies a cough either dry, productive or hemoptysis.  Feels like his legs are getting weaker.  Some pain in the back present with ambulation as noted.         Past Medical History:   Diagnosis Date   • Abnormal electrocardiogram    • Arm pain    • Atrial fibrillation (HCC)    • Atrial flutter (HCC)    • BPH (benign prostatic hyperplasia)    • BPH associated with nocturia    • Chronic kidney disease    • Colon cancer (HCC)     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   • Colon polyp 11/22/2015   • Depression    • Dyspnea    • Enlarged prostate without lower urinary tract symptoms (luts)    • Episode of generalized weakness     knees were weak - had to be helped by wife to sit down   • Esophagitis, reflux    • Fatigue    • Fracture of ankle     right   • GERD (gastroesophageal reflux disease)    • Hyperlipidemia    • Inguinal hernia    • Loss of hearing    • Lumbar radiculopathy    • Obesity    • Osteoarthritis cervical spine     doc on [facility] 08/16   • Osteopenia    • Osteoporosis    • Overweight    • Tobacco dependence in remission    • Urge incontinence of urine    •  Vitamin D deficiency    • Wheezing          Past Surgical History:   Procedure Laterality Date   • CARDIAC CATHETERIZATION N/A 7/27/2017    Procedure: Valve assessment;  Surgeon: Adonis Solis MD;  Location:  [facility] CATH INVASIVE LOCATION;  Service:    • CARDIAC CATHETERIZATION N/A 10/1/2018    Procedure: Left Heart Cath;  Surgeon: Bogdan Vargas MD;  Location[facility] CATH [facility];  Service: Cardiology   • CARDIAC CATHETERIZATION N/A 10/1/2018    Procedure: Coronary angiography;  Surgeon: Bogdan Vargas MD;  Location[facility] C[facility];  Service: Cardiology   • CARDIAC CATHETERIZATION N/A 10/1/2018    Procedure: Left ventriculography;  Surgeon: Bogdan Vargas MD;  Location[facility] C[facility];  Service: Cardiology   • CARDIAC CATHETERIZATION N/A 10/1/2018    Procedure: Right Heart Cath;  Surgeon: Bogdan Vargas MD;  [facility];  Service: Cardiology   • CARDIAC ELECTROPHYSIOLOGY PROCEDURE N/A 7/27/2017    Procedure: Ablation atrial flutter Will need to have 3 -4 weeks of therapeutic INR's ;  Surgeon: Adonis Solis MD;  Location:  [facility] CATH INVASIVE LOCATION;  Service:    • CARDIOVERSION     • CATARACT EXTRACTION W/ INTRAOCULAR LENS  IMPLANT, BILATERAL     • COLON RESECTION     • COLON SURGERY      polyp removed   • COLONOSCOPY      07/15 redo 5 years  [facility]   • KNEE SURGERY      benign tumor removed, age 6   • LASIK     • LUMBAR EPIDURAL INJECTION N/A 5/25/2022    Procedure: LUMBAR EPIDURAL 1ST VISIT L3-4 (right of midline);  Surgeon: Bonnie Brown MD;  Location: [facility] OR;  Service: Pain Management;  Laterality: N/A;   • REFRACTIVE SURGERY  2007   • REPAIR PERONEAL TENDONS ANKLE W/ FIBULAR OSTEOTOMY Right 03/2013    Dr. Banks           Current Outpatient Medications:   •  acetaminophen (8 Hour Arthritis Pain) 650 MG 8 hr tablet, Take 650 mg by mouth Every 8 (Eight) Hours As Needed for Mild Pain., Disp: , Rfl:   •   acetaminophen (TYLENOL) 325 MG tablet, Take 650 mg by mouth Every 6 (Six) Hours As Needed for Mild Pain ., Disp: , Rfl:   •  aspirin 81 MG chewable tablet, Chew 81 mg Daily., Disp: , Rfl:   •  atorvastatin (LIPITOR) 10 MG tablet, TAKE 1 TABLET BY MOUTH DAILY., Disp: 90 tablet, Rfl: 1  •  Cholecalciferol (VITAMIN D3) 2000 UNITS tablet, Take 2,000 Units by mouth daily., Disp: , Rfl:   •  escitalopram (LEXAPRO) 10 MG tablet, , Disp: , Rfl:   •  gabapentin (NEURONTIN) 300 MG capsule, Take 300 mg by mouth 2 (Two) Times a Day., Disp: , Rfl:   •  Misc. Devices (Rollator Ultra-Light) misc, 1 Units Daily., Disp: 1 each, Rfl: 0  •  Myrbetriq 25 MG tablet sustained-release 24 hour 24 hr tablet, , Disp: , Rfl:   •  omeprazole (priLOSEC) 40 MG capsule, TAKE 1 CAPSULE BY MOUTH DAILY., Disp: 30 capsule, Rfl: 1  •  vitamin B-12 (CYANOCOBALAMIN) 1000 MCG tablet, Take 1 tablet by mouth Daily. For b12 def, Disp: 90 tablet, Rfl: 3  •  Flaxseed, Linseed, (FLAXSEED OIL) 1000 MG capsule, Take 1,000 mg by mouth Daily., Disp: , Rfl:   •  gabapentin (Neurontin) 600 MG tablet, Take 1 tablet by mouth 2 (Two) Times a Day for 30 days., Disp: 60 tablet, Rfl: 2  •  mirtazapine (REMERON) 7.5 MG tablet, , Disp: , Rfl:       Family History   Problem Relation Age of Onset   • Breast cancer Mother    • Heart disease Father    • Hypertension Father    • Heart attack Father    • Hypertension Sister          Social History     Socioeconomic History   • Marital status:    • Number of children: 2   • Years of education: COLLEGE GRADUATE   Tobacco Use   • Smoking status: Former     Packs/day: 0.50     Years: 55.00     Pack years: 27.50     Types: Cigarettes     Quit date: 2008     Years since quittin.8   • Smokeless tobacco: Never   • Tobacco comments:     QUIT 10 YRS   Substance and Sexual Activity   • Alcohol use: Yes     Comment: SOCIALLY   • Drug use: No   • Sexual activity: Defer         No Known Allergies      Pain Scale: 8/10 mostly  "back        ROS:  Review of Systems   Musculoskeletal: Positive for back pain and neck pain. Negative for gait problem (uses walker when going out).   Neurological: Positive for weakness (legs). Negative for dizziness, tremors, seizures, syncope, facial asymmetry, speech difficulty, light-headedness, numbness (sometimes in  bottom of feet) and headaches.         I have reviewed and agree with the above ROS completed by the medical assistant.      Physical Exam:  Vitals:    02/20/23 1454   Height: 165.1 cm (65\")     Orthostatic BP:    Body mass index is 34.01 kg/m².    Physical Exam  General: Obese white male no acute distress  HEENT: Normocephalic no evidence of trauma.  Neck: Supple  Heart: Regular rate and rhythm  Extremities: Some pedal edema present      Neurological Exam:   Mental Status: Awake, alert, oriented to person, place and time.  Conversant without evidence of an affective disorder, thought disorder, delusions or hallucinations.  Attention span and concentration are normal.  HCF: No aphasia, apraxia or dysarthria.  Recent and remote memory intact.  Knowledge   of recent events intact.  CN: I:   II: Visual fields full without left inattention   III, IV, VI: Eye movements intact without nystagmus or ptosis.  Pupils equal    round and reactive to light.   V,VII: Light touch and pinprick intact all 3 divisions of V.  Facial muscles symmetrical.   VIII: Hearing intact to finger rub   IX,X: Soft palate elevates symmetrically   XI: Sternomastoid and trapezius are strong.   XII: Tongue midline without atrophy or fasciculations  Motor: Normal tone and bulk in the upper and lower extremities   Power testing: Mild weakness of intrinsic hand muscles and ankle dorsiflexion and toe dorsiflexion  Reflexes: Upper extremities: +1 diffusely        Lower extremities: Trace        Toe signs: Downgoing  Sensory: Light touch:        Pinprick:        Vibration:        Position:    Cerebellar: Finger-to-nose: Intact           " Rapid movement: Intact           Heel-to-shin: Intact  Gait and Station: Uses a rolling walker.  Broad-based.    Results:      Lab Results   Component Value Date    GLUCOSE 100 09/09/2021    BUN 25 (H) 09/09/2021    CREATININE 1.57 (H) 09/09/2021    EGFRIFNONA 42 (L) 09/09/2021    EGFRIFAFRI 53 (L) 07/25/2019    BCR 15.9 09/09/2021    CO2 24.4 09/09/2021    CALCIUM 9.4 09/09/2021    PROTENTOTREF 6.3 09/09/2021    ALBUMIN 3.5 09/09/2021    ALBUMIN 3.70 09/09/2021    LABIL2 1.3 09/09/2021    AST 14 09/09/2021    ALT 8 09/09/2021       Lab Results   Component Value Date    WBC 10.35 09/23/2021    HGB 13.6 09/23/2021    HCT 43.3 09/23/2021    MCV 99.1 (H) 09/23/2021     09/23/2021         .  Lab Results   Component Value Date    RPR Non-Reactive 07/08/2021         Lab Results   Component Value Date    TSH 1.420 07/08/2021         Lab Results   Component Value Date    DIAXEJCA26 1,366 (H) 07/08/2021         Lab Results   Component Value Date    FOLATE 17.70 07/08/2021         Lab Results   Component Value Date    HGBA1C 5.61 (H) 07/08/2021         Lab Results   Component Value Date    GLUCOSE 100 09/09/2021    BUN 25 (H) 09/09/2021    CREATININE 1.57 (H) 09/09/2021    EGFRIFNONA 42 (L) 09/09/2021    EGFRIFAFRI 53 (L) 07/25/2019    BCR 15.9 09/09/2021    K 4.4 09/09/2021    CO2 24.4 09/09/2021    CALCIUM 9.4 09/09/2021    PROTENTOTREF 6.3 09/09/2021    ALBUMIN 3.5 09/09/2021    ALBUMIN 3.70 09/09/2021    LABIL2 1.3 09/09/2021    AST 14 09/09/2021    ALT 8 09/09/2021         Lab Results   Component Value Date    WBC 10.35 09/23/2021    HGB 13.6 09/23/2021    HCT 43.3 09/23/2021    MCV 99.1 (H) 09/23/2021     09/23/2021             Assessment:   1.  Peripheral neuropathy associated with an MGUS-not significantly worse.  Dr. Gamboa saw the patient recommended a bone marrow which the patient declined  2.  Neurogenic claudication due to multilevel degenerative disc disease  3.  Shortness of breath and  fatigability-history does not suggest heart failure          Plan:  1.  Continue gabapentin  2.  Given age of 87 with other medical issues doubt that he would ever be considered for lumbar spine surgery  3.  Recheck SPEP and IEP  4.  Follow-up in 6 months with Betty Serrano NP            Time: 30 minutes          Dictated utilizing Dragon dictation.

## 2023-02-21 LAB
ALBUMIN SERPL ELPH-MCNC: 3.7 G/DL (ref 2.9–4.4)
ALBUMIN/GLOB SERPL: 1.3 {RATIO} (ref 0.7–1.7)
ALPHA1 GLOB SERPL ELPH-MCNC: 0.2 G/DL (ref 0–0.4)
ALPHA2 GLOB SERPL ELPH-MCNC: 0.8 G/DL (ref 0.4–1)
B-GLOBULIN SERPL ELPH-MCNC: 1 G/DL (ref 0.7–1.3)
GAMMA GLOB SERPL ELPH-MCNC: 0.8 G/DL (ref 0.4–1.8)
GLOBULIN SER CALC-MCNC: 2.8 G/DL (ref 2.2–3.9)
IGA SERPL-MCNC: 143 MG/DL (ref 61–437)
IGG SERPL-MCNC: 943 MG/DL (ref 603–1613)
IGM SERPL-MCNC: 74 MG/DL (ref 15–143)
LABORATORY COMMENT REPORT: ABNORMAL
M PROTEIN SERPL ELPH-MCNC: 0.2 G/DL
PROT PATTERN SERPL ELPH-IMP: ABNORMAL
PROT PATTERN SERPL IFE-IMP: ABNORMAL
PROT SERPL-MCNC: 6.5 G/DL (ref 6–8.5)

## 2023-03-27 ENCOUNTER — APPOINTMENT (OUTPATIENT)
Dept: CT IMAGING | Facility: HOSPITAL | Age: 88
End: 2023-03-27
Payer: MEDICARE

## 2023-03-27 ENCOUNTER — HOSPITAL ENCOUNTER (OUTPATIENT)
Facility: HOSPITAL | Age: 88
Setting detail: OBSERVATION
Discharge: HOME | End: 2023-03-30
Attending: EMERGENCY MEDICINE | Admitting: HOSPITALIST
Payer: MEDICARE

## 2023-03-27 DIAGNOSIS — D64.9 ACUTE ANEMIA: ICD-10-CM

## 2023-03-27 DIAGNOSIS — M48.062 SPINAL STENOSIS OF LUMBAR REGION WITH NEUROGENIC CLAUDICATION: Primary | ICD-10-CM

## 2023-03-27 DIAGNOSIS — M54.50 ACUTE RIGHT-SIDED LOW BACK PAIN WITHOUT SCIATICA: ICD-10-CM

## 2023-03-27 DIAGNOSIS — S32.030A COMPRESSION FRACTURE OF L3 VERTEBRA, INITIAL ENCOUNTER: ICD-10-CM

## 2023-03-27 DIAGNOSIS — N18.9 CHRONIC RENAL IMPAIRMENT, UNSPECIFIED CKD STAGE: ICD-10-CM

## 2023-03-27 PROBLEM — S32.040A CLOSED COMPRESSION FRACTURE OF L4 LUMBAR VERTEBRA, INITIAL ENCOUNTER: Status: ACTIVE | Noted: 2023-03-27

## 2023-03-27 LAB
ALBUMIN SERPL-MCNC: 3.6 G/DL (ref 3.5–5.2)
ALBUMIN/GLOB SERPL: 1.4 G/DL
ALP SERPL-CCNC: 85 U/L (ref 39–117)
ALT SERPL W P-5'-P-CCNC: 12 U/L (ref 1–41)
ANION GAP SERPL CALCULATED.3IONS-SCNC: 11 MMOL/L (ref 5–15)
AST SERPL-CCNC: 13 U/L (ref 1–40)
BASOPHILS # BLD AUTO: 0.06 10*3/MM3 (ref 0–0.2)
BASOPHILS NFR BLD AUTO: 0.7 % (ref 0–1.5)
BILIRUB SERPL-MCNC: 0.6 MG/DL (ref 0–1.2)
BILIRUB UR QL STRIP: NEGATIVE
BUN SERPL-MCNC: 22 MG/DL (ref 8–23)
BUN/CREAT SERPL: 16.4 (ref 7–25)
CALCIUM SPEC-SCNC: 8.8 MG/DL (ref 8.6–10.5)
CHLORIDE SERPL-SCNC: 109 MMOL/L (ref 98–107)
CLARITY UR: CLEAR
CO2 SERPL-SCNC: 20 MMOL/L (ref 22–29)
COLOR UR: YELLOW
CREAT SERPL-MCNC: 1.34 MG/DL (ref 0.76–1.27)
DEPRECATED RDW RBC AUTO: 43.9 FL (ref 37–54)
EGFRCR SERPLBLD CKD-EPI 2021: 51.6 ML/MIN/1.73
EOSINOPHIL # BLD AUTO: 0.19 10*3/MM3 (ref 0–0.4)
EOSINOPHIL NFR BLD AUTO: 2.2 % (ref 0.3–6.2)
ERYTHROCYTE [DISTWIDTH] IN BLOOD BY AUTOMATED COUNT: 13.3 % (ref 12.3–15.4)
GLOBULIN UR ELPH-MCNC: 2.6 GM/DL
GLUCOSE SERPL-MCNC: 129 MG/DL (ref 65–99)
GLUCOSE UR STRIP-MCNC: NEGATIVE MG/DL
HCT VFR BLD AUTO: 34.1 % (ref 37.5–51)
HGB BLD-MCNC: 10.6 G/DL (ref 13–17.7)
HGB UR QL STRIP.AUTO: NEGATIVE
IMM GRANULOCYTES # BLD AUTO: 0.08 10*3/MM3 (ref 0–0.05)
IMM GRANULOCYTES NFR BLD AUTO: 0.9 % (ref 0–0.5)
KETONES UR QL STRIP: NEGATIVE
LEUKOCYTE ESTERASE UR QL STRIP.AUTO: NEGATIVE
LYMPHOCYTES # BLD AUTO: 1.18 10*3/MM3 (ref 0.7–3.1)
LYMPHOCYTES NFR BLD AUTO: 13.8 % (ref 19.6–45.3)
MCH RBC QN AUTO: 28.3 PG (ref 26.6–33)
MCHC RBC AUTO-ENTMCNC: 31.1 G/DL (ref 31.5–35.7)
MCV RBC AUTO: 90.9 FL (ref 79–97)
MONOCYTES # BLD AUTO: 1.08 10*3/MM3 (ref 0.1–0.9)
MONOCYTES NFR BLD AUTO: 12.6 % (ref 5–12)
NEUTROPHILS NFR BLD AUTO: 5.97 10*3/MM3 (ref 1.7–7)
NEUTROPHILS NFR BLD AUTO: 69.8 % (ref 42.7–76)
NITRITE UR QL STRIP: NEGATIVE
NRBC BLD AUTO-RTO: 0.1 /100 WBC (ref 0–0.2)
PH UR STRIP.AUTO: 5.5 [PH] (ref 5–8)
PLATELET # BLD AUTO: 288 10*3/MM3 (ref 140–450)
PMV BLD AUTO: 11 FL (ref 6–12)
POTASSIUM SERPL-SCNC: 4.3 MMOL/L (ref 3.5–5.2)
PROT SERPL-MCNC: 6.2 G/DL (ref 6–8.5)
PROT UR QL STRIP: NEGATIVE
RBC # BLD AUTO: 3.75 10*6/MM3 (ref 4.14–5.8)
SODIUM SERPL-SCNC: 140 MMOL/L (ref 136–145)
SP GR UR STRIP: 1.02 (ref 1–1.03)
UROBILINOGEN UR QL STRIP: NORMAL
WBC NRBC COR # BLD: 8.56 10*3/MM3 (ref 3.4–10.8)

## 2023-03-27 PROCEDURE — 25010000002 MORPHINE PER 10 MG: Performed by: EMERGENCY MEDICINE

## 2023-03-27 PROCEDURE — 74176 CT ABD & PELVIS W/O CONTRAST: CPT

## 2023-03-27 PROCEDURE — 81003 URINALYSIS AUTO W/O SCOPE: CPT | Performed by: EMERGENCY MEDICINE

## 2023-03-27 PROCEDURE — G0378 HOSPITAL OBSERVATION PER HR: HCPCS

## 2023-03-27 PROCEDURE — 25010000002 ONDANSETRON PER 1 MG: Performed by: EMERGENCY MEDICINE

## 2023-03-27 PROCEDURE — 96374 THER/PROPH/DIAG INJ IV PUSH: CPT

## 2023-03-27 PROCEDURE — 99285 EMERGENCY DEPT VISIT HI MDM: CPT

## 2023-03-27 PROCEDURE — 85025 COMPLETE CBC W/AUTO DIFF WBC: CPT | Performed by: EMERGENCY MEDICINE

## 2023-03-27 PROCEDURE — 96375 TX/PRO/DX INJ NEW DRUG ADDON: CPT

## 2023-03-27 PROCEDURE — 80053 COMPREHEN METABOLIC PANEL: CPT | Performed by: EMERGENCY MEDICINE

## 2023-03-27 RX ORDER — ESCITALOPRAM OXALATE 5 MG/1
10 TABLET ORAL DAILY
Status: DISCONTINUED | OUTPATIENT
Start: 2023-03-27 | End: 2023-03-30 | Stop reason: HOSPADM

## 2023-03-27 RX ORDER — SODIUM CHLORIDE 0.9 % (FLUSH) 0.9 %
10 SYRINGE (ML) INJECTION AS NEEDED
Status: DISCONTINUED | OUTPATIENT
Start: 2023-03-27 | End: 2023-03-30 | Stop reason: HOSPADM

## 2023-03-27 RX ORDER — ATORVASTATIN CALCIUM 20 MG/1
10 TABLET, FILM COATED ORAL DAILY
Status: DISCONTINUED | OUTPATIENT
Start: 2023-03-27 | End: 2023-03-30 | Stop reason: HOSPADM

## 2023-03-27 RX ORDER — ONDANSETRON 4 MG/1
4 TABLET, FILM COATED ORAL EVERY 6 HOURS PRN
Status: DISCONTINUED | OUTPATIENT
Start: 2023-03-27 | End: 2023-03-30 | Stop reason: HOSPADM

## 2023-03-27 RX ORDER — SODIUM CHLORIDE 0.9 % (FLUSH) 0.9 %
10 SYRINGE (ML) INJECTION EVERY 12 HOURS SCHEDULED
Status: DISCONTINUED | OUTPATIENT
Start: 2023-03-27 | End: 2023-03-30 | Stop reason: HOSPADM

## 2023-03-27 RX ORDER — HYDROCODONE BITARTRATE AND ACETAMINOPHEN 5; 325 MG/1; MG/1
1 TABLET ORAL EVERY 4 HOURS PRN
Status: DISCONTINUED | OUTPATIENT
Start: 2023-03-27 | End: 2023-03-30 | Stop reason: HOSPADM

## 2023-03-27 RX ORDER — POLYETHYLENE GLYCOL 3350 17 G/17G
17 POWDER, FOR SOLUTION ORAL DAILY PRN
COMMUNITY

## 2023-03-27 RX ORDER — ASPIRIN 81 MG/1
81 TABLET, CHEWABLE ORAL DAILY
Status: DISCONTINUED | OUTPATIENT
Start: 2023-03-27 | End: 2023-03-30 | Stop reason: HOSPADM

## 2023-03-27 RX ORDER — NALOXONE HCL 0.4 MG/ML
0.4 VIAL (ML) INJECTION
Status: DISCONTINUED | OUTPATIENT
Start: 2023-03-27 | End: 2023-03-30 | Stop reason: HOSPADM

## 2023-03-27 RX ORDER — ONDANSETRON 2 MG/ML
4 INJECTION INTRAMUSCULAR; INTRAVENOUS ONCE
Status: COMPLETED | OUTPATIENT
Start: 2023-03-27 | End: 2023-03-27

## 2023-03-27 RX ORDER — SODIUM CHLORIDE 9 MG/ML
40 INJECTION, SOLUTION INTRAVENOUS AS NEEDED
Status: DISCONTINUED | OUTPATIENT
Start: 2023-03-27 | End: 2023-03-30 | Stop reason: HOSPADM

## 2023-03-27 RX ORDER — ONDANSETRON 2 MG/ML
4 INJECTION INTRAMUSCULAR; INTRAVENOUS EVERY 6 HOURS PRN
Status: DISCONTINUED | OUTPATIENT
Start: 2023-03-27 | End: 2023-03-30 | Stop reason: HOSPADM

## 2023-03-27 RX ORDER — CHOLECALCIFEROL (VITAMIN D3) 125 MCG
1000 CAPSULE ORAL DAILY
Status: DISCONTINUED | OUTPATIENT
Start: 2023-03-27 | End: 2023-03-30 | Stop reason: HOSPADM

## 2023-03-27 RX ORDER — GABAPENTIN 300 MG/1
600 CAPSULE ORAL EVERY 12 HOURS SCHEDULED
Status: DISCONTINUED | OUTPATIENT
Start: 2023-03-27 | End: 2023-03-30 | Stop reason: HOSPADM

## 2023-03-27 RX ORDER — MORPHINE SULFATE 2 MG/ML
2 INJECTION, SOLUTION INTRAMUSCULAR; INTRAVENOUS ONCE
Status: COMPLETED | OUTPATIENT
Start: 2023-03-27 | End: 2023-03-27

## 2023-03-27 RX ORDER — PANTOPRAZOLE SODIUM 40 MG/1
40 TABLET, DELAYED RELEASE ORAL
Status: DISCONTINUED | OUTPATIENT
Start: 2023-03-28 | End: 2023-03-30 | Stop reason: HOSPADM

## 2023-03-27 RX ORDER — ACETAMINOPHEN 650 MG/1
650 SUPPOSITORY RECTAL EVERY 4 HOURS PRN
Status: DISCONTINUED | OUTPATIENT
Start: 2023-03-27 | End: 2023-03-30 | Stop reason: HOSPADM

## 2023-03-27 RX ORDER — ACETAMINOPHEN 325 MG/1
650 TABLET ORAL EVERY 4 HOURS PRN
Status: DISCONTINUED | OUTPATIENT
Start: 2023-03-27 | End: 2023-03-30 | Stop reason: HOSPADM

## 2023-03-27 RX ORDER — ACETAMINOPHEN 160 MG/5ML
650 SOLUTION ORAL EVERY 4 HOURS PRN
Status: DISCONTINUED | OUTPATIENT
Start: 2023-03-27 | End: 2023-03-30 | Stop reason: HOSPADM

## 2023-03-27 RX ORDER — HYDROMORPHONE HYDROCHLORIDE 1 MG/ML
0.5 INJECTION, SOLUTION INTRAMUSCULAR; INTRAVENOUS; SUBCUTANEOUS
Status: DISCONTINUED | OUTPATIENT
Start: 2023-03-27 | End: 2023-03-30 | Stop reason: HOSPADM

## 2023-03-27 RX ADMIN — ONDANSETRON 4 MG: 2 INJECTION INTRAMUSCULAR; INTRAVENOUS at 11:55

## 2023-03-27 RX ADMIN — Medication 10 ML: at 21:03

## 2023-03-27 RX ADMIN — GABAPENTIN 600 MG: 300 CAPSULE ORAL at 21:03

## 2023-03-27 RX ADMIN — MORPHINE SULFATE 2 MG: 2 INJECTION, SOLUTION INTRAMUSCULAR; INTRAVENOUS at 11:57

## 2023-03-27 NOTE — ED NOTES
Nursing report ED to floor  Jonathan Trejo  88 y.o.  male    HPI :   Chief Complaint   Patient presents with    Flank Pain       Admitting doctor:   Marino Lyman MD    Admitting diagnosis:   The primary encounter diagnosis was Acute anemia. Diagnoses of Chronic renal impairment, unspecified CKD stage, Compression fracture of L3 vertebra, initial encounter ([facility]), and Acute right-sided low back pain without sciatica were also pertinent to this visit.    Code status:   Current Code Status       Date Active Code Status Order ID Comments User Context       Prior            Allergies:   Patient has no known allergies.    Isolation:   No active isolations    Intake and Output  No intake or output data in the 24 hours ending 03/27/23 1528    Weight:       03/27/23  1121   Weight: 81.6 kg (180 lb)       Most recent vitals:   Vitals:    03/27/23 1321 03/27/23 1326 03/27/23 1334 03/27/23 1526   BP: 134/79   98/60   BP Location:    Right arm   Patient Position:    Sitting   Pulse: (!) 47 50 51 52   Resp:    16   Temp:    98 °F (36.7 °C)   TempSrc:       SpO2: 95% 92% 91% 91%   Weight:       Height:           Active LDAs/IV Access:   Lines, Drains & Airways       Active LDAs       Name Placement date Placement time Site Days    Peripheral IV 03/27/23 1149 Posterior;Right Hand 03/27/23  1149  Hand  less than 1                    Labs (abnormal labs have a star):   Labs Reviewed   COMPREHENSIVE METABOLIC PANEL - Abnormal; Notable for the following components:       Result Value    Glucose 129 (*)     Creatinine 1.34 (*)     Chloride 109 (*)     CO2 20.0 (*)     eGFR 51.6 (*)     All other components within normal limits    Narrative:     GFR Normal >60  Chronic Kidney Disease <60  Kidney Failure <15    The GFR formula is only valid for adults with stable renal function between ages 18 and 70.   CBC WITH AUTO DIFFERENTIAL - Abnormal; Notable for the following components:    RBC 3.75 (*)     Hemoglobin 10.6 (*)     Hematocrit 34.1  (*)     MCHC 31.1 (*)     Lymphocyte % 13.8 (*)     Monocyte % 12.6 (*)     Immature Grans % 0.9 (*)     Monocytes, Absolute 1.08 (*)     Immature Grans, Absolute 0.08 (*)     All other components within normal limits   URINALYSIS W/ MICROSCOPIC IF INDICATED (NO CULTURE) - Normal    Narrative:     Urine microscopic not indicated.   CBC AND DIFFERENTIAL    Narrative:     The following orders were created for panel order CBC & Differential.  Procedure                               Abnormality         Status                     ---------                               -----------         ------                     CBC Auto Differential[[id number]]        Abnormal            Final result                 Please view results for these tests on the individual orders.       EKG:   No orders to display       Meds given in ED:   Medications   sodium chloride 0.9 % flush 10 mL (has no administration in time range)   morphine injection 2 mg (2 mg Intravenous Given 3/27/23 1157)   ondansetron (ZOFRAN) injection 4 mg (4 mg Intravenous Given 3/27/23 1155)       Imaging results:  CT Abdomen Pelvis Without Contrast    Result Date: 3/27/2023  1.  Superior plate compression deformities of L3 and L4 resulting in approximately 40% loss of height underlying sclerosis. Findings are new since 2017 and concerning for an acute/subacute etiology. Further evaluation with MRI of the lumbar spine is recommended to further characterize. 2.  Left nephrolithiasis without hydronephrosis. 3.  0.7 cm pleural-based nodule within the left lower lobe, grossly unchanged since 02/11/2020. 4.  Small-to-moderate left inguinal hernia containing a short segment of the distal descending/proximal sigmoid colon. 5.  Other findings as above  This report was finalized on 3/27/2023 1:17 PM by Dr. Bryan Calzada M.D.       Ambulatory status:   - UP WITH ASSIST ONLY    Social issues:   Social History     Socioeconomic History    Marital status:     Number of  children: 2    Years of education: COLLEGE GRADUATE   Tobacco Use    Smoking status: Former     Packs/day: 0.50     Years: 55.00     Pack years: 27.50     Types: Cigarettes     Quit date: 2008     Years since quittin.9    Smokeless tobacco: Never    Tobacco comments:     QUIT 10 YRS   Substance and Sexual Activity    Alcohol use: Yes     Comment: SOCIALLY    Drug use: No    Sexual activity: Defer       NIH Stroke Scale:         Angelica Roper RN  23 15:28 EDT

## 2023-03-27 NOTE — H&P
HISTORY AND PHYSICAL   [facility]        Patient Identification:  Name: Jonathan Trejo  Age: 88 y.o.  Sex: male  :  1935  MRN: [id number]                     Primary Care Physician: Coby Walton APRN    Chief Complaint:  Right flank pain  History of Present Illness:       The patient  is a 88 y.o. male with history of having ablation for A-fib flutter, history of BPH, CKD, history of colon polyps, hyperlipidemia, and GERD who presents to the hospital complaining of acute right flank and lower back pain for the last 3 to 4 days.  He states the pain has been constant.  He reports urinary frequency.  He denies any nausea or vomiting.  He denies any diarrhea or constipation.  He denies any prior surgeries on his abdomen.  Nothing makes his pain worse or better.  He reports the pain is severe.  The patient was evaluated in the ER and found to have changes consistent with compression fracture of L3 and L4 which appeared to be new on imaging studies.  The patient was admitted for further evaluation treatment with neurosurgical consultation who was consulted from the emergency room.  Patient was also found to be somewhat anemic.  He denies passing any blood in stool or having any black tarry stools.      Past Medical History:  Past Medical History:   Diagnosis Date   • Abnormal electrocardiogram    • Arm pain    • Atrial fibrillation (HCC)    • Atrial flutter (HCC)    • BPH (benign prostatic hyperplasia)    • BPH associated with nocturia    • Chronic kidney disease    • Colon cancer (HCC)     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   • Colon polyp 2015   • Depression    • Dyspnea    • Enlarged prostate without lower urinary tract symptoms (luts)    • Episode of generalized weakness     knees were weak - had to be helped by wife to sit down   • Esophagitis, reflux    • Fatigue    • Fracture of ankle     right   • GERD (gastroesophageal reflux disease)    • Hyperlipidemia    • Inguinal hernia     • Loss of hearing    • Lumbar radiculopathy    • Obesity    • Osteoarthritis cervical spine     doc on Sray 08/16   • Osteopenia    • Osteoporosis    • Overweight    • Tobacco dependence in remission    • Urge incontinence of urine    • Vitamin D deficiency    • Wheezing      Past Surgical History:  Past Surgical History:   Procedure Laterality Date   • CARDIAC CATHETERIZATION N/A 7/27/2017    Procedure: Valve assessment;  Surgeon: Adonis Solis MD;  Location: [facility] CATH INVASIVE LOCATION;  Service:    • CARDIAC CATHETERIZATION N/A 10/1/2018    Procedure: Left Heart Cath;  Surgeon: Bogdan Vargas MD;  Location: [facility] CATH INVASIVE LOCATION;  Service: Cardiology   • CARDIAC CATHETERIZATION N/A 10/1/2018    Procedure: Coronary angiography;  Surgeon: Bogdan Vargas MD;  Location: [facility] CATH INVASIVE LOCATION;  Service: Cardiology   • CARDIAC CATHETERIZATION N/A 10/1/2018    Procedure: Left ventriculography;  Surgeon: Bogdan Vargas MD;  Location: [facility] CATH INVASIVE LOCATION;  Service: Cardiology   • CARDIAC CATHETERIZATION N/A 10/1/2018    Procedure: Right Heart Cath;  Surgeon: Bogdan Vargsa MD;  Location: [facility] INVASIVE LOCATION;  Service: Cardiology   • CARDIAC ELECTROPHYSIOLOGY PROCEDURE N/A 7/27/2017    Procedure: Ablation atrial flutter Will need to have 3 -4 weeks of therapeutic INR's ;  Surgeon: Adonis Solis MD;  Location: [facility] INVASIVE LOCATION;  Service:    • CARDIOVERSION     • CATARACT EXTRACTION W/ INTRAOCULAR LENS  IMPLANT, BILATERAL     • COLON RESECTION     • COLON SURGERY      polyp removed   • COLONOSCOPY      07/15 redo 5 years  Segun   • KNEE SURGERY      benign tumor removed, age 6   • LASIK     • LUMBAR EPIDURAL INJECTION N/A 5/25/2022    Procedure: LUMBAR EPIDURAL 1ST VISIT L3-4 (right of midline);  Surgeon: Bonnie Brown MD;  Location: [facility] MAIN OR;  Service: Pain Management;  Laterality: N/A;   • REFRACTIVE SURGERY  2007   • REPAIR PERONEAL  TENDONS ANKLE W/ FIBULAR OSTEOTOMY Right 03/2013    Dr. Banks      Home Meds:  (Not in a hospital admission)    Current meds    Current Facility-Administered Medications:   •  [COMPLETED] Insert Peripheral IV, , , Once **AND** sodium chloride 0.9 % flush 10 mL, 10 mL, Intravenous, PRN, Darryn Johnson MD    Current Outpatient Medications:   •  acetaminophen (TYLENOL) 325 MG tablet, Take 2 tablets by mouth Every 6 (Six) Hours As Needed for Mild Pain., Disp: , Rfl:   •  acetaminophen (TYLENOL) 650 MG 8 hr tablet, Take 1 tablet by mouth 2 (Two) Times a Day., Disp: , Rfl:   •  aspirin 81 MG chewable tablet, Chew 1 tablet Daily., Disp: , Rfl:   •  atorvastatin (LIPITOR) 10 MG tablet, TAKE 1 TABLET BY MOUTH DAILY., Disp: 90 tablet, Rfl: 1  •  Cholecalciferol (VITAMIN D3) 2000 UNITS tablet, Take 1 tablet by mouth Daily., Disp: , Rfl:   •  Diclofenac Sodium (VOLTAREN) 1 % gel gel, Apply 4 g topically to the appropriate area as directed 3 (Three) Times a Day. Apply to right shoulder, Disp: , Rfl:   •  escitalopram (LEXAPRO) 10 MG tablet, Take 1 tablet by mouth Daily., Disp: , Rfl:   •  gabapentin (NEURONTIN) 600 MG tablet, Take 1 tablet by mouth 2 (Two) Times a Day., Disp: , Rfl:   •  magnesium hydroxide (MILK OF MAGNESIA) 400 MG/5ML suspension, Take 30 mL by mouth Daily As Needed for Constipation., Disp: , Rfl:   •  omeprazole (priLOSEC) 40 MG capsule, TAKE 1 CAPSULE BY MOUTH DAILY., Disp: 30 capsule, Rfl: 1  •  polyethylene glycol (MIRALAX) 17 g packet, Take 17 g by mouth Daily As Needed., Disp: , Rfl:   •  vitamin B-12 (CYANOCOBALAMIN) 1000 MCG tablet, Take 1 tablet by mouth Daily. For b12 def (Patient taking differently: Take 1 tablet by mouth Daily.), Disp: 90 tablet, Rfl: 3  Allergies:  No Known Allergies  Immunizations:  Immunization History   Administered Date(s) Administered   • COVID-19 (PFIZER) PURPLE CAP 12/29/2020, 02/10/2021   • Pneumococcal Conjugate 13-Valent (PCV13) 01/01/2016, 04/04/2016   • Pneumococcal  Polysaccharide (PPSV23) 2004   • Pneumococcal, Unspecified 02/15/2004   • Td 1998   • Tdap 2014, 10/19/2020   • Zostavax 03/15/2009     Social History:   Social History     Social History Narrative   • Not on file     Social History     Socioeconomic History   • Marital status:    • Number of children: 2   • Years of education: COLLEGE GRADUATE   Tobacco Use   • Smoking status: Former     Packs/day: 0.50     Years: 55.00     Pack years: 27.50     Types: Cigarettes     Quit date: 2008     Years since quittin.9   • Smokeless tobacco: Never   • Tobacco comments:     QUIT 10 YRS   Substance and Sexual Activity   • Alcohol use: Yes     Comment: SOCIALLY   • Drug use: No   • Sexual activity: Defer       Family History:  Family History   Problem Relation Age of Onset   • Breast cancer Mother    • Heart disease Father    • Hypertension Father    • Heart attack Father    • Hypertension Sister         Review of Systems  See history of present illness and past medical history.  Patient denies headache, dizziness, syncope, falls, trauma, change in vision, change in hearing, change in taste, changes in weight, changes in appetite, focal weakness, numbness, or paresthesia.  Patient denies chest pain, palpitations, dyspnea, orthopnea, PND, cough, sinus pressure, rhinorrhea, epistaxis, hemoptysis, nausea, vomiting, hematemesis, diarrhea, constipation or hematchezia.  Denies cold or heat intolerance, polydipsia, polyuria, polyphagia. Denies hematuria, pyuria, dysuria, hesitancy, frequency or urgency.   Denies fever, chills, sweats, night sweats.  Denies missing any routine medications. Remainder of ROS is negative.    Objective:  tMax 24 hrs: Temp (24hrs), Av.7 °F (36.5 °C), Min:97.3 °F (36.3 °C), Max:98 °F (36.7 °C)    Vitals Ranges:   Temp:  [97.3 °F (36.3 °C)-98 °F (36.7 °C)] 98 °F (36.7 °C)  Heart Rate:  [45-67] 52  Resp:  [16-18] 16  BP: ()/(60-79) 98/60      Exam:  BP 98/60 (BP  "Location: Right arm, Patient Position: Sitting)   Pulse 52   Temp 98 °F (36.7 °C)   Resp 16   Ht 170.2 cm (67\")   Wt 81.6 kg (180 lb)   SpO2 91%   BMI 28.19 kg/m²     General Appearance:    Alert, cooperative, no distress, appears stated age   Head:    Normocephalic, without obvious abnormality, atraumatic   Eyes:    PERRL, conjunctivae/corneas clear, EOM's intact, both eyes   Ears:    Normal external ear canals, both ears   Nose:   Nares normal, septum midline, mucosa normal, no drainage    or sinus tenderness   Throat:   Lips, mucosa, and tongue normal   Neck:   Supple, symmetrical, trachea midline, no adenopathy;     thyroid:  no enlargement/tenderness/nodules; no carotid    bruit or JVD   Back:     Symmetric, no curvature, ROM normal, no CVA tenderness   Lungs:     Clear to auscultation bilaterally, respirations unlabored   Chest Wall:    No tenderness or deformity    Heart:    Regular rate and rhythm, S1 and S2 normal, no murmur, rub   or gallop   Abdomen:     Soft, nontender, bowel sounds active all four quadrants,     no masses, no hepatomegaly, no splenomegaly   Extremities:   Extremities normal, atraumatic, no cyanosis or edema   Pulses:   2+ and symmetric all extremities   Skin:   Skin color, texture, turgor normal, no rashes or lesions   Lymph nodes:   Cervical, supraclavicular, and axillary nodes normal   Neurologic:   CNII-XII intact, normal strength, sensation intact throughout      .    Data Review:  Lab Results (last 72 hours)     Procedure Component Value Units Date/Time    Urinalysis With Microscopic If Indicated (No Culture) - Urine, Clean Catch [[id number]]  (Normal) Collected: 03/27/23 1333    Specimen: Urine, Clean Catch Updated: 03/27/23 1359     Color, UA Yellow     Appearance, UA Clear     pH, UA 5.5     Specific Gravity, UA 1.018     Glucose, UA Negative     Ketones, UA Negative     Bilirubin, UA Negative     Blood, UA Negative     Protein, UA Negative     Leuk Esterase, UA Negative "     Nitrite, UA Negative     Urobilinogen, UA 1.0 E.U./dL    Narrative:      Urine microscopic not indicated.    Comprehensive Metabolic Panel [[id number]]  (Abnormal) Collected: 03/27/23 1150    Specimen: Blood Updated: 03/27/23 1301     Glucose 129 mg/dL      BUN 22 mg/dL      Creatinine 1.34 mg/dL      Sodium 140 mmol/L      Potassium 4.3 mmol/L      Comment: Slight hemolysis detected by analyzer. Results may be affected.        Chloride 109 mmol/L      CO2 20.0 mmol/L      Calcium 8.8 mg/dL      Total Protein 6.2 g/dL      Albumin 3.6 g/dL      ALT (SGPT) 12 U/L      AST (SGOT) 13 U/L      Alkaline Phosphatase 85 U/L      Total Bilirubin 0.6 mg/dL      Globulin 2.6 gm/dL      A/G Ratio 1.4 g/dL      BUN/Creatinine Ratio 16.4     Anion Gap 11.0 mmol/L      eGFR 51.6 mL/min/1.73     Narrative:      GFR Normal >60  Chronic Kidney Disease <60  Kidney Failure <15    The GFR formula is only valid for adults with stable renal function between ages 18 and 70.    CBC & Differential [[id number]]  (Abnormal) Collected: 03/27/23 1150    Specimen: Blood Updated: 03/27/23 1220    Narrative:      The following orders were created for panel order CBC & Differential.  Procedure                               Abnormality         Status                     ---------                               -----------         ------                     CBC Auto Differential[[id number]]        Abnormal            Final result                 Please view results for these tests on the individual orders.    CBC Auto Differential [[id number]]  (Abnormal) Collected: 03/27/23 1150    Specimen: Blood Updated: 03/27/23 1220     WBC 8.56 10*3/mm3      RBC 3.75 10*6/mm3      Hemoglobin 10.6 g/dL      Hematocrit 34.1 %      MCV 90.9 fL      MCH 28.3 pg      MCHC 31.1 g/dL      RDW 13.3 %      RDW-SD 43.9 fl      MPV 11.0 fL      Platelets 288 10*3/mm3      Neutrophil % 69.8 %      Lymphocyte % 13.8 %      Monocyte % 12.6 %      Eosinophil % 2.2 %       Basophil % 0.7 %      Immature Grans % 0.9 %      Neutrophils, Absolute 5.97 10*3/mm3      Lymphocytes, Absolute 1.18 10*3/mm3      Monocytes, Absolute 1.08 10*3/mm3      Eosinophils, Absolute 0.19 10*3/mm3      Basophils, Absolute 0.06 10*3/mm3      Immature Grans, Absolute 0.08 10*3/mm3      nRBC 0.1 /100 WBC                    Imaging Results (All)     Procedure Component Value Units Date/Time    CT Abdomen Pelvis Without Contrast [[id number]] Collected: 03/27/23 1221     Updated: 03/27/23 1320    Narrative:      CT ABDOMEN AND PELVIS WITHOUT IV CONTRAST     HISTORY: Right flank pain for 2 days     TECHNIQUE: Radiation dose reduction techniques were utilized, including  automated exposure control and exposure modulation based on body size.   3 mm images were obtained through the abdomen and pelvis without IV  contrast.      COMPARISON: High-resolution chest CT 02/11/2020 and lumbar spine CT  12/15/2017     FINDINGS:  0.7 cm pleural based nodule within the left lower lobe is present, as  before. Findings suggestive of chronic interstitial lung disease within  the lung bases which is overall not well evaluated. There are no  findings of small bowel obstruction. Postsurgical changes from right  hemicolectomy are present.     Subcentimeter hepatic lesions are too small to characterize. The  gallbladder, pancreas, spleen and adrenal glands have an unremarkable  noncontrast CT appearance. Subcentimeter renal lesions are too small to  characterize. Larger hypodense lesions demonstrating density less than  15 Hounsfield units are likely benign per Bosniak 2019 criteria.  Punctate 1 to 2 mm left renal calculus is present. There is no  hydronephrosis or visualized ureteral calculus. No abdominopelvic  adenopathy by size criteria. There is colonic diverticulosis. A  small-to-moderate left inguinal hernia contains a short segment of the  distal descending/proximal sigmoid colon. There are no findings of  obstruction at this  time. The bladder is decompressed and cannot be  evaluated. No free intraperitoneal air is seen.     For the purposes of this dictation, last well-formed disc space be  referred to as L5-S1. There are superior endplate compression  deformities of L4 and L3 resulting approximately 40 % loss of height  underlying sclerosis is present within the vertebral bodies, new since  2017. Old fracture of the anterior aspect of the S1 vertebral body is  present, as before.       Impression:      1.  Superior plate compression deformities of L3 and L4 resulting in  approximately 40% loss of height underlying sclerosis. Findings are new  since 2017 and concerning for an acute/subacute etiology. Further  evaluation with MRI of the lumbar spine is recommended to further  characterize.  2.  Left nephrolithiasis without hydronephrosis.  3.  0.7 cm pleural-based nodule within the left lower lobe, grossly  unchanged since 02/11/2020.  4.  Small-to-moderate left inguinal hernia containing a short segment of  the distal descending/proximal sigmoid colon.  5.  Other findings as above     This report was finalized on 3/27/2023 1:17 PM by Dr. Bryan Calzada M.D.           Past Medical History:   Diagnosis Date   • Abnormal electrocardiogram    • Arm pain    • Atrial fibrillation (HCC)    • Atrial flutter (HCC)    • BPH (benign prostatic hyperplasia)    • BPH associated with nocturia    • Chronic kidney disease    • Colon cancer (HCC)     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   • Colon polyp 11/22/2015   • Depression    • Dyspnea    • Enlarged prostate without lower urinary tract symptoms (luts)    • Episode of generalized weakness     knees were weak - had to be helped by wife to sit down   • Esophagitis, reflux    • Fatigue    • Fracture of ankle     right   • GERD (gastroesophageal reflux disease)    • Hyperlipidemia    • Inguinal hernia    • Loss of hearing    • Lumbar radiculopathy    • Obesity    • Osteoarthritis cervical spine     doc  on Sray 08/16   • Osteopenia    • Osteoporosis    • Overweight    • Tobacco dependence in remission    • Urge incontinence of urine    • Vitamin D deficiency    • Wheezing        Assessment:  Active Hospital Problems    Diagnosis  POA   • **Acute anemia [D64.9]  Yes   • Closed compression fracture of L3 lumbar vertebra, initial encounter ([facility]) [S32.030A]  Unknown   • Closed compression fracture of L4 lumbar vertebra, initial encounter ([facility]) [S32.040A]  Unknown   • Monoclonal gammopathy of unknown significance ([facility]) [D47.2]  Yes   • Pulmonary emphysema ([facility]) [J43.9]  Yes   • Cardiomyopathy ([facility]) [I42.9]  Yes   • Degenerative disc disease, cervical [M50.30]  Yes   • Hyperlipidemia [E78.5]  Yes      Resolved Hospital Problems   No resolved problems to display.       Plan:  The patient is admitted to the hospital and will consult neurosurgery for further evaluation treatment of L3 and L4 compression fractures.  We will get MRI of lumbar spine and will also do some labs to evaluate anemia.  We will check stool for occult blood.  We will get follow-up lab studies.    Marino Lyman MD  3/27/2023  15:44 EDT

## 2023-03-27 NOTE — ED NOTES
Pt has right flank pain x 3 days.  He has been urinating frequently    Patient was placed in face mask during first look triage.  Patient was wearing a face mask throughout encounter.  I wore personal protective equipment throughout the encounter.  Hand hygiene was performed before and after patient encounter.

## 2023-03-27 NOTE — ED PROVIDER NOTES
EMERGENCY DEPARTMENT ENCOUNTER    Room Number:  125/1  Date of encounter:  3/27/2023  PCP: Coby Walton APRN  Patient Care Team:  Coby Walton APRN as PCP - General (Internal Medicine)  Dunia Quinonez PA-C as Referring Physician (Physician Assistant)  Chun Gamboa MD as Consulting Physician (Hematology and Oncology)   Independent Historians: Patient, caregiver    HPI:  Chief Complaint: Acute right flank pain    A complete HPI/ROS/PMH/PSH/SH/FH are unobtainable due to: Nothing    Chronic or social conditions impacting patient care (Social Determinants of Health): None  (Financial Resource Strain / Food Insecurity / Transportation Needs / Physical Activity / Stress / Social Connections / Intimate Partner Violence / Housing Stability)    Context: Jonathan Trejo is a 88 y.o. male who presents to the ED c/o acute right flank pain for the last 3 to 4 days.  He states the pain has been constant.  He reports urinary frequency.  He denies any nausea or vomiting.  He denies any diarrhea or constipation.  He denies any prior surgeries on his abdomen.  Nothing makes his pain worse or better.  He reports the pain is severe.    Review of prior external notes (non-ED): Neurology note dated 2/20/2023 where he was evaluated for peripheral neuropathy.  Recommended to continue gabapentin.    Review of prior external test results outside of this encounter: Laboratory evaluation 9/9/2021 shows a CMP with an elevated creatinine of 1.57.  CBC on 9/23/2021 was normal.    Prescription drug monitoring program review: [facility] reviewed by Marino Lyman MD, Darryn Johnson MD   My review reveals monthly gabapentin prescriptions.  Last filled in February.     PAST MEDICAL HISTORY  Active Ambulatory Problems     Diagnosis Date Noted   • Benign prostatic hyperplasia with urinary obstruction 11/22/2015   • Chronic renal impairment, stage 3 (moderate) (HCC) 11/22/2015   • Depression 11/22/2015   • Gastroesophageal  reflux disease with esophagitis 11/22/2015   • Hyperlipidemia 11/22/2015   • Nocturia 11/22/2015   • Obesity (BMI 30-39.9) 11/22/2015   • Osteopenia 11/22/2015   • Osteoporosis 11/22/2015   • Seborrhea 04/04/2016   • Abnormal EKG 07/19/2016   • Bradycardia 09/02/2016   • Degenerative disc disease, cervical 10/02/2016   • Typical atrial flutter ([facility]) 04/06/2017   • Lumbar radiculopathy 06/06/2017   • 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[facility]) 09/19/2017   • S/P ablation of atrial flutter 09/19/2017   • S/P catheter ablation of slow pathway 09/19/2017   • Cardiomyopathy ([facility]) 09/19/2017   • GARCIA (dyspnea on exertion) 09/19/2017   • Urinary tract infection without hematuria 01/02/2018   • Right foot drop 01/03/2018   • Frequent falls 01/03/2018   • Metabolic encephalopathy 01/03/2018   • Acute renal failure ([facility]) 01/03/2018   • 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   • Moderate single current episode of major depressive disorder ([facility]) 03/12/2019   • Mass of left lower leg 06/13/2019   • Pulmonary emphysema ([facility]) 05/08/2020   • Monoclonal gammopathy of unknown significance ([facility]) 08/04/2021   • Myasthenia gravis without exacerbation ([facility]) 12/10/2021   • Displacement of lumbar intervertebral disc without myelopathy 05/19/2022     Resolved Ambulatory Problems     Diagnosis Date Noted   • Pain of upper extremity 11/22/2015   • Colon polyp 11/22/2015   • Hearing loss 11/22/2015   • Obesity (BMI 30-39.9) 11/22/2015   • Acute on chronic systolic heart failure ([facility]) 09/02/2016     Past Medical History:   Diagnosis Date   • Abnormal electrocardiogram    • Arm pain    • Atrial fibrillation ([facility])    • Atrial flutter ([facility])    • BPH (benign prostatic hyperplasia)    • BPH associated with nocturia    • Chronic kidney disease    • Colon cancer (HCC)    • Dyspnea    • Enlarged prostate without lower urinary tract symptoms (luts)    • Episode of generalized weakness    • Esophagitis, reflux    • Fatigue    • Fracture of ankle     • GERD (gastroesophageal reflux disease)    • Inguinal hernia    • Loss of hearing    • Obesity    • Osteoarthritis cervical spine    • Overweight    • Tobacco dependence in remission    • Urge incontinence of urine    • Vitamin D deficiency    • Wheezing        The patient has started, but not completed, their COVID-19 vaccination series.    PAST SURGICAL HISTORY  Past Surgical History:   Procedure Laterality Date   • CARDIAC CATHETERIZATION N/A 7/27/2017    Procedure: Valve assessment;  Surgeon: Adonis Solis MD;  Location: [facility] CATH INVASIVE LOCATION;  Service:    • CARDIAC CATHETERIZATION N/A 10/1/2018    Procedure: Left Heart Cath;  Surgeon: Bogdan Vargas MD;  Location: [facility] CATH INVASIVE LOCATION;  Service: Cardiology   • CARDIAC CATHETERIZATION N/A 10/1/2018    Procedure: Coronary angiography;  Surgeon: Bogdan Vargas MD;  Location: [facility] CATH INVASIVE LOCATION;  Service: Cardiology   • CARDIAC CATHETERIZATION N/A 10/1/2018    Procedure: Left ventriculography;  Surgeon: Bogadn Vargas MD;  Location: [facility] CATH INVASIVE LOCATION;  Service: Cardiology   • CARDIAC CATHETERIZATION N/A 10/1/2018    Procedure: Right Heart Cath;  Surgeon: Bogdan Vargas MD;  Location: [facility] CATH INVASIVE LOCATION;  Service: Cardiology   • CARDIAC ELECTROPHYSIOLOGY PROCEDURE N/A 7/27/2017    Procedure: Ablation atrial flutter Will need to have 3 -4 weeks of therapeutic INR's ;  Surgeon: Adonis Solis MD;  Location: [facility] CATH INVASIVE LOCATION;  Service:    • CARDIOVERSION     • CATARACT EXTRACTION W/ INTRAOCULAR LENS  IMPLANT, BILATERAL     • COLON RESECTION     • COLON SURGERY      polyp removed   • COLONOSCOPY      07/15 redo 5 years  Segun   • KNEE SURGERY      benign tumor removed, age 6   • LASIK     • LUMBAR EPIDURAL INJECTION N/A 5/25/2022    Procedure: LUMBAR EPIDURAL 1ST VISIT L3-4 (right of midline);  Surgeon: Bonnie Brown MD;  Location: [facility] OR;  Service: Pain Management;   Laterality: N/A;   • REFRACTIVE SURGERY     • REPAIR PERONEAL TENDONS ANKLE W/ FIBULAR OSTEOTOMY Right 2013    Dr. Banks         FAMILY HISTORY  Family History   Problem Relation Age of Onset   • Breast cancer Mother    • Heart disease Father    • Hypertension Father    • Heart attack Father    • Hypertension Sister          SOCIAL HISTORY  Social History     Socioeconomic History   • Marital status:    • Number of children: 2   • Years of education: COLLEGE GRADUATE   Tobacco Use   • Smoking status: Former     Packs/day: 0.50     Years: 55.00     Pack years: 27.50     Types: Cigarettes     Quit date: 2008     Years since quittin.9   • Smokeless tobacco: Never   • Tobacco comments:     QUIT 10 YRS   Substance and Sexual Activity   • Alcohol use: Yes     Comment: SOCIALLY   • Drug use: No   • Sexual activity: Defer         ALLERGIES  Patient has no known allergies.        REVIEW OF SYSTEMS  Review of Systems   No chest pain, no shortness of breath, no nausea, no vomiting, positive dysuria, positive frequency, no hematuria, no diarrhea, no constipation  All systems reviewed and negative except for those discussed in HPI.       PHYSICAL EXAM    I have reviewed the triage vital signs and nursing notes.    ED Triage Vitals   Temp Heart Rate Resp BP SpO2   23 1046 23 1046 23 1046 23 1121 23 1046   97.3 °F (36.3 °C) 67 18 125/67 93 %      Temp src Heart Rate Source Patient Position BP Location FiO2 (%)   23 1046 23 1046 23 1121 23 1121 --   Tympanic Monitor Lying Right arm        Physical Exam  GENERAL: Awake, alert, no acute distress  SKIN: Warm, dry  HENT: Normocephalic, atraumatic  EYES: no scleral icterus  CV: regular rhythm, regular rate  RESPIRATORY: normal effort, lungs clear  ABDOMEN: soft, nontender, nondistended  MUSCULOSKELETAL: no deformity  NEURO: alert, moves all extremities, follows commands          LAB RESULTS  Recent Results  (from the past 24 hour(s))   Comprehensive Metabolic Panel    Collection Time: 03/27/23 11:50 AM    Specimen: Blood   Result Value Ref Range    Glucose 129 (H) 65 - 99 mg/dL    BUN 22 8 - 23 mg/dL    Creatinine 1.34 (H) 0.76 - 1.27 mg/dL    Sodium 140 136 - 145 mmol/L    Potassium 4.3 3.5 - 5.2 mmol/L    Chloride 109 (H) 98 - 107 mmol/L    CO2 20.0 (L) 22.0 - 29.0 mmol/L    Calcium 8.8 8.6 - 10.5 mg/dL    Total Protein 6.2 6.0 - 8.5 g/dL    Albumin 3.6 3.5 - 5.2 g/dL    ALT (SGPT) 12 1 - 41 U/L    AST (SGOT) 13 1 - 40 U/L    Alkaline Phosphatase 85 39 - 117 U/L    Total Bilirubin 0.6 0.0 - 1.2 mg/dL    Globulin 2.6 gm/dL    A/G Ratio 1.4 g/dL    BUN/Creatinine Ratio 16.4 7.0 - 25.0    Anion Gap 11.0 5.0 - 15.0 mmol/L    eGFR 51.6 (L) >60.0 mL/min/1.73   CBC Auto Differential    Collection Time: 03/27/23 11:50 AM    Specimen: Blood   Result Value Ref Range    WBC 8.56 3.40 - 10.80 10*3/mm3    RBC 3.75 (L) 4.14 - 5.80 10*6/mm3    Hemoglobin 10.6 (L) 13.0 - 17.7 g/dL    Hematocrit 34.1 (L) 37.5 - 51.0 %    MCV 90.9 79.0 - 97.0 fL    MCH 28.3 26.6 - 33.0 pg    MCHC 31.1 (L) 31.5 - 35.7 g/dL    RDW 13.3 12.3 - 15.4 %    RDW-SD 43.9 37.0 - 54.0 fl    MPV 11.0 6.0 - 12.0 fL    Platelets 288 140 - 450 10*3/mm3    Neutrophil % 69.8 42.7 - 76.0 %    Lymphocyte % 13.8 (L) 19.6 - 45.3 %    Monocyte % 12.6 (H) 5.0 - 12.0 %    Eosinophil % 2.2 0.3 - 6.2 %    Basophil % 0.7 0.0 - 1.5 %    Immature Grans % 0.9 (H) 0.0 - 0.5 %    Neutrophils, Absolute 5.97 1.70 - 7.00 10*3/mm3    Lymphocytes, Absolute 1.18 0.70 - 3.10 10*3/mm3    Monocytes, Absolute 1.08 (H) 0.10 - 0.90 10*3/mm3    Eosinophils, Absolute 0.19 0.00 - 0.40 10*3/mm3    Basophils, Absolute 0.06 0.00 - 0.20 10*3/mm3    Immature Grans, Absolute 0.08 (H) 0.00 - 0.05 10*3/mm3    nRBC 0.1 0.0 - 0.2 /100 WBC   Urinalysis With Microscopic If Indicated (No Culture) - Urine, Clean Catch    Collection Time: 03/27/23  1:33 PM    Specimen: Urine, Clean Catch   Result Value Ref Range     Color, UA Yellow Yellow, Straw    Appearance, UA Clear Clear    pH, UA 5.5 5.0 - 8.0    Specific Gravity, UA 1.018 1.005 - 1.030    Glucose, UA Negative Negative    Ketones, UA Negative Negative    Bilirubin, UA Negative Negative    Blood, UA Negative Negative    Protein, UA Negative Negative    Leuk Esterase, UA Negative Negative    Nitrite, UA Negative Negative    Urobilinogen, UA 1.0 E.U./dL 0.2 - 1.0 E.U./dL       Ordered the above labs and independently reviewed the results.        RADIOLOGY  CT Abdomen Pelvis Without Contrast    Result Date: 3/27/2023  CT ABDOMEN AND PELVIS WITHOUT IV CONTRAST  HISTORY: Right flank pain for 2 days  TECHNIQUE: Radiation dose reduction techniques were utilized, including automated exposure control and exposure modulation based on body size. 3 mm images were obtained through the abdomen and pelvis without IV contrast.  COMPARISON: High-resolution chest CT 02/11/2020 and lumbar spine CT 12/15/2017  FINDINGS: 0.7 cm pleural based nodule within the left lower lobe is present, as before. Findings suggestive of chronic interstitial lung disease within the lung bases which is overall not well evaluated. There are no findings of small bowel obstruction. Postsurgical changes from right hemicolectomy are present.  Subcentimeter hepatic lesions are too small to characterize. The gallbladder, pancreas, spleen and adrenal glands have an unremarkable noncontrast CT appearance. Subcentimeter renal lesions are too small to characterize. Larger hypodense lesions demonstrating density less than 15 Hounsfield units are likely benign per Bosniak 2019 criteria. Punctate 1 to 2 mm left renal calculus is present. There is no hydronephrosis or visualized ureteral calculus. No abdominopelvic adenopathy by size criteria. There is colonic diverticulosis. A small-to-moderate left inguinal hernia contains a short segment of the distal descending/proximal sigmoid colon. There are no findings of  obstruction at this time. The bladder is decompressed and cannot be evaluated. No free intraperitoneal air is seen.  For the purposes of this dictation, last well-formed disc space be referred to as L5-S1. There are superior endplate compression deformities of L4 and L3 resulting approximately 40 % loss of height underlying sclerosis is present within the vertebral bodies, new since 2017. Old fracture of the anterior aspect of the S1 vertebral body is present, as before.      1.  Superior plate compression deformities of L3 and L4 resulting in approximately 40% loss of height underlying sclerosis. Findings are new since 2017 and concerning for an acute/subacute etiology. Further evaluation with MRI of the lumbar spine is recommended to further characterize. 2.  Left nephrolithiasis without hydronephrosis. 3.  0.7 cm pleural-based nodule within the left lower lobe, grossly unchanged since 02/11/2020. 4.  Small-to-moderate left inguinal hernia containing a short segment of the distal descending/proximal sigmoid colon. 5.  Other findings as above  This report was finalized on 3/27/2023 1:17 PM by Dr. Bryan Calzada M.D.        I ordered the above noted radiological studies. Reviewed by me and discussed with radiologist.  See dictation for official radiology interpretation.      PROCEDURES    Procedures      MEDICATIONS GIVEN IN ER    Medications   sodium chloride 0.9 % flush 10 mL (has no administration in time range)   aspirin chewable tablet 81 mg (81 mg Oral Not Given 3/27/23 1819)   atorvastatin (LIPITOR) tablet 10 mg (10 mg Oral Not Given 3/27/23 1820)   escitalopram (LEXAPRO) tablet 10 mg (10 mg Oral Not Given 3/27/23 1820)   gabapentin (NEURONTIN) capsule 600 mg (has no administration in time range)   pantoprazole (PROTONIX) EC tablet 40 mg (has no administration in time range)   vitamin B-12 (CYANOCOBALAMIN) tablet 1,000 mcg (1,000 mcg Oral Not Given 3/27/23 1820)   sodium chloride 0.9 % flush 10 mL (has no  administration in time range)   sodium chloride 0.9 % flush 10 mL (has no administration in time range)   sodium chloride 0.9 % infusion 40 mL (has no administration in time range)   acetaminophen (TYLENOL) tablet 650 mg (has no administration in time range)     Or   acetaminophen (TYLENOL) 160 MG/5ML solution 650 mg (has no administration in time range)     Or   acetaminophen (TYLENOL) suppository 650 mg (has no administration in time range)   HYDROcodone-acetaminophen (NORCO) 5-325 MG per tablet 1 tablet (has no administration in time range)   HYDROmorphone (DILAUDID) injection 0.5 mg (has no administration in time range)     And   naloxone (NARCAN) injection 0.4 mg (has no administration in time range)   ondansetron (ZOFRAN) tablet 4 mg (has no administration in time range)     Or   ondansetron (ZOFRAN) injection 4 mg (has no administration in time range)   morphine injection 2 mg (2 mg Intravenous Given 3/27/23 1157)   ondansetron (ZOFRAN) injection 4 mg (4 mg Intravenous Given 3/27/23 1155)         ORDERS PLACED DURING THIS VISIT:  Orders Placed This Encounter   Procedures   • CT Abdomen Pelvis Without Contrast   • MRI Lumbar Spine Without Contrast   • Comprehensive Metabolic Panel   • Urinalysis With Microscopic If Indicated (No Culture) - Urine, Clean Catch   • CBC Auto Differential   • Basic Metabolic Panel   • CBC Auto Differential   • Folate   • Ferritin   • Iron Profile   • Vitamin B12   • Occult Blood X 1, Stool - Stool, Per Rectum   • Diet: Regular/House Diet; Texture: Regular Texture (IDDSI 7); Fluid Consistency: Thin (IDDSI 0)   • Monitor Blood Pressure   • Pulse Oximetry, Continuous   • Discontinue Cardiac Monitoring   • Vital Signs   • Intake & Output   • Weigh Patient   • Oral Care   • Saline Lock & Maintain IV Access   • Place Sequential Compression Device   • Maintain Sequential Compression Device   • Code Status and Medical Interventions:   • Neurosurgery (on-call MD unless specified)   • A  (on-call MD unless specified) Details   • Oxygen Therapy- Nasal Cannula; Titrate for SPO2: 90% - 95%   • Insert Peripheral IV   • Insert Peripheral IV   • Initiate Observation Status   • CBC & Differential         PROGRESS, DATA ANALYSIS, CONSULTS, AND MEDICAL DECISION MAKING    All labs have been independently interpreted by me.  All radiology studies have been reviewed by me and discussed with radiologist dictating the report.   EKG's independently viewed and interpreted by me.  Discussion below represents my analysis of pertinent findings related to patient's condition, differential diagnosis, treatment plan and final disposition.    Differential diagnosis includes but is not limited to kidney stone, pyelonephritis, UTI, appendicitis, cholecystitis.    ED Course as of 03/27/23 1918   Mon Mar 27, 2023   1217 CT Abdomen Pelvis Without Contrast  My independent interpretation of the CT of the abdomen pelvis is aortic calcification [TR]   1425 Creatinine(!): 1.34  Patient has chronic renal insufficiency which is unchanged [TR]   1426 Hemoglobin(!): 10.6  The patient has a new acute anemia with a hemoglobin of 10.6. [TR]   1440 I reviewed the work-up and findings with the patient at the bedside.  Answered all questions.  He was unaware of his lumbar spine compression fractures.  He is not aware of any recent falls.  He does have a new anemia but a normal BUN.  Plan to check his stool for blood to rule out any blood loss anemia [TR]   1451 Rectal exam shows brown stool which is heme-negative.  The patient reports his pain is in his low back on the right side.  He states it is improved with morphine.  Is been present for the last 4 to 5 days.  He denies any injury.  I have a call out to neurosurgery to discuss given he has acute compression fractures on his CT scan. [TR]   1510 Discussing with Veronica with neurosurgery.  She recommends  admission with MRI to evaluate for acute fractures. [TR]   1525 Discussing with   Nura with [facility].  Agrees to admit. [TR]   1529 Reviewed work-up and findings with the patient at the bedside.  He is agreeable to admission. [TR]      ED Course User Index  [TR] Darryn Johnson MD       I interpreted the cardiac monitor rhythm and my independent interpretation is: normal sinus rhythm.     PPE: The patient wore a mask and I wore an N95 mask throughout the entire patient encounter.       AS OF 19:18 EDT VITALS:    BP - 176/82  HR - (!) 45  TEMP - 97.5 °F (36.4 °C) (Oral)  O2 SATS - 93%        DIAGNOSIS  Final diagnoses:   Acute anemia   Chronic renal impairment, unspecified CKD stage   Compression fracture of L3 vertebra, initial encounter (HCC)   Acute right-sided low back pain without sciatica         DISPOSITION  ED Disposition     ED Disposition   Decision to Admit    Condition   --    Comment   Level of Care: Med/Surg [1]   Diagnosis: Acute anemia [[id number]]   Admitting Physician: MICKEY HUSAIN [[id number]]   Attending Physician: MICKEY HUSAIN [[id number]]                  Note Disclaimer: At [facility], we believe that sharing information builds trust and better relationships. You are receiving this note because you recently visited [facility]. It is possible you will see health information before a provider has talked with you about it. This kind of information can be easy to misunderstand. To help you fully understand what it means for your health, we urge you to discuss this note with your provider.       Darryn Johnson MD  03/27/23 1918

## 2023-03-27 NOTE — ED NOTES
Pt is a resident at [facility] accompanied with caregiver (Harriett) and began experiencing R flank pain for the past 3-4 days described as an intermittent ache. Pt says he's had some more urinary frequency with some dribbling, but no blood show or dysuria. Pt denies n/v/d or ever having a kidney stone before. Pt says he still has his gallbladder and was ruled out for a UTI yesterday. Pt masked on arrival, this RN in ppe.

## 2023-03-27 NOTE — PROGRESS NOTES
Clinical Pharmacy Services: Medication History    Jonathan Trejo is a 88 y.o. male presenting to [facility] for   Chief Complaint   Patient presents with   • Flank Pain       He  has a past medical history of Abnormal electrocardiogram, Arm pain, Atrial fibrillation (HCC), Atrial flutter (HCC), BPH (benign prostatic hyperplasia), BPH associated with nocturia, Chronic kidney disease, Colon cancer (HCC), Colon polyp (11/22/2015), Depression, Dyspnea, Enlarged prostate without lower urinary tract symptoms (luts), Episode of generalized weakness, Esophagitis, reflux, Fatigue, Fracture of ankle, GERD (gastroesophageal reflux disease), Hyperlipidemia, Inguinal hernia, Loss of hearing, Lumbar radiculopathy, Obesity, Osteoarthritis cervical spine, Osteopenia, Osteoporosis, Overweight, Tobacco dependence in remission, Urge incontinence of urine, Vitamin D deficiency, and Wheezing.    Allergies as of 03/27/2023   • (No Known Allergies)       Medication information was obtained from: snf Paperwork  Pharmacy and Phone Number:     Prior to Admission Medications     Prescriptions Last Dose Informant Patient Reported? Taking?    acetaminophen (TYLENOL) 325 MG tablet  Nursing Home Yes Yes    Take 2 tablets by mouth Every 6 (Six) Hours As Needed for Mild Pain.    acetaminophen (TYLENOL) 650 MG 8 hr tablet  Nursing Home Yes Yes    Take 1 tablet by mouth 2 (Two) Times a Day.    aspirin 81 MG chewable tablet  Nursing Home Yes Yes    Chew 1 tablet Daily.    atorvastatin (LIPITOR) 10 MG tablet  Nursing Home No Yes    TAKE 1 TABLET BY MOUTH DAILY.    Cholecalciferol (VITAMIN D3) 2000 UNITS tablet  Nursing Home Yes Yes    Take 1 tablet by mouth Daily.    Diclofenac Sodium (VOLTAREN) 1 % gel gel  Nursing Home Yes Yes    Apply 4 g topically to the appropriate area as directed 3 (Three) Times a Day. Apply to right shoulder    escitalopram (LEXAPRO) 10 MG tablet  Nursing Home Yes Yes    Take 1 tablet by mouth Daily.     gabapentin (NEURONTIN) 600 MG tablet  Nursing Home Yes Yes    Take 1 tablet by mouth 2 (Two) Times a Day.    magnesium hydroxide (MILK OF MAGNESIA) 400 MG/5ML suspension  Nursing Home Yes Yes    Take 30 mL by mouth Daily As Needed for Constipation.    omeprazole (priLOSEC) 40 MG capsule  Nursing Home No Yes    TAKE 1 CAPSULE BY MOUTH DAILY.    polyethylene glycol (MIRALAX) 17 g packet  Nursing Home Yes Yes    Take 17 g by mouth Daily As Needed.    vitamin B-12 (CYANOCOBALAMIN) 1000 MCG tablet  Nursing Home No Yes    Take 1 tablet by mouth Daily. For b12 def    Patient taking differently:  Take 1 tablet by mouth Daily.            Medication notes:     This medication list is complete to the best of my knowledge as of 3/27/2023    Please call if questions.    Dominick Gaston  Medication History Technician   [phone]    3/27/2023 12:10 EDT

## 2023-03-28 ENCOUNTER — APPOINTMENT (OUTPATIENT)
Dept: MRI IMAGING | Facility: HOSPITAL | Age: 88
End: 2023-03-28
Payer: MEDICARE

## 2023-03-28 LAB
ANION GAP SERPL CALCULATED.3IONS-SCNC: 8 MMOL/L (ref 5–15)
BASOPHILS # BLD AUTO: 0.09 10*3/MM3 (ref 0–0.2)
BASOPHILS NFR BLD AUTO: 1.2 % (ref 0–1.5)
BUN SERPL-MCNC: 21 MG/DL (ref 8–23)
BUN/CREAT SERPL: 18.1 (ref 7–25)
CALCIUM SPEC-SCNC: 8.9 MG/DL (ref 8.6–10.5)
CHLORIDE SERPL-SCNC: 109 MMOL/L (ref 98–107)
CO2 SERPL-SCNC: 25 MMOL/L (ref 22–29)
CREAT SERPL-MCNC: 1.16 MG/DL (ref 0.76–1.27)
DEPRECATED RDW RBC AUTO: 43.1 FL (ref 37–54)
EGFRCR SERPLBLD CKD-EPI 2021: 61.3 ML/MIN/1.73
EOSINOPHIL # BLD AUTO: 0.15 10*3/MM3 (ref 0–0.4)
EOSINOPHIL NFR BLD AUTO: 2 % (ref 0.3–6.2)
ERYTHROCYTE [DISTWIDTH] IN BLOOD BY AUTOMATED COUNT: 13.2 % (ref 12.3–15.4)
FERRITIN SERPL-MCNC: 31.3 NG/ML (ref 30–400)
FOLATE SERPL-MCNC: 14.3 NG/ML (ref 4.78–24.2)
GLUCOSE SERPL-MCNC: 117 MG/DL (ref 65–99)
HCT VFR BLD AUTO: 35.3 % (ref 37.5–51)
HGB BLD-MCNC: 11.4 G/DL (ref 13–17.7)
IMM GRANULOCYTES # BLD AUTO: 0.11 10*3/MM3 (ref 0–0.05)
IMM GRANULOCYTES NFR BLD AUTO: 1.5 % (ref 0–0.5)
IRON 24H UR-MRATE: 35 MCG/DL (ref 59–158)
IRON SATN MFR SERPL: 8 % (ref 20–50)
LYMPHOCYTES # BLD AUTO: 1.02 10*3/MM3 (ref 0.7–3.1)
LYMPHOCYTES NFR BLD AUTO: 13.6 % (ref 19.6–45.3)
MCH RBC QN AUTO: 29.5 PG (ref 26.6–33)
MCHC RBC AUTO-ENTMCNC: 32.3 G/DL (ref 31.5–35.7)
MCV RBC AUTO: 91.5 FL (ref 79–97)
MONOCYTES # BLD AUTO: 0.71 10*3/MM3 (ref 0.1–0.9)
MONOCYTES NFR BLD AUTO: 9.4 % (ref 5–12)
NEUTROPHILS NFR BLD AUTO: 5.44 10*3/MM3 (ref 1.7–7)
NEUTROPHILS NFR BLD AUTO: 72.3 % (ref 42.7–76)
NRBC BLD AUTO-RTO: 0 /100 WBC (ref 0–0.2)
PLATELET # BLD AUTO: 297 10*3/MM3 (ref 140–450)
PMV BLD AUTO: 11.2 FL (ref 6–12)
POTASSIUM SERPL-SCNC: 4.5 MMOL/L (ref 3.5–5.2)
RBC # BLD AUTO: 3.86 10*6/MM3 (ref 4.14–5.8)
SODIUM SERPL-SCNC: 142 MMOL/L (ref 136–145)
TIBC SERPL-MCNC: 465 MCG/DL (ref 298–536)
TRANSFERRIN SERPL-MCNC: 312 MG/DL (ref 200–360)
VIT B12 BLD-MCNC: 1486 PG/ML (ref 211–946)
WBC NRBC COR # BLD: 7.52 10*3/MM3 (ref 3.4–10.8)

## 2023-03-28 PROCEDURE — 83540 ASSAY OF IRON: CPT | Performed by: HOSPITALIST

## 2023-03-28 PROCEDURE — 84466 ASSAY OF TRANSFERRIN: CPT | Performed by: HOSPITALIST

## 2023-03-28 PROCEDURE — 82607 VITAMIN B-12: CPT | Performed by: HOSPITALIST

## 2023-03-28 PROCEDURE — 99222 1ST HOSP IP/OBS MODERATE 55: CPT | Performed by: NURSE PRACTITIONER

## 2023-03-28 PROCEDURE — 36415 COLL VENOUS BLD VENIPUNCTURE: CPT | Performed by: HOSPITALIST

## 2023-03-28 PROCEDURE — G0378 HOSPITAL OBSERVATION PER HR: HCPCS

## 2023-03-28 PROCEDURE — 72148 MRI LUMBAR SPINE W/O DYE: CPT

## 2023-03-28 PROCEDURE — 82728 ASSAY OF FERRITIN: CPT | Performed by: HOSPITALIST

## 2023-03-28 PROCEDURE — 80048 BASIC METABOLIC PNL TOTAL CA: CPT | Performed by: HOSPITALIST

## 2023-03-28 PROCEDURE — 85025 COMPLETE CBC W/AUTO DIFF WBC: CPT | Performed by: HOSPITALIST

## 2023-03-28 PROCEDURE — 82746 ASSAY OF FOLIC ACID SERUM: CPT | Performed by: HOSPITALIST

## 2023-03-28 RX ORDER — FERROUS SULFATE 325(65) MG
325 TABLET ORAL
Status: DISCONTINUED | OUTPATIENT
Start: 2023-03-29 | End: 2023-03-30 | Stop reason: HOSPADM

## 2023-03-28 RX ORDER — LORAZEPAM 0.5 MG/1
0.5 TABLET ORAL ONCE
Status: COMPLETED | OUTPATIENT
Start: 2023-03-28 | End: 2023-03-28

## 2023-03-28 RX ADMIN — ATORVASTATIN CALCIUM 10 MG: 20 TABLET, FILM COATED ORAL at 08:00

## 2023-03-28 RX ADMIN — GABAPENTIN 600 MG: 300 CAPSULE ORAL at 20:13

## 2023-03-28 RX ADMIN — Medication 10 ML: at 20:13

## 2023-03-28 RX ADMIN — GABAPENTIN 600 MG: 300 CAPSULE ORAL at 08:01

## 2023-03-28 RX ADMIN — ESCITALOPRAM 10 MG: 5 TABLET, FILM COATED ORAL at 08:00

## 2023-03-28 RX ADMIN — Medication 10 ML: at 08:01

## 2023-03-28 RX ADMIN — LORAZEPAM 0.5 MG: 0.5 TABLET ORAL at 22:05

## 2023-03-28 RX ADMIN — ASPIRIN 81 MG: 81 TABLET, CHEWABLE ORAL at 08:00

## 2023-03-28 RX ADMIN — Medication 1000 MCG: at 08:00

## 2023-03-28 RX ADMIN — PANTOPRAZOLE SODIUM 40 MG: 40 TABLET, DELAYED RELEASE ORAL at 08:01

## 2023-03-28 NOTE — CASE MANAGEMENT/SOCIAL WORK
Discharge Planning Assessment  [facility]     Patient Name: Jonathan Trejo  MRN: [id number]  Today's Date: 3/28/2023    Admit Date: 3/27/2023    Plan: Home alone   Discharge Needs Assessment     Row Name 03/28/23 1427       Living Environment    People in Home alone    Current Living Arrangements other (see comments)  Senior living    Primary Care Provided by self    Provides Primary Care For no one    Family Caregiver if Needed child(hannah), adult    Family Caregiver Names Kaylynn Gil [phone]    Able to Return to Prior Arrangements yes       Resource/Environmental Concerns    Resource/Environmental Concerns none       Transition Planning    Patient/Family Anticipates Transition to home    Transportation Anticipated health plan transportation       Discharge Needs Assessment    Readmission Within the Last 30 Days no previous admission in last 30 days    Equipment Currently Used at Home rollator    Concerns to be Addressed discharge planning    Anticipated Changes Related to Illness none    Equipment Needed After Discharge none               Discharge Plan     Row Name 03/28/23 1429       Plan    Plan Home alone    Patient/Family in Agreement with Plan yes    Plan Comments MOON letter checked. Met with patient at bedside, introduced self and explained CCP role. Verified facesheet and PCP- Coby Walton. Patient lives at [facility]. Patient stated he is ind with ADLs and ambulates with Rollator independently. He stated the only thing they assist with is getting his groceries. Patient reports there a no steps to his apt. Patient denies h/o HH/SNF and a rollator. Patient uses [facility] and reports no difficult affording medications. Emergency contact is his daughter Kaylynn Gil [phone]. Patient will need transportation at [place]. CCP to follow. Tavon ZABALA RN              Continued Care and Services - Admitted Since 3/27/2023    Coordination has not been started for this encounter.           Demographic Summary     Row Name 03/28/23 1420       General Information    Admission Type observation    Referral Source admission list    Reason for Consult discharge planning    Preferred Language English               Functional Status     Row Name 03/28/23 1427       Functional Status    Usual Activity Tolerance good    Current Activity Tolerance good       Functional Status, IADL    Medications independent    Meal Preparation independent    Housekeeping independent    Laundry independent    Shopping assistive person       Mental Status    General Appearance WDL WDL       Mental Status Summary    Recent Changes in Mental Status/Cognitive Functioning no changes               Psychosocial    No documentation.                Abuse/Neglect    No documentation.                Legal    No documentation.                Substance Abuse    No documentation.                Patient Forms    No documentation.                   Tavon Steven RN

## 2023-03-28 NOTE — CONSULTS
[facility]   Consult Note    Patient Name: Jonathan Trejo  : 1935  MRN: [id number]  Primary Care Physician:  Coby Walton APRN  Referring Physician: Marino Lyman MD  Date of admission: 3/27/2023    Neurosurgery (on-call MD unless specified)  Consult performed by: Marlene Hoskins APRN  Consult ordered by: Darryn Johnson MD  Reason for consult: Compression fracture        Subjective   Subjective     Reason for Consult/ Chief Complaint: L3, L4 compression fractures noted on recent CT abdomen and pelvis.      History of Present Illness  Jonathan Trejo is a 88 y.o. very pleasant obese male with prior history of colon cancer, A-fib on aspirin only at home, BPH, chronic kidney disease, colon cancer stage I, osteoporosis, lumbar radiculopathy for which she takes gabapentin at home.  I am seeing this patient today in the observation unit for complaints of worsening R sided low lumbar pain for 4- 5 days with associated radiation across the right posterior iliac crest and into the right lateral hip, right groin.  The pain then traverses down the lateral aspect of the right thigh.  The pain stops at the knee.  The patient denies any pain sensations in the right leg below the knee.  He reports numbness and tingling in both feet. He also endorses a history of chronic back pain that is worsened by prolonged standing and walking. He denies any  recent history of fall or trauma.  He has had no prior history of spine surgery. He states he played a lot of baseball in his younger years and his position was catcher. He relates that his legs were strong back in those days but that is no longer the case.  He reports a several month history of urinary frequency and  urgency. He has had some urinary dribbling associated with not getting to the bathroom in time.  Other than that he denies any bowel or bladder incontinence.   CT abdomen and pelvis performed without contrast yesterday revealed a nodule in the left lower  lobe consistent with chronic interstitial lung disease.  No evidence of bladder distention.  There is superior endplate compression deformities at L4 and L3 with 40% body height loss and associated sclerosis within the vertebral bodies.  These findings are new since prior imaging in 2017.  Reasor there is also a chronic appearing fracture at S1.  Neurosurgery has been asked to evaluate.  MRI of the lumbar spine is currently pending.    Review of Systems   Constitutional: Positive for activity change.   HENT: Negative.    Eyes: Negative.    Respiratory: Negative.    Cardiovascular: Negative.  Negative for chest pain.   Gastrointestinal: Negative.  Negative for abdominal pain, constipation, nausea and vomiting.   Endocrine: Negative.    Genitourinary: Positive for frequency and urgency.   All other systems reviewed and are negative.       Personal History     Past Medical History:   Diagnosis Date   • Abnormal electrocardiogram    • Arm pain    • Atrial fibrillation (HCC)    • Atrial flutter (HCC)    • BPH (benign prostatic hyperplasia)    • BPH associated with nocturia    • Chronic kidney disease    • Colon cancer (HCC)     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   • Colon polyp 11/22/2015   • Depression    • Dyspnea    • Enlarged prostate without lower urinary tract symptoms (luts)    • Episode of generalized weakness     knees were weak - had to be helped by wife to sit down   • Esophagitis, reflux    • Fatigue    • Fracture of ankle     right   • GERD (gastroesophageal reflux disease)    • Hyperlipidemia    • Inguinal hernia    • Loss of hearing    • Lumbar radiculopathy    • Obesity    • Osteoarthritis cervical spine     doc on [facility] 08/16   • Osteopenia    • Osteoporosis    • Overweight    • Tobacco dependence in remission    • Urge incontinence of urine    • Vitamin D deficiency    • Wheezing        Past Surgical History:   Procedure Laterality Date   • CARDIAC CATHETERIZATION N/A 7/27/2017    Procedure: Valve  assessment;  Surgeon: Adonis Solis MD;  Location: [facility] C[facility]VASIVE LOC[facility];  Service:    • CARDIAC CATHETERIZATION N/A 10/1/2018    Procedure: Left Heart Cath;  Surgeon: Bogdan Vargas MD;  Location: [facility] CATH [facility]ASIVE LOCATION;  Service: Cardiology   • CARDIAC CATHETERIZATION N/A 10/1/2018    Procedure: Coronary angiography;  Surgeon: Bogdan Vargas MD;  Location: [facility] CATH [facility]ASIVE LOCATION;  Service: Cardiology   • CARDIAC CATHETERIZATION N/A 10/1/2018    Procedure: Left ventriculography;  Surgeon: Bogdan Vargas MD;  Location: [facility] CATH [facility]VASIVE LOCATION;  Service: Cardiology   • CARDIAC CATHETERIZATION N/A 10/1/2018    Procedure: Right Heart Cath;  Surgeon: Bogdan Vargas MD;  Location: [facility] CATH [facility]VASIVE LOCATION;  Service: Cardiology   • CARDIAC ELECTROPHYSIOLOGY PROCEDURE N/A 7/27/2017    Procedure: Ablation atrial flutter Will need to have 3 -4 weeks of therapeutic INR's ;  Surgeon: Adonis Solis MD;  Location: [facility] CATH INVASIVE LOCATION;  Service:    • CARDIOVERSION     • CATARACT EXTRACTION W/ INTRAOCULAR LENS  IMPLANT, BILATERAL     • COLON RESECTION     • COLON SURGERY      polyp removed   • COLONOSCOPY      07/15 redo 5 years  Segun   • KNEE SURGERY      benign tumor removed, age 6   • LASIK     • LUMBAR EPIDURAL INJECTION N/A 5/25/2022    Procedure: LUMBAR EPIDURAL 1ST VISIT L3-4 (right of midline);  Surgeon: Bonnie Brown MD;  Location: [facility];  Service: Pain Management;  Laterality: N/A;   • REFRACTIVE SURGERY  2007   • REPAIR PERONEAL TENDONS ANKLE W/ FIBULAR OSTEOTOMY Right 03/2013    Dr. Banks       Family History: family history includes Breast cancer in his mother; Heart attack in his father; Heart disease in his father; Hypertension in his father and sister. Otherwise pertinent FHx was reviewed and not pertinent to current issue.    Social History:  reports that he quit smoking about 14 years ago. His smoking use included cigarettes. He has  a 27.50 pack-year smoking history. He has never used smokeless tobacco. He reports current alcohol use. He reports that he does not use drugs.    Home Medications:   Diclofenac Sodium, Vitamin D3, acetaminophen, aspirin, atorvastatin, escitalopram, gabapentin, magnesium hydroxide, omeprazole, polyethylene glycol, and vitamin B-12    Allergies:  No Known Allergies    Objective    Objective     Vitals:  Temp:  [97.3 °F (36.3 °C)-98 °F (36.7 °C)] 97.8 °F (36.6 °C)  Heart Rate:  [45-67] 49  Resp:  [16-18] 18  BP: ()/(59-82) 156/75    Physical Exam  Vitals reviewed.   Constitutional:       General: He is not in acute distress.     Appearance: He is well-developed. He is obese. He is not ill-appearing, toxic-appearing or diaphoretic.   HENT:      Head: Normocephalic and atraumatic.      Nose: Nose normal.      Mouth/Throat:      Mouth: Mucous membranes are moist.   Eyes:      General:         Right eye: No discharge.         Left eye: No discharge.      Conjunctiva/sclera: Conjunctivae normal.   Neck:      Trachea: No tracheal deviation.   Cardiovascular:      Rate and Rhythm: Normal rate.   Pulmonary:      Effort: Pulmonary effort is normal. No respiratory distress.   Abdominal:      General: There is no distension.      Palpations: Abdomen is soft.      Tenderness: There is no abdominal tenderness.   Musculoskeletal:         General: Tenderness (Mild tenderness to palpation mid lumbar region toward the R posterior hip.) present. Normal range of motion.      Cervical back: Normal range of motion and neck supple. No rigidity or tenderness.      Right lower leg: No edema.      Left lower leg: No edema.   Skin:     General: Skin is warm and dry.      Findings: No erythema.   Neurological:      General: No focal deficit present.      Mental Status: He is alert and oriented to person, place, and time.      GCS: GCS eye subscore is 4. GCS verbal subscore is 5. GCS motor subscore is 6.      Sensory: No sensory deficit.       Motor: No abnormal muscle tone.      Coordination: Coordination normal.      Deep Tendon Reflexes: Reflexes are normal and symmetric. Reflexes normal.      Comments: No motor or sensory deficits. DTR's normal. Negative Ovalle's; negative clonus. SLR and Bakari's negative bilaterally. Gait not tested due to safety concern.    Psychiatric:         Mood and Affect: Mood normal.         Behavior: Behavior is cooperative.         Thought Content: Thought content normal.       Result Review    Result Review:  I have personally reviewed the results from the time of this admission to 3/28/2023 10:25 EDT and agree with these findings:  []  Laboratory list / accordion  []  Microbiology  [x]  Radiology  []  EKG/Telemetry   []  Cardiology/Vascular   []  Pathology  []  Old records  []  Other:  Most notable findings include:     I reviewed the CT abdomen pelvis without IV contrast dated 3/27/2023. The study shows superior endplate compression deformity at L3 and L4 with approximately 40% body height loss.  There is a chronic appearing fracture of the anterior S1 vertebral body as well.  The acuity of the L3 and L4 fractures is currently unknown.     Assessment & Plan   Assessment / Plan     Brief Patient Summary:  Jonathan Trejo is a 88 y.o. male who has no prior history of spine surgery.  He notes a chronic history of low back pain.  [facility] a 4 to 5-day history of worsening right-sided low back pain with radiation into the right posterior upper hip region into the right lateral hip, right groin and down the right lateral thigh.  The pain stops at the knee.  The pain is worse with standing and walking.  He also notes no problems with lying on his right side at night.  He has been having difficulty with standing and walking.  Denies bowel or bladder incontinence although he does have urinary urgency and will sometimes lose control if he does not get to the bathroom in time.    Active  Hospital Problems:  Active Hospital Problems    Diagnosis    • **Acute anemia    • Closed compression fracture of L3 lumbar vertebra, initial encounter (HCC)    • Closed compression fracture of L4 lumbar vertebra, initial encounter (HCC)    • Monoclonal gammopathy of unknown significance (MGUS)    • Pulmonary emphysema (HCC)    • Cardiomyopathy (HCC)    • Degenerative disc disease, cervical    • Hyperlipidemia      Plan:     MRI imaging of the lumbar spine has been ordered and is currently pending.  The patient does have a history of claustrophobia but intends to make every effort to follow through with MRI imaging today.    We will check back tomorrow to review and make further recommendations.    Marlene Hoskins, APRN

## 2023-03-28 NOTE — PLAN OF CARE
Goal Outcome Evaluation:  Plan of Care Reviewed With: patient        Progress: no change  Outcome Evaluation: VSS. alert and oriented, forgetful at times. no c/o pain this shift. pt agreeable to MRI at this time. MRI made aware and MRI screening sheet faxed down. pt ambulates to the restroom with assist x1 and a walker. urinal in use as needed.

## 2023-03-28 NOTE — PLAN OF CARE
Goal Outcome Evaluation:  Plan of Care Reviewed With: patient        Progress: improving  Outcome Evaluation: No complaints overnight. Rested comfortably. VSS. Pt refused MRI, too claustrophobic. Neurosx to see today. Continue safety and plan of care.

## 2023-03-29 ENCOUNTER — ANESTHESIA EVENT (OUTPATIENT)
Dept: PAIN MEDICINE | Facility: HOSPITAL | Age: 88
End: 2023-03-29
Payer: MEDICARE

## 2023-03-29 ENCOUNTER — APPOINTMENT (OUTPATIENT)
Dept: GENERAL RADIOLOGY | Facility: HOSPITAL | Age: 88
End: 2023-03-29
Payer: MEDICARE

## 2023-03-29 ENCOUNTER — ANESTHESIA (OUTPATIENT)
Dept: PAIN MEDICINE | Facility: HOSPITAL | Age: 88
End: 2023-03-29
Payer: MEDICARE

## 2023-03-29 ENCOUNTER — APPOINTMENT (OUTPATIENT)
Dept: PAIN MEDICINE | Facility: HOSPITAL | Age: 88
End: 2023-03-29
Payer: MEDICARE

## 2023-03-29 PROCEDURE — 77003 FLUOROGUIDE FOR SPINE INJECT: CPT

## 2023-03-29 PROCEDURE — 99232 SBSQ HOSP IP/OBS MODERATE 35: CPT

## 2023-03-29 PROCEDURE — G0378 HOSPITAL OBSERVATION PER HR: HCPCS

## 2023-03-29 RX ORDER — MIDAZOLAM HYDROCHLORIDE 1 MG/ML
1 INJECTION INTRAMUSCULAR; INTRAVENOUS
Status: DISCONTINUED | OUTPATIENT
Start: 2023-03-29 | End: 2023-03-30 | Stop reason: HOSPADM

## 2023-03-29 RX ORDER — FENTANYL CITRATE 50 UG/ML
50 INJECTION, SOLUTION INTRAMUSCULAR; INTRAVENOUS AS NEEDED
Status: DISCONTINUED | OUTPATIENT
Start: 2023-03-29 | End: 2023-03-30 | Stop reason: HOSPADM

## 2023-03-29 RX ORDER — LIDOCAINE HYDROCHLORIDE 10 MG/ML
1 INJECTION, SOLUTION INFILTRATION; PERINEURAL ONCE
Status: DISCONTINUED | OUTPATIENT
Start: 2023-03-29 | End: 2023-03-30 | Stop reason: HOSPADM

## 2023-03-29 RX ORDER — METHYLPREDNISOLONE ACETATE 80 MG/ML
80 INJECTION, SUSPENSION INTRA-ARTICULAR; INTRALESIONAL; INTRAMUSCULAR; SOFT TISSUE ONCE
Status: DISCONTINUED | OUTPATIENT
Start: 2023-03-29 | End: 2023-03-30 | Stop reason: HOSPADM

## 2023-03-29 RX ADMIN — GABAPENTIN 600 MG: 300 CAPSULE ORAL at 08:24

## 2023-03-29 RX ADMIN — FERROUS SULFATE TAB 325 MG (65 MG ELEMENTAL FE) 325 MG: 325 (65 FE) TAB at 08:24

## 2023-03-29 RX ADMIN — Medication 10 ML: at 09:10

## 2023-03-29 RX ADMIN — Medication 1000 MCG: at 08:24

## 2023-03-29 RX ADMIN — ASPIRIN 81 MG: 81 TABLET, CHEWABLE ORAL at 08:24

## 2023-03-29 RX ADMIN — PANTOPRAZOLE SODIUM 40 MG: 40 TABLET, DELAYED RELEASE ORAL at 05:01

## 2023-03-29 RX ADMIN — ESCITALOPRAM 10 MG: 5 TABLET, FILM COATED ORAL at 08:24

## 2023-03-29 RX ADMIN — GABAPENTIN 600 MG: 300 CAPSULE ORAL at 20:02

## 2023-03-29 RX ADMIN — Medication 10 ML: at 20:02

## 2023-03-29 RX ADMIN — ATORVASTATIN CALCIUM 10 MG: 20 TABLET, FILM COATED ORAL at 08:24

## 2023-03-29 NOTE — PROGRESS NOTES
[facility] THORACIC/LUMBAR NEUROSURGERY PROGRESS NOTE      CC: Low back pain      Subjective     Interval History: The patient continues to have low back pain without current radiation to the lower extremities.  When the pain does radiate, the pattern is down the right lateral and anterior thigh to the knee.  Denies bowel and bladder incontinence.  Patient has history of urinary frequency, but is able to sense when he needs to urinate.    ROS:   Constitutional: No fever, chills  MS: back pain  Neuro: No numbness, tingling, or weakness,  no balance difficulties  : Urinary frequency     Objective     Vital signs in last 24 hours:  Temp:  [97.4 °F (36.3 °C)-98.2 °F (36.8 °C)] 98.2 °F (36.8 °C)  Heart Rate:  [49-54] 51  Resp:  [16-18] 18  BP: (115-142)/(59-71) 142/66    Intake/Output this shift:  I/O this shift:  In: 360 [P.O.:360]  Out: -     LABS:  .  Results from last 7 days   Lab Units 03/28/23  0853   WBC 10*3/mm3 7.52   HEMOGLOBIN g/dL 11.4*   HEMATOCRIT % 35.3*   PLATELETS 10*3/mm3 297     .  Results from last 7 days   Lab Units 03/28/23  0853 03/27/23  1150   SODIUM mmol/L 142 140   POTASSIUM mmol/L 4.5 4.3   CHLORIDE mmol/L 109* 109*   CO2 mmol/L 25.0 20.0*   BUN mg/dL 21 22   CREATININE mg/dL 1.16 1.34*   CALCIUM mg/dL 8.9 8.8   BILIRUBIN mg/dL  --  0.6   ALK PHOS U/L  --  85   ALT (SGPT) U/L  --  12   AST (SGOT) U/L  --  13   GLUCOSE mg/dL 117* 129*         IMAGING STUDIES:  MRI lumbar spine without contrast 3/28/2023: Shows L3 and L4 Schmorl nodes and chronic S1 fracture.  Severe degenerative change seen causing moderate to severe spinal stenosis at L2-L4 and severe bilateral foraminal narrowing with possible impingement at almost every exiting nerve root of the lumbar spine.    I personally viewed and interpreted the patient's labs, imaging, and chart.    Meds reviewed/changed: Yes  Dilaudid 0.5 mg IV as needed every 2 hours-0 doses  Norco 5 mg as needed every 4 hours- 0 doses    Physical  "Exam:    General:  Awake, alert.  Back:   Mild pain to palpation lumbar region lateral to spine on the right side SLR - bilateral  Motor:   Normal muscle strength throughout , bulk and tone in lower extremities.  No fasciculations, rigidity, spasticity, or abnormal movements.  Reflexes:            no clonus  Sensation:                   Mild bilateral volar foot numbness baseline for 1 year  Station and Gait:      Not tested      Assessment & Plan         ASSESSMENT:      Acute anemia    Hyperlipidemia    Degenerative disc disease, cervical    Cardiomyopathy ([facility])    Pulmonary emphysema ([facility])    Monoclonal gammopathy of unknown significance (MGUS)    Closed compression fracture of L3 lumbar vertebra, initial encounter ([facility])    Closed compression fracture of L4 lumbar vertebra, initial encounter ([facility])      There were no acute findings on MRI of the lumbar spine but with severe multilevel forminal and canal stenosis. At this point, he would be a candidate for lumbar epidural steroid injection and option of LESI was discussed with the patient.  He stated that he had a lumbar spinal injection in September of last year in which he received 3 weeks of improvement of symptoms.  After discussion, he is agreeable to receiving injection in house and will follow-up for possible subsequent injections with his  pain management specialist Dr. Brown.  I discussed the possible risks and complications associated with the procedure.       PLAN:     Lumbar epidural steroid injection today      I discussed the patient's findings and my recommendations with patient, nursing staff and Dr. Brownlee    During patient visit, I utilized appropriate personal protective equipment including mask and gloves.  Mask used was standard procedure mask. Appropriate PPE was worn during the entire visit.  Hand hygiene was completed before and after.      LOS: 0 days       Delano Chang, APRN  3/29/2023  12:30 EDT    \"Dictated utilizing Dragon " "dictation\".    "

## 2023-03-29 NOTE — PROGRESS NOTES
Name: Jonathan Trejo ADMIT: 3/27/2023   : 1935  PCP: Coby Walton APRN    MRN: [id number] LOS: 0 days   AGE/SEX: 88 y.o. male  [facility]     Subjective   Subjective   Pain is improved. He wants to go home. No chest pain or shortness of breath. No abdominal pain.     Objective   Objective   Vital Signs  Temp:  [97.4 °F (36.3 °C)-98.2 °F (36.8 °C)] 98 °F (36.7 °C)  Heart Rate:  [49-54] 51  Resp:  [14-18] 14  BP: (115-142)/(59-74) 133/74  SpO2:  [92 %-96 %] 96 %  on   ;   Device (Oxygen Therapy): room air  Body mass index is 28.19 kg/m².    Physical Exam  Constitutional:       General: He is not in acute distress.     Appearance: Normal appearance. He is not toxic-appearing.   HENT:      Head: Normocephalic and atraumatic.   Eyes:      Conjunctiva/sclera: Conjunctivae normal.   Cardiovascular:      Rate and Rhythm: Normal rate and regular rhythm.   Pulmonary:      Effort: Pulmonary effort is normal. No respiratory distress.      Breath sounds: Normal breath sounds. No wheezing or rhonchi.   Abdominal:      Palpations: Abdomen is soft.      Tenderness: There is no abdominal tenderness.   Musculoskeletal:         General: No swelling.      Cervical back: Normal range of motion.   Skin:     General: Skin is warm and dry.   Neurological:      General: No focal deficit present.      Mental Status: He is alert and oriented to person, place, and time.     Results Review  I reviewed the patient's new clinical results.  Results from last 7 days   Lab Units 23  0853 23  1150   WBC 10*3/mm3 7.52 8.56   HEMOGLOBIN g/dL 11.4* 10.6*   PLATELETS 10*3/mm3 297 288     Results from last 7 days   Lab Units 23  0853 23  1150   SODIUM mmol/L 142 140   POTASSIUM mmol/L 4.5 4.3   CHLORIDE mmol/L 109* 109*   CO2 mmol/L 25.0 20.0*   BUN mg/dL 21 22   CREATININE mg/dL 1.16 1.34*   GLUCOSE mg/dL 117* 129*     Lab Results   Component Value Date    ANIONGAP 8.0 2023     Estimated Creatinine  Clearance: 46.7 mL/min (by C-G formula based on SCr of 1.16 mg/dL).    Results from last 7 days   Lab Units 03/27/23  1150   ALBUMIN g/dL 3.6   BILIRUBIN mg/dL 0.6   ALK PHOS U/L 85   AST (SGOT) U/L 13   ALT (SGPT) U/L 12     Results from last 7 days   Lab Units 03/28/23  0853 03/27/23  1150   CALCIUM mg/dL 8.9 8.8   ALBUMIN g/dL  --  3.6       No results found for: HGBA1C, POCGLU    MRI Lumbar Spine Without Contrast    Result Date: 3/28/2023  Electronically signed by Donte Evans MD on 03-28-23 at 2319      Scheduled Meds  aspirin, 81 mg, Oral, Daily  atorvastatin, 10 mg, Oral, Daily  escitalopram, 10 mg, Oral, Daily  ferrous sulfate, 325 mg, Oral, Daily With Breakfast  gabapentin, 600 mg, Oral, Q12H  iopamidol, 3 mL, Epidural, Once in imaging  lidocaine, 1 mL, Intradermal, Once  methylPREDNISolone acetate, 80 mg, Epidural, Once  pantoprazole, 40 mg, Oral, Q AM  sodium chloride, 10 mL, Intravenous, Q12H  vitamin B-12, 1,000 mcg, Oral, Daily    Continuous Infusions   PRN Meds  •  acetaminophen **OR** acetaminophen **OR** acetaminophen  •  fentanyl  •  HYDROcodone-acetaminophen  •  HYDROmorphone **AND** naloxone  •  midazolam  •  ondansetron **OR** ondansetron  •  [COMPLETED] Insert Peripheral IV **AND** sodium chloride  •  sodium chloride  •  sodium chloride     Diet  Diet: Regular/House Diet; Texture: Regular Texture (IDDSI 7); Fluid Consistency: Thin (IDDSI 0)    I have personally reviewed:  [x]  Medications  [x]  Laboratory   []  Microbiology   [x]  Radiology   []  EKG/Telemetry   []  Cardiology/Vascular   []  Pathology   []  Records     Assessment/Plan     Active Hospital Problems    Diagnosis  POA   • **Acute anemia [D64.9]  Yes   • Closed compression fracture of L3 lumbar vertebra, initial encounter ([facility]) [S32.030A]  Unknown   • Closed compression fracture of L4 lumbar vertebra, initial encounter ([facility]) [S32.040A]  Unknown   • Monoclonal gammopathy of unknown significance (MGUS) [D47.2]  Yes   • Pulmonary  emphysema (HCC) [J43.9]  Yes   • Cardiomyopathy (HCC) [I42.9]  Yes   • Degenerative disc disease, cervical [M50.30]  Yes   • Hyperlipidemia [E78.5]  Yes      Resolved Hospital Problems   No resolved problems to display.     88 y.o. male with history atrial flutter, BPH, CKD, hyperlipidemia presented with back pain      Back pain  -MRI noted  -Epidural injection today    Anemia, MGUS    COPD    Cardiomyopathy    SCDs for DVT prophylaxis    Discussed with patient and nursing staff and Ximena Garcia neurosurgery APRN    Expected Discharge Date and Time     Expected Discharge Date Expected Discharge Time    Mar 31, 2023       Neto Rush MD  [facility]  03/29/23

## 2023-03-29 NOTE — H&P
INTERVAL HISTORY:    The patient presents for his first Lumbar epidural steroid injection today.  They have received N/A% improvement since their last injection with a pain level of 3-10 /10 at its worst recently.    Conservative measures tried in the interim. Daily activities are still affected by the pain.    Radiology reports and/or previous notes have been reviewed and are consistent with their diagnosis of Post-Op Diagnosis Codes:     * Spinal stenosis of lumbar region with neurogenic claudication [M48.062]     * Lumbar compression fracture (HCC) [S32.000A]    Alert and oriented  MP - 2  Lungs - clear  CV - rrr    Diagnosis:  Post-Op Diagnosis Codes:     * Spinal stenosis of lumbar region with neurogenic claudication [M48.062]     * Lumbar compression fracture (HCC) [S32.000A]      Plan:  Lumbar epidural steroid injection under fluoroscopic guidance        Target : L3-4    I have encouraged them to continue:  1.  Physical therapy exercises at home as prescribed by physical therapy or from the pain clinic handout (given to the patient).  Continuation of these exercises every day, or multiple times per week, even when the patient has good pain relief, was stressed to the patient as a preventative measure to decrease the frequency and severity of future pain episodes.  2.  Continue pain medicines as already prescribed.  If patient not currently taking any, it is recommended to begin Acetaminophen 1000 mg po q 8 hours.  If other medicines containing Acetaminophen are currently prescribed, maintain daily dose at 3000mg.    3.  If they can tolerate NSAIDS, it is recommended to take Ibuprofen 600 mg po q 6 hours for 7 days during pain exacerbations.   Alternatively, they may substitute an NSAID of their choice (e.g. Aleve)  4.  Heat and ice to the affected area as tolerated for pain control.  It was discussed that heating pads can cause burns.  5.  Low impact exercise such as walking or water exercise was recommended  to maintain overall health and aid in weight control.   6.  Follow up as needed for subsequent injections.  7.  Patient was counseled to abstain from tobacco products.    Time :27    min

## 2023-03-29 NOTE — PLAN OF CARE
Goal Outcome Evaluation:  Plan of Care Reviewed With: patient        Progress: no change     VSS. MRI completed, ativan given before MRI. Forgetful at times.

## 2023-03-29 NOTE — ANESTHESIA PROCEDURE NOTES
PAIN Epidural block    Pre-sedation assessment completed: 3/29/2023 2:50 PM    Patient reassessed immediately prior to procedure    Patient location during procedure: pain clinic  Start Time: 3/29/2023 2:50 PM  Stop Time: 3/29/2023 3:04 PM  Indication:at surgeon's request and procedure for pain  Performed By  Anesthesiologist: Adonis Johnson MD  Preanesthetic Checklist  Completed: patient identified, IV checked, site marked, risks and benefits discussed, surgical consent, monitors and equipment checked, pre-op evaluation and timeout performed  Additional Notes  Dx:  Post-Op Diagnosis Codes:     * Spinal stenosis of lumbar region with neurogenic claudication (M48.062)     * Lumbar compression fracture (HCC) (S32.000A)  80 mg depomedrol in epid    Plan : return to clinic as needed  Prep:  Pt Position:prone (prone)  Sterile Tech:cap, gloves, mask and sterile barrier  Prep:chlorhexidine gluconate and isopropyl alcohol  Monitoring:blood pressure monitoring, EKG and continuous pulse oximetry  Procedure:Sedation: no     Approach:midline  Guidance: fluoroscopy and c arm pa and lat and loss of resistance  Location:lumbar  Level:4-5 (interlaminar)  Needle Type:Thalchemy  Needle Gauge:20  Aspiration:negative  Medications:  Preservative Free Saline:3mL  Comments:No dye - renal pathologyDepomedrol:80 mg  Post Assessment:  Pt Tolerance:patient tolerated the procedure well with no apparent complications  Complications:no

## 2023-03-29 NOTE — DISCHARGE INSTRUCTIONS

## 2023-03-29 NOTE — PLAN OF CARE
Goal Outcome Evaluation:  Plan of Care Reviewed With: patient        Progress: no change  Outcome Evaluation: VSS. Pt alert and forgetful. No c/o pain requiring pain medication this shift. Lumbar epidural steroid injection complete this shift. Regular diet tolerated well. Ambulating with asist x1.

## 2023-03-30 ENCOUNTER — READMISSION MANAGEMENT (OUTPATIENT)
Dept: CALL CENTER | Facility: HOSPITAL | Age: 88
End: 2023-03-30
Payer: MEDICARE

## 2023-03-30 VITALS
TEMPERATURE: 98.1 F | RESPIRATION RATE: 18 BRPM | BODY MASS INDEX: 28.25 KG/M2 | HEIGHT: 67 IN | DIASTOLIC BLOOD PRESSURE: 67 MMHG | HEART RATE: 53 BPM | SYSTOLIC BLOOD PRESSURE: 138 MMHG | OXYGEN SATURATION: 94 % | WEIGHT: 180 LBS

## 2023-03-30 PROCEDURE — 99231 SBSQ HOSP IP/OBS SF/LOW 25: CPT

## 2023-03-30 PROCEDURE — G0378 HOSPITAL OBSERVATION PER HR: HCPCS

## 2023-03-30 RX ORDER — FERROUS SULFATE 325(65) MG
325 TABLET ORAL
Qty: 30 TABLET | Refills: 0 | Status: SHIPPED | OUTPATIENT
Start: 2023-03-31

## 2023-03-30 RX ADMIN — ESCITALOPRAM 10 MG: 5 TABLET, FILM COATED ORAL at 09:02

## 2023-03-30 RX ADMIN — ATORVASTATIN CALCIUM 10 MG: 20 TABLET, FILM COATED ORAL at 09:02

## 2023-03-30 RX ADMIN — Medication 1000 MCG: at 09:02

## 2023-03-30 RX ADMIN — ASPIRIN 81 MG: 81 TABLET, CHEWABLE ORAL at 09:02

## 2023-03-30 RX ADMIN — PANTOPRAZOLE SODIUM 40 MG: 40 TABLET, DELAYED RELEASE ORAL at 05:02

## 2023-03-30 RX ADMIN — FERROUS SULFATE TAB 325 MG (65 MG ELEMENTAL FE) 325 MG: 325 (65 FE) TAB at 09:02

## 2023-03-30 RX ADMIN — GABAPENTIN 600 MG: 300 CAPSULE ORAL at 09:03

## 2023-03-30 RX ADMIN — Medication 10 ML: at 09:04

## 2023-03-30 NOTE — PLAN OF CARE
Goal Outcome Evaluation:  Plan of Care Reviewed With: patient        Progress: no change       VSS. Assist x1 with a walker.

## 2023-03-30 NOTE — PROGRESS NOTES
[facility] THORACIC/LUMBAR NEUROSURGERY PROGRESS NOTE      CC: Low back pain      Subjective     Interval History: The patient received lumbar epidural steroid injection yesterday and reports improvement in lumbar back pain as well as absence of radicular symptoms in right leg.  He denies headaches.    ROS:  Constitutional: No fever  MS: Mild lumbar back pain  Neuro: No numbness, tingling, or weakness,   :No difficulty voiding, no incontinence, does report baseline urinary frequency    Objective     Vital signs in last 24 hours:  Temp:  [97.4 °F (36.3 °C)-98.4 °F (36.9 °C)] 98.1 °F (36.7 °C)  Heart Rate:  [50-62] 53  Resp:  [14-18] 18  BP: (113-161)/(65-76) 138/67    Intake/Output this shift:  I/O this shift:  In: 360 [P.O.:360]  Out: -       I personally viewed and interpreted the patient's chart.    Meds reviewed/changed: Yes  No utilization of as needed pain medications  Gabapentin 600 mg twice daily          Physical Exam:    General:   Awake, alert.  Back:    Mild lumbar tenderness  Motor:    Normal muscle strength, bulk and tone in lower extremities.  No fasciculations, rigidity, spasticity, or abnormal movements.  Sensation:   Normal to light touch kyrie LEs  Station and Gait:         Not tested   Extremities:   Wearing SCD      Assessment & Plan     ASSESSMENT:      Acute anemia    Hyperlipidemia    Degenerative disc disease, cervical    Cardiomyopathy (HCC)    Pulmonary emphysema (HCC)    Monoclonal gammopathy of unknown significance (MGUS)    Closed compression fracture of L3 lumbar vertebra, initial encounter ([facility])    Closed compression fracture of L4 lumbar vertebra, initial encounter ([facility])    The patient was seems to have received benefit from lumbar epidural steroid injection yesterday.  I encouraged the patient to follow-up with his previous pain management specialist Dr. Brown additional epidural injections or other pain management intervention options. May f/u with neurosurgery on referral if fails  "conservative management.    PLAN:     Referral to outpatient pain management      I discussed the patient's findings and my recommendations with patient and nursing staff    During patient visit, I utilized appropriate personal protective equipment including mask and gloves.  Mask used was standard procedure mask. Appropriate PPE was worn during the entire visit.  Hand hygiene was completed before and after.      LOS: 0 days       [facility], APRN  3/30/2023  11:59 EDT    \"Dictated utilizing Dragon dictation\".    "

## 2023-03-30 NOTE — DISCHARGE SUMMARY
[facility]    Date of Discharge:  3/30/2023    PCP: Coby Walton APRN    Discharge Diagnosis:   Active Hospital Problems    Diagnosis  POA   • **Acute anemia [D64.9]  Yes   • Closed compression fracture of L3 lumbar vertebra, initial encounter ([facility]) [S32.030A]  Unknown   • Closed compression fracture of L4 lumbar vertebra, initial encounter ([facility]) [S32.040A]  Unknown   • Monoclonal gammopathy of unknown significance (MGUS) [D47.2]  Yes   • Pulmonary emphysema (HCC) [J43.9]  Yes   • Cardiomyopathy (HCC) [I42.9]  Yes   • Degenerative disc disease, cervical [M50.30]  Yes   • Hyperlipidemia [E78.5]  Yes      Resolved Hospital Problems   No resolved problems to display.          Consults     Date and Time Order Name Status Description    3/27/2023  3:18 PM [facility] (on-call MD unless specified) Details      3/27/2023  2:50 PM Neurosurgery (on-call MD unless specified) Completed         Hospital Course  88 y.o. male with a history of atrial flutter, BPH, CKD, hyperlipidemia presented with back pain. CT showed lumbar compression deformity. Neurosurgery was consulted and MRI was ordered that did not show any acute findings. There were several multilevel foraminal and canal stenosis. Neurosurgery recommended lumbar epidural steroid injection and he had 1 yesterday. He will follow-up for possible subsequent injection with pain management. His pain is improved today and he would like to go home.    He had mild anemia. He had an iron of 35 and percent saturation of 8. He will need to follow-up with PCP. I discussed finding with him. He has history of polyp that he states required surgical rather than endoscopic resection and he believes his last colonoscopy was 2015 or so and he was told to come back in 5 years and he never returned. I discussed with him his iron deficiency and that one concern with this is colon cancer and he agrees to follow-up with PCP and arrange  to follow up for colonoscopy.    I discussed the patient's findings and my recommendations with patient and nursing staff.    Temp:  [97.4 °F (36.3 °C)-98.4 °F (36.9 °C)] 98.1 °F (36.7 °C)  Heart Rate:  [50-62] 53  Resp:  [14-18] 18  BP: (113-161)/(65-76) 138/67  Body mass index is 28.19 kg/m².    Physical Exam  Constitutional:       General: He is not in acute distress.     Appearance: Normal appearance. He is not toxic-appearing.   HENT:      Head: Normocephalic and atraumatic.   Cardiovascular:      Rate and Rhythm: Normal rate and regular rhythm.   Pulmonary:      Effort: Pulmonary effort is normal. No respiratory distress.      Breath sounds: Normal breath sounds.   Abdominal:      General: Bowel sounds are normal.      Palpations: Abdomen is soft.      Tenderness: There is no abdominal tenderness.   Musculoskeletal:         General: No swelling.      Cervical back: Normal range of motion.   Skin:     General: Skin is warm and dry.   Neurological:      General: No focal deficit present.      Mental Status: He is alert and oriented to person, place, and time.   Psychiatric:         Mood and Affect: Mood normal.         Behavior: Behavior normal.         Thought Content: Thought content normal.       Disposition: Home or Self Care       Discharge Medications      New Medications      Instructions Start Date   ferrous sulfate 325 (65 FE) MG tablet   325 mg, Oral, Daily With Breakfast   Start Date: March 31, 2023        Changes to Medications      Instructions Start Date   acetaminophen 325 MG tablet  Commonly known as: TYLENOL  What changed: Another medication with the same name was removed. Continue taking this medication, and follow the directions you see here.   650 mg, Oral, Every 6 Hours PRN      vitamin B-12 1000 MCG tablet  Commonly known as: CYANOCOBALAMIN  What changed: additional instructions   1,000 mcg, Oral, Daily, For b12 def         Continue These Medications      Instructions Start Date   aspirin  81 MG chewable tablet   81 mg, Oral, Daily      atorvastatin 10 MG tablet  Commonly known as: LIPITOR   10 mg, Oral, Daily      Diclofenac Sodium 1 % gel gel  Commonly known as: VOLTAREN   4 g, Topical, 3 Times Daily, Apply to right shoulder      escitalopram 10 MG tablet  Commonly known as: LEXAPRO   10 mg, Oral, Daily      gabapentin 600 MG tablet  Commonly known as: NEURONTIN   600 mg, Oral, 2 Times Daily      magnesium hydroxide 400 MG/5ML suspension  Commonly known as: MILK OF MAGNESIA   30 mL, Oral, Daily PRN      omeprazole 40 MG capsule  Commonly known as: priLOSEC   40 mg, Oral, Daily      polyethylene glycol 17 g packet  Commonly known as: MIRALAX   17 g, Oral, Daily PRN      Vitamin D3 50 MCG (2000 UT) tablet   2,000 Units, Oral, Daily            Diet Instructions     Diet: Regular/House Diet; Texture: Regular Texture (IDDSI 7); Fluid Consistency: Thin (IDDSI 0)      Discharge Diet: Regular/House Diet    Texture: Regular Texture (IDDSI 7)    Fluid Consistency: Thin (IDDSI 0)         Activity Instructions     Activity as Tolerated           Additional Instructions for the Follow-ups that You Need to Schedule     Call MD for problems / concerns.   As directed         Follow-up Information     Coby Walton APRN Follow up in 1 week(s).    Specialty: Internal Medicine  Why: post hospital follow up, follow up iron def anemia, history of colon polyps  Contact information:  [address] [place] [id number] [phone]             Bonnie Brown MD. Call.    Specialty: Pain Medicine  Contact information:  [address] [facility]  [address] [address] [phone]                        Future Appointments   Date Time Provider Department Center   9/12/2023  4:00 PM Teddy Campa MD [facility] Minh Loc Nguyen, MD  [facility]  03/30/23    Discharge time spent greater than 30 minutes.

## 2023-03-31 NOTE — CASE MANAGEMENT/SOCIAL WORK
Case Management Discharge Note      Final Note: DC'd home to [facility]. Tavon ZABALA RN         Selected Continued Care - Discharged on 3/30/2023 Admission date: 3/27/2023 - Discharge disposition: Home or Self Care    Destination    No services have been selected for the patient.              Durable Medical Equipment    No services have been selected for the patient.              Dialysis/Infusion    No services have been selected for the patient.              Home Medical Care    No services have been selected for the patient.              Therapy    No services have been selected for the patient.              Community Resources    No services have been selected for the patient.              Community & DME    No services have been selected for the patient.                  Transportation Services  Private: Car    Final Discharge Disposition Code: 01 - home or self-care

## 2023-03-31 NOTE — OUTREACH NOTE
Prep Survey    Flowsheet Row Responses   [facility] facility patient discharged from? [place]   Is LACE score < 7 ? No   Eligibility Readm Mgmt   Discharge diagnosis anemia   Does the patient have one of the following disease processes/diagnoses(primary or secondary)? Other   Does the patient have Home health ordered? No   Is there a DME ordered? No   Prep survey completed? Yes          Conchita ZABALA - Registered Nurse

## 2023-04-04 ENCOUNTER — READMISSION MANAGEMENT (OUTPATIENT)
Dept: CALL CENTER | Facility: HOSPITAL | Age: 88
End: 2023-04-04
Payer: MEDICARE

## 2023-04-04 NOTE — OUTREACH NOTE
Medical Week 1 Survey    Flowsheet Row Responses   [facility] patient discharged from? [place]   Does the patient have one of the following disease processes/diagnoses(primary or secondary)? Other   Week 1 attempt successful? Yes   Call start time 1109   Call end time 1112   Discharge diagnosis anemia   Meds reviewed with patient/caregiver? Yes   Is the patient having any side effects they believe may be caused by any medication additions or changes? No   Does the patient have all medications ordered at discharge? Yes   Is the patient taking all medications as directed (includes completed medication regime)? Yes   Does the patient have a primary care provider?  Yes   Does the patient have an appointment with their PCP within 7 days of discharge? No   What is preventing the patient from scheduling follow up appointments within 7 days of discharge? Haven't had time   Nursing Interventions Advised patient to make appointment   Has the patient kept scheduled appointments due by today? N/A   Has home health visited the patient within 72 hours of discharge? N/A   Psychosocial issues? No   Did the patient receive a copy of their discharge instructions? Yes   Nursing interventions Reviewed instructions with patient   What is the patient's perception of their health status since discharge? Improving   Is the patient/caregiver able to teach back signs and symptoms related to disease process for when to call PCP? Yes   Is the patient/caregiver able to teach back signs and symptoms related to disease process for when to call 911? Yes   Is the patient/caregiver able to teach back the hierarchy of who to call/visit for symptoms/problems? PCP, Specialist, Home health nurse, Urgent Care, ED, 911 Yes   Additional teach back comments Discussed iron pill s/e of lena, says he will be mindful.   Week 1 call completed? Yes   Is the patient interested in additional calls from an ambulatory ?  NOTE:  applies  to high risk patients requiring additional follow-up. No   Wrap up additional comments No questions or concerns today.          Vaishnavi BARNES - Registered Nurse

## 2023-05-30 ENCOUNTER — OFFICE VISIT (OUTPATIENT)
Dept: GASTROENTEROLOGY | Facility: CLINIC | Age: 88
End: 2023-05-30

## 2023-05-30 VITALS — HEIGHT: 66 IN | WEIGHT: 200 LBS | TEMPERATURE: 98 F | BODY MASS INDEX: 32.14 KG/M2

## 2023-05-30 DIAGNOSIS — D50.9 IRON DEFICIENCY ANEMIA, UNSPECIFIED IRON DEFICIENCY ANEMIA TYPE: Primary | ICD-10-CM

## 2023-05-30 PROCEDURE — 99203 OFFICE O/P NEW LOW 30 MIN: CPT | Performed by: INTERNAL MEDICINE

## 2023-05-30 PROCEDURE — 1159F MED LIST DOCD IN RCRD: CPT | Performed by: INTERNAL MEDICINE

## 2023-05-30 PROCEDURE — 1160F RVW MEDS BY RX/DR IN RCRD: CPT | Performed by: INTERNAL MEDICINE

## 2023-05-30 NOTE — PROGRESS NOTES
Chief Complaint   Patient presents with   • Anemia     Subjective   HPI     Jonathan Trejo is a 88 y.o. male who presents today for new patient evaluation.  Referred by PCP for anemia.     Very mild normocytic anemia, with Hb 11.4.  Iron indices with low iron and borderline ferritin.  No GI complaints.    Last colonoscopy 2015 normal  Remote colon resection for unresectable polyp.  States he has subsequently had 10-12 colonoscopies since then which have all been normal.        Objective   Vitals:    05/30/23 1521   Temp: 98 °F (36.7 °C)     Physical Exam  Vitals reviewed.   Constitutional:       Appearance: He is well-developed.   HENT:      Head: Normocephalic and atraumatic.   Neurological:      Mental Status: He is alert and oriented to person, place, and time.   Psychiatric:         Behavior: Behavior normal.         Thought Content: Thought content normal.         Judgment: Judgment normal.              Assessment & Plan   Assessment:     1. Iron deficiency anemia, unspecified iron deficiency anemia type    2.      Personal hx of colon polyps    Plan:   89yo gentleman who has a very mild iron deficiency anemia without any evidence of overt GI bleeding or active GI complaints.  Had a long discussion today about the possibility of performing colonoscopy to further evaluate his anemia.  We discussed the risk of the procedure in detail.  He notes that he has had multiple negative prior colonoscopies and doesn't seem to be intent of performing aggressive workup unless absolutely necessary.  We have agreed to begin evaluation with hemoccult cards x 3, if negative then he would be content with foregoing colonoscopy and assess his response to iron supplementation. If heme positive then he would be agreeable to colonoscopy.           Macho Harvey M.D.  [facility]  [address] [place] [address]  Office: [phone]

## 2023-07-13 ENCOUNTER — APPOINTMENT (OUTPATIENT)
Dept: CT IMAGING | Facility: HOSPITAL | Age: 88
DRG: 176 | End: 2023-07-13
Payer: MEDICARE

## 2023-07-13 ENCOUNTER — APPOINTMENT (OUTPATIENT)
Dept: GENERAL RADIOLOGY | Facility: HOSPITAL | Age: 88
DRG: 176 | End: 2023-07-13
Payer: MEDICARE

## 2023-07-13 ENCOUNTER — HOSPITAL ENCOUNTER (INPATIENT)
Facility: HOSPITAL | Age: 88
LOS: 4 days | Discharge: HOME HEALTH | DRG: 176 | End: 2023-07-17
Attending: EMERGENCY MEDICINE | Admitting: INTERNAL MEDICINE
Payer: MEDICARE

## 2023-07-13 DIAGNOSIS — Z74.09 MOBILITY POOR: ICD-10-CM

## 2023-07-13 DIAGNOSIS — J96.01 ACUTE HYPOXEMIC RESPIRATORY FAILURE: ICD-10-CM

## 2023-07-13 DIAGNOSIS — I26.99 ACUTE PULMONARY EMBOLISM WITHOUT ACUTE COR PULMONALE, UNSPECIFIED PULMONARY EMBOLISM TYPE: Primary | ICD-10-CM

## 2023-07-13 LAB
ALBUMIN SERPL-MCNC: 3.7 G/DL (ref 3.5–5.2)
ALBUMIN/GLOB SERPL: 1.5 G/DL
ALP SERPL-CCNC: 112 U/L (ref 39–117)
ALT SERPL W P-5'-P-CCNC: 17 U/L (ref 1–41)
ANION GAP SERPL CALCULATED.3IONS-SCNC: 13 MMOL/L (ref 5–15)
APTT PPP: 34 SECONDS (ref 22.7–35.4)
AST SERPL-CCNC: 16 U/L (ref 1–40)
B PARAPERT DNA SPEC QL NAA+PROBE: NOT DETECTED
B PERT DNA SPEC QL NAA+PROBE: NOT DETECTED
BASOPHILS # BLD AUTO: 0.08 10*3/MM3 (ref 0–0.2)
BASOPHILS NFR BLD AUTO: 0.8 % (ref 0–1.5)
BILIRUB SERPL-MCNC: 1.1 MG/DL (ref 0–1.2)
BILIRUB UR QL STRIP: NEGATIVE
BUN SERPL-MCNC: 24 MG/DL (ref 8–23)
BUN/CREAT SERPL: 12.4 (ref 7–25)
C PNEUM DNA NPH QL NAA+NON-PROBE: NOT DETECTED
CALCIUM SPEC-SCNC: 9.4 MG/DL (ref 8.6–10.5)
CHLORIDE SERPL-SCNC: 107 MMOL/L (ref 98–107)
CK SERPL-CCNC: 31 U/L (ref 20–200)
CLARITY UR: ABNORMAL
CO2 SERPL-SCNC: 19 MMOL/L (ref 22–29)
COLOR UR: YELLOW
CREAT SERPL-MCNC: 1.94 MG/DL (ref 0.76–1.27)
D DIMER PPP FEU-MCNC: 6.65 MCGFEU/ML (ref 0–0.86)
DEPRECATED RDW RBC AUTO: 47.5 FL (ref 37–54)
EGFRCR SERPLBLD CKD-EPI 2021: 33.1 ML/MIN/1.73
EOSINOPHIL # BLD AUTO: 0.11 10*3/MM3 (ref 0–0.4)
EOSINOPHIL NFR BLD AUTO: 1.1 % (ref 0.3–6.2)
ERYTHROCYTE [DISTWIDTH] IN BLOOD BY AUTOMATED COUNT: 13.3 % (ref 12.3–15.4)
FLUAV SUBTYP SPEC NAA+PROBE: NOT DETECTED
FLUBV RNA ISLT QL NAA+PROBE: NOT DETECTED
GEN 5 2HR TROPONIN T REFLEX: 27 NG/L
GLOBULIN UR ELPH-MCNC: 2.5 GM/DL
GLUCOSE SERPL-MCNC: 123 MG/DL (ref 65–99)
GLUCOSE UR STRIP-MCNC: NEGATIVE MG/DL
HADV DNA SPEC NAA+PROBE: NOT DETECTED
HCOV 229E RNA SPEC QL NAA+PROBE: NOT DETECTED
HCOV HKU1 RNA SPEC QL NAA+PROBE: NOT DETECTED
HCOV NL63 RNA SPEC QL NAA+PROBE: NOT DETECTED
HCOV OC43 RNA SPEC QL NAA+PROBE: NOT DETECTED
HCT VFR BLD AUTO: 41.2 % (ref 37.5–51)
HGB BLD-MCNC: 13.7 G/DL (ref 13–17.7)
HGB UR QL STRIP.AUTO: NEGATIVE
HMPV RNA NPH QL NAA+NON-PROBE: NOT DETECTED
HPIV1 RNA ISLT QL NAA+PROBE: NOT DETECTED
HPIV2 RNA SPEC QL NAA+PROBE: NOT DETECTED
HPIV3 RNA NPH QL NAA+PROBE: NOT DETECTED
HPIV4 P GENE NPH QL NAA+PROBE: NOT DETECTED
IMM GRANULOCYTES # BLD AUTO: 0.37 10*3/MM3 (ref 0–0.05)
IMM GRANULOCYTES NFR BLD AUTO: 3.7 % (ref 0–0.5)
INR PPP: 1.13 (ref 0.9–1.1)
KETONES UR QL STRIP: NEGATIVE
LEUKOCYTE ESTERASE UR QL STRIP.AUTO: NEGATIVE
LIPASE SERPL-CCNC: 52 U/L (ref 13–60)
LYMPHOCYTES # BLD AUTO: 0.69 10*3/MM3 (ref 0.7–3.1)
LYMPHOCYTES NFR BLD AUTO: 6.9 % (ref 19.6–45.3)
M PNEUMO IGG SER IA-ACNC: NOT DETECTED
MAGNESIUM SERPL-MCNC: 2.1 MG/DL (ref 1.6–2.4)
MCH RBC QN AUTO: 32.3 PG (ref 26.6–33)
MCHC RBC AUTO-ENTMCNC: 33.3 G/DL (ref 31.5–35.7)
MCV RBC AUTO: 97.2 FL (ref 79–97)
MONOCYTES # BLD AUTO: 0.95 10*3/MM3 (ref 0.1–0.9)
MONOCYTES NFR BLD AUTO: 9.5 % (ref 5–12)
NEUTROPHILS NFR BLD AUTO: 7.78 10*3/MM3 (ref 1.7–7)
NEUTROPHILS NFR BLD AUTO: 78 % (ref 42.7–76)
NITRITE UR QL STRIP: NEGATIVE
NRBC BLD AUTO-RTO: 0.2 /100 WBC (ref 0–0.2)
NT-PROBNP SERPL-MCNC: 1335 PG/ML (ref 0–1800)
PH UR STRIP.AUTO: <=5 [PH] (ref 5–8)
PHOSPHATE SERPL-MCNC: 2 MG/DL (ref 2.5–4.5)
PLATELET # BLD AUTO: 267 10*3/MM3 (ref 140–450)
PMV BLD AUTO: 11.4 FL (ref 6–12)
POTASSIUM SERPL-SCNC: 4.3 MMOL/L (ref 3.5–5.2)
PROT SERPL-MCNC: 6.2 G/DL (ref 6–8.5)
PROT UR QL STRIP: ABNORMAL
PROTHROMBIN TIME: 14.7 SECONDS (ref 11.7–14.2)
RBC # BLD AUTO: 4.24 10*6/MM3 (ref 4.14–5.8)
RHINOVIRUS RNA SPEC NAA+PROBE: NOT DETECTED
RSV RNA NPH QL NAA+NON-PROBE: NOT DETECTED
SARS-COV-2 RNA NPH QL NAA+NON-PROBE: NOT DETECTED
SODIUM SERPL-SCNC: 139 MMOL/L (ref 136–145)
SP GR UR STRIP: 1.01 (ref 1–1.03)
T4 FREE SERPL-MCNC: 0.99 NG/DL (ref 0.93–1.7)
TROPONIN T DELTA: -8 NG/L
TROPONIN T SERPL HS-MCNC: 35 NG/L
TSH SERPL DL<=0.05 MIU/L-ACNC: 1.32 UIU/ML (ref 0.27–4.2)
UROBILINOGEN UR QL STRIP: ABNORMAL
WBC NRBC COR # BLD: 9.98 10*3/MM3 (ref 3.4–10.8)

## 2023-07-13 PROCEDURE — 93010 ELECTROCARDIOGRAM REPORT: CPT | Performed by: INTERNAL MEDICINE

## 2023-07-13 PROCEDURE — 71275 CT ANGIOGRAPHY CHEST: CPT

## 2023-07-13 PROCEDURE — 83690 ASSAY OF LIPASE: CPT | Performed by: EMERGENCY MEDICINE

## 2023-07-13 PROCEDURE — 85730 THROMBOPLASTIN TIME PARTIAL: CPT | Performed by: EMERGENCY MEDICINE

## 2023-07-13 PROCEDURE — 84439 ASSAY OF FREE THYROXINE: CPT | Performed by: EMERGENCY MEDICINE

## 2023-07-13 PROCEDURE — 81003 URINALYSIS AUTO W/O SCOPE: CPT | Performed by: EMERGENCY MEDICINE

## 2023-07-13 PROCEDURE — 82550 ASSAY OF CK (CPK): CPT | Performed by: EMERGENCY MEDICINE

## 2023-07-13 PROCEDURE — 25010000002 HEPARIN (PORCINE) 25000-0.45 UT/250ML-% SOLUTION: Performed by: EMERGENCY MEDICINE

## 2023-07-13 PROCEDURE — 84484 ASSAY OF TROPONIN QUANT: CPT | Performed by: EMERGENCY MEDICINE

## 2023-07-13 PROCEDURE — 80053 COMPREHEN METABOLIC PANEL: CPT | Performed by: EMERGENCY MEDICINE

## 2023-07-13 PROCEDURE — 83880 ASSAY OF NATRIURETIC PEPTIDE: CPT | Performed by: EMERGENCY MEDICINE

## 2023-07-13 PROCEDURE — 85610 PROTHROMBIN TIME: CPT | Performed by: EMERGENCY MEDICINE

## 2023-07-13 PROCEDURE — 99285 EMERGENCY DEPT VISIT HI MDM: CPT

## 2023-07-13 PROCEDURE — 83735 ASSAY OF MAGNESIUM: CPT | Performed by: EMERGENCY MEDICINE

## 2023-07-13 PROCEDURE — 93005 ELECTROCARDIOGRAM TRACING: CPT | Performed by: EMERGENCY MEDICINE

## 2023-07-13 PROCEDURE — 25510000001 IOPAMIDOL PER 1 ML: Performed by: EMERGENCY MEDICINE

## 2023-07-13 PROCEDURE — 84100 ASSAY OF PHOSPHORUS: CPT | Performed by: EMERGENCY MEDICINE

## 2023-07-13 PROCEDURE — 85379 FIBRIN DEGRADATION QUANT: CPT | Performed by: EMERGENCY MEDICINE

## 2023-07-13 PROCEDURE — 84443 ASSAY THYROID STIM HORMONE: CPT | Performed by: EMERGENCY MEDICINE

## 2023-07-13 PROCEDURE — 85025 COMPLETE CBC W/AUTO DIFF WBC: CPT | Performed by: EMERGENCY MEDICINE

## 2023-07-13 PROCEDURE — 25010000002 HEPARIN (PORCINE) PER 1000 UNITS: Performed by: EMERGENCY MEDICINE

## 2023-07-13 PROCEDURE — 71045 X-RAY EXAM CHEST 1 VIEW: CPT

## 2023-07-13 PROCEDURE — 0202U NFCT DS 22 TRGT SARS-COV-2: CPT | Performed by: EMERGENCY MEDICINE

## 2023-07-13 RX ORDER — GABAPENTIN 300 MG/1
600 CAPSULE ORAL EVERY 12 HOURS SCHEDULED
Status: DISCONTINUED | OUTPATIENT
Start: 2023-07-13 | End: 2023-07-17 | Stop reason: HOSPADM

## 2023-07-13 RX ORDER — CHOLECALCIFEROL (VITAMIN D3) 125 MCG
1000 CAPSULE ORAL DAILY
Status: DISCONTINUED | OUTPATIENT
Start: 2023-07-14 | End: 2023-07-17 | Stop reason: HOSPADM

## 2023-07-13 RX ORDER — ALBUTEROL SULFATE 90 UG/1
2 AEROSOL, METERED RESPIRATORY (INHALATION) 4 TIMES DAILY PRN
COMMUNITY

## 2023-07-13 RX ORDER — BISACODYL 10 MG
10 SUPPOSITORY, RECTAL RECTAL DAILY PRN
Status: DISCONTINUED | OUTPATIENT
Start: 2023-07-13 | End: 2023-07-17 | Stop reason: HOSPADM

## 2023-07-13 RX ORDER — HEPARIN SODIUM 5000 [USP'U]/ML
80 INJECTION, SOLUTION INTRAVENOUS; SUBCUTANEOUS ONCE
Status: COMPLETED | OUTPATIENT
Start: 2023-07-13 | End: 2023-07-13

## 2023-07-13 RX ORDER — PANTOPRAZOLE SODIUM 40 MG/1
40 TABLET, DELAYED RELEASE ORAL
Status: DISCONTINUED | OUTPATIENT
Start: 2023-07-14 | End: 2023-07-17 | Stop reason: HOSPADM

## 2023-07-13 RX ORDER — ESCITALOPRAM OXALATE 10 MG/1
10 TABLET ORAL DAILY
Status: DISCONTINUED | OUTPATIENT
Start: 2023-07-14 | End: 2023-07-17 | Stop reason: HOSPADM

## 2023-07-13 RX ORDER — HEPARIN SODIUM 10000 [USP'U]/100ML
18 INJECTION, SOLUTION INTRAVENOUS
Status: DISPENSED | OUTPATIENT
Start: 2023-07-13 | End: 2023-07-14

## 2023-07-13 RX ORDER — FERROUS SULFATE 325(65) MG
325 TABLET ORAL
Status: DISCONTINUED | OUTPATIENT
Start: 2023-07-14 | End: 2023-07-17 | Stop reason: HOSPADM

## 2023-07-13 RX ORDER — BISACODYL 5 MG/1
5 TABLET, DELAYED RELEASE ORAL DAILY PRN
Status: DISCONTINUED | OUTPATIENT
Start: 2023-07-13 | End: 2023-07-17 | Stop reason: HOSPADM

## 2023-07-13 RX ORDER — MELATONIN
2000 DAILY
Status: DISCONTINUED | OUTPATIENT
Start: 2023-07-14 | End: 2023-07-17 | Stop reason: HOSPADM

## 2023-07-13 RX ORDER — UREA 10 %
3 LOTION (ML) TOPICAL NIGHTLY PRN
Status: DISCONTINUED | OUTPATIENT
Start: 2023-07-13 | End: 2023-07-17 | Stop reason: HOSPADM

## 2023-07-13 RX ORDER — ONDANSETRON 4 MG/1
4 TABLET, FILM COATED ORAL EVERY 6 HOURS PRN
Status: DISCONTINUED | OUTPATIENT
Start: 2023-07-13 | End: 2023-07-17 | Stop reason: HOSPADM

## 2023-07-13 RX ORDER — ACETAMINOPHEN 325 MG/1
650 TABLET ORAL EVERY 4 HOURS PRN
Status: DISCONTINUED | OUTPATIENT
Start: 2023-07-13 | End: 2023-07-17 | Stop reason: HOSPADM

## 2023-07-13 RX ORDER — HEPARIN SODIUM 5000 [USP'U]/ML
40-80 INJECTION, SOLUTION INTRAVENOUS; SUBCUTANEOUS EVERY 6 HOURS PRN
Status: DISPENSED | OUTPATIENT
Start: 2023-07-13 | End: 2023-07-14

## 2023-07-13 RX ORDER — ATORVASTATIN CALCIUM 20 MG/1
10 TABLET, FILM COATED ORAL DAILY
Status: DISCONTINUED | OUTPATIENT
Start: 2023-07-14 | End: 2023-07-17 | Stop reason: HOSPADM

## 2023-07-13 RX ORDER — SODIUM CHLORIDE 0.9 % (FLUSH) 0.9 %
10 SYRINGE (ML) INJECTION AS NEEDED
Status: DISCONTINUED | OUTPATIENT
Start: 2023-07-13 | End: 2023-07-17 | Stop reason: HOSPADM

## 2023-07-13 RX ORDER — ONDANSETRON 2 MG/ML
4 INJECTION INTRAMUSCULAR; INTRAVENOUS EVERY 6 HOURS PRN
Status: DISCONTINUED | OUTPATIENT
Start: 2023-07-13 | End: 2023-07-17 | Stop reason: HOSPADM

## 2023-07-13 RX ORDER — ASPIRIN 81 MG/1
81 TABLET, CHEWABLE ORAL DAILY
Status: DISCONTINUED | OUTPATIENT
Start: 2023-07-14 | End: 2023-07-17 | Stop reason: HOSPADM

## 2023-07-13 RX ORDER — AMOXICILLIN 250 MG
2 CAPSULE ORAL 2 TIMES DAILY
Status: DISCONTINUED | OUTPATIENT
Start: 2023-07-13 | End: 2023-07-17 | Stop reason: HOSPADM

## 2023-07-13 RX ORDER — ALBUTEROL SULFATE 2.5 MG/3ML
2.5 SOLUTION RESPIRATORY (INHALATION) EVERY 6 HOURS PRN
Status: DISCONTINUED | OUTPATIENT
Start: 2023-07-13 | End: 2023-07-17 | Stop reason: HOSPADM

## 2023-07-13 RX ORDER — POLYETHYLENE GLYCOL 3350 17 G/17G
17 POWDER, FOR SOLUTION ORAL DAILY PRN
Status: DISCONTINUED | OUTPATIENT
Start: 2023-07-13 | End: 2023-07-17 | Stop reason: HOSPADM

## 2023-07-13 RX ADMIN — HEPARIN SODIUM 18 UNITS/KG/HR: 10000 INJECTION, SOLUTION INTRAVENOUS at 17:38

## 2023-07-13 RX ADMIN — SODIUM CHLORIDE, POTASSIUM CHLORIDE, SODIUM LACTATE AND CALCIUM CHLORIDE 1000 ML: 600; 310; 30; 20 INJECTION, SOLUTION INTRAVENOUS at 14:20

## 2023-07-13 RX ADMIN — IOPAMIDOL 95 ML: 755 INJECTION, SOLUTION INTRAVENOUS at 15:14

## 2023-07-13 RX ADMIN — HEPARIN SODIUM 6500 UNITS: 5000 INJECTION INTRAVENOUS; SUBCUTANEOUS at 17:32

## 2023-07-13 NOTE — ED NOTES
..Nursing report ED to floor  Jonathan Trejo  88 y.o.  male    HPI :   Chief Complaint   Patient presents with    Weakness - Generalized       Admitting doctor:   Teena Criosstomo MD    Admitting diagnosis:   The primary encounter diagnosis was Acute pulmonary embolism without acute cor pulmonale, unspecified pulmonary embolism type. A diagnosis of Acute hypoxemic respiratory failure was also pertinent to this visit.    Code status:   Current Code Status       Date Active Code Status Order ID Comments User Context       Prior            Allergies:   Patient has no known allergies.    Isolation:   No active isolations    Intake and Output  No intake or output data in the 24 hours ending 07/13/23 1706    Weight:       07/13/23  1126   Weight: 81.6 kg (180 lb)       Most recent vitals:   Vitals:    07/13/23 1614 07/13/23 1615 07/13/23 1616 07/13/23 1617   BP:       Pulse: (!) 47 50 (!) 49 50   Resp:       Temp:       SpO2: 91% 92% 92% 93%   Weight:       Height:           Active LDAs/IV Access:   Lines, Drains & Airways       Active LDAs       Name Placement date Placement time Site Days    Peripheral IV 07/13/23 1420 Anterior;Left;Proximal Forearm 07/13/23  1420  Forearm  less than 1                    Labs (abnormal labs have a star):   Labs Reviewed   COMPREHENSIVE METABOLIC PANEL - Abnormal; Notable for the following components:       Result Value    Glucose 123 (*)     BUN 24 (*)     Creatinine 1.94 (*)     CO2 19.0 (*)     eGFR 33.1 (*)     All other components within normal limits    Narrative:     GFR Normal >60  Chronic Kidney Disease <60  Kidney Failure <15    The GFR formula is only valid for adults with stable renal function between ages 18 and 70.   TROPONIN - Abnormal; Notable for the following components:    HS Troponin T 35 (*)     All other components within normal limits    Narrative:     High Sensitive Troponin T Reference Range:  <10.0 ng/L- Negative Female for AMI  <15.0 ng/L- Negative Male for  "AMI  >=10 - Abnormal Female indicating possible myocardial injury.  >=15 - Abnormal Male indicating possible myocardial injury.   Clinicians would have to utilize clinical acumen, EKG, Troponin, and serial changes to determine if it is an Acute Myocardial Infarction or myocardial injury due to an underlying chronic condition.        PHOSPHORUS - Abnormal; Notable for the following components:    Phosphorus 2.0 (*)     All other components within normal limits   CBC WITH AUTO DIFFERENTIAL - Abnormal; Notable for the following components:    MCV 97.2 (*)     Neutrophil % 78.0 (*)     Lymphocyte % 6.9 (*)     Immature Grans % 3.7 (*)     Neutrophils, Absolute 7.78 (*)     Lymphocytes, Absolute 0.69 (*)     Monocytes, Absolute 0.95 (*)     Immature Grans, Absolute 0.37 (*)     All other components within normal limits   D-DIMER, QUANTITATIVE - Abnormal; Notable for the following components:    D-Dimer, Quantitative 6.65 (*)     All other components within normal limits    Narrative:     According to the assay 's published package insert, a normal (<0.50 MCGFEU/mL) D-dimer result in conjunction with a non-high clinical probability assessment, excludes deep vein thrombosis (DVT) and pulmonary embolism (PE) with high sensitivity.    D-dimer values increase with age and this can make VTE exclusion of an older population difficult. To address this, the [employer], based on best available evidence and recent guidelines, recommends that clinicians use age-adjusted D-dimer thresholds in patients greater than 50 years of age with: a) a low probability of PE who do not meet all Pulmonary Embolism Rule Out Criteria, or b) in those with intermediate probability of PE.   The formula for an age-adjusted D-dimer cut-off is \"age/100\".  For example, a 60 year old patient would have an age-adjusted cut-off of 0.60 MCGFEU/mL and an 80 year old 0.80 MCGFEU/mL.   RESPIRATORY PANEL PCR W/ COVID-19 (SARS-COV-2) " PREET/DARREN/LIDYA/PAD/COR/MAD/FLOR IN-HOUSE, NP SWAB IN UT/VTP, 3-4 HR TAT - Normal    Narrative:     In the setting of a positive respiratory panel with a viral infection PLUS a negative procalcitonin without other underlying concern for bacterial infection, consider observing off antibiotics or discontinuation of antibiotics and continue supportive care. If the respiratory panel is positive for atypical bacterial infection (Bordetella pertussis, Chlamydophila pneumoniae, or Mycoplasma pneumoniae), consider antibiotic de-escalation to target atypical bacterial infection.   BNP (IN-HOUSE) - Normal    Narrative:     Among patients with dyspnea, NT-proBNP is highly sensitive for the detection of acute congestive heart failure. In addition NT-proBNP of <300 pg/ml effectively rules out acute congestive heart failure with 99% negative predictive value.    Results may be falsely decreased if patient taking Biotin.     LIPASE - Normal   T4, FREE - Normal    Narrative:     Results may be falsely increased if patient taking Biotin.     TSH - Normal   MAGNESIUM - Normal   CK - Normal   URINALYSIS W/ MICROSCOPIC IF INDICATED (NO CULTURE)   HIGH SENSITIVITIY TROPONIN T 2HR   PROTIME-INR   APTT   CBC AND DIFFERENTIAL    Narrative:     The following orders were created for panel order CBC & Differential.  Procedure                               Abnormality         Status                     ---------                               -----------         ------                     CBC Auto Differential[[id number]]        Abnormal            Final result                 Please view results for these tests on the individual orders.       EKG:   ECG 12 Lead Other; fatigue   Preliminary Result   HEART RATE= 63  bpm   RR Interval= 952  ms   DC Interval= 95  ms   P Horizontal Axis= 231  deg   P Front Axis= 0  deg   QRSD Interval= 97  ms   QT Interval= 407  ms   QRS Axis= -5  deg   T Wave Axis= 145  deg   - ABNORMAL ECG -   Sinus rhythm   Short DC  interval   LVH with secondary repolarization abnormality   Electronically Signed By:    Date and Time of Study: 2023-07-13 11:23:06          Meds given in ED:   Medications   sodium chloride 0.9 % flush 10 mL (has no administration in time range)   heparin (porcine) 5000 UNIT/ML injection 6,500 Units (has no administration in time range)   heparin 00350 units/250 mL (100 units/mL) in 0.45 % NaCl infusion (has no administration in time range)   heparin (porcine) 5000 UNIT/ML injection 3,300-6,500 Units (has no administration in time range)   lactated ringers bolus 1,000 mL (1,000 mL Intravenous New Bag 7/13/23 1420)   iopamidol (ISOVUE-370) 76 % injection 100 mL (95 mL Intravenous Given by Other 7/13/23 1514)       Imaging results:  XR Chest 1 View    Result Date: 7/13/2023  No focal pulmonary consolidation. Borderline heart size. Tortuous aorta. Follow-up as clinically indicated.  This report was finalized on 7/13/2023 12:36 PM by Dr. Sami Avila M.D.      CT Angiogram Chest    Result Date: 7/13/2023  Extensive bilateral pulmonary emboli without evidence of right heart strain.  This report was finalized on 7/13/2023 3:34 PM by Dr. David Potter M.D.       Ambulatory status:   - assist x1    Social issues:   Social History     Socioeconomic History    Marital status:     Number of children: 2    Years of education: COLLEGE GRADUATE   Tobacco Use    Smoking status: Former     Packs/day: 0.50     Years: 55.00     Pack years: 27.50     Types: Cigarettes     Quit date: 5/1/2008     Years since quitting: 15.2    Smokeless tobacco: Never    Tobacco comments:     QUIT 10 YRS   Vaping Use    Vaping Use: Never used   Substance and Sexual Activity    Alcohol use: Yes     Comment: SOCIALLY    Drug use: No    Sexual activity: Defer       NIH Stroke Scale:       Savanah Bueno RN  07/13/23 17:06 EDT

## 2023-07-13 NOTE — ED NOTES
Pt to ER from home via EMS for weakness and dyspnea upon exertion. Pt was recently dx with UTI. Pt is unable to get out of bed and just wants to lay around.

## 2023-07-13 NOTE — PROGRESS NOTES
Clinical Pharmacy Services: Medication History    Jonathan Trejo is a 88 y.o. male presenting to [facility] for   Chief Complaint   Patient presents with    Weakness - Generalized       He  has a past medical history of Abnormal electrocardiogram, Arm pain, Atrial fibrillation, Atrial flutter, BPH (benign prostatic hyperplasia), BPH associated with nocturia, Chronic kidney disease, Colon cancer, Colon polyp (11/22/2015), Depression, Dyspnea, Enlarged prostate without lower urinary tract symptoms (luts), Episode of generalized weakness, Esophagitis, reflux, Fatigue, Fracture of ankle, GERD (gastroesophageal reflux disease), Hyperlipidemia, Inguinal hernia, Loss of hearing, Lumbar radiculopathy, Obesity, Osteoarthritis cervical spine, Osteopenia, Osteoporosis, Overweight, Tobacco dependence in remission, Urge incontinence of urine, Vitamin D deficiency, and Wheezing.    Allergies as of 07/13/2023    (No Known Allergies)       Medication information was obtained from: [facility]   Pharmacy and Phone Number: [phone]    Prior to Admission Medications       Prescriptions Last Dose Informant Patient Reported? Taking?    acetaminophen (TYLENOL) 325 MG tablet  Nursing Home Yes Yes    Take 2 tablets by mouth Every 6 (Six) Hours As Needed for Mild Pain.    albuterol sulfate  (90 Base) MCG/ACT inhaler  Nursing Home Yes Yes    Inhale 2 puffs 4 (Four) Times a Day As Needed for Wheezing.    aspirin 81 MG chewable tablet  Nursing Home Yes Yes    Chew 1 tablet Daily.    atorvastatin (LIPITOR) 10 MG tablet  Nursing Home No Yes    TAKE 1 TABLET BY MOUTH DAILY.    Cholecalciferol (VITAMIN D3) 2000 UNITS tablet  Nursing Home Yes Yes    Take 1 tablet by mouth Daily.    escitalopram (LEXAPRO) 10 MG tablet  Nursing Home Yes Yes    Take 1 tablet by mouth Daily.    ferrous sulfate 325 (65 FE) MG tablet  Nursing Home No Yes    Take 1 tablet by mouth Daily With Breakfast.    gabapentin (NEURONTIN) 600 MG  tablet  Nursing Home Yes Yes    Take 1 tablet by mouth 2 (Two) Times a Day.    omeprazole (priLOSEC) 40 MG capsule  Nursing Home No Yes    TAKE 1 CAPSULE BY MOUTH DAILY.    vitamin B-12 (CYANOCOBALAMIN) 1000 MCG tablet  Nursing Home No No    Take 1 tablet by mouth Daily. For b12 def    Patient taking differently:  Take 1 tablet by mouth Daily.              Medication notes:     This medication list is complete to the best of my knowledge as of 7/13/2023    Please call if questions.    Dominick Gaston  Medication History Technician   [phone]    7/13/2023 17:27 EDT

## 2023-07-13 NOTE — ED PROVIDER NOTES
EMERGENCY DEPARTMENT ENCOUNTER    Room Number:  24/24  PCP: Coby Walton APRN      HPI:  Chief Complaint: Shortness of breath  A complete HPI/ROS/PMH/PSH/SH/FH are unobtainable due to: None  Context: Jonathan Trejo is a 88 y.o. male who presents to the ED c/o shortness of breath.  States has been a chronic issue for him for 5 years.  He states that he is here today because his doctor sent him to the emergency department.  He states that he becomes dyspneic with exertion.  States that he is asymptomatic at rest.  He denies having any other associated symptoms such as fever, cough, chest pain.  No vomiting or abdominal pain.  By report, he recently was diagnosed with a UTI.  He states he does not normally wear oxygen at baseline.          PAST MEDICAL HISTORY  Active Ambulatory Problems     Diagnosis Date Noted    Benign prostatic hyperplasia with urinary obstruction 11/22/2015    Chronic renal impairment, stage 3 (moderate) 11/22/2015    Depression 11/22/2015    Gastroesophageal reflux disease with esophagitis 11/22/2015    Hyperlipidemia 11/22/2015    Nocturia 11/22/2015    Obesity (BMI 30-39.9) 11/22/2015    Osteopenia 11/22/2015    Osteoporosis 11/22/2015    Seborrhea 04/04/2016    Abnormal EKG 07/19/2016    Bradycardia 09/02/2016    Degenerative disc disease, cervical 10/02/2016    Typical atrial flutter 04/06/2017    Lumbar radiculopathy 06/06/2017    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    S/P ablation of atrial flutter 09/19/2017    S/P catheter ablation of slow pathway 09/19/2017    Cardiomyopathy 09/19/2017    GARCIA (dyspnea on exertion) 09/19/2017    Urinary tract infection without hematuria 01/02/2018    Right foot drop 01/03/2018    Frequent falls 01/03/2018    Metabolic encephalopathy 01/03/2018    Acute renal failure 01/03/2018    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    Moderate single current episode of major depressive disorder 03/12/2019    Mass of left lower leg 06/13/2019     Pulmonary emphysema 05/08/2020    Monoclonal gammopathy of unknown significance (MGUS) 08/04/2021    Myasthenia gravis without exacerbation 12/10/2021    Displacement of lumbar intervertebral disc without myelopathy 05/19/2022    Acute anemia 03/27/2023    Closed compression fracture of L3 lumbar vertebra, initial encounter 03/27/2023    Closed compression fracture of L4 lumbar vertebra, initial encounter 03/27/2023     Resolved Ambulatory Problems     Diagnosis Date Noted    Pain of upper extremity 11/22/2015    Colon polyp 11/22/2015    Hearing loss 11/22/2015    Obesity (BMI 30-39.9) 11/22/2015    Acute on chronic systolic heart failure 09/02/2016     Past Medical History:   Diagnosis Date    Abnormal electrocardiogram     Arm pain     Atrial fibrillation     Atrial flutter     BPH (benign prostatic hyperplasia)     BPH associated with nocturia     Chronic kidney disease     Colon cancer     Dyspnea     Enlarged prostate without lower urinary tract symptoms (luts)     Episode of generalized weakness     Esophagitis, reflux     Fatigue     Fracture of ankle     GERD (gastroesophageal reflux disease)     Inguinal hernia     Loss of hearing     Obesity     Osteoarthritis cervical spine     Overweight     Tobacco dependence in remission     Urge incontinence of urine     Vitamin D deficiency     Wheezing          PAST SURGICAL HISTORY  Past Surgical History:   Procedure Laterality Date    CARDIAC CATHETERIZATION N/A 7/27/2017    Procedure: Valve assessment;  Surgeon: Adonis Solis MD;  [facility];  Service:     CARDIAC CATHETERIZATION N/A 10/1/2018    Procedure: Left Heart Cath;  Surgeon: Bogdan Vargas MD;  Location:  [facility] CATH INVASIVE LOCATION;  Service: Cardiology    CARDIAC CATHETERIZATION N/A 10/1/2018    Procedure: Coronary angiography;  Surgeon: Bogdan Vargas MD;  Location:  [facility] CATH INVASIVE LOCATION;  Service: Cardiology    CARDIAC CATHETERIZATION N/A 10/1/2018     Procedure: Left ventriculography;  Surgeon: Bogdan Vargas MD;  Location:  [facility] CATH INVASIVE LOCATION;  Service: Cardiology    CARDIAC CATHETERIZATION N/A 10/1/2018    Procedure: Right Heart Cath;  Surgeon: Bogdan Vargas MD;  Location:  [facility] CATH INVASIVE LOCATION;  Service: Cardiology    CARDIAC ELECTROPHYSIOLOGY PROCEDURE N/A 7/27/2017    Procedure: Ablation atrial flutter Will need to have 3 -4 weeks of therapeutic INR's ;  Surgeon: Adonis Solis MD;  Location:  [facility] CATH INVASIVE LOCATION;  Service:     CARDIOVERSION      CATARACT EXTRACTION W/ INTRAOCULAR LENS  IMPLANT, BILATERAL      COLON RESECTION      COLON SURGERY      polyp removed    COLONOSCOPY      07/15 redo 5 years  Segun    KNEE SURGERY      benign tumor removed, age 6    LASIK      LUMBAR EPIDURAL INJECTION N/A 5/25/2022    Procedure: LUMBAR EPIDURAL 1ST VISIT L3-4 (right of midline);  Surgeon: Bonnie Brown MD;  [place] [facility] [place]  Service: Pain Management;  Laterality: N/A;    REFRACTIVE SURGERY  2007    REPAIR PERONEAL TENDONS ANKLE W/ FIBULAR OSTEOTOMY Right 03/2013    Dr. Banks         FAMILY HISTORY  Family History   Problem Relation Age of Onset    Breast cancer Mother     Heart disease Father     Hypertension Father     Heart attack Father     Hypertension Sister          SOCIAL HISTORY  Social History     Socioeconomic History    Marital status:     Number of children: 2    Years of education: COLLEGE GRADUATE   Tobacco Use    Smoking status: Former     Packs/day: 0.50     Years: 55.00     Pack years: 27.50     Types: Cigarettes     Quit date: 5/1/2008     Years since quitting: 15.2    Smokeless tobacco: Never    Tobacco comments:     QUIT 10 YRS   Vaping Use    Vaping Use: Never used   Substance and Sexual Activity    Alcohol use: Yes     Comment: SOCIALLY    Drug use: No    Sexual activity: Defer         ALLERGIES  Patient has no known allergies.        REVIEW OF SYSTEMS  Review of Systems     All  systems reviewed and negative except for those discussed in HPI.       PHYSICAL EXAM  ED Triage Vitals [07/13/23 1108]   Temp Heart Rate Resp BP SpO2   97.9 °F (36.6 °C) 66 20 94/62 95 %      Temp src Heart Rate Source Patient Position BP Location FiO2 (%)   -- -- -- -- --       Physical Exam      GENERAL: no acute distress  HENT: nares patent  EYES: no scleral icterus  CV: regular rhythm, normal rate  RESPIRATORY: normal effort, clear to auscultation bilaterally, delayed expiratory phase  ABDOMEN: soft, nontender  MUSCULOSKELETAL: no deformity, no lower extremity edema or tenderness  NEURO: alert, moves all extremities, follows commands  PSYCH:  calm, cooperative  SKIN: warm, dry    Vital signs and nursing notes reviewed.          LAB RESULTS  Recent Results (from the past 24 hour(s))   ECG 12 Lead Other; fatigue    Collection Time: 07/13/23 11:23 AM   Result Value Ref Range    QT Interval 407 ms   Comprehensive Metabolic Panel    Collection Time: 07/13/23 11:48 AM    Specimen: Blood   Result Value Ref Range    Glucose 123 (H) 65 - 99 mg/dL    BUN 24 (H) 8 - 23 mg/dL    Creatinine 1.94 (H) 0.76 - 1.27 mg/dL    Sodium 139 136 - 145 mmol/L    Potassium 4.3 3.5 - 5.2 mmol/L    Chloride 107 98 - 107 mmol/L    CO2 19.0 (L) 22.0 - 29.0 mmol/L    Calcium 9.4 8.6 - 10.5 mg/dL    Total Protein 6.2 6.0 - 8.5 g/dL    Albumin 3.7 3.5 - 5.2 g/dL    ALT (SGPT) 17 1 - 41 U/L    AST (SGOT) 16 1 - 40 U/L    Alkaline Phosphatase 112 39 - 117 U/L    Total Bilirubin 1.1 0.0 - 1.2 mg/dL    Globulin 2.5 gm/dL    A/G Ratio 1.5 g/dL    BUN/Creatinine Ratio 12.4 7.0 - 25.0    Anion Gap 13.0 5.0 - 15.0 mmol/L    eGFR 33.1 (L) >60.0 mL/min/1.73   High Sensitivity Troponin T    Collection Time: 07/13/23 11:48 AM    Specimen: Blood   Result Value Ref Range    HS Troponin T 35 (H) <15 ng/L   BNP    Collection Time: 07/13/23 11:48 AM    Specimen: Blood   Result Value Ref Range    proBNP 1,335.0 0.0 - 1,800.0 pg/mL   Lipase    Collection Time:  07/13/23 11:48 AM    Specimen: Blood   Result Value Ref Range    Lipase 52 13 - 60 U/L   T4, Free    Collection Time: 07/13/23 11:48 AM    Specimen: Blood   Result Value Ref Range    Free T4 0.99 0.93 - 1.70 ng/dL   TSH    Collection Time: 07/13/23 11:48 AM    Specimen: Blood   Result Value Ref Range    TSH 1.320 0.270 - 4.200 uIU/mL   Magnesium    Collection Time: 07/13/23 11:48 AM    Specimen: Blood   Result Value Ref Range    Magnesium 2.1 1.6 - 2.4 mg/dL   Phosphorus    Collection Time: 07/13/23 11:48 AM    Specimen: Blood   Result Value Ref Range    Phosphorus 2.0 (L) 2.5 - 4.5 mg/dL   CK    Collection Time: 07/13/23 11:48 AM    Specimen: Blood   Result Value Ref Range    Creatine Kinase 31 20 - 200 U/L   CBC Auto Differential    Collection Time: 07/13/23 11:48 AM    Specimen: Blood   Result Value Ref Range    WBC 9.98 3.40 - 10.80 10*3/mm3    RBC 4.24 4.14 - 5.80 10*6/mm3    Hemoglobin 13.7 13.0 - 17.7 g/dL    Hematocrit 41.2 37.5 - 51.0 %    MCV 97.2 (H) 79.0 - 97.0 fL    MCH 32.3 26.6 - 33.0 pg    MCHC 33.3 31.5 - 35.7 g/dL    RDW 13.3 12.3 - 15.4 %    RDW-SD 47.5 37.0 - 54.0 fl    MPV 11.4 6.0 - 12.0 fL    Platelets 267 140 - 450 10*3/mm3    Neutrophil % 78.0 (H) 42.7 - 76.0 %    Lymphocyte % 6.9 (L) 19.6 - 45.3 %    Monocyte % 9.5 5.0 - 12.0 %    Eosinophil % 1.1 0.3 - 6.2 %    Basophil % 0.8 0.0 - 1.5 %    Immature Grans % 3.7 (H) 0.0 - 0.5 %    Neutrophils, Absolute 7.78 (H) 1.70 - 7.00 10*3/mm3    Lymphocytes, Absolute 0.69 (L) 0.70 - 3.10 10*3/mm3    Monocytes, Absolute 0.95 (H) 0.10 - 0.90 10*3/mm3    Eosinophils, Absolute 0.11 0.00 - 0.40 10*3/mm3    Basophils, Absolute 0.08 0.00 - 0.20 10*3/mm3    Immature Grans, Absolute 0.37 (H) 0.00 - 0.05 10*3/mm3    nRBC 0.2 0.0 - 0.2 /100 WBC   D-dimer, Quantitative    Collection Time: 07/13/23 11:48 AM    Specimen: Blood   Result Value Ref Range    D-Dimer, Quantitative 6.65 (H) 0.00 - 0.86 MCGFEU/mL   Respiratory Panel PCR w/COVID-19(SARS-CoV-2)  [facility] In-House, NP Swab in UTM/VTM, 3-4 HR TAT - Swab, Nasopharynx    Collection Time: 07/13/23 11:48 AM    Specimen: Nasopharynx; Swab   Result Value Ref Range    ADENOVIRUS, PCR Not Detected Not Detected    Coronavirus 229E Not Detected Not Detected    Coronavirus HKU1 Not Detected Not Detected    Coronavirus NL63 Not Detected Not Detected    Coronavirus OC43 Not Detected Not Detected    COVID19 Not Detected Not Detected - Ref. Range    Human Metapneumovirus Not Detected Not Detected    Human Rhinovirus/Enterovirus Not Detected Not Detected    Influenza A PCR Not Detected Not Detected    Influenza B PCR Not Detected Not Detected    Parainfluenza Virus 1 Not Detected Not Detected    Parainfluenza Virus 2 Not Detected Not Detected    Parainfluenza Virus 3 Not Detected Not Detected    Parainfluenza Virus 4 Not Detected Not Detected    RSV, PCR Not Detected Not Detected    Bordetella pertussis pcr Not Detected Not Detected    Bordetella parapertussis PCR Not Detected Not Detected    Chlamydophila pneumoniae PCR Not Detected Not Detected    Mycoplasma pneumo by PCR Not Detected Not Detected       Ordered the above labs and reviewed the results.        RADIOLOGY  XR Chest 1 View    Result Date: 7/13/2023  XR CHEST 1 VW-  HISTORY: Male who is 88 years-old,  weakness  TECHNIQUE: Frontal view of the chest  COMPARISON: 12/20/2018  FINDINGS: The heart size is borderline. Aorta is tortuous. Pulmonary vasculature is unremarkable. No focal pulmonary consolidation, pleural effusion, or pneumothorax. No acute osseous process.      No focal pulmonary consolidation. Borderline heart size. Tortuous aorta. Follow-up as clinically indicated.  This report was finalized on 7/13/2023 12:36 PM by Dr. Sami Avila M.D.      CT Angiogram Chest    Result Date: 7/13/2023  Examination: CTA of the chest with contrast pulmonary embolus protocol  TECHNIQUE: CTA of the chest with contrast per pulmonary embolusprotocol.  Radiation dose reduction techniques were utilized, including automated exposure control and exposure modulation based on body size. 3D reconstructions were performed   HISTORY: Shortness of breath  COMPARISON:02/11/2020  FINDINGS: The contrast bolus is good. There are bilateral pulmonary emboli involving segments and all lobes of the lungs. There is no evidence of right heart strain. The RV to LV ratio measures 0.6.   There are stable pulmonary nodules, for example in the left lower lobe measuring 0.8 x 0.6 cm on series 5, image 115. No enlarged pulmonary nodule or mass are seen.Emphysematous changes are seen in the lungs .There is dependent atelectasis.No consolidation, pleural effusion, or pneumothorax are seen. The central airways are patent.  The heart is normal in size and configuration, without pericardial effusion.Coronary calcifications are seen.There is mild pulmonary arterial enlargement, likely pulmonary arterial hypertension.No enlarged supraclavicular, axillary, mediastinal, or hilar lymph nodes are demonstrated.  Limited evaluation of the upper abdomendemonstrates a cirrhotic appearing liver. Cysts and lesions that are technically indeterminate, likely cysts, are seen in the kidneys..  Bone windows demonstrateDegenerative changes, without suspicious osseous lesion seen.      Extensive bilateral pulmonary emboli without evidence of right heart strain.  This report was finalized on 7/13/2023 3:34 PM by Dr. David Potter M.D.       Ordered the above noted radiological studies. Reviewed by me in PACS.          PROCEDURES  Procedures          MEDICATIONS GIVEN IN ER  Medications   sodium chloride 0.9 % flush 10 mL (has no administration in time range)   heparin (porcine) 5000 UNIT/ML injection 6,500 Units (has no administration in time range)   heparin 49671 units/250 mL (100 units/mL) in 0.45 % NaCl infusion (has no administration in time range)   heparin (porcine) 5000 UNIT/ML injection 3,300-6,500 Units  (has no administration in time range)   lactated ringers bolus 1,000 mL (1,000 mL Intravenous New Bag 7/13/23 1420)   iopamidol (ISOVUE-370) 76 % injection 100 mL (95 mL Intravenous Given by Other 7/13/23 1514)         MEDICAL DECISION MAKING, PROGRESS, and CONSULTS    Discussion below represents my analysis of pertinent findings related to patient's condition, differential diagnosis, treatment plan and final disposition.      Orders placed during this visit:  Orders Placed This Encounter   Procedures    Respiratory Panel PCR w/COVID-19(SARS-CoV-2) [facility] In-House, NP Swab in UTM/VTM, 3-4 HR TAT - Swab, Nasopharynx    XR Chest 1 View    CT Angiogram Chest    Comprehensive Metabolic Panel    High Sensitivity Troponin T    BNP    Lipase    Urinalysis With Microscopic If Indicated (No Culture) - Urine, Clean Catch    T4, Free    TSH    Magnesium    Phosphorus    CK    CBC Auto Differential    D-dimer, Quantitative    High Sensitivity Troponin T 2Hr    Protime-INR    aPTT    aPTT    aPTT    Monitor Blood Pressure    Pulse Oximetry, Continuous    Adjust Heparin Rate Based on aPTT Using Nomogram    Verify All Anticoagulant Orders Are Discontinued Upon Initiation of Heparin Protocol (eg Enoxaparin, Fondaparinux, Apixaban, Dabigatran, Edoxaban, or Rivaroxaban)    RN To Release aPTT Order 6 Hours After Heparin Bolus & 6 Hours After Any Heparin Rate Change    L[facility] (on-call MD unless specified) Details    ECG 12 Lead Other; fatigue    Insert Peripheral IV    Inpatient Admission    CBC & Differential    CBC & Differential         Additional sources:      - External (non-ED) record review: Medical chart review, reviewed discharge summary from 3/30/2023 by Dr. Rush.  Patient found to have lumbar compression deformity.  Neurosurgery consulted and MRI showed no acute findings.  Neurosurgery recommended lumbar epidural steroid injection which she had during his hospital course.  Additionally, patient found to  have mild anemia.  Recommended follow-up with PCP.  Patient is delayed on following up for his colonoscopy.              Differential diagnosis:    Differential diagnosis includes but not limited to CHF, acute coronary syndrome, pulmonary embolism, pneumothorax, pneumonia, asthma/COPD, pulmonary hypertension, deconditioning, anemia, other hematologic etiologies such as CO poisoning, anxiety.           Independent interpretation of labs, radiology studies, and discussions with consultants:  ED Course as of 07/13/23 1636   Thu Jul 13, 2023   1132 SpO2(!): 89 % [TD]   1132 Device (Oxygen Therapy): room air [TD]   1132 Patient states that he does not normally wear oxygen at home. [TD]   1135 EKG independently interpreted by myself.  Time 11:23 AM.  Sinus rhythm.  Heart rate 63.  Normal intervals and axis.  LVH with strain pattern.  This is similar compared to his old EKG from 7/27/2018. [TD]   1344 eGFR(!): 33.1 [TD]   1345 Creatinine(!): 1.94 [TD]   1544 CT imaging shows extensive bilateral PE without evidence of heart strain. [TD]      ED Course User Index  [TD] Alex Arellano II, MD         I discussed the case with Dr. Cao, hospitalist.  We reviewed patient's labs, history, imaging.  Patient started on heparin drip.      DIAGNOSIS  Final diagnoses:   Acute pulmonary embolism without acute cor pulmonale, unspecified pulmonary embolism type   Acute hypoxemic respiratory failure         DISPOSITION  Admit      Latest Documented Vital Signs:  As of 16:36 EDT  BP- 102/57 HR- 50 Temp- 97.9 °F (36.6 °C) O2 sat- 93%      --    Please note that portions of this were completed with a voice recognition program.       Note Disclaimer: At [facility], we believe that sharing information builds trust and better relationships. You are receiving this note because you are receiving care at [facility] or recently visited. It is possible you will see health information before a provider has talked with you about it.  This kind of information can be easy to misunderstand. To help you fully understand what it means for your health, we urge you to discuss this note with your provider.         Alex Arellano II, MD  07/13/23 9980

## 2023-07-14 ENCOUNTER — APPOINTMENT (OUTPATIENT)
Dept: CARDIOLOGY | Facility: HOSPITAL | Age: 88
DRG: 176 | End: 2023-07-14
Payer: MEDICARE

## 2023-07-14 LAB
ANION GAP SERPL CALCULATED.3IONS-SCNC: 7.3 MMOL/L (ref 5–15)
APTT PPP: 173.5 SECONDS (ref 22.7–35.4)
APTT PPP: 58.4 SECONDS (ref 22.7–35.4)
APTT PPP: 92 SECONDS (ref 22.7–35.4)
APTT PPP: >200 SECONDS (ref 22.7–35.4)
ASCENDING AORTA: 3.6 CM
BASOPHILS # BLD AUTO: 0.1 10*3/MM3 (ref 0–0.2)
BASOPHILS NFR BLD AUTO: 1 % (ref 0–1.5)
BH CV ECHO LEFT VENTRICLE GLOBAL LONGITUDINAL STRAIN: -18.6 %
BH CV ECHO MEAS - ACS: 1.75 CM
BH CV ECHO MEAS - AO MAX PG: 9.4 MMHG
BH CV ECHO MEAS - AO MEAN PG: 4.6 MMHG
BH CV ECHO MEAS - AO ROOT DIAM: 3.4 CM
BH CV ECHO MEAS - AO V2 MAX: 153.6 CM/SEC
BH CV ECHO MEAS - AO V2 VTI: 29.3 CM
BH CV ECHO MEAS - AVA(I,D): 2.9 CM2
BH CV ECHO MEAS - EDV(CUBED): 56.6 ML
BH CV ECHO MEAS - EDV(MOD-SP2): 61 ML
BH CV ECHO MEAS - EDV(MOD-SP4): 57 ML
BH CV ECHO MEAS - EF(MOD-BP): 64.9 %
BH CV ECHO MEAS - EF(MOD-SP2): 62.3 %
BH CV ECHO MEAS - EF(MOD-SP4): 66.7 %
BH CV ECHO MEAS - ESV(CUBED): 14.8 ML
BH CV ECHO MEAS - ESV(MOD-SP2): 23 ML
BH CV ECHO MEAS - ESV(MOD-SP4): 19 ML
BH CV ECHO MEAS - FS: 36.1 %
BH CV ECHO MEAS - IVS/LVPW: 1.02 CM
BH CV ECHO MEAS - IVSD: 1.11 CM
BH CV ECHO MEAS - LAT PEAK E' VEL: 6.9 CM/SEC
BH CV ECHO MEAS - LV MASS(C)D: 137.3 GRAMS
BH CV ECHO MEAS - LV MAX PG: 4.1 MMHG
BH CV ECHO MEAS - LV MEAN PG: 1.68 MMHG
BH CV ECHO MEAS - LV V1 MAX: 101.5 CM/SEC
BH CV ECHO MEAS - LV V1 VTI: 21.8 CM
BH CV ECHO MEAS - LVIDD: 3.8 CM
BH CV ECHO MEAS - LVIDS: 2.45 CM
BH CV ECHO MEAS - LVOT AREA: 3.9 CM2
BH CV ECHO MEAS - LVOT DIAM: 2.24 CM
BH CV ECHO MEAS - LVPWD: 1.09 CM
BH CV ECHO MEAS - MED PEAK E' VEL: 5.2 CM/SEC
BH CV ECHO MEAS - MV A DUR: 0.15 SEC
BH CV ECHO MEAS - MV A MAX VEL: 60 CM/SEC
BH CV ECHO MEAS - MV DEC SLOPE: 283.6 CM/SEC2
BH CV ECHO MEAS - MV DEC TIME: 0.27 MSEC
BH CV ECHO MEAS - MV E MAX VEL: 78.8 CM/SEC
BH CV ECHO MEAS - MV E/A: 1.31
BH CV ECHO MEAS - MV MAX PG: 3.6 MMHG
BH CV ECHO MEAS - MV MEAN PG: 1.26 MMHG
BH CV ECHO MEAS - MV P1/2T: 90.5 MSEC
BH CV ECHO MEAS - MV V2 VTI: 32.1 CM
BH CV ECHO MEAS - MVA(P1/2T): 2.43 CM2
BH CV ECHO MEAS - MVA(VTI): 2.7 CM2
BH CV ECHO MEAS - PA ACC TIME: 0.09 SEC
BH CV ECHO MEAS - PA V2 MAX: 95 CM/SEC
BH CV ECHO MEAS - PI END-D VEL: 129 CM/SEC
BH CV ECHO MEAS - PULM A REVS DUR: 0.1 SEC
BH CV ECHO MEAS - PULM A REVS VEL: 19.1 CM/SEC
BH CV ECHO MEAS - PULM DIAS VEL: 47.9 CM/SEC
BH CV ECHO MEAS - PULM S/D: 1.31
BH CV ECHO MEAS - PULM SYS VEL: 62.9 CM/SEC
BH CV ECHO MEAS - QP/QS: 0.83
BH CV ECHO MEAS - RAP SYSTOLE: 3 MMHG
BH CV ECHO MEAS - RV MAX PG: 1.88 MMHG
BH CV ECHO MEAS - RV V1 MAX: 68.6 CM/SEC
BH CV ECHO MEAS - RV V1 VTI: 14.2 CM
BH CV ECHO MEAS - RVOT DIAM: 2.5 CM
BH CV ECHO MEAS - RVSP: 41 MMHG
BH CV ECHO MEAS - SV(LVOT): 86 ML
BH CV ECHO MEAS - SV(MOD-SP2): 38 ML
BH CV ECHO MEAS - SV(MOD-SP4): 38 ML
BH CV ECHO MEAS - SV(RVOT): 71.1 ML
BH CV ECHO MEAS - TR MAX PG: 38.6 MMHG
BH CV ECHO MEAS - TR MAX VEL: 310.8 CM/SEC
BH CV ECHO MEASUREMENTS AVERAGE E/E' RATIO: 13.02
BH CV LOWER VASCULAR LEFT COMMON FEMORAL AUGMENT: NORMAL
BH CV LOWER VASCULAR LEFT COMMON FEMORAL COMPETENT: NORMAL
BH CV LOWER VASCULAR LEFT COMMON FEMORAL COMPRESS: NORMAL
BH CV LOWER VASCULAR LEFT COMMON FEMORAL PHASIC: NORMAL
BH CV LOWER VASCULAR LEFT COMMON FEMORAL SPONT: NORMAL
BH CV LOWER VASCULAR LEFT DISTAL FEMORAL COMPRESS: NORMAL
BH CV LOWER VASCULAR LEFT GASTRONEMIUS COMPRESS: NORMAL
BH CV LOWER VASCULAR LEFT GREATER SAPH AK COMPRESS: NORMAL
BH CV LOWER VASCULAR LEFT GREATER SAPH BK COMPRESS: NORMAL
BH CV LOWER VASCULAR LEFT LESSER SAPH COMPRESS: NORMAL
BH CV LOWER VASCULAR LEFT MID FEMORAL AUGMENT: NORMAL
BH CV LOWER VASCULAR LEFT MID FEMORAL COMPETENT: NORMAL
BH CV LOWER VASCULAR LEFT MID FEMORAL COMPRESS: NORMAL
BH CV LOWER VASCULAR LEFT MID FEMORAL PHASIC: NORMAL
BH CV LOWER VASCULAR LEFT MID FEMORAL SPONT: NORMAL
BH CV LOWER VASCULAR LEFT PERONEAL COMPRESS: NORMAL
BH CV LOWER VASCULAR LEFT POPLITEAL AUGMENT: NORMAL
BH CV LOWER VASCULAR LEFT POPLITEAL COMPETENT: NORMAL
BH CV LOWER VASCULAR LEFT POPLITEAL COMPRESS: NORMAL
BH CV LOWER VASCULAR LEFT POPLITEAL PHASIC: NORMAL
BH CV LOWER VASCULAR LEFT POPLITEAL SPONT: NORMAL
BH CV LOWER VASCULAR LEFT POSTERIOR TIBIAL COMPRESS: NORMAL
BH CV LOWER VASCULAR LEFT PROFUNDA FEMORAL COMPRESS: NORMAL
BH CV LOWER VASCULAR LEFT PROXIMAL FEMORAL COMPRESS: NORMAL
BH CV LOWER VASCULAR LEFT SAPHENOFEMORAL JUNCTION COMPRESS: NORMAL
BH CV LOWER VASCULAR LEFT SOLEAL COMPRESS: NORMAL
BH CV LOWER VASCULAR RIGHT COMMON FEMORAL AUGMENT: NORMAL
BH CV LOWER VASCULAR RIGHT COMMON FEMORAL COMPETENT: NORMAL
BH CV LOWER VASCULAR RIGHT COMMON FEMORAL COMPRESS: NORMAL
BH CV LOWER VASCULAR RIGHT COMMON FEMORAL PHASIC: NORMAL
BH CV LOWER VASCULAR RIGHT COMMON FEMORAL SPONT: NORMAL
BH CV LOWER VASCULAR RIGHT DISTAL FEMORAL COMPRESS: NORMAL
BH CV LOWER VASCULAR RIGHT GASTRONEMIUS COMPRESS: NORMAL
BH CV LOWER VASCULAR RIGHT GREATER SAPH AK COMPRESS: NORMAL
BH CV LOWER VASCULAR RIGHT GREATER SAPH BK COMPRESS: NORMAL
BH CV LOWER VASCULAR RIGHT LESSER SAPH COMPRESS: NORMAL
BH CV LOWER VASCULAR RIGHT MID FEMORAL AUGMENT: NORMAL
BH CV LOWER VASCULAR RIGHT MID FEMORAL COMPETENT: NORMAL
BH CV LOWER VASCULAR RIGHT MID FEMORAL COMPRESS: NORMAL
BH CV LOWER VASCULAR RIGHT MID FEMORAL PHASIC: NORMAL
BH CV LOWER VASCULAR RIGHT MID FEMORAL SPONT: NORMAL
BH CV LOWER VASCULAR RIGHT PERONEAL COMPRESS: NORMAL
BH CV LOWER VASCULAR RIGHT POPLITEAL AUGMENT: NORMAL
BH CV LOWER VASCULAR RIGHT POPLITEAL COMPETENT: NORMAL
BH CV LOWER VASCULAR RIGHT POPLITEAL COMPRESS: NORMAL
BH CV LOWER VASCULAR RIGHT POPLITEAL PHASIC: NORMAL
BH CV LOWER VASCULAR RIGHT POPLITEAL SPONT: NORMAL
BH CV LOWER VASCULAR RIGHT POSTERIOR TIBIAL COMPRESS: NORMAL
BH CV LOWER VASCULAR RIGHT PROFUNDA FEMORAL COMPRESS: NORMAL
BH CV LOWER VASCULAR RIGHT PROXIMAL FEMORAL COMPRESS: NORMAL
BH CV LOWER VASCULAR RIGHT SAPHENOFEMORAL JUNCTION COMPRESS: NORMAL
BH CV LOWER VASCULAR RIGHT SOLEAL COMPRESS: NORMAL
BH CV XLRA - RV BASE: 2.8 CM
BH CV XLRA - RV LENGTH: 5.5 CM
BH CV XLRA - RV MID: 2.12 CM
BH CV XLRA - TDI S': 11.4 CM/SEC
BUN SERPL-MCNC: 22 MG/DL (ref 8–23)
BUN/CREAT SERPL: 16.1 (ref 7–25)
CALCIUM SPEC-SCNC: 8.7 MG/DL (ref 8.6–10.5)
CHLORIDE SERPL-SCNC: 109 MMOL/L (ref 98–107)
CO2 SERPL-SCNC: 19.7 MMOL/L (ref 22–29)
CREAT SERPL-MCNC: 1.37 MG/DL (ref 0.76–1.27)
DEPRECATED RDW RBC AUTO: 45.2 FL (ref 37–54)
EGFRCR SERPLBLD CKD-EPI 2021: 50.2 ML/MIN/1.73
EOSINOPHIL # BLD AUTO: 0.29 10*3/MM3 (ref 0–0.4)
EOSINOPHIL NFR BLD AUTO: 2.8 % (ref 0.3–6.2)
ERYTHROCYTE [DISTWIDTH] IN BLOOD BY AUTOMATED COUNT: 12.8 % (ref 12.3–15.4)
GLUCOSE SERPL-MCNC: 113 MG/DL (ref 65–99)
HCT VFR BLD AUTO: 36.2 % (ref 37.5–51)
HGB BLD-MCNC: 12.4 G/DL (ref 13–17.7)
IMM GRANULOCYTES # BLD AUTO: 0.43 10*3/MM3 (ref 0–0.05)
IMM GRANULOCYTES NFR BLD AUTO: 4.2 % (ref 0–0.5)
LEFT ATRIUM VOLUME INDEX: 26.2 ML/M2
LYMPHOCYTES # BLD AUTO: 1.13 10*3/MM3 (ref 0.7–3.1)
LYMPHOCYTES NFR BLD AUTO: 11 % (ref 19.6–45.3)
MCH RBC QN AUTO: 32.5 PG (ref 26.6–33)
MCHC RBC AUTO-ENTMCNC: 34.3 G/DL (ref 31.5–35.7)
MCV RBC AUTO: 95 FL (ref 79–97)
MONOCYTES # BLD AUTO: 1.02 10*3/MM3 (ref 0.1–0.9)
MONOCYTES NFR BLD AUTO: 9.9 % (ref 5–12)
NEUTROPHILS NFR BLD AUTO: 7.31 10*3/MM3 (ref 1.7–7)
NEUTROPHILS NFR BLD AUTO: 71.1 % (ref 42.7–76)
NRBC BLD AUTO-RTO: 0.2 /100 WBC (ref 0–0.2)
PLATELET # BLD AUTO: 231 10*3/MM3 (ref 140–450)
PMV BLD AUTO: 11.4 FL (ref 6–12)
POTASSIUM SERPL-SCNC: 4 MMOL/L (ref 3.5–5.2)
QT INTERVAL: 407 MS
RBC # BLD AUTO: 3.81 10*6/MM3 (ref 4.14–5.8)
SINUS: 3.3 CM
SODIUM SERPL-SCNC: 136 MMOL/L (ref 136–145)
STJ: 2.8 CM
WBC NRBC COR # BLD: 10.28 10*3/MM3 (ref 3.4–10.8)

## 2023-07-14 PROCEDURE — 80048 BASIC METABOLIC PNL TOTAL CA: CPT | Performed by: INTERNAL MEDICINE

## 2023-07-14 PROCEDURE — 85730 THROMBOPLASTIN TIME PARTIAL: CPT | Performed by: HOSPITALIST

## 2023-07-14 PROCEDURE — 93356 MYOCRD STRAIN IMG SPCKL TRCK: CPT

## 2023-07-14 PROCEDURE — 25010000002 HEPARIN (PORCINE) 25000-0.45 UT/250ML-% SOLUTION: Performed by: EMERGENCY MEDICINE

## 2023-07-14 PROCEDURE — 93970 EXTREMITY STUDY: CPT

## 2023-07-14 PROCEDURE — 93306 TTE W/DOPPLER COMPLETE: CPT | Performed by: INTERNAL MEDICINE

## 2023-07-14 PROCEDURE — 93306 TTE W/DOPPLER COMPLETE: CPT

## 2023-07-14 PROCEDURE — 25010000002 HEPARIN (PORCINE) PER 1000 UNITS: Performed by: EMERGENCY MEDICINE

## 2023-07-14 PROCEDURE — 36415 COLL VENOUS BLD VENIPUNCTURE: CPT | Performed by: INTERNAL MEDICINE

## 2023-07-14 PROCEDURE — 85025 COMPLETE CBC W/AUTO DIFF WBC: CPT | Performed by: EMERGENCY MEDICINE

## 2023-07-14 PROCEDURE — 85730 THROMBOPLASTIN TIME PARTIAL: CPT | Performed by: INTERNAL MEDICINE

## 2023-07-14 PROCEDURE — 93356 MYOCRD STRAIN IMG SPCKL TRCK: CPT | Performed by: INTERNAL MEDICINE

## 2023-07-14 RX ADMIN — ASPIRIN 81 MG: 81 TABLET, CHEWABLE ORAL at 10:55

## 2023-07-14 RX ADMIN — GABAPENTIN 600 MG: 300 CAPSULE ORAL at 20:37

## 2023-07-14 RX ADMIN — GABAPENTIN 600 MG: 300 CAPSULE ORAL at 10:56

## 2023-07-14 RX ADMIN — ESCITALOPRAM OXALATE 10 MG: 10 TABLET, FILM COATED ORAL at 10:56

## 2023-07-14 RX ADMIN — PANTOPRAZOLE SODIUM 40 MG: 40 TABLET, DELAYED RELEASE ORAL at 06:43

## 2023-07-14 RX ADMIN — HEPARIN SODIUM 6500 UNITS: 5000 INJECTION INTRAVENOUS; SUBCUTANEOUS at 12:14

## 2023-07-14 RX ADMIN — ATORVASTATIN CALCIUM 10 MG: 20 TABLET, FILM COATED ORAL at 10:55

## 2023-07-14 RX ADMIN — HEPARIN SODIUM 19 UNITS/KG/HR: 10000 INJECTION, SOLUTION INTRAVENOUS at 15:00

## 2023-07-14 RX ADMIN — FERROUS SULFATE TAB 325 MG (65 MG ELEMENTAL FE) 325 MG: 325 (65 FE) TAB at 10:56

## 2023-07-14 RX ADMIN — Medication 2000 UNITS: at 10:56

## 2023-07-14 RX ADMIN — Medication 1000 MCG: at 10:56

## 2023-07-14 NOTE — PROGRESS NOTES
"DAILY PROGRESS NOTE  [facility]    Patient Identification:  Name: Jonathan Trejo  Age: 88 y.o.  Sex: male  :  1935  MRN: [id number]         Primary Care Physician: Coby Walton APRN      Subjective  Patient with no complaints.  Attempted to review his admitting history but he is highly circumferential and could not get details out of him.    Objective:  General Appearance:  Comfortable, well-appearing, in no acute distress and not in pain.    Vital signs: (most recent): Blood pressure 150/79, pulse 80, temperature 97.9 °F (36.6 °C), temperature source Oral, resp. rate 18, height 167.6 cm (66\"), weight 82.1 kg (181 lb), SpO2 93 %.    Lungs:  Normal effort and normal respiratory rate.  Breath sounds clear to auscultation.    Heart: Normal rate.  Regular rhythm.    Extremities: There is no dependent edema.    Neurological: Patient is alert.  (Oriented to person and place.  Cooperative.  Very talkative.).    Skin:  Warm and dry.                Vital signs in last 24 hours:  Temp:  [97.3 °F (36.3 °C)-98.4 °F (36.9 °C)] 97.9 °F (36.6 °C)  Heart Rate:  [47-80] 80  Resp:  [16-18] 18  BP: (126-150)/(66-79) 150/79    Intake/Output:    Intake/Output Summary (Last 24 hours) at 2023 1534  Last data filed at 2023 1229  Gross per 24 hour   Intake 1240 ml   Output 400 ml   Net 840 ml         Results from last 7 days   Lab Units 23  0211 23  1148   WBC 10*3/mm3 10.28 9.98   HEMOGLOBIN g/dL 12.4* 13.7   PLATELETS 10*3/mm3 231 267     Results from last 7 days   Lab Units 23  0211 23  1148   SODIUM mmol/L 136 139   POTASSIUM mmol/L 4.0 4.3   CHLORIDE mmol/L 109* 107   CO2 mmol/L 19.7* 19.0*   BUN mg/dL 22 24*   CREATININE mg/dL 1.37* 1.94*   GLUCOSE mg/dL 113* 123*   Estimated Creatinine Clearance: 38.9 mL/min (A) (by C-G formula based on SCr of 1.37 mg/dL (H)).  Results from last 7 days   Lab Units 23  0211 23  1148   CALCIUM mg/dL 8.7 9.4   ALBUMIN g/dL  -- "  3.7   MAGNESIUM mg/dL  --  2.1   PHOSPHORUS mg/dL  --  2.0*     Results from last 7 days   Lab Units 07/13/23  1148   ALBUMIN g/dL 3.7   BILIRUBIN mg/dL 1.1   ALK PHOS U/L 112   AST (SGOT) U/L 16   ALT (SGPT) U/L 17       Assessment:  Pulmonary embolus: Bilateral.  No evidence of RV strain.  Good room air O2 saturations.  No shortness of air.  Transition to Eliquis tonight.  Check venous Dopplers lower extremity.  Probable discharge tomorrow.  SPENCER: Much improved.  Appears to be very close to his baseline.  CKD 3:  COPD: Clinically stable.  History of atrial flutter: Presently normal sinus rhythm.    All problems new to this examiner.    Plan:  Please see above.  Discussed with patient.  Discussed with RN.    Mikey Henderson MD  7/14/2023  15:34 EDT

## 2023-07-14 NOTE — H&P
HISTORY AND PHYSICAL   [facility]        Date of Admission: 2023  Patient Identification:  Name: Jonathan Trejo  Age: 88 y.o.  Sex: male  :  1935  MRN: [id number]                     Primary Care Physician: Coby Walton APRN    Chief Complaint:  88 year old gentleman who presented to the emergency room with shortness of breath worse with exertion; he was seen by his pcp and advised to go to the ed; he says he has been short of air for five years but worse for the last few days; denies fever or chills    History of Present Illness:   As above    Past Medical History:  Past Medical History:   Diagnosis Date    Abnormal electrocardiogram     Arm pain     Atrial fibrillation     Atrial flutter     BPH (benign prostatic hyperplasia)     BPH associated with nocturia     Chronic kidney disease     Colon cancer     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    Colon polyp 2015    Depression     Dyspnea     Enlarged prostate without lower urinary tract symptoms (luts)     Episode of generalized weakness     knees were weak - had to be helped by wife to sit down    Esophagitis, reflux     Fatigue     Fracture of ankle     right    GERD (gastroesophageal reflux disease)     Hyperlipidemia     Inguinal hernia     Loss of hearing     Lumbar radiculopathy     Obesity     Osteoarthritis cervical spine     doc on [place]     Osteopenia     Osteoporosis     Overweight     Tobacco dependence in remission     Urge incontinence of urine     Vitamin D deficiency     Wheezing      Past Surgical History:  Past Surgical History:   Procedure Laterality Date    CARDIAC CATHETERIZATION N/A 2017    Procedure: Valve assessment;  Surgeon: Adonis Solis MD;  Location: [facility] CATH INVASIVE LOCATION;  Service:     CARDIAC CATHETERIZATION N/A 10/1/2018    Procedure: Left Heart Cath;  Surgeon: Bogdan Vargas MD;  Location: [facility] CATH INVASIVE LOCATION;  Service: Cardiology    CARDIAC CATHETERIZATION  N/A 10/1/2018    Procedure: Coronary angiography;  Surgeon: Bogdan Vargas MD;  Location: [facility] CATH INVASIVE LOCATION;  Service: Cardiology    CARDIAC CATHETERIZATION N/A 10/1/2018    Procedure: Left ventriculography;  Surgeon: Bogdan Vargas MD;  Location[facility] CATH [facility]VASIVE LOC[facility];  Service: Cardiology    CARDIAC CATHETERIZATION N/A 10/1/2018    Procedure: Right Heart Cath;  Surgeon: Bogdan Vargas MD;  Location: [facility] CATH INVASIVE LOCATION;  Service: Cardiology    CARDIAC ELECTROPHYSIOLOGY PROCEDURE N/A 7/27/2017    Procedure: Ablation atrial flutter Will need to have 3 -4 weeks of therapeutic INR's ;  Surgeon: Adonis Solis MD;  Location: [facility] CATH INVASIVE LOCATION;  Service:     CARDIOVERSION      CATARACT EXTRACTION W/ INTRAOCULAR LENS  IMPLANT, BILATERAL      COLON RESECTION      COLON SURGERY      polyp removed    COLONOSCOPY      07/15 redo 5 years  Segun    KNEE SURGERY      benign tumor removed, age 6    LASIK      LUMBAR EPIDURAL INJECTION N/A 5/25/2022    Procedure: LUMBAR EPIDURAL 1ST VISIT L3-4 (right of midline);  Surgeon: Bonnie Brown MD;  Location: [facility] MAIN OR;  Service: Pain Management;  Laterality: N/A;    REFRACTIVE SURGERY  2007    REPAIR PERONEAL TENDONS ANKLE W/ FIBULAR OSTEOTOMY Right 03/2013    Dr. Banks      Home Meds:  Medications Prior to Admission   Medication Sig Dispense Refill Last Dose    acetaminophen (TYLENOL) 325 MG tablet Take 2 tablets by mouth Every 6 (Six) Hours As Needed for Mild Pain.       albuterol sulfate  (90 Base) MCG/ACT inhaler Inhale 2 puffs 4 (Four) Times a Day As Needed for Wheezing.       aspirin 81 MG chewable tablet Chew 1 tablet Daily.       atorvastatin (LIPITOR) 10 MG tablet TAKE 1 TABLET BY MOUTH DAILY. 90 tablet 1     Cholecalciferol (VITAMIN D3) 2000 UNITS tablet Take 1 tablet by mouth Daily.       escitalopram (LEXAPRO) 10 MG tablet Take 1 tablet by mouth Daily.       ferrous sulfate 325 (65 FE) MG tablet Take  1 tablet by mouth Daily With Breakfast. 30 tablet 0     gabapentin (NEURONTIN) 600 MG tablet Take 1 tablet by mouth 2 (Two) Times a Day.       omeprazole (priLOSEC) 40 MG capsule TAKE 1 CAPSULE BY MOUTH DAILY. 30 capsule 1     vitamin B-12 (CYANOCOBALAMIN) 1000 MCG tablet Take 1 tablet by mouth Daily. For b12 def (Patient taking differently: Take 1 tablet by mouth Daily.) 90 tablet 3        Allergies:  No Known Allergies  Immunizations:  Immunization History   Administered Date(s) Administered    COVID-19 (PFIZER) Purple Cap Monovalent 12/29/2020, 12/30/2020, 02/10/2021, 10/28/2021    Fluzone High-Dose 65+yrs 10/06/2022    Pneumococcal Conjugate 13-Valent (PCV13) 01/01/2016, 04/04/2016    Pneumococcal Polysaccharide (PPSV23) 01/01/2004    Pneumococcal, Unspecified 02/15/2004    Td (TDVAX) 01/01/1998    Tdap 03/18/2014, 10/19/2020    Zostavax 03/15/2009     Social History:   Social History     Social History Narrative    Not on file     Social History     Socioeconomic History    Marital status:     Number of children: 2    Years of education: COLLEGE GRADUATE   Tobacco Use    Smoking status: Former     Packs/day: 0.50     Years: 55.00     Pack years: 27.50     Types: Cigarettes     Quit date: 5/1/2008     Years since quitting: 15.2    Smokeless tobacco: Never    Tobacco comments:     QUIT 10 YRS   Vaping Use    Vaping Use: Never used   Substance and Sexual Activity    Alcohol use: Not Currently     Comment: SOCIALLY    Drug use: No    Sexual activity: Defer       Family History:  Family History   Problem Relation Age of Onset    Breast cancer Mother     Heart disease Father     Hypertension Father     Heart attack Father     Hypertension Sister         Review of Systems  See history of present illness and past medical history.  Patient denies headache, dizziness, syncope, falls, trauma, change in vision, change in hearing, change in taste, changes in weight, changes in appetite, focal weakness, numbness, or  "paresthesia.  Patient denies chest pain, palpitations  cough, sinus pressure, rhinorrhea, epistaxis, hemoptysis, nausea, vomiting,hematemesis, diarrhea, constipation or hematochezia.  Denies cold or heat intolerance, polydipsia, polyuria, polyphagia. Denies hematuria, pyuria, dysuria, hesitancy, frequency or urgency. Denies consumption of raw and under cooked meats foods or change in water source.  Denies fever, chills, sweats, night sweats.  Denies missing any routine medications. Remainder of ROS is negative.    Objective:  T Max 24 hrs: Temp (24hrs), Av.6 °F (36.4 °C), Min:97.3 °F (36.3 °C), Max:97.9 °F (36.6 °C)    Vitals Ranges:   Temp:  [97.3 °F (36.3 °C)-97.9 °F (36.6 °C)] 97.3 °F (36.3 °C)  Heart Rate:  [47-66] 47  Resp:  [18-22] 18  BP: ()/(54-72) 142/70      Exam:  /70 (BP Location: Right arm, Patient Position: Lying)   Pulse (!) 47   Temp 97.3 °F (36.3 °C) (Oral)   Resp 18   Ht 167.6 cm (66\")   Wt 81.6 kg (180 lb)   SpO2 94%   BMI 29.05 kg/m²     General Appearance:    Alert, cooperative, no distress, appears stated age   Head:    Normocephalic, without obvious abnormality, atraumatic   Eyes:    PERRL, conjunctivae/corneas clear, EOM's intact, both eyes   Ears:    Normal external ear canals, both ears   Nose:   Nares normal, septum midline, mucosa normal, no drainage    or sinus tenderness   Throat:   Lips, mucosa, and tongue normal   Neck:   Supple, symmetrical, trachea midline, no adenopathy;     thyroid:  no enlargement/tenderness/nodules; no carotid    bruit or JVD   Back:     Symmetric, no curvature, ROM normal, no CVA tenderness   Lungs:     Decreased breath sounds bilaterally, respirations unlabored   Chest Wall:    No tenderness or deformity    Heart:    Regular rate and rhythm, S1 and S2 normal, no murmur, rub   or gallop   Abdomen:     Soft, nontender, bowel sounds active all four quadrants,     no masses, no hepatomegaly, no splenomegaly   Extremities:   Extremities " normal, atraumatic, no cyanosis or edema   Pulses:   2+ and symmetric all extremities   Skin:   Skin color, texture, turgor normal, no rashes or lesions               .    Data Review:  Labs in chart were reviewed.  WBC   Date Value Ref Range Status   07/13/2023 9.98 3.40 - 10.80 10*3/mm3 Final     Hemoglobin   Date Value Ref Range Status   07/13/2023 13.7 13.0 - 17.7 g/dL Final     Hematocrit   Date Value Ref Range Status   07/13/2023 41.2 37.5 - 51.0 % Final     Platelets   Date Value Ref Range Status   07/13/2023 267 140 - 450 10*3/mm3 Final     Sodium   Date Value Ref Range Status   07/13/2023 139 136 - 145 mmol/L Final     Potassium   Date Value Ref Range Status   07/13/2023 4.3 3.5 - 5.2 mmol/L Final     Comment:     Slight hemolysis detected by analyzer. Results may be affected.     Chloride   Date Value Ref Range Status   07/13/2023 107 98 - 107 mmol/L Final     CO2   Date Value Ref Range Status   07/13/2023 19.0 (L) 22.0 - 29.0 mmol/L Final     BUN   Date Value Ref Range Status   07/13/2023 24 (H) 8 - 23 mg/dL Final     Creatinine   Date Value Ref Range Status   07/13/2023 1.94 (H) 0.76 - 1.27 mg/dL Final     Glucose   Date Value Ref Range Status   07/13/2023 123 (H) 65 - 99 mg/dL Final     Calcium   Date Value Ref Range Status   07/13/2023 9.4 8.6 - 10.5 mg/dL Final     Magnesium   Date Value Ref Range Status   07/13/2023 2.1 1.6 - 2.4 mg/dL Final     Phosphorus   Date Value Ref Range Status   07/13/2023 2.0 (L) 2.5 - 4.5 mg/dL Final     AST (SGOT)   Date Value Ref Range Status   07/13/2023 16 1 - 40 U/L Final     Comment:     Slight hemolysis detected by analyzer. Results may be affected.     ALT (SGPT)   Date Value Ref Range Status   07/13/2023 17 1 - 41 U/L Final     Alkaline Phosphatase   Date Value Ref Range Status   07/13/2023 112 39 - 117 U/L Final       Results from last 7 days   Lab Units 07/13/23  1148   TSH uIU/mL 1.320   FREE T4 ng/dL 0.99          Imaging Results (All)       Procedure  Component Value Units Date/Time    CT Angiogram Chest [[id number]] Collected: 07/13/23 1529     Updated: 07/13/23 1537    Narrative:      Examination: CTA of the chest with contrast pulmonary embolus protocol     TECHNIQUE: CTA of the chest with contrast per pulmonary embolusprotocol.  Radiation dose reduction techniques were utilized, including automated  exposure control and exposure modulation based on body size. 3D  reconstructions were performed        HISTORY: Shortness of breath     COMPARISON:02/11/2020     FINDINGS: The contrast bolus is good. There are bilateral pulmonary  emboli involving segments and all lobes of the lungs. There is no  evidence of right heart strain. The RV to LV ratio measures 0.6.      There are stable pulmonary nodules, for example in the left lower lobe  measuring 0.8 x 0.6 cm on series 5, image 115. No enlarged pulmonary  nodule or mass are seen.Emphysematous changes are seen in the lungs  .There is dependent atelectasis.No consolidation, pleural effusion, or  pneumothorax are seen. The central airways are patent.     The heart is normal in size and configuration, without pericardial  effusion.Coronary calcifications are seen.There is mild pulmonary  arterial enlargement, likely pulmonary arterial hypertension.No enlarged  supraclavicular, axillary, mediastinal, or hilar lymph nodes are  demonstrated.     Limited evaluation of the upper abdomendemonstrates a cirrhotic  appearing liver. Cysts and lesions that are technically indeterminate,  likely cysts, are seen in the kidneys..     Bone windows demonstrateDegenerative changes, without suspicious osseous  lesion seen.       Impression:      Extensive bilateral pulmonary emboli without evidence of  right heart strain.     This report was finalized on 7/13/2023 3:34 PM by Dr. David Potter M.D.       XR Chest 1 View [[id number]] Collected: 07/13/23 1235     Updated: 07/13/23 1239    Narrative:      XR CHEST 1 VW-     HISTORY: Male who  is 88 years-old,  weakness     TECHNIQUE: Frontal view of the chest     COMPARISON: 12/20/2018     FINDINGS: The heart size is borderline. Aorta is tortuous. Pulmonary  vasculature is unremarkable. No focal pulmonary consolidation, pleural  effusion, or pneumothorax. No acute osseous process.       Impression:      No focal pulmonary consolidation. Borderline heart size.  Tortuous aorta. Follow-up as clinically indicated.     This report was finalized on 7/13/2023 12:36 PM by Dr. Sami Avila M.D.                 Assessment:  Active Hospital Problems    Diagnosis  POA    **Pulmonary embolus [I26.99]  Yes      Resolved Hospital Problems   No resolved problems to display.   Acute hypoxic respiratory failure  Copd  Ckd3  A flutter    Plan:  Continue heparin  Wean oxygen as tolerated  Check echo  Monitor on telemetry  Trend labs  Dw patient and ed provider  Teena Crisostomo MD  7/13/2023  20:52 EDT

## 2023-07-14 NOTE — PLAN OF CARE
Goal Outcome Evaluation:  Plan of Care Reviewed With: patient      Progress: no change  Outcome Evaluation: New admit tonight w/ bilateral PE. Up w/ assist x 1 and walker. Urinal at bedside. BM tonight. Cardiac diet. VSS. Oriented x 4 w/ some confusion after just waking up. 3L NC. SB on the monitor (40-50s). Heparin gtt continued. ECHO planned for today. No complaints.

## 2023-07-14 NOTE — CASE MANAGEMENT/SOCIAL WORK
Discharge Planning Assessment  [facility]     Patient Name: Jonathan Trejo  MRN: [id number]  Today's Date: 7/14/2023    Admit Date: 7/13/2023    Plan: Return [place] at [facility]   Discharge Needs Assessment       Row Name 07/14/23 0908       Living Environment    People in Home alone    Current Living Arrangements independent living facility    Potentially Unsafe Housing Conditions none    Primary Care Provided by self    Provides Primary Care For no one, unable/limited ability to care for self    Family Caregiver if Needed child(hannah), adult    Family Caregiver Names Daughter Kaylynn Miles [phone]    Quality of Family Relationships helpful    Able to Return to Prior Arrangements yes       Resource/Environmental Concerns    Resource/Environmental Concerns none    Transportation Concerns none       Food Insecurity    Within the past 12 months, you worried that your food would run out before you got the money to buy more. Never true    Within the past 12 months, the food you bought just didn't last and you didn't have money to get more. Never true       Transition Planning    Patient/Family Anticipates Transition to home    Patient/Family Anticipated Services at Transition none    Transportation Anticipated family or friend will provide       Discharge Needs Assessment    Readmission Within the Last 30 Days no previous admission in last 30 days    Equipment Currently Used at Home rollator    Concerns to be Addressed basic needs    Anticipated Changes Related to Illness none    Equipment Needed After Discharge none                   Discharge Plan       Row Name 07/14/23 0909       Plan    Plan Return [place] at [facility]    Patient/Family in Agreement with Plan yes    Plan Comments Spoke with patient and daughter Kaylynn Miles [phone] at bedside.  [place] at [facility], he uses a rollator, has used [facility]  in the past and has been to [facility] for  rehab.  PCP is Marychuy CALLEJAS with [facility] and pharmacy is [facility] - meds are delivered.  Daughter assists with driving or needs.  [place]. No PT eval yet.  CCP will follow.  Emiliana LYLES                  Continued Care and Services - Admitted Since 7/13/2023    Coordination has not been started for this encounter.       Expected Discharge Date and Time       Expected Discharge Date Expected Discharge Time    Jul 17, 2023            Demographic Summary       Row Name 07/14/23 0907       General Information    Admission Type inpatient    Arrived From home    Referral Source admission list    Reason for Consult discharge planning    Preferred Language English                   Functional Status       Row Name 07/14/23 0907       Functional Status    Usual Activity Tolerance moderate    Current Activity Tolerance moderate       Functional Status, IADL    Medications assistive person    Meal Preparation assistive person    Housekeeping assistive person    Laundry assistive person    Shopping assistive person       Mental Status    General Appearance WDL X;appearance    General Appearance unshaven       Mental Status Summary    Recent Changes in Mental Status/Cognitive Functioning no changes  A little forgetful and answers slowly                               Becky S. [place], RN

## 2023-07-15 LAB
ALBUMIN SERPL-MCNC: 3.4 G/DL (ref 3.5–5.2)
ANION GAP SERPL CALCULATED.3IONS-SCNC: 7.2 MMOL/L (ref 5–15)
APTT PPP: 33.1 SECONDS (ref 22.7–35.4)
BASOPHILS # BLD MANUAL: 0.17 10*3/MM3 (ref 0–0.2)
BASOPHILS NFR BLD MANUAL: 2 % (ref 0–1.5)
BUN SERPL-MCNC: 16 MG/DL (ref 8–23)
BUN/CREAT SERPL: 12.8 (ref 7–25)
BURR CELLS BLD QL SMEAR: ABNORMAL
CALCIUM SPEC-SCNC: 9.1 MG/DL (ref 8.6–10.5)
CHLORIDE SERPL-SCNC: 111 MMOL/L (ref 98–107)
CO2 SERPL-SCNC: 21.8 MMOL/L (ref 22–29)
CREAT SERPL-MCNC: 1.25 MG/DL (ref 0.76–1.27)
DEPRECATED RDW RBC AUTO: 44.9 FL (ref 37–54)
EGFRCR SERPLBLD CKD-EPI 2021: 56.1 ML/MIN/1.73
EOSINOPHIL # BLD MANUAL: 0.52 10*3/MM3 (ref 0–0.4)
EOSINOPHIL NFR BLD MANUAL: 6.1 % (ref 0.3–6.2)
ERYTHROCYTE [DISTWIDTH] IN BLOOD BY AUTOMATED COUNT: 13 % (ref 12.3–15.4)
FOLATE SERPL-MCNC: 7.47 NG/ML (ref 4.78–24.2)
GEN 5 2HR TROPONIN T REFLEX: 37 NG/L
GLUCOSE SERPL-MCNC: 92 MG/DL (ref 65–99)
HCT VFR BLD AUTO: 36.2 % (ref 37.5–51)
HGB BLD-MCNC: 12.3 G/DL (ref 13–17.7)
LYMPHOCYTES # BLD MANUAL: 1.03 10*3/MM3 (ref 0.7–3.1)
LYMPHOCYTES NFR BLD MANUAL: 9.1 % (ref 5–12)
MCH RBC QN AUTO: 32.4 PG (ref 26.6–33)
MCHC RBC AUTO-ENTMCNC: 34 G/DL (ref 31.5–35.7)
MCV RBC AUTO: 95.3 FL (ref 79–97)
MONOCYTES # BLD: 0.77 10*3/MM3 (ref 0.1–0.9)
NEUTROPHILS # BLD AUTO: 6 10*3/MM3 (ref 1.7–7)
NEUTROPHILS NFR BLD MANUAL: 70.7 % (ref 42.7–76)
PHOSPHATE SERPL-MCNC: 3.1 MG/DL (ref 2.5–4.5)
PLAT MORPH BLD: NORMAL
PLATELET # BLD AUTO: 245 10*3/MM3 (ref 140–450)
PMV BLD AUTO: 11.8 FL (ref 6–12)
POTASSIUM SERPL-SCNC: 4.3 MMOL/L (ref 3.5–5.2)
QT INTERVAL: 479 MS
RBC # BLD AUTO: 3.8 10*6/MM3 (ref 4.14–5.8)
SODIUM SERPL-SCNC: 140 MMOL/L (ref 136–145)
TROPONIN T DELTA: 8 NG/L
TROPONIN T SERPL HS-MCNC: 29 NG/L
VARIANT LYMPHS NFR BLD MANUAL: 12.1 % (ref 19.6–45.3)
VIT B12 BLD-MCNC: 1623 PG/ML (ref 211–946)
WBC MORPH BLD: NORMAL
WBC NRBC COR # BLD: 8.48 10*3/MM3 (ref 3.4–10.8)

## 2023-07-15 PROCEDURE — 94799 UNLISTED PULMONARY SVC/PX: CPT

## 2023-07-15 PROCEDURE — 93005 ELECTROCARDIOGRAM TRACING: CPT | Performed by: HOSPITALIST

## 2023-07-15 PROCEDURE — 82746 ASSAY OF FOLIC ACID SERUM: CPT | Performed by: NURSE PRACTITIONER

## 2023-07-15 PROCEDURE — 80069 RENAL FUNCTION PANEL: CPT | Performed by: HOSPITALIST

## 2023-07-15 PROCEDURE — 94618 PULMONARY STRESS TESTING: CPT

## 2023-07-15 PROCEDURE — 36415 COLL VENOUS BLD VENIPUNCTURE: CPT | Performed by: EMERGENCY MEDICINE

## 2023-07-15 PROCEDURE — 85730 THROMBOPLASTIN TIME PARTIAL: CPT | Performed by: EMERGENCY MEDICINE

## 2023-07-15 PROCEDURE — 85007 BL SMEAR W/DIFF WBC COUNT: CPT | Performed by: EMERGENCY MEDICINE

## 2023-07-15 PROCEDURE — 85025 COMPLETE CBC W/AUTO DIFF WBC: CPT | Performed by: EMERGENCY MEDICINE

## 2023-07-15 PROCEDURE — 99222 1ST HOSP IP/OBS MODERATE 55: CPT | Performed by: INTERNAL MEDICINE

## 2023-07-15 PROCEDURE — 84484 ASSAY OF TROPONIN QUANT: CPT | Performed by: NURSE PRACTITIONER

## 2023-07-15 PROCEDURE — 93010 ELECTROCARDIOGRAM REPORT: CPT | Performed by: INTERNAL MEDICINE

## 2023-07-15 PROCEDURE — 82607 VITAMIN B-12: CPT | Performed by: NURSE PRACTITIONER

## 2023-07-15 RX ADMIN — ASPIRIN 81 MG: 81 TABLET, CHEWABLE ORAL at 09:17

## 2023-07-15 RX ADMIN — APIXABAN 10 MG: 5 TABLET, FILM COATED ORAL at 21:25

## 2023-07-15 RX ADMIN — Medication 1000 MCG: at 09:15

## 2023-07-15 RX ADMIN — APIXABAN 10 MG: 5 TABLET, FILM COATED ORAL at 09:15

## 2023-07-15 RX ADMIN — GABAPENTIN 600 MG: 300 CAPSULE ORAL at 09:15

## 2023-07-15 RX ADMIN — ESCITALOPRAM OXALATE 10 MG: 10 TABLET, FILM COATED ORAL at 09:17

## 2023-07-15 RX ADMIN — PANTOPRAZOLE SODIUM 40 MG: 40 TABLET, DELAYED RELEASE ORAL at 06:22

## 2023-07-15 RX ADMIN — Medication 2000 UNITS: at 09:15

## 2023-07-15 RX ADMIN — ATORVASTATIN CALCIUM 10 MG: 20 TABLET, FILM COATED ORAL at 09:15

## 2023-07-15 RX ADMIN — FERROUS SULFATE TAB 325 MG (65 MG ELEMENTAL FE) 325 MG: 325 (65 FE) TAB at 09:16

## 2023-07-15 RX ADMIN — GABAPENTIN 600 MG: 300 CAPSULE ORAL at 21:25

## 2023-07-15 NOTE — PROGRESS NOTES
Exercise Oximetry    Patient Name:Jonathan Trejo   MRN: [id number]   Date: 07/15/23             ROOM AIR BASELINE   SpO2% 95   Heart Rate  56   Blood Pressure      EXERCISE ON ROOM AIR SpO2% EXERCISE ON O2 @ LPM SpO2%   1 MINUTE 94 1 MINUTE    2 MINUTES 94 2 MINUTES    3 MINUTES 93 3 MINUTES    4 MINUTES  4 MINUTES    5 MINUTES  5 MINUTES    6 MINUTES  6 MINUTES               Distance Walked   Distance Walked   Dyspnea (Steffen Scale)   Dyspnea (Steffen Scale)   Fatigue (Steffen Scale)   Fatigue (Steffen Scale)   SpO2% Post Exercise   SpO2% Post Exercise   HR Post Exercise   HR Post Exercise   Time to Recovery   Time to Recovery     Comments: o2 sats on room air at rest with forehead probe 95%, patient ambulated to bedside commode and o2 sats 93% to 94 on room air, patient is a little unsteady on his feet at this time,   Patient doesn't feel like he is strong enough to walk in hallway at this time, due to hip issues.  Bibi Albrecht RRT

## 2023-07-15 NOTE — PLAN OF CARE
Pt more alert today, still confused at times but can recollect his confusion the day before. Awaiting PT eval. Pt is heavy assistx1 to BSC. Cardio consulted for elevated troponin, no new orders. Weened O2 to RA. Patient may need O2 while asleep. No complaints of pain or SOA. Started Eliquis today.

## 2023-07-15 NOTE — CONSULTS
[facility] Consult Note       Encounter Date:07/15/23  Patient:Jonathan Trejo  :1935  MRN:[id number]    Date of Admission: 2023  Date of Encounter Visit: 07/15/23  Encounter Provider: Macho Miranda MD  Referring Provider: Teena Crisostomo MD  Place of Service: [facility]  Patient Care Team:  Coby Walton APRN as PCP - General (Internal Medicine)  Dunia Quinonez PA-C as Referring Physician (Physician Assistant)  Chun Gamboa MD as Consulting Physician (Hematology and Oncology)      Consulted for: Abnormal EKG elevated troponin and pulmonary emboli    Chief Complaint: Weakness and fatigue      History of Presenting Illness:      Mr. Trejo is a 88 y.o. 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  He was subsequently found to have pulmonary emboli.  Fortunately there is no evidence of significant right ventricular strain on CT imaging nor on echocardiogram.  Does have some mild pulmonary hypertension noted on echocardiogram was also noted on prior right heart catheterizations.  In general patient is made a slow but steady recovery did have some delirium while in the hospital seems to be improving per her report.  This is my first time seeing him this morning he seems pleasant.  When I walked in he was snoring heavily asleep took a while to wake him up.  He does answer questions appropriately but easily falls asleep again even while talking to him.  Fortunately he denies any chest discomfort or palpitations.  Does have generalized fatigue.        Review of Systems:  Review of Systems   Constitutional: Positive for malaise/fatigue.   HENT: Negative.     Eyes: Negative.     Cardiovascular:  Positive for dyspnea on exertion. Negative for chest pain and palpitations.   Respiratory:  Positive for shortness of breath.    Endocrine: Negative.    Hematologic/Lymphatic: Negative.    Skin: Negative.    Musculoskeletal: Negative.    Gastrointestinal: Negative.    Genitourinary: Negative.    Neurological: Negative.    Psychiatric/Behavioral: Negative.     Allergic/Immunologic: Negative.      Medications:    Current Facility-Administered Medications:     acetaminophen (TYLENOL) tablet 650 mg, 650 mg, Oral, Q4H PRN, Teena Crisostomo MD    albuterol (PROVENTIL) nebulizer solution 0.083% 2.5 mg/3mL, 2.5 mg, Nebulization, Q6H PRN, Teena Crisostomo MD    apixaban (ELIQUIS) tablet 10 mg, 10 mg, Oral, Q12H, 10 mg at 07/15/23 0915 **FOLLOWED BY** [START ON 7/21/2023] apixaban (ELIQUIS) tablet 5 mg, 5 mg, Oral, Q12H, Mikey Henderson MD    aspirin chewable tablet 81 mg, 81 mg, Oral, Daily, Teena Crisostomo MD, 81 mg at 07/15/23 0917    atorvastatin (LIPITOR) tablet 10 mg, 10 mg, Oral, Daily, Teena Crisostomo MD, 10 mg at 07/15/23 0915    sennosides-docusate (PERICOLACE) 8.6-50 MG per tablet 2 tablet, 2 tablet, Oral, BID **AND** polyethylene glycol (MIRALAX) packet 17 g, 17 g, Oral, Daily PRN **AND** bisacodyl (DULCOLAX) EC tablet 5 mg, 5 mg, Oral, Daily PRN **AND** bisacodyl (DULCOLAX) suppository 10 mg, 10 mg, Rectal, Daily PRN, Teena Crisostomo MD    cholecalciferol (VITAMIN D3) tablet 2,000 Units, 2,000 Units, Oral, Daily, Teena Crisostomo MD, 2,000 Units at 07/15/23 0915    escitalopram (LEXAPRO) tablet 10 mg, 10 mg, Oral, Daily, Teena Crisostomo MD, 10 mg at 07/15/23 0917    ferrous sulfate tablet 325 mg, 325 mg, Oral, Daily With Breakfast, Teena Crisostomo MD, 325 mg at 07/15/23 0916    gabapentin (NEURONTIN) capsule 600 mg, 600 mg, Oral, Q12H, Teena Crisostomo MD, 600 mg at 07/15/23 0915    melatonin tablet 3 mg, 3 mg, Oral, Nightly PRN,  Teena Crisostomo MD    ondansetron (ZOFRAN) tablet 4 mg, 4 mg, Oral, Q6H PRN **OR** ondansetron (ZOFRAN) injection 4 mg, 4 mg, Intravenous, Q6H PRN, Teena Crisostomo MD    pantoprazole (PROTONIX) EC tablet 40 mg, 40 mg, Oral, Q AM, Teena Crisostomo MD, 40 mg at 07/15/23 0622    [COMPLETED] Insert Peripheral IV, , , Once **AND** sodium chloride 0.9 % flush 10 mL, 10 mL, Intravenous, PRN, Alex Arellano II, MD    vitamin B-12 (CYANOCOBALAMIN) tablet 1,000 mcg, 1,000 mcg, Oral, Daily, Teena Crisostomo MD, 1,000 mcg at 07/15/23 0915    No Known Allergies    Past Medical History:   Diagnosis Date    Abnormal electrocardiogram     Arm pain     Atrial fibrillation     Atrial flutter     BPH (benign prostatic hyperplasia)     BPH associated with nocturia     Chronic kidney disease     Colon cancer     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    Colon polyp 11/22/2015    Depression     Dyspnea     Enlarged prostate without lower urinary tract symptoms (luts)     Episode of generalized weakness     knees were weak - had to be helped by wife to sit down    Esophagitis, reflux     Fatigue     Fracture of ankle     right    GERD (gastroesophageal reflux disease)     Hyperlipidemia     Inguinal hernia     Loss of hearing     Lumbar radiculopathy     Obesity     Osteoarthritis cervical spine     doc on [facility] 08/16    Osteopenia     Osteoporosis     Overweight     Tobacco dependence in remission     Urge incontinence of urine     Vitamin D deficiency     Wheezing        Past Surgical History:   Procedure Laterality Date    CARDIAC CATHETERIZATION N/A 7/27/2017    Procedure: Valve assessment;  Surgeon: Adonis Solis MD;  Location: [facility] CATH INVASIVE LOCATION;  Service:     CARDIAC CATHETERIZATION N/A 10/1/2018    Procedure: Left Heart Cath;  Surgeon: Bogdan Vargas MD;  Location:  [facility] CATH INVASIVE LOCATION;  Service: Cardiology    CARDIAC CATHETERIZATION N/A 10/1/2018    Procedure: Coronary  angiography;  Surgeon: Bogdan Vargas MD;  Location:  [facility] CATH INVASIVE LOCATION;  Service: Cardiology    CARDIAC CATHETERIZATION N/A 10/1/2018    Procedure: Left ventriculography;  Surgeon: Bogdan Vargas MD;  Location:  [facility] CATH INVASIVE LOCATION;  Service: Cardiology    CARDIAC CATHETERIZATION N/A 10/1/2018    Procedure: Right Heart Cath;  Surgeon: Bogdan Vargas MD;  Location: [facility] CATH INVASIVE LOCATION;  Service: Cardiology    CARDIAC ELECTROPHYSIOLOGY PROCEDURE N/A 7/27/2017    Procedure: Ablation atrial flutter Will need to have 3 -4 weeks of therapeutic INR's ;  Surgeon: Adonis Solis MD;  Location:  [facility] CATH INVASIVE LOCATION;  Service:     CARDIOVERSION      CATARACT EXTRACTION W/ INTRAOCULAR LENS  IMPLANT, BILATERAL      COLON RESECTION      COLON SURGERY      polyp removed    COLONOSCOPY      07/15 redo 5 years  [facility]    KNEE SURGERY      benign tumor removed, age 6    LASIK      LUMBAR EPIDURAL INJECTION N/A 5/25/2022    Procedure: LUMBAR EPIDURAL 1ST VISIT L3-4 (right of midline);  Surgeon: Bonnie Brown MD;  Location: [facility] MAIN OR;  Service: Pain Management;  Laterality: N/A;    REFRACTIVE SURGERY  2007    REPAIR PERONEAL TENDONS ANKLE W/ FIBULAR OSTEOTOMY Right 03/2013    Dr. Banks       Social History     Socioeconomic History    Marital status:     Number of children: 2    Years of education: COLLEGE GRADUATE   Tobacco Use    Smoking status: Former     Packs/day: 0.50     Years: 55.00     Pack years: 27.50     Types: Cigarettes     Quit date: 5/1/2008     Years since quitting: 15.2    Smokeless tobacco: Never    Tobacco comments:     QUIT 10 YRS   Vaping Use    Vaping Use: Never used   Substance and Sexual Activity    Alcohol use: Not Currently     Comment: SOCIALLY    Drug use: No    Sexual activity: Defer       Family History   Problem Relation Age of Onset    Breast cancer Mother     Heart disease Father     Hypertension Father     Heart attack Father      Hypertension Sister        The following portions of the patient's history were reviewed and updated as appropriate: allergies, current medications, past family history, past medical history, past social history, past surgical history and problem list.         Objective:      Temp:  [97.4 °F (36.3 °C)-98.1 °F (36.7 °C)] 98.1 °F (36.7 °C)  Heart Rate:  [51-80] 62  Resp:  [18] 18  BP: ()/(63-79) 113/63   No intake or output data in the 24 hours ending 07/15/23 1322  Body mass index is 29.21 kg/m².      07/13/23  1126 07/14/23  0317 07/14/23  1417   Weight: 81.6 kg (180 lb) 82.2 kg (181 lb 3.2 oz) 82.1 kg (181 lb)           Physical Exam:  Constitutional: Well appearing, well developed, no acute distress   HENT: Oropharynx clear and membrane moist  Eyes: Normal conjunctiva, no sclera icterus.  Neck: Supple, no carotid bruit bilaterally.  Cardiovascular: Regular and Bradycardic rate and rhythm, No Murmur, Trace bilateral lower extremity edema.  Pulmonary: Normal respiratory effort, normal lung sounds, no wheezing.  Abdominal: Soft, nontender, no hepatosplenomegaly, liver is non-pulsatile.  Neurological: Alert and orient x 3. Sleepy falls asleep while interviewing   Skin: Warm, dry, no ecchymosis, no rash.  Psych: Appropriate mood and affect. Normal judgment and insight.         Lab Review:   Results from last 7 days   Lab Units 07/15/23  0656 07/14/23  0211 07/13/23  1148   SODIUM mmol/L 140 136 139   POTASSIUM mmol/L 4.3 4.0 4.3   CHLORIDE mmol/L 111* 109* 107   CO2 mmol/L 21.8* 19.7* 19.0*   BUN mg/dL 16 22 24*   CREATININE mg/dL 1.25 1.37* 1.94*   GLUCOSE mg/dL 92 113* 123*   CALCIUM mg/dL 9.1 8.7 9.4   AST (SGOT) U/L  --   --  16   ALT (SGPT) U/L  --   --  17     Results from last 7 days   Lab Units 07/15/23  0846 07/15/23  0656 07/13/23  1647 07/13/23  1148   CK TOTAL U/L  --   --   --  31   HSTROP T ng/L 37* 29* 27* 35*     Results from last 7 days   Lab Units 07/15/23  0656 07/14/23  0211 07/13/23  1148    WBC 10*3/mm3 8.48 10.28 9.98   HEMOGLOBIN g/dL 12.3* 12.4* 13.7   HEMATOCRIT % 36.2* 36.2* 41.2   PLATELETS 10*3/mm3 245 231 267     Results from last 7 days   Lab Units 07/15/23  0656 07/14/23  2137 07/14/23  1819 07/14/23  1035 07/14/23  0211 07/13/23  1724   INR   --   --   --   --   --  1.13*   APTT seconds 33.1 92.0* >200.0* 58.4* 173.5* 34.0     Results from last 7 days   Lab Units 07/13/23  1148   MAGNESIUM mg/dL 2.1           Invalid input(s): LDLCALC  The ASCVD Risk score (Trevin HURD, et al., 2019) failed to calculate for the following reasons:    The 2019 ASCVD risk score is only valid for ages 40 to 79     Results from last 7 days   Lab Units 07/13/23  1148   PROBNP pg/mL 1,335.0     Results from last 7 days   Lab Units 07/13/23  1148   TSH uIU/mL 1.320       Lower extremity duplex 7/14/2023:  Normal venous duplex no DVT    Echocardiogram 7/14/2023 images reviewed by myself:  Left ventricular systolic function is normal. Calculated left ventricular EF = 64.9%  Left ventricular diastolic function is consistent with (grade II w/high LAP) pseudonormalization.  Calculated right ventricular systolic pressure from tricuspid regurgitation is 41 mmHg.    Chest CT 7/13/2023 images reviewed by myself  Small scattered pulmonary emboli throughout both lungs  No evidence of right ventricular strain    ECG 7/15/2023:  Sinus bradycardia  LVH with repolarization abnormalities no change from prior      Prior cardiac testing:      Cardiac catheterization 10/1/2018 images reviewed by myself:  Left main: There is mild calcification of the left main coronary artery.  No stenosis was noted.  LAD: The left anterior descending coronary artery is mildly calcified in the proximal segment.  No stenosis is noted.  There is no stenosis noted in the mid or distal segments.  The diagonal branches are normal.  Ramus intermedius: The ramus branch is normal.  LCX: The left circumflex coronary arteries small nondominant vessel that is  normal.  RCA: Mild luminal irregularities are seen in the proximal segment of the RCA without measurable stenosis.  The mid and distal segments appear to be normal.  The posterior descending and posterolateral branches are normal.  The artery is a large dominant vessel.    Stress MPI 8/23/2018:  Myocardial perfusion imaging indicates a normal myocardial perfusion study with no evidence of ischemia.  Left ventricular ejection fraction is normal (Calculated EF = 50%).  Impressions are consistent with a low risk study.    Echocardiogram 7/19/2018:  Calculated right ventricular systolic pressure from tricuspid regurgitation is 16 mmHg.  Left ventricular systolic function is normal. Estimated EF = 61%.  Left atrial cavity size is moderately dilated.  Left ventricular diastolic dysfunction (grade I) consistent with impaired relaxation.       Assessment:           Pulmonary embolus         Plan:       Mr. Trejo is a 88 y.o. 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  He was subsequently diagnosed with bilateral pulmonary emboli fortunate these were small and scattered no active DVT.  He has already been transitioned over to oral anticoagulants.  He does have a mildly elevated troponin and BNP level which would correlate somewhat with these findings.  We were asked to see him today as his EKG was considered abnormal.  This is pretty similar to where it been in the past he does have LVH with repolarization abnormalities.  His echocardiogram this admission again shows no focal wall motion abnormalities and recent cardiac catheterization and stress testing showed no ischemic findings.  He has no active symptoms of ischemia would defer any further ischemic work-up  during this admission.  He is fairly bradycardic today he is not on any rate controlling agents at home or even here in the hospital based upon his presentation upon my arrival to the room he is probably got a high likelihood of obstructive sleep apnea which would be appropriate to work-up.      Pulmonary emboli without cor pulmonale:  Agree with anticoagulation regimen currently on Eliquis which would be fine would recommend lifelong anticoagulation  Does have mild pulmonary hypertension noted on echocardiogram we will need close follow-up.    Bradycardia/abnormal EKG:  Patient with bradycardia and LVH on EKG  No major symptoms troponins are flat although mildly elevated not unsurprising given underlying renal insufficiency and newly diagnosed pulmonary emboli  Echocardiogram without any acute changes  Reasonable to work-up obstructive sleep apnea as outpatient    Pulmonary hypertension:  Mild on echocardiogram this admission as well as heart catheterization in 2018  We will continue to monitor hopefully will improve with anticoagulation    Typical atrial flutter:  Status post ablation 7/2017 no recurrence  We will now be on lifelong anticoagulation due to unprovoked PE    Thank you for allowing me to participate in the care of Jonathan Trejo. Feel free to contact me directly with any further questions or concerns.    Macho Miranda MD  [facility]  07/15/23  13:22 EDT

## 2023-07-15 NOTE — PROGRESS NOTES
"DAILY PROGRESS NOTE  [facility]    Patient Identification:  Name: Jonathan Trejo  Age: 88 y.o.  Sex: male  :  1935  MRN: [id number]         Primary Care Physician: Coby Walton APRN      Subjective  Patient has no complaints.  Daughter is at bedside.  Notes that he was confused much of yesterday.    Objective:  General Appearance:  Comfortable, well-appearing, in no acute distress and not in pain.    Vital signs: (most recent): Blood pressure 113/63, pulse 62, temperature 98.1 °F (36.7 °C), temperature source Oral, resp. rate 18, height 167.6 cm (66\"), weight 82.1 kg (181 lb), SpO2 97 %.    Lungs:  Normal effort and normal respiratory rate.  Breath sounds clear to auscultation.    Heart: Normal rate.  Regular rhythm.    Extremities: There is no dependent edema.    Neurological: Patient is alert.  (Patient sleeping deeply on entering the room.  Once awakened he is oriented to person and the fact that he is in the hospital.  Could not remember which one though.  Conversant cooperative and pleasant.  He remembers speaking to me yesterday.).    Skin:  Warm and dry.                Vital signs in last 24 hours:  Temp:  [97.4 °F (36.3 °C)-98.1 °F (36.7 °C)] 98.1 °F (36.7 °C)  Heart Rate:  [51-80] 62  Resp:  [18] 18  BP: ()/(63-79) 113/63    Intake/Output:    Intake/Output Summary (Last 24 hours) at 7/15/2023 1133  Last data filed at 2023 1229  Gross per 24 hour   Intake --   Output 200 ml   Net -200 ml         Results from last 7 days   Lab Units 07/15/23  0656 23  0211 23  1148   WBC 10*3/mm3 8.48 10.28 9.98   HEMOGLOBIN g/dL 12.3* 12.4* 13.7   PLATELETS 10*3/mm3 245 231 267     Results from last 7 days   Lab Units 07/15/23  0656 23  0211 23  1148   SODIUM mmol/L 140 136 139   POTASSIUM mmol/L 4.3 4.0 4.3   CHLORIDE mmol/L 111* 109* 107   CO2 mmol/L 21.8* 19.7* 19.0*   BUN mg/dL 16 22 24*   CREATININE mg/dL 1.25 1.37* 1.94*   GLUCOSE mg/dL 92 113* 123* "   Estimated Creatinine Clearance: 42.7 mL/min (by C-G formula based on SCr of 1.25 mg/dL).  Results from last 7 days   Lab Units 07/15/23  0656 07/14/23  0211 07/13/23  1148   CALCIUM mg/dL 9.1 8.7 9.4   ALBUMIN g/dL 3.4*  --  3.7   MAGNESIUM mg/dL  --   --  2.1   PHOSPHORUS mg/dL 3.1  --  2.0*     Results from last 7 days   Lab Units 07/15/23  0656 07/13/23  1148   ALBUMIN g/dL 3.4* 3.7   BILIRUBIN mg/dL  --  1.1   ALK PHOS U/L  --  112   AST (SGOT) U/L  --  16   ALT (SGPT) U/L  --  17       Assessment:  Pulmonary embolus: Bilateral.  No RV strain on CT or echocardiogram.  Transitioned to Eliquis last night.  Patient presently on 2 L again.  Incentive spirometry and wean as tolerated.  Check ambulatory O2 saturations.  SEPNCER: Resolved.  CKD 3:  COPD: Clinically stable that may be contributing to his mild hypoxemia.  History of atrial flutter: Presently normal sinus rhythm.  Elevated troponin.  Levels fluctuating a bit but pretty much at his admitting level.  However the EKG does show increased anterolateral ST depression from yesterday.  No symptoms.  We will request cardiology opinion.  Last cardiac cath was 5 years ago.  No clinically significant stenosis at that point.  Confusion: Sundowning.  Appears improved today.  Keep lights on during the daytime and increase activity.  Discussed with daughter at length.  Hopefully can get him back to more familiar surroundings soon.      Plan:  Please see above.  PT evaluation.  Discussed with daughter at bedside.  Discussed with RN.    Mikey Henderson MD  7/15/2023  11:33 EDT

## 2023-07-16 LAB
APTT PPP: 41.3 SECONDS (ref 22.7–35.4)
DEPRECATED RDW RBC AUTO: 46.1 FL (ref 37–54)
EOSINOPHIL # BLD MANUAL: 0.18 10*3/MM3 (ref 0–0.4)
EOSINOPHIL NFR BLD MANUAL: 2.1 % (ref 0.3–6.2)
ERYTHROCYTE [DISTWIDTH] IN BLOOD BY AUTOMATED COUNT: 13.1 % (ref 12.3–15.4)
HCT VFR BLD AUTO: 35.2 % (ref 37.5–51)
HGB BLD-MCNC: 12 G/DL (ref 13–17.7)
LYMPHOCYTES # BLD MANUAL: 0.78 10*3/MM3 (ref 0.7–3.1)
LYMPHOCYTES NFR BLD MANUAL: 9.3 % (ref 5–12)
MCH RBC QN AUTO: 32.5 PG (ref 26.6–33)
MCHC RBC AUTO-ENTMCNC: 34.1 G/DL (ref 31.5–35.7)
MCV RBC AUTO: 95.4 FL (ref 79–97)
MONOCYTES # BLD: 0.78 10*3/MM3 (ref 0.1–0.9)
NEUTROPHILS # BLD AUTO: 6.66 10*3/MM3 (ref 1.7–7)
NEUTROPHILS NFR BLD MANUAL: 79.4 % (ref 42.7–76)
OVALOCYTES BLD QL SMEAR: ABNORMAL
PLAT MORPH BLD: NORMAL
PLATELET # BLD AUTO: 244 10*3/MM3 (ref 140–450)
PMV BLD AUTO: 11.6 FL (ref 6–12)
POIKILOCYTOSIS BLD QL SMEAR: ABNORMAL
RBC # BLD AUTO: 3.69 10*6/MM3 (ref 4.14–5.8)
VARIANT LYMPHS NFR BLD MANUAL: 9.3 % (ref 19.6–45.3)
WBC MORPH BLD: NORMAL
WBC NRBC COR # BLD: 8.39 10*3/MM3 (ref 3.4–10.8)

## 2023-07-16 PROCEDURE — 97162 PT EVAL MOD COMPLEX 30 MIN: CPT

## 2023-07-16 PROCEDURE — 99232 SBSQ HOSP IP/OBS MODERATE 35: CPT | Performed by: INTERNAL MEDICINE

## 2023-07-16 PROCEDURE — 36415 COLL VENOUS BLD VENIPUNCTURE: CPT | Performed by: EMERGENCY MEDICINE

## 2023-07-16 PROCEDURE — 85730 THROMBOPLASTIN TIME PARTIAL: CPT | Performed by: EMERGENCY MEDICINE

## 2023-07-16 PROCEDURE — 97116 GAIT TRAINING THERAPY: CPT

## 2023-07-16 PROCEDURE — 85025 COMPLETE CBC W/AUTO DIFF WBC: CPT | Performed by: EMERGENCY MEDICINE

## 2023-07-16 PROCEDURE — 85007 BL SMEAR W/DIFF WBC COUNT: CPT | Performed by: EMERGENCY MEDICINE

## 2023-07-16 RX ADMIN — Medication 1000 MCG: at 09:56

## 2023-07-16 RX ADMIN — FERROUS SULFATE TAB 325 MG (65 MG ELEMENTAL FE) 325 MG: 325 (65 FE) TAB at 09:56

## 2023-07-16 RX ADMIN — ASPIRIN 81 MG: 81 TABLET, CHEWABLE ORAL at 09:56

## 2023-07-16 RX ADMIN — PANTOPRAZOLE SODIUM 40 MG: 40 TABLET, DELAYED RELEASE ORAL at 06:27

## 2023-07-16 RX ADMIN — ATORVASTATIN CALCIUM 10 MG: 20 TABLET, FILM COATED ORAL at 11:58

## 2023-07-16 RX ADMIN — Medication 2000 UNITS: at 09:56

## 2023-07-16 RX ADMIN — APIXABAN 10 MG: 5 TABLET, FILM COATED ORAL at 20:56

## 2023-07-16 RX ADMIN — GABAPENTIN 600 MG: 300 CAPSULE ORAL at 09:56

## 2023-07-16 RX ADMIN — ESCITALOPRAM OXALATE 10 MG: 10 TABLET, FILM COATED ORAL at 09:56

## 2023-07-16 RX ADMIN — Medication 10 ML: at 20:57

## 2023-07-16 RX ADMIN — GABAPENTIN 600 MG: 300 CAPSULE ORAL at 20:56

## 2023-07-16 RX ADMIN — APIXABAN 10 MG: 5 TABLET, FILM COATED ORAL at 09:56

## 2023-07-16 NOTE — PLAN OF CARE
Goal Outcome Evaluation:  Plan of Care Reviewed With: patient           Outcome Evaluation: Pt very pleasant, on RA with best reading by forehead probe. Assist x1 with walker to BSC also uses bedside urinal. Disoriented to time and situation at times, reorients well with help. Worked with PT with plans to go back to [facility]. Will need transportation arranged as daughter will be out of state through mid week.

## 2023-07-16 NOTE — THERAPY EVALUATION
Patient Name: Jonathan Trejo  : 1935    MRN: [id number]                              Today's Date: 2023       Admit Date: 2023    Visit Dx:     ICD-10-CM ICD-9-CM   1. Acute pulmonary embolism without acute cor pulmonale, unspecified pulmonary embolism type  I26.99 415.19   2. Acute hypoxemic respiratory failure  J96.01 518.81     Patient Active Problem List   Diagnosis    Benign prostatic hyperplasia with urinary obstruction    Chronic renal impairment, stage 3 (moderate)    Depression    Gastroesophageal reflux disease with esophagitis    Hyperlipidemia    Nocturia    Obesity (BMI 30-39.9)    Osteopenia    Osteoporosis    Seborrhea    Abnormal EKG    Bradycardia    Degenerative disc disease, cervical    Typical atrial flutter    Lumbar radiculopathy    AVNRT (AV tarun re-entry tachycardia)    S/P ablation of atrial flutter    S/P catheter ablation of slow pathway    Cardiomyopathy    GARCIA (dyspnea on exertion)    Urinary tract infection without hematuria    Right foot drop    Frequent falls    Metabolic encephalopathy    Acute renal failure    Other secondary parkinsonism    Moderate single current episode of major depressive disorder    Mass of left lower leg    Pulmonary emphysema    Monoclonal gammopathy of unknown significance (MGUS)    Myasthenia gravis without exacerbation    Displacement of lumbar intervertebral disc without myelopathy    Acute anemia    Closed compression fracture of L3 lumbar vertebra, initial encounter    Closed compression fracture of L4 lumbar vertebra, initial encounter    Pulmonary embolus     Past Medical History:   Diagnosis Date    Abnormal electrocardiogram     Arm pain     Atrial fibrillation     Atrial flutter     BPH (benign prostatic hyperplasia)     BPH associated with nocturia     Chronic kidney disease     Colon cancer     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    Colon polyp 2015    Depression     Dyspnea     Enlarged prostate without lower urinary  tract symptoms (luts)     Episode of generalized weakness     knees were weak - had to be helped by wife to sit down    Esophagitis, reflux     Fatigue     Fracture of ankle     right    GERD (gastroesophageal reflux disease)     Hyperlipidemia     Inguinal hernia     Loss of hearing     Lumbar radiculopathy     Obesity     Osteoarthritis cervical spine     doc on Sray 08/16    Osteopenia     Osteoporosis     Overweight     Tobacco dependence in remission     Urge incontinence of urine     Vitamin D deficiency     Wheezing      Past Surgical History:   Procedure Laterality Date    CARDIAC CATHETERIZATION N/A 7/27/2017    Procedure: Valve assessment;  Surgeon: Adonis Solis MD;  Location:  [facility] C[facility] INVASIVE LOC[facility];  Service:     CARDIAC CATHETERIZATION N/A 10/1/2018    Procedure: Left Heart Cath;  Surgeon: Bogdan Vargas MD;  Location: [facility] C[facility];  Service: Cardiology    CARDIAC CATHETERIZATION N/A 10/1/2018    Procedure: Coronary angiography;  Surgeon: Bogdan Vargas MD;  Location: [facility] C[facility] LOC[facility];  Service: Cardiology    CARDIAC CATHETERIZATION N/A 10/1/2018    Procedure: Left ventriculography;  Surgeon: Bogdan Vargas MD;  Location: [facility] C[facility] LOC[facility];  Service: Cardiology    CARDIAC CATHETERIZATION N/A 10/1/2018    Procedure: Right Heart Cath;  Surgeon: Bogdan Vargas MD;  Location: [facility] CATH [facility] LOC[facility];  Service: Cardiology    CARDIAC ELECTROPHYSIOLOGY PROCEDURE N/A 7/27/2017    Procedure: Ablation atrial flutter Will need to have 3 -4 weeks of therapeutic INR's ;  Surgeon: Adonis Solis MD;  Location: [facility] C[facility] LOC[facility];  Service:     CARDIOVERSION      CATARACT EXTRACTION W/ INTRAOCULAR LENS  IMPLANT, BILATERAL      COLON RESECTION      COLON SURGERY      polyp removed    COLONOSCOPY      07/15 redo 5 years  Segun    KNEE SURGERY      benign tumor removed, age 6    LASIK      LUMBAR EPIDURAL INJECTION N/A 5/25/2022     Procedure: LUMBAR EPIDURAL 1ST VISIT L3-4 (right of midline);  Surgeon: Bonnie Brown MD;  Location[place] [facility] [place];  Service: Pain Management;  Laterality: N/A;    REFRACTIVE SURGERY  2007    REPAIR PERONEAL TENDONS ANKLE W/ FIBULAR OSTEOTOMY Right 03/2013    Dr. Banks      General Information       Row Name 07/16/23 1416          Physical Therapy Time and Intention    Document Type evaluation  -     Mode of Treatment physical therapy  -       Row Name 07/16/23 1416          General Information    Patient Profile Reviewed yes  -     Prior Level of Function independent:  -     Existing Precautions/Restrictions no known precautions/restrictions  -     Barriers to Rehab none identified  -       Row Name 07/16/23 1416          Living Environment    People in Home alone  -       Row Name 07/16/23 1416          Home Main Entrance    Number of Stairs, Main Entrance none  -       Row Name 07/16/23 1416          Cognition    Orientation Status (Cognition) oriented x 3  -               User Key  (r) = Recorded By, (t) = Taken By, (c) = Cosigned By      Initials Name Provider Type    Braxton Jolley, PT Physical Therapist                   Mobility       Row Name 07/16/23 1417          Bed Mobility    Bed Mobility bed mobility (all) activities  -     All Activities, [place] (Bed Mobility) supervision;1 person assist;verbal cues  -     Assistive Device (Bed Mobility) bed rails  -       Row Name 07/16/23 1417          Sit-Stand Transfer    Sit-Stand [place] (Transfers) standby assist;contact guard;verbal cues;1 person assist  -     Assistive Device (Sit-Stand Transfers) walker, 4-wheeled  -       Row Name 07/16/23 1417          Gait/Stairs (Locomotion)    [place] Level (Gait) 1 person assist;contact guard;verbal cues  -     Assistive Device (Gait) walker, 4-wheeled  -     Distance in Feet (Gait) 60  -DR     Deviations/Abnormal Patterns (Gait) base of support, narrow;gait  speed decreased  -DR     Bilateral Gait Deviations forward flexed posture  -DR               User Key  (r) = Recorded By, (t) = Taken By, (c) = Cosigned By      Initials Name Provider Type    Braxton Jolley, PT Physical Therapist                   Obj/Interventions       [facility] Name 07/16/23 1419          Range of Motion Comprehensive    General Range of Motion no range of motion deficits identified  -       Row Name 07/16/23 1419          Strength Comprehensive (MMT)    General Manual Muscle Testing (MMT) Assessment no strength deficits identified  -     Comment, General Manual Muscle Testing (MMT) Assessment 3+/5 gross LE MMT  -DR       [facility] Name 07/16/23 1419          Balance    Balance Assessment sitting static balance;standing static balance  -DR     Static Sitting Balance verbal cues;standby assist  -DR     Position, Sitting Balance unsupported;sitting edge of bed  -DR     Static Standing Balance standby assist;verbal cues;contact guard;1-person assist  -DR     Position/Device Used, Standing Balance walker, front-wheeled  -DR               User Key  (r) = Recorded By, (t) = Taken By, (c) = Cosigned By      Initials Name Provider Type    Braxton Jolley, PT Physical Therapist                   Goals/Plan       Row Name 07/16/23 1425          Gait Training Goal 1 (PT)    Activity/Assistive Device (Gait Training Goal 1, PT) gait (walking locomotion);assistive device use  -DR     [place] Level (Gait Training Goal 1, PT) standby assist  -DR     Distance (Gait Training Goal 1, PT) 100  -DR     Time Frame (Gait Training Goal 1, PT) 5 days  -DR               User Key  (r) = Recorded By, (t) = Taken By, (c) = Cosigned By      Initials Name Provider Type    Braxton Jolley, PT Physical Therapist                   Clinical Impression       [facility] Name 07/16/23 1421          Pain    Pretreatment Pain Rating 0/10 - no pain  -DR     Posttreatment Pain Rating 0/10 - no pain  -DR       Row Name 07/16/23 1421           Plan of Care Review    Progress improving  -DR     Outcome Evaluation Pt agreeable to evaluation this afternoon. Pt admitted 7/13 for primary problem of pulmonary embolus. PMHx consisting of BPH, stage 3 chronic kidney disease, hyperlipidemia, cancer, osteoporosis. Pt demonstrates WFL gross LE strength, supervision with bed mobility, SBA-CGA x1 STS and CGAx1 with ambulation ~60ft with rollator. Pt felt SOA during ambulation, but did not take any rest breaks. Pt will benefit from acute PT to address endurance and functional mobility. PT recommends back to assisted living at time of d/c.  -       Row Name 07/16/23 1421          Therapy Assessment/Plan (PT)    Rehab Potential (PT) good, to achieve stated therapy goals  -DR     Criteria for Skilled Interventions Met (PT) yes  -DR     Therapy Frequency (PT) 5 times/wk  -DR [facility] Name 07/16/23 1421          Positioning and Restraints    Pre-Treatment Position in bed  -DR     Post Treatment Position bed  -DR     In Bed notified nsg;supine;call light within reach;encouraged to call for assist;exit alarm on  -DR               User Key  (r) = Recorded By, (t) = Taken By, (c) = Cosigned By      Initials Name Provider Type    Braxton Jolley, PT Physical Therapist                   Outcome Measures       Row Name 07/16/23 1422          How much help from another person do you currently need...    Turning from your back to your side while in flat bed without using bedrails? 4  -DR     Moving from lying on back to sitting on the side of a flat bed without bedrails? 4  -DR     Moving to and from a bed to a chair (including a wheelchair)? 3  -DR     Standing up from a chair using your arms (e.g., wheelchair, bedside chair)? 3  -DR     Climbing 3-5 steps with a railing? 2  -DR     To walk in hospital room? 3  -DR     AM-PAC 6 Clicks Score (PT) 19  -DR     Highest level of mobility 6 --> Walked 10 steps or more  -       Row Name 07/16/23 1422          Functional  Assessment    Outcome Measure Options AM-PAC 6 Clicks Basic Mobility (PT)  -               User Key  (r) = Recorded By, (t) = Taken By, (c) = Cosigned By      Initials Name Provider Type    Braxton Jolley, PT Physical Therapist                                 Physical Therapy Education       Title: PT OT SLP Therapies (Done)       Topic: Physical Therapy (Done)       Point: Mobility training (Done)       Learning Progress Summary             Patient Acceptance, E,TB, VU by  at 7/16/2023 1427                         Point: Home exercise program (Done)       Learning Progress Summary             Patient Acceptance, E,TB, VU by  at 7/16/2023 1427                         Point: Body mechanics (Done)       Learning Progress Summary             Patient Acceptance, E,TB, VU by  at 7/16/2023 1427                         Point: Precautions (Done)       Learning Progress Summary             Patient Acceptance, E,TB, VU by  at 7/16/2023 1427                                         User Key       Initials Effective Dates Name Provider Type Discipline     05/24/23 -  Braxton Mark, PT Physical Therapist PT                  PT Recommendation and Plan     Progress: improving  Outcome Evaluation: Pt agreeable to evaluation this afternoon. Pt admitted 7/13 for primary problem of pulmonary embolus. PMHx consisting of BPH, stage 3 chronic kidney disease, hyperlipidemia, cancer, osteoporosis. Pt demonstrates WFL gross LE strength, supervision with bed mobility, SBA-CGA x1 STS and CGAx1 with ambulation ~60ft with rollator. Pt felt SOA during ambulation, but did not take any rest breaks. Pt will benefit from acute PT to address endurance and functional mobility. PT recommends back to assisted living at time of d/c.     Time Calculation:    PT Charges       Row Name 07/16/23 1427             Time Calculation    Start Time 1305  -DR      Stop Time 1323  -DR      Time Calculation (min) 18 min  -DR      PT Received On  07/16/23  -      PT - Next Appointment 07/17/23  -      PT Goal Re-Cert Due Date 07/21/23  -DR         Time Calculation- PT    Total Timed Code Minutes- PT 12 minute(s)  -DR         Timed Charges    86176 - Gait Training Minutes  12  -DR         Total Minutes    Timed Charges Total Minutes 12  -DR       Total Minutes 12  -DR                User Key  (r) = Recorded By, (t) = Taken By, (c) = Cosigned By      Initials Name Provider Type    Braxton Jolley, PT Physical Therapist                  Therapy Charges for Today       Code Description Service Date Service Provider Modifiers Qty    81102136850 HC GAIT TRAINING EA 15 MIN 7/16/2023 Braxton Mark, PT GP 1    20463342049 HC PT EVAL MOD COMPLEXITY 3 7/16/2023 Braxton Mark, PT GP 1            PT G-Codes  Outcome Measure Options: AM-PAC 6 Clicks Basic Mobility (PT)  AM-PAC 6 Clicks Score (PT): 19  PT Discharge Summary  Anticipated Discharge Disposition (PT): assisted living    Braxton Mark, PT  7/16/2023

## 2023-07-16 NOTE — PLAN OF CARE
Goal Outcome Evaluation:           Progress: improving  Outcome Evaluation: Pt agreeable to evaluation this afternoon. Pt admitted 7/13 for primary problem of pulmonary embolus. PMHx consisting of BPH, stage 3 chronic kidney disease, hyperlipidemia, cancer, osteoporosis. Pt demonstrates WFL gross LE strength, supervision with bed mobility, SBA-CGA x1 STS and CGAx1 with ambulation ~60ft with rollator. Pt felt SOA during ambulation, but did not take any rest breaks. Pt will benefit from acute PT to address endurance and functional mobility. PT recommends back to assisted living at time of d/c.      Anticipated Discharge Disposition (PT): assisted living

## 2023-07-16 NOTE — PROGRESS NOTES
[facility] Progress Note       Encounter Date:23  Patient:Jonathan Trejo  :1935  MRN:[id number]      Chief Complaint: Follow-up pulmonary emboli      Subjective:        Patient doing well no new complaints    Review of Systems:  Review of Systems   Constitutional: Negative for malaise/fatigue.   Cardiovascular:  Negative for dyspnea on exertion.   Respiratory:  Negative for shortness of breath.      Medications:  Scheduled Meds:  apixaban, 10 mg, Oral, Q12H   Followed by  [START ON 2023] apixaban, 5 mg, Oral, Q12H  aspirin, 81 mg, Oral, Daily  atorvastatin, 10 mg, Oral, Daily  cholecalciferol, 2,000 Units, Oral, Daily  escitalopram, 10 mg, Oral, Daily  ferrous sulfate, 325 mg, Oral, Daily With Breakfast  gabapentin, 600 mg, Oral, Q12H  pantoprazole, 40 mg, Oral, Q AM  senna-docusate sodium, 2 tablet, Oral, BID  vitamin B-12, 1,000 mcg, Oral, Daily    Continuous Infusions:   PRN Meds:    acetaminophen    albuterol    senna-docusate sodium **AND** polyethylene glycol **AND** bisacodyl **AND** bisacodyl    melatonin    ondansetron **OR** ondansetron    [COMPLETED] Insert Peripheral IV **AND** sodium chloride         Objective:       Vitals:    07/15/23 1933 23 0014 23 0500 23 0817   BP: 120/65 119/61  136/70   BP Location: Right arm Right arm  Right arm   Patient Position: Lying Lying  Lying   Pulse: 51 53  54   Resp: 18 18     Temp: 97.4 °F (36.3 °C) 97.3 °F (36.3 °C)  98.8 °F (37.1 °C)   TempSrc: Oral Oral  Oral   SpO2: 91% 96%     Weight:   81 kg (178 lb 9.2 oz)    Height:               Physical Exam:  Constitutional: Well appearing, well developed, no acute distress   HENT: Oropharynx clear and membrane moist  Eyes: Normal conjunctiva, no sclera icterus.  Neck: Supple, no carotid bruit bilaterally.  Cardiovascular: Regular rate and rhythm, No Murmur, No bilateral lower extremity edema.  Pulmonary: Normal respiratory effort, normal lung sounds, no  wheezing.  Abdominal: Soft, nontender, no hepatosplenomegaly, liver is non-pulsatile.  Neurological: Alert and orient x 3.   Skin: Warm, dry, scattered ecchymosis, no rash.  Psych: Appropriate mood and affect. Normal judgment and insight.           Lab Review:   Results from last 7 days   Lab Units 07/15/23  0656 07/14/23  0211 07/13/23  1148   SODIUM mmol/L 140 136 139   POTASSIUM mmol/L 4.3 4.0 4.3   CHLORIDE mmol/L 111* 109* 107   CO2 mmol/L 21.8* 19.7* 19.0*   BUN mg/dL 16 22 24*   CREATININE mg/dL 1.25 1.37* 1.94*   GLUCOSE mg/dL 92 113* 123*   CALCIUM mg/dL 9.1 8.7 9.4   AST (SGOT) U/L  --   --  16   ALT (SGPT) U/L  --   --  17     Results from last 7 days   Lab Units 07/15/23  0846 07/15/23  0656 07/13/23  1647 07/13/23  1148   CK TOTAL U/L  --   --   --  31   HSTROP T ng/L 37* 29* 27* 35*     Results from last 7 days   Lab Units 07/16/23  0603 07/15/23  0656 07/14/23  0211 07/13/23  1148   WBC 10*3/mm3 8.39 8.48 10.28 9.98   HEMOGLOBIN g/dL 12.0* 12.3* 12.4* 13.7   HEMATOCRIT % 35.2* 36.2* 36.2* 41.2   PLATELETS 10*3/mm3 244 245 231 267     Results from last 7 days   Lab Units 07/16/23  0603 07/15/23  0656 07/14/23  2137 07/14/23  1819 07/14/23  1035 07/14/23  0211 07/13/23  1724   INR   --   --   --   --   --   --  1.13*   APTT seconds 41.3* 33.1 92.0* >200.0* 58.4* 173.5* 34.0     Results from last 7 days   Lab Units 07/13/23  1148   MAGNESIUM mg/dL 2.1           Invalid input(s): LDLCALC  Results from last 7 days   Lab Units 07/13/23  1148   PROBNP pg/mL 1,335.0     Results from last 7 days   Lab Units 07/13/23  1148   TSH uIU/mL 1.320       Lower extremity duplex 7/14/2023:  Normal venous duplex no DVT     Echocardiogram 7/14/2023 images reviewed by myself:  Left ventricular systolic function is normal. Calculated left ventricular EF = 64.9%  Left ventricular diastolic function is consistent with (grade II w/high LAP) pseudonormalization.  Calculated right ventricular systolic pressure from tricuspid  regurgitation is 41 mmHg.     Chest CT 7/13/2023 images reviewed by myself  Small scattered pulmonary emboli throughout both lungs  No evidence of right ventricular strain     ECG 7/15/2023:  Sinus bradycardia  LVH with repolarization abnormalities no change from prior        Prior cardiac testing:        Cardiac catheterization 10/1/2018 images reviewed by myself:  Left main: There is mild calcification of the left main coronary artery.  No stenosis was noted.  LAD: The left anterior descending coronary artery is mildly calcified in the proximal segment.  No stenosis is noted.  There is no stenosis noted in the mid or distal segments.  The diagonal branches are normal.  Ramus intermedius: The ramus branch is normal.  LCX: The left circumflex coronary arteries small nondominant vessel that is normal.  RCA: Mild luminal irregularities are seen in the proximal segment of the RCA without measurable stenosis.  The mid and distal segments appear to be normal.  The posterior descending and posterolateral branches are normal.  The artery is a large dominant vessel.     Stress MPI 8/23/2018:  Myocardial perfusion imaging indicates a normal myocardial perfusion study with no evidence of ischemia.  Left ventricular ejection fraction is normal (Calculated EF = 50%).  Impressions are consistent with a low risk study.     Echocardiogram 7/19/2018:  Calculated right ventricular systolic pressure from tricuspid regurgitation is 16 mmHg.  Left ventricular systolic function is normal. Estimated EF = 61%.  Left atrial cavity size is moderately dilated.  Left ventricular diastolic dysfunction (grade I) consistent with impaired relaxation.          Assessment:          Diagnosis Plan   1. Acute pulmonary embolism without acute cor pulmonale, unspecified pulmonary embolism type        2. Acute hypoxemic respiratory failure               Plan:       Mr. Trejo is a 88 y.o. 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  He was subsequently diagnosed with bilateral pulmonary emboli fortunate these were small and scattered no active DVT.  He has already been transitioned over to oral anticoagulants.  He does have a mildly elevated troponin and BNP level which would correlate somewhat with these findings.  We were asked to see him today as his EKG was considered abnormal.  This is pretty similar to where it been in the past he does have LVH with repolarization abnormalities.  His echocardiogram this admission again shows no focal wall motion abnormalities and recent cardiac catheterization and stress testing showed no ischemic findings.  He has no active symptoms of ischemia would defer any further ischemic work-up during this admission.  He is fairly bradycardic today he is not on any rate controlling agents at home or even here in the hospital based upon his presentation upon my arrival to the room he is probably got a high likelihood of obstructive sleep apnea which would be appropriate to work-up.  From a cardiac standpoint perfectly fine for hospital discharge we will continue to follow along peripherally.  Please call us back with any questions otherwise he will see us as an outpatient.        Pulmonary emboli without cor pulmonale:  Agree with anticoagulation regimen currently on Eliquis which would be fine would recommend lifelong anticoagulation  Does have mild pulmonary hypertension noted on echocardiogram we will need close follow-up.     Bradycardia/abnormal EKG:  Patient with bradycardia and LVH on EKG  No major symptoms troponins are flat although mildly elevated not unsurprising given underlying renal insufficiency and newly diagnosed pulmonary emboli  Echocardiogram without  any acute changes  Reasonable to work-up obstructive sleep apnea as outpatient     Pulmonary hypertension:  Mild on echocardiogram this admission as well as heart catheterization in 2018  We will continue to monitor hopefully will improve with anticoagulation     Typical atrial flutter:  Status post ablation 7/2017 no recurrence  We will now be on 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

## 2023-07-16 NOTE — PROGRESS NOTES
"DAILY PROGRESS NOTE  [facility]    Patient Identification:  Name: Jonathan Trejo  Age: 88 y.o.  Sex: male  :  1935  MRN: [id number]         Primary Care Physician: Coby Walton APRN      Subjective  Patient with no complaints and feels well.  Per nursing staff he was confused again last night but once again cleared during the morning.  RN notes that the patient has significant limited mobility.  Still awaiting PT evaluation.    Objective:  General Appearance:  Comfortable, well-appearing, in no acute distress and not in pain.    Vital signs: (most recent): Blood pressure 136/70, pulse 54, temperature 98.8 °F (37.1 °C), temperature source Oral, resp. rate 18, height 167.6 cm (66\"), weight 81 kg (178 lb 9.2 oz), SpO2 96 %.    Lungs:  Normal effort and normal respiratory rate.  He is not in respiratory distress.  No stridor.  There are rhonchi.  No rales, decreased breath sounds or wheezes.  (A few scattered rhonchi.  Good room air O2 saturations.)  Heart: Normal rate.  Regular rhythm.    Extremities: There is no dependent edema.    Neurological: Patient is alert.  (Oriented to person and place.  Cooperative and pleasant.).    Skin:  Warm and dry.                Vital signs in last 24 hours:  Temp:  [97.3 °F (36.3 °C)-98.8 °F (37.1 °C)] 98.8 °F (37.1 °C)  Heart Rate:  [50-54] 54  Resp:  [18] 18  BP: (119-136)/(61-77) 136/70    Intake/Output:    Intake/Output Summary (Last 24 hours) at 2023 1032  Last data filed at 2023 0500  Gross per 24 hour   Intake 0 ml   Output --   Net 0 ml         Results from last 7 days   Lab Units 23  0603 07/15/23  0656 23  0211 23  1148   WBC 10*3/mm3 8.39 8.48 10.28 9.98   HEMOGLOBIN g/dL 12.0* 12.3* 12.4* 13.7   PLATELETS 10*3/mm3 244 245 231 267     Results from last 7 days   Lab Units 07/15/23  0656 23  0211 23  1148   SODIUM mmol/L 140 136 139   POTASSIUM mmol/L 4.3 4.0 4.3   CHLORIDE mmol/L 111* 109* 107   CO2 " mmol/L 21.8* 19.7* 19.0*   BUN mg/dL 16 22 24*   CREATININE mg/dL 1.25 1.37* 1.94*   GLUCOSE mg/dL 92 113* 123*   Estimated Creatinine Clearance: 42.4 mL/min (by C-G formula based on SCr of 1.25 mg/dL).  Results from last 7 days   Lab Units 07/15/23  0656 07/14/23  0211 07/13/23  1148   CALCIUM mg/dL 9.1 8.7 9.4   ALBUMIN g/dL 3.4*  --  3.7   MAGNESIUM mg/dL  --   --  2.1   PHOSPHORUS mg/dL 3.1  --  2.0*     Results from last 7 days   Lab Units 07/15/23  0656 07/13/23  1148   ALBUMIN g/dL 3.4* 3.7   BILIRUBIN mg/dL  --  1.1   ALK PHOS U/L  --  112   AST (SGOT) U/L  --  16   ALT (SGPT) U/L  --  17       Assessment:  Pulmonary embolus: Bilateral.  No RV strain on CT or echocardiogram.  Transitioned to Eliquis last night.  Patient presently on 2 L again.  Incentive spirometry and wean as tolerated.  Check ambulatory O2 saturations.  SPENCER: Resolved.  CKD 3:  COPD: Clinically stable that may be contributing to his mild hypoxemia.  History of atrial flutter: Presently normal sinus rhythm.  Elevated troponin.  Cardiology evaluation appreciated.    Confusion: Sundowning.  Resolved during the day.  Keep lights on during the daytime and increase activity.  Discussed with daughter at length.  Hopefully can get him back to more familiar surroundings soon.  Poor mobility: Await PT recommendations for safe discharge planning.      Plan:  Please see above.  Discussed with RN.  Discussed with daughter at bedside.    Mikey Henderson MD  7/16/2023  10:32 EDT

## 2023-07-17 VITALS
SYSTOLIC BLOOD PRESSURE: 126 MMHG | TEMPERATURE: 97.9 F | DIASTOLIC BLOOD PRESSURE: 65 MMHG | HEART RATE: 60 BPM | WEIGHT: 184.08 LBS | HEIGHT: 66 IN | OXYGEN SATURATION: 91 % | BODY MASS INDEX: 29.58 KG/M2 | RESPIRATION RATE: 16 BRPM

## 2023-07-17 PROCEDURE — 94618 PULMONARY STRESS TESTING: CPT

## 2023-07-17 RX ADMIN — ESCITALOPRAM OXALATE 10 MG: 10 TABLET, FILM COATED ORAL at 08:05

## 2023-07-17 RX ADMIN — PANTOPRAZOLE SODIUM 40 MG: 40 TABLET, DELAYED RELEASE ORAL at 06:16

## 2023-07-17 RX ADMIN — GABAPENTIN 600 MG: 300 CAPSULE ORAL at 08:05

## 2023-07-17 RX ADMIN — APIXABAN 10 MG: 5 TABLET, FILM COATED ORAL at 08:05

## 2023-07-17 RX ADMIN — ASPIRIN 81 MG: 81 TABLET, CHEWABLE ORAL at 08:05

## 2023-07-17 RX ADMIN — Medication 1000 MCG: at 08:05

## 2023-07-17 RX ADMIN — ATORVASTATIN CALCIUM 10 MG: 20 TABLET, FILM COATED ORAL at 08:05

## 2023-07-17 RX ADMIN — Medication 2000 UNITS: at 08:05

## 2023-07-17 RX ADMIN — FERROUS SULFATE TAB 325 MG (65 MG ELEMENTAL FE) 325 MG: 325 (65 FE) TAB at 08:05

## 2023-07-17 NOTE — CASE MANAGEMENT/SOCIAL WORK
Continued Stay Note  [facility]     Patient Name: Jonathan Trejo  MRN: [id number]  Today's Date: 7/17/2023    Admit Date: 7/13/2023    Plan: Return to [place] at [facility] via [facility] HERRMANN/ZAHRAA coburn   Discharge Plan       Row Name 07/17/23 1304       Plan    Plan Return to [place] at [facility] via [facility] W[facility]C [facility]    Patient/Family in Agreement with Plan yes    Plan Comments Did not qualify for home O2.  Spoke with daughter Kaylynn by telephone.  She is agreeable to return to [place] at [facility].  She would like [facility] to follow - call to [facility] and awaiting return call.  Does not have transportation.  [facility] HERRMANN/ZAHRAA coburn scheduled for 4 p.m.  conf #[id number].  Also spoke with patient at bedside and he is agreeable.  Emiliana LYLES                 Expected Discharge Date and Time       Expected Discharge Date Expected Discharge Time    Jul 17, 2023               Becky S. [place], RN

## 2023-07-17 NOTE — DISCHARGE SUMMARY
PHYSICIAN DISCHARGE SUMMARY                                                                        [facility]    Patient Identification:  Name: Jonathan Trejo  Age: 88 y.o.  Sex: male  :  1935  MRN: [id number]  Primary Care Physician: Coby Walton APRN    Admit date: 2023  Discharge date and time: 2023     Discharged Condition: good    Discharge Diagnoses:  Pulmonary embolus: .    SPENCER: .  CKD 3:  COPD: .  History of atrial flutter: .  Elevated troponin.  .    Confusion: .  Poor mobility:     Hospital Course:  Pleasant 88-year-old gentleman from assisted living who presents with shortness of air.  Work-up including CTA of the chest revealed bilateral pulmonary emboli.  Patient's been on heparin drip and admitted.  CT of the chest nor the echocardiogram showed evidence of RV strain.  There was a mild bump in the troponin level and cardiology consultation was obtained.  This did not appear to be a ischemic issue.  Venous Dopplers did not show any thrombotic load in the lower extremities.  He did have an elevated creatinine above his baseline on presentation with this.  Very nicely with gentle hydration.  There were issues with sundowning initially but this appears to be improving progressively.  He was transitioned to Eliquis was medically stable over the weekend but there were concerns about safety of discharge with his mobility.  He has improved today and was able to do 60 feet with a rolling walker with physical therapy.  Ambulatory O2 saturations are also now in a good range.  At this point then he will be discharged remainder of his treatment follow-up as an outpatient.    Consults:     Consults       Date and Time Order Name Status Description    7/15/2023 10:54 AM Inpatient Cardiology Consult Completed     2023  4:00 PM [facility] (on-call MD unless specified) Details                Discharge Exam:  General  "Appearance:  Comfortable, well-appearing, in no acute distress and not in pain.    Vital signs: (most recent): Blood pressure 126/65, pulse 58, temperature 98.2 °F (36.8 °C), temperature source Oral, resp. rate 16, height 167.6 cm (66\"), weight 83.5 kg (184 lb 1.4 oz), SpO2 91 %.    Lungs:  Normal effort and normal respiratory rate.  Breath sounds clear to auscultation.  (O2 sats 9394% on room air presently.)  Heart: Normal rate.  Regular rhythm.    Extremities: There is no dependent edema.    Neurological: Patient is alert.  (Oriented to person and place.  Cooperative and pleasant.).    Skin:  Warm and dry.       Disposition:  Assisted Living    Patient Instructions:      Discharge Medications        New Medications        Instructions Start Date   apixaban 5 MG tablet tablet  Commonly known as: ELIQUIS   10 mg, Oral, Every 12 Hours Scheduled      apixaban 5 MG tablet tablet  Commonly known as: ELIQUIS   5 mg, Oral, Every 12 Hours Scheduled   Start Date: July 21, 2023            Continue These Medications        Instructions Start Date   acetaminophen 325 MG tablet  Commonly known as: TYLENOL   650 mg, Oral, Every 6 Hours PRN      albuterol sulfate  (90 Base) MCG/ACT inhaler  Commonly known as: PROVENTIL HFA;VENTOLIN HFA;PROAIR HFA   2 puffs, Inhalation, 4 Times Daily PRN      aspirin 81 MG chewable tablet   81 mg, Oral, Daily      atorvastatin 10 MG tablet  Commonly known as: LIPITOR   10 mg, Oral, Daily      escitalopram 10 MG tablet  Commonly known as: LEXAPRO   10 mg, Oral, Daily      ferrous sulfate 325 (65 FE) MG tablet   325 mg, Oral, Daily With Breakfast      gabapentin 600 MG tablet  Commonly known as: NEURONTIN   600 mg, Oral, 2 Times Daily      omeprazole 40 MG capsule  Commonly known as: priLOSEC   40 mg, Oral, Daily      Vitamin D3 50 MCG (2000 UT) tablet   2,000 Units, Oral, Daily             ASK your doctor about these medications        Instructions Start Date   vitamin B-12 1000 MCG " tablet  Commonly known as: CYANOCOBALAMIN   1,000 mcg, Oral, Daily, For b12 def             Diet Instructions       Diet: Cardiac Diets; Healthy Heart (2-3 Na+); Regular Texture (IDDSI 7); Thin (IDDSI 0)      Discharge Diet: Cardiac Diets    Cardiac Diet: Healthy Heart (2-3 Na+)    Texture: Regular Texture (IDDSI 7)    Fluid Consistency: Thin (IDDSI 0)          Future Appointments   Date Time Provider Department Center   9/12/2023  4:00 PM Teddy Campa MD [facility]     Additional Instructions for the Follow-ups that You Need to Schedule       Ambulatory Referral to Home Health ([facility])   As directed      Face to Face Visit Date: 7/17/2023    Follow-up provider for Plan of Care?: I treated the patient in an acute care facility and will not continue treatment after discharge.    Follow-up provider: FUNMILAYO WALTON [[id number]]    Reason/Clinical Findings: Poor mobility    Describe mobility limitations that make leaving home difficult: Poor mobility    Nursing/Therapeutic Services Requested: Physical Therapy    PT orders: Gait Training Transfer training Strengthening Home safety assessment Therapeutic exercise    Weight Bearing Status: As Tolerated    Frequency: 1 Week 1         Discharge Follow-up with PCP   As directed       Currently Documented PCP:    Funmilayo Walton APRN    PCP Phone Number:    [phone]     Follow Up Details: 1 week                Follow-up Information       Macho Miranda MD Follow up in 2 week(s).    Specialty: Cardiology  Why: With nurse practitioner SAMI Maharaj  Contact information:  [address] [address]  [phone]               Funmilayo Walton APRN .    Specialty: Internal Medicine  Why: 1 week  Contact information:  [address] [address]  [phone]                           Discharge Order (From admission, onward)       Start     Ordered    07/17/23 1324  Discharge patient  Once        Expected  Discharge Date: 07/17/23    Discharge Disposition: Home or Self Care    Physician of Record for Attribution - Please select from Treatment Team: MIKEY HENDERSON [[id number]]    Review needed by CMO to determine Physician of Record: No       Question Answer Comment   Physician of Record for Attribution - Please select from Treatment Team MIKEY HENDERSON    Review needed by CMO to determine Physician of Record No        07/17/23 1327                      Total time spent discharging patient including evaluation,post hospitalization follow up,  medication and post hospitalization instructions and education total time exceeds 30 minutes.    Signed:  Mikey Henderson MD  7/17/2023  13:27 EDT    EMR Dragon/Transcription disclaimer:   Much of this encounter note is an electronic transcription/translation of spoken language to printed text. The electronic translation of spoken language may permit erroneous, or at times, nonsensical words or phrases to be inadvertently transcribed; Although I have reviewed the note for such errors, some may still exist.

## 2023-07-17 NOTE — PROGRESS NOTES
Exercise Oximetry    Patient Name:Jonathan Trejo   MRN: [id number]   Date: 07/17/23             ROOM AIR BASELINE   SpO2% 91   Heart Rate 65   Blood Pressure      EXERCISE ON ROOM AIR SpO2% EXERCISE ON O2 @ LPM SpO2%   1 MINUTE 91% 1 MINUTE    2 MINUTES 96% 2 MINUTES    3 MINUTES 95% 3 MINUTES    4 MINUTES 92% 4 MINUTES    5 MINUTES 94% 5 MINUTES    6 MINUTES 96% 6 MINUTES               Distance Walked   Distance Walked   Dyspnea (Steffen Scale)   Dyspnea (Steffen Scale)   Fatigue (Steffen Scale)   Fatigue (Steffen Scale)   SpO2% Post Exercise  97% SpO2% Post Exercise   HR Post Exercise  72 HR Post Exercise   Time to Recovery  N/A Time to Recovery     Comments: This RT walked with this patient with a forehead probe and his Rolator from home. The patient stated he felt strong enough to take a walk around the nurses' station, however, he did need to take short break (approximately 3 minutes) at the first corner of the station due to feeling weak.  He stated this was normal for him.  We continued to walk the rest of the single lap around the nurses' station and returned to his room, where he was returned to his bed, without incident.  The patient's saturations did not drop below 91% for the duration of the walk.  As of this assessment, the patient does not qualify for home oxygen.

## 2023-07-17 NOTE — PLAN OF CARE
Goal Outcome Evaluation:  Plan of Care Reviewed With: patient        Progress: improving  Outcome Evaluation: VSS, no complaints of pain this shift.  Pt did a walking oximetetry with respiratory, does not qualify for oxygen.  Pt alert and oriented x4.  Plan is for pt to return to [facility] this afternoon, wheel chair van booked for 4pm.

## 2023-07-17 NOTE — DISCHARGE PLACEMENT REQUEST
"Jonathan Trejo (88 y.o. Male)       Date of Birth   1935    Social Security Number       Address   [address] [place] [address]    Home Phone   [phone]    MRN   [id number]       Episcopal   Anabaptist    Marital Status                               Admission Date   7/13/23    Admission Type   Emergency    Admitting Provider   Teena Crisostomo MD    Attending Provider   Mikey Henderson MD    Department, Room/Bed   [address], [address]/1       Discharge Date       Discharge Disposition       Discharge Destination                                 Attending Provider: Mikey Henderson MD    Allergies: No Known Allergies    Isolation: None   Infection: None   Code Status: CPR    Ht: 167.6 cm (66\")   Wt: 83.5 kg (184 lb 1.4 oz)    Admission Cmt: None   Principal Problem: Pulmonary embolus [I26.99]                   Active Insurance as of 7/13/2023       Primary Coverage       Payor Plan Insurance Group Employer/Plan Group    MEDICARE MEDICARE A & B        Payor Plan Address Payor Plan Phone Number Payor Plan Fax Number Effective Dates    [address] [phone]  3/1/2002 - None Entered    [facility] [address]         Subscriber Name Subscriber Birth Date Member ID       JONATHAN TREJO 1935 [id number]               Secondary Coverage       Payor Plan Insurance Group Employer/Plan Group    AARP MC SUP AARP HEALTH CARE OPTIONS        Payor Plan Address Payor Plan Phone Number Payor Plan Fax Number Effective Dates    [facility] [phone]  1/1/2016 - None Entered    [address]       [facility] [address]         Subscriber Name Subscriber Birth Date Member ID       JONATHAN TREJO 1935 [id number]                     Emergency Contacts        (Rel.) Home Phone Work Phone Mobile Phone    Kaylynn Gil (Daughter) [phone] -- [phone]    Terrence Davenport (Friend) -- -- [phone]                "

## 2023-07-17 NOTE — PROGRESS NOTES
"DAILY PROGRESS NOTE  [facility]    Patient Identification:  Name: Jonathan Trejo  Age: 88 y.o.  Sex: male  :  1935  MRN: [id number]         Primary Care Physician: Coby Walton APRN      Subjective  Patient has no complaints.  Feels well.    Objective:  General Appearance:  Comfortable, well-appearing, in no acute distress and not in pain.    Vital signs: (most recent): Blood pressure 126/65, pulse 58, temperature 98.2 °F (36.8 °C), temperature source Oral, resp. rate 16, height 167.6 cm (66\"), weight 83.5 kg (184 lb 1.4 oz), SpO2 91 %.    Lungs:  Normal effort and normal respiratory rate.  Breath sounds clear to auscultation.  (O2 sats 9394% on room air presently.)  Heart: Normal rate.  Regular rhythm.    Extremities: There is no dependent edema.    Neurological: Patient is alert.  (Oriented to person and place.  Cooperative and pleasant.).    Skin:  Warm and dry.                Vital signs in last 24 hours:  Temp:  [98.2 °F (36.8 °C)-98.8 °F (37.1 °C)] 98.2 °F (36.8 °C)  Heart Rate:  [54-63] 58  Resp:  [16-18] 16  BP: (115-136)/(56-70) 126/65    Intake/Output:    Intake/Output Summary (Last 24 hours) at 2023 0753  Last data filed at 2023 0729  Gross per 24 hour   Intake 240 ml   Output 200 ml   Net 40 ml         Results from last 7 days   Lab Units 23  0603 07/15/23  0656 23  0211 23  1148   WBC 10*3/mm3 8.39 8.48 10.28 9.98   HEMOGLOBIN g/dL 12.0* 12.3* 12.4* 13.7   PLATELETS 10*3/mm3 244 245 231 267     Results from last 7 days   Lab Units 07/15/23  0656 23  0211 23  1148   SODIUM mmol/L 140 136 139   POTASSIUM mmol/L 4.3 4.0 4.3   CHLORIDE mmol/L 111* 109* 107   CO2 mmol/L 21.8* 19.7* 19.0*   BUN mg/dL 16 22 24*   CREATININE mg/dL 1.25 1.37* 1.94*   GLUCOSE mg/dL 92 113* 123*   Estimated Creatinine Clearance: 43 mL/min (by C-G formula based on SCr of 1.25 mg/dL).  Results from last 7 days   Lab Units 07/15/23  0656 23  0211 " 07/13/23  1148   CALCIUM mg/dL 9.1 8.7 9.4   ALBUMIN g/dL 3.4*  --  3.7   MAGNESIUM mg/dL  --   --  2.1   PHOSPHORUS mg/dL 3.1  --  2.0*     Results from last 7 days   Lab Units 07/15/23  0656 07/13/23  1148   ALBUMIN g/dL 3.4* 3.7   BILIRUBIN mg/dL  --  1.1   ALK PHOS U/L  --  112   AST (SGOT) U/L  --  16   ALT (SGPT) U/L  --  17       Assessment:  Pulmonary embolus: Bilateral.  No RV strain on CT or echocardiogram.  Transitioned to Eliquist..  Patient presently on room air again.  Continue incentive spirometry and increase activity.  Awaiting ambulatory O2 saturations.    SPENCER: Resolved.  CKD 3:  COPD: Clinically stable that may be contributing to his mild hypoxemia.  History of atrial flutter: Presently normal sinus rhythm.  Elevated troponin.  Cardiology evaluation appreciated.    Confusion: Sundowning.  Resolved during the day.  Keep lights on during the daytime and increase activity.  Discussed with daughter at length.  Hopefully can get him back to more familiar surroundings soon.  Poor mobility: PT evaluation noted.  He was able to do 60 feet with a rolling walker.      Plan:  Please see above.  Discharge planning.  Awaiting ambulatory O2 saturations.    Mikey Henderson MD  7/17/2023  07:53 EDT.

## 2023-07-17 NOTE — PLAN OF CARE
Goal Outcome Evaluation:  Plan of Care Reviewed With: patient      Progress: improving  Outcome Evaluation: All needs met. Up w/ assist x 1 and walket. Urinal. Cardiac diet. VSS. Oriented x 4. Confusion better tonight. RA. SB on the monitor. Eliquis. No complaints.

## 2023-07-18 NOTE — CASE MANAGEMENT/SOCIAL WORK
Case Management Discharge Note      Final Note: DC'd home with [facility] following              Home Medical Care Coordination complete.      Service Provider Selected Services Address Phone Fax Patient Preferred    [facility] [address], [address], [facility] [address] [phone] --                 Transportation Services  W/C Van: [facility]    Final Discharge Disposition Code: 06 - home with home health care

## 2023-07-25 ENCOUNTER — READMISSION MANAGEMENT (OUTPATIENT)
Dept: CALL CENTER | Facility: HOSPITAL | Age: 88
End: 2023-07-25
Payer: MEDICARE

## 2023-07-25 NOTE — OUTREACH NOTE
Medical Week 1 Survey      Flowsheet Row Responses   [facility] patient discharged from? [facility]   Does the patient have one of the following disease processes/diagnoses(primary or secondary)? Other   Week 1 attempt successful? Yes   Call start time 0922   Call end time 0924   General alerts for this patient [place] at [facility]   Discharge diagnosis pulmonary embolus, SPENCER on CKD   Meds reviewed with patient/caregiver? Yes   Is the patient having any side effects they believe may be caused by any medication additions or changes? No   Does the patient have all medications ordered at discharge? Yes   Is the patient taking all medications as directed (includes completed medication regime)? Yes   Does the patient have a primary care provider?  Yes   Does the patient have an appointment with their PCP within 7 days of discharge? No   Nursing Interventions Educated patient on importance of making appointment, Advised patient to make appointment   Comments Pt states what we want him to do is not primary   Psychosocial issues? No   Did the patient receive a copy of their discharge instructions? Yes   Nursing interventions Reviewed instructions with patient   What is the patient's perception of their health status since discharge? Improving   Week 1 call completed? Yes   Wrap up additional comments Pt not receptive to RN asking him to FU with PCP. Pt reports he is feeling better.   Call end time 0924            JOSIE ARAUJO - Registered Nurse

## 2023-08-03 ENCOUNTER — READMISSION MANAGEMENT (OUTPATIENT)
Dept: CALL CENTER | Facility: HOSPITAL | Age: 88
End: 2023-08-03
Payer: MEDICARE

## 2023-08-14 ENCOUNTER — TELEPHONE (OUTPATIENT)
Dept: NEUROLOGY | Facility: CLINIC | Age: 88
End: 2023-08-14
Payer: MEDICARE

## 2023-09-08 ENCOUNTER — HOSPITAL ENCOUNTER (EMERGENCY)
Facility: HOSPITAL | Age: 88
Discharge: HOME | DRG: 312 | End: 2023-09-08
Attending: EMERGENCY MEDICINE
Payer: MEDICARE

## 2023-09-08 ENCOUNTER — APPOINTMENT (OUTPATIENT)
Dept: CT IMAGING | Facility: HOSPITAL | Age: 88
DRG: 312 | End: 2023-09-08
Payer: MEDICARE

## 2023-09-08 VITALS
RESPIRATION RATE: 16 BRPM | SYSTOLIC BLOOD PRESSURE: 135 MMHG | DIASTOLIC BLOOD PRESSURE: 70 MMHG | WEIGHT: 180 LBS | TEMPERATURE: 97.7 F | HEART RATE: 60 BPM | BODY MASS INDEX: 28.93 KG/M2 | HEIGHT: 66 IN | OXYGEN SATURATION: 98 %

## 2023-09-08 DIAGNOSIS — S09.90XA MINOR HEAD INJURY, INITIAL ENCOUNTER: Primary | ICD-10-CM

## 2023-09-08 DIAGNOSIS — Z79.01 CHRONIC ANTICOAGULATION: ICD-10-CM

## 2023-09-08 PROCEDURE — 70450 CT HEAD/BRAIN W/O DYE: CPT

## 2023-09-08 PROCEDURE — 99284 EMERGENCY DEPT VISIT MOD MDM: CPT

## 2023-09-08 NOTE — ED PROVIDER NOTES
EMERGENCY DEPARTMENT ENCOUNTER    Room Number:  17/17  PCP: Provider, No Known  Discussed/ obtained information from independent historians: EMS      HPI:  Chief Complaint: Fall    Context: Jonathan Trejo is a 88 y.o. male who presents to the ED c/o fall.  Patient reports he lost his balance and fell backwards.  He did hit his head but denies LOC.  Patient normally ambulates with a rollator.  Patient anticoagulated on Eliquis for history of atrial flutter.  Patient denies headache, neck pain, dizziness, chest pain, abdominal pain, unilateral numbness/weakness, or any other systemic complaint.      External (non-ED) record review:   Patient seen by PCP on 7/11/2023 for tinea unguium.  Unable to load note to review assessment and plan.  Reviewed prior laboratory studies.  Most recent CBC with hemoglobin 12.0.  Most recent renal function panel with creatinine 1.25.      PAST MEDICAL HISTORY  Active Ambulatory Problems     Diagnosis Date Noted    Benign prostatic hyperplasia with urinary obstruction 11/22/2015    Chronic renal impairment, stage 3 (moderate) 11/22/2015    Depression 11/22/2015    Gastroesophageal reflux disease with esophagitis 11/22/2015    Hyperlipidemia 11/22/2015    Nocturia 11/22/2015    Obesity (BMI 30-39.9) 11/22/2015    Osteopenia 11/22/2015    Osteoporosis 11/22/2015    Seborrhea 04/04/2016    Abnormal EKG 07/19/2016    Bradycardia 09/02/2016    Degenerative disc disease, cervical 10/02/2016    Typical atrial flutter 04/06/2017    Lumbar radiculopathy 06/06/2017    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    S/P ablation of atrial flutter 09/19/2017    S/P catheter ablation of slow pathway 09/19/2017    Cardiomyopathy 09/19/2017    GARCIA (dyspnea on exertion) 09/19/2017    Urinary tract infection without hematuria 01/02/2018    Right foot drop 01/03/2018    Frequent falls 01/03/2018    Metabolic encephalopathy 01/03/2018    Acute renal failure 01/03/2018    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    Moderate single current episode of major depressive disorder 03/12/2019    Mass of left lower leg 06/13/2019    Pulmonary emphysema 05/08/2020    Monoclonal gammopathy of unknown significance (MGUS) 08/04/2021    Myasthenia gravis without exacerbation 12/10/2021    Displacement of lumbar intervertebral disc without myelopathy 05/19/2022    Acute anemia 03/27/2023    Closed compression fracture of L3 lumbar vertebra, initial encounter 03/27/2023    Closed compression fracture of L4 lumbar vertebra, initial encounter 03/27/2023    Pulmonary embolus 07/13/2023     Resolved Ambulatory Problems     Diagnosis Date Noted    Pain of upper extremity 11/22/2015    Colon polyp 11/22/2015    Hearing loss 11/22/2015    Obesity (BMI 30-39.9) 11/22/2015    Acute on chronic systolic heart failure 09/02/2016     Past Medical History:   Diagnosis Date    Abnormal electrocardiogram     Arm pain     Atrial fibrillation     Atrial flutter     BPH (benign prostatic hyperplasia)     BPH associated with nocturia     Chronic kidney disease     Colon cancer     Dyspnea     Enlarged prostate without lower urinary tract symptoms (luts)     Episode of generalized weakness     Esophagitis, reflux     Fatigue     Fracture of ankle     GERD (gastroesophageal reflux disease)     Inguinal hernia     Loss of hearing     Obesity     Osteoarthritis cervical spine     Overweight     Tobacco dependence in remission     Urge incontinence of urine     Vitamin D deficiency     Wheezing          PAST SURGICAL HISTORY  Past Surgical History:   Procedure Laterality Date    CARDIAC CATHETERIZATION N/A 7/27/2017    Procedure: Valve assessment;  Surgeon: Adonis Solis MD;  Location:  [facility];  Service:     CARDIAC CATHETERIZATION N/A 10/1/2018    Procedure: Left Heart Cath;  Surgeon: Bogdan Vargas MD;  Location:  [facility];  Service: Cardiology    CARDIAC CATHETERIZATION N/A 10/1/2018    Procedure:  Coronary angiography;  Surgeon: Bogdan Vargas MD;  Location:  [facility];  Service: Cardiology    CARDIAC CATHETERIZATION N/A 10/1/2018    Procedure: Left ventriculography;  Surgeon: Bogdan Vargas MD;  Location:  [facility];  Service: Cardiology    CARDIAC CATHETERIZATION N/A 10/1/2018    Procedure: Right Heart Cath;  Surgeon: Bogdan Vargas MD;  Location: [facility] CATH INVASIVE LOCATION;  Service: Cardiology    CARDIAC ELECTROPHYSIOLOGY PROCEDURE N/A 7/27/2017    Procedure: Ablation atrial flutter Will need to have 3 -4 weeks of therapeutic INR's ;  Surgeon: Adonis Solis MD;  Location:  [facility];  Service:     CARDIOVERSION      CATARACT EXTRACTION W/ INTRAOCULAR LENS  IMPLANT, BILATERAL      COLON RESECTION      COLON SURGERY      polyp removed    COLONOSCOPY      07/15 redo 5 years  [facility]    KNEE SURGERY      benign tumor removed, age 6    LASIK      LUMBAR EPIDURAL INJECTION N/A 5/25/2022    Procedure: LUMBAR EPIDURAL 1ST VISIT L3-4 (right of midline);  Surgeon: Bonnie Brown MD;  Location: [facility] MAIN OR;  Service: Pain Management;  Laterality: N/A;    REFRACTIVE SURGERY  2007    REPAIR PERONEAL TENDONS ANKLE W/ FIBULAR OSTEOTOMY Right 03/2013    Dr. Banks         FAMILY HISTORY  Family History   Problem Relation Age of Onset    Breast cancer Mother     Heart disease Father     Hypertension Father     Heart attack Father     Hypertension Sister          SOCIAL HISTORY  Social History     Socioeconomic History    Marital status:     Number of children: 2    Years of education: COLLEGE GRADUATE   Tobacco Use    Smoking status: Former     Packs/day: 0.50     Years: 55.00     Pack years: 27.50     Types: Cigarettes     Quit date: 5/1/2008     Years since quitting: 15.3    Smokeless tobacco: Never    Tobacco comments:     QUIT 10 YRS   Vaping Use    Vaping Use: Never used   Substance and Sexual Activity    Alcohol use: Not  Currently     Comment: SOCIALLY    Drug use: No    Sexual activity: Defer         ALLERGIES  Patient has no known allergies.        REVIEW OF SYSTEMS  Review of Systems   Constitutional:  Negative for chills and fever.   HENT:  Negative for ear pain and sore throat.    Respiratory:  Negative for cough and shortness of breath.    Cardiovascular:  Negative for chest pain and palpitations.   Gastrointestinal:  Negative for abdominal pain and vomiting.   Genitourinary:  Negative for dysuria and hematuria.   Musculoskeletal:  Negative for arthralgias and joint swelling.   Skin:  Negative for pallor and rash.   Neurological:  Negative for syncope and headaches.   Psychiatric/Behavioral:  Negative for confusion and hallucinations.           PHYSICAL EXAM  ED Triage Vitals [09/08/23 0928]   Temp Heart Rate Resp BP SpO2   97.7 °F (36.5 °C) 59 16 121/63 98 %      Temp src Heart Rate Source Patient Position BP Location FiO2 (%)   Tympanic Monitor -- -- --       Physical Exam  Constitutional:       General: He is not in acute distress.     Appearance: Normal appearance.   HENT:      Head: Normocephalic and atraumatic.      Comments: No contusion, abrasion, or laceration to the back of the head     Nose: Nose normal.      Mouth/Throat:      Mouth: Mucous membranes are moist.   Eyes:      Extraocular Movements: Extraocular movements intact.      Conjunctiva/sclera: Conjunctivae normal.      Pupils: Pupils are equal, round, and reactive to light.   Cardiovascular:      Rate and Rhythm: Normal rate and regular rhythm.      Pulses: Normal pulses.      Heart sounds: Normal heart sounds.      Comments: Distal pulses intact  Pulmonary:      Effort: Pulmonary effort is normal.      Breath sounds: Normal breath sounds.   Abdominal:      General: There is no distension.   Musculoskeletal:         General: Normal range of motion.      Cervical back: Normal range of motion and neck supple.   Skin:     General: Skin is warm.      Capillary  Refill: Capillary refill takes less than 2 seconds.   Neurological:      General: No focal deficit present.      Mental Status: He is alert and oriented to person, place, and time.      Comments: No gross neurologic deficit.  Sensation intact to light touch all 4 extremities   Psychiatric:         Mood and Affect: Mood normal.         Vital signs and nursing notes reviewed.          RADIOLOGY  CT Head Without Contrast    Result Date: 9/8/2023  CT HEAD WITHOUT CONTRAST  CLINICAL HISTORY: Fall today hitting the back of the head.  TECHNIQUE: CT scan of the head was obtained with 3 mm axial soft tissue and 2 mm axial bone algorithm algorithm images. No intravenous contrast was administered. Sagittal and coronal reconstructions were obtained.  COMPARISON: CT head dated 07/04/2023.  FINDINGS:   There is no evidence for a calvarial fracture. There is no evidence for an acute extra-axial hemorrhage.  The ventricles, sulci, and cisterns are age-appropriate. The basal ganglia and thalami are unremarkable in appearance. There are moderate changes of chronic small vessel ischemic phenomenon. The posterior fossa structures are within normal limits. Atherosclerotic calcifications are incidentally appreciated within the intracranial vasculature.       No CT evidence for acute traumatic intracranial pathology.        Ordered the above noted radiological studies. Reviewed by me in PACS.              MEDICAL DECISION MAKING, PROGRESS, and CONSULTS    All labs have been independently reviewed by me.  All radiology studies have been reviewed by me and I have also reviewed the radiology report.   EKG's independently viewed and interpreted by me.  Discussion below represents my analysis of pertinent findings related to patient's condition, differential diagnosis, treatment plan and final disposition.            Orders placed during this visit:  Orders Placed This Encounter   Procedures    CT Head Without Contrast           Differential  diagnosis:  Closed head injury, cranial fracture, intracranial bleed      Independent interpretation of labs, radiology studies, and discussions with consultants:  ED Course as of 09/08/23 1158   Fri Sep 08, 2023   1051 CT scan of head independently interpreted me as no cranial fracture [MP]   1158 Patient presents emergency department after fall with head injury.  Worked up with CT scan of the head, negative for intracranial hemorrhage.  He has reassuring neurologic exam.  Will encourage close PCP follow-up, discussed ED return precautions.  He is otherwise well-appearing, hemodynamically stable, and therefore appropriate for discharge. [MP]      ED Course User Index  [MP] Silvia Seo PA-C         Additional orders considered but not ordered:  CT of cervical spine      Additional sources:    - Chronic or social conditions impacting care: Chronic anticoagulation          DIAGNOSIS  Final diagnoses:   Minor head injury, initial encounter   Chronic anticoagulation           Follow Up:  PATIENT CONNECTION - [facility] [address]  [phone]  Call in 1 day  As needed      RX:     Medication List        Changed      vitamin B-12 1000 MCG tablet  Commonly known as: CYANOCOBALAMIN  Take 1 tablet by mouth Daily. For b12 def  What changed: additional instructions              Latest Documented Vital Signs:  As of 11:57 EDT  BP- 135/70 HR- 60 Temp- 97.7 °F (36.5 °C) (Tympanic) O2 sat- 98%              --    Please note that portions of this were completed with a voice recognition program.       Note Disclaimer: At [facility], we believe that sharing information builds trust and better relationships. You are receiving this note because you are receiving care at [facility] or recently visited. It is possible you will see health information before a provider has talked with you about it. This kind of information can be easy to misunderstand. To help you fully understand what it means for your  health, we urge you to discuss this note with your provider.             Silvia Seo PA-C  09/08/23 7923

## 2023-09-08 NOTE — ED NOTES
Pt fell one hour ago.  He hit the back of his head.  No loc.  He is on eliquis.  He fell backwards - he walks c a rollator.  He denies dizziness  
f/u hiatal hernia sx

## 2023-09-08 NOTE — CASE MANAGEMENT/SOCIAL WORK
Contacted [facility], who states they will send an employee to pick the patient up for discharge transport. CLAUDIO LeeN RN

## 2023-09-08 NOTE — ED PROVIDER NOTES
MD ATTESTATION NOTE    The JOEL and I have discussed this patient's history, physical exam, and treatment plan.  I have reviewed the documentation and personally had a face to face interaction with the patient. I affirm the documentation and agree with the treatment and plan.  The attached note describes my personal findings.      I provided a substantive portion of the care of the patient.  I personally performed the physical exam in its entirety, and below are my findings.        Brief HPI: Patient presents for evaluation of head injury after he fell.  He had no loss of consciousness.  He is on Eliquis due to history of atrial flutter.  He denies neck pain.    PHYSICAL EXAM  ED Triage Vitals [09/08/23 0928]   Temp Heart Rate Resp BP SpO2   97.7 °F (36.5 °C) 59 16 121/63 98 %      Temp src Heart Rate Source Patient Position BP Location FiO2 (%)   Tympanic Monitor -- -- --         GENERAL: Awake and alert, well-appearing, no acute distress  HENT: nares patent, no scalp hematoma or laceration  EYES: no scleral icterus, pupils 3 mm reactive bilaterally, EOMI  CV: regular rhythm, normal rate  RESPIRATORY: normal effort  MUSCULOSKELETAL: no deformity, no midline cervical spine tenderness  NEURO: alert, moves all extremities, follows commands  PSYCH:  calm, cooperative  SKIN: warm, dry    Vital signs and nursing notes reviewed.    CT Head Without Contrast    Result Date: 9/8/2023  CT HEAD WITHOUT CONTRAST  CLINICAL HISTORY: Fall today hitting the back of the head.  TECHNIQUE: CT scan of the head was obtained with 3 mm axial soft tissue and 2 mm axial bone algorithm algorithm images. No intravenous contrast was administered. Sagittal and coronal reconstructions were obtained.  COMPARISON: CT head dated 07/04/2023.  FINDINGS:   There is no evidence for a calvarial fracture. There is no evidence for an acute extra-axial hemorrhage.  The ventricles, sulci, and cisterns are age-appropriate. The basal ganglia and thalami are  unremarkable in appearance. There are moderate changes of chronic small vessel ischemic phenomenon. The posterior fossa structures are within normal limits. Atherosclerotic calcifications are incidentally appreciated within the intracranial vasculature.       No CT evidence for acute traumatic intracranial pathology.          Plan: CT brain imaging is negative acute.  Patient appropriate discharge back to home at this time.    Final diagnoses:   Minor head injury, initial encounter   Chronic anticoagulation         DISCHARGE    Patient discharged in stable condition.    Reviewed implications of results, diagnosis, meds, responsibility to follow up, warning signs and symptoms of possible worsening, potential complications and reasons to return to ER.    Patient/Family voiced understanding of above instructions.    Discussed plan for discharge, as there is no emergent indication for admission. Patient referred to primary care provider for BP management due to today's BP. Pt/family is agreeable and understands need for follow up and repeat testing.  Pt is aware that discharge does not mean that nothing is wrong but it indicates no emergency is present that requires admission and they must continue care with follow-up as given below or physician of their choice.     FOLLOW-UP  PATIENT [facility] [address]  [phone]  Call in 1 day  As needed         Medication List        Changed      vitamin B-12 1000 MCG tablet  Commonly known as: CYANOCOBALAMIN  Take 1 tablet by mouth Daily. For b12 def  What changed: additional instructions                   Lion Eagle MD  09/08/23 7868

## 2023-09-09 ENCOUNTER — HOSPITAL ENCOUNTER (INPATIENT)
Facility: HOSPITAL | Age: 88
LOS: 12 days | Discharge: NURSING, REHAB OR LONG-TERM CARE | DRG: 312 | End: 2023-09-21
Attending: EMERGENCY MEDICINE | Admitting: HOSPITALIST
Payer: MEDICARE

## 2023-09-09 ENCOUNTER — APPOINTMENT (OUTPATIENT)
Dept: CT IMAGING | Facility: HOSPITAL | Age: 88
DRG: 312 | End: 2023-09-09
Payer: MEDICARE

## 2023-09-09 ENCOUNTER — APPOINTMENT (OUTPATIENT)
Dept: GENERAL RADIOLOGY | Facility: HOSPITAL | Age: 88
DRG: 312 | End: 2023-09-09
Payer: MEDICARE

## 2023-09-09 DIAGNOSIS — A41.9 SEPSIS, DUE TO UNSPECIFIED ORGANISM, UNSPECIFIED WHETHER ACUTE ORGAN DYSFUNCTION PRESENT: Primary | ICD-10-CM

## 2023-09-09 DIAGNOSIS — T14.8XXA HEMATOMA: ICD-10-CM

## 2023-09-09 DIAGNOSIS — R29.6 FREQUENT FALLS: ICD-10-CM

## 2023-09-09 LAB
AMORPH URATE CRY URNS QL MICRO: ABNORMAL /HPF
ANION GAP SERPL CALCULATED.3IONS-SCNC: 9 MMOL/L (ref 5–15)
BACTERIA UR QL AUTO: ABNORMAL /HPF
BASOPHILS # BLD AUTO: 0.05 10*3/MM3 (ref 0–0.2)
BASOPHILS NFR BLD AUTO: 0.3 % (ref 0–1.5)
BILIRUB UR QL STRIP: NEGATIVE
BUN SERPL-MCNC: 34 MG/DL (ref 8–23)
BUN/CREAT SERPL: 22.5 (ref 7–25)
CALCIUM SPEC-SCNC: 9.1 MG/DL (ref 8.6–10.5)
CHLORIDE SERPL-SCNC: 107 MMOL/L (ref 98–107)
CK SERPL-CCNC: 75 U/L (ref 20–200)
CLARITY UR: ABNORMAL
CO2 SERPL-SCNC: 23 MMOL/L (ref 22–29)
COLOR UR: ABNORMAL
CREAT SERPL-MCNC: 1.51 MG/DL (ref 0.76–1.27)
CRP SERPL-MCNC: 0.64 MG/DL (ref 0–0.5)
D-LACTATE SERPL-SCNC: 1.7 MMOL/L (ref 0.5–2)
D-LACTATE SERPL-SCNC: 2.3 MMOL/L (ref 0.5–2)
DEPRECATED RDW RBC AUTO: 44.1 FL (ref 37–54)
EGFRCR SERPLBLD CKD-EPI 2021: 44.7 ML/MIN/1.73
EOSINOPHIL # BLD AUTO: 0 10*3/MM3 (ref 0–0.4)
EOSINOPHIL NFR BLD AUTO: 0 % (ref 0.3–6.2)
ERYTHROCYTE [DISTWIDTH] IN BLOOD BY AUTOMATED COUNT: 12.2 % (ref 12.3–15.4)
GLUCOSE SERPL-MCNC: 127 MG/DL (ref 65–99)
GLUCOSE UR STRIP-MCNC: NEGATIVE MG/DL
HCT VFR BLD AUTO: 37.6 % (ref 37.5–51)
HGB BLD-MCNC: 12.7 G/DL (ref 13–17.7)
HGB UR QL STRIP.AUTO: NEGATIVE
HYALINE CASTS UR QL AUTO: ABNORMAL /LPF
IMM GRANULOCYTES # BLD AUTO: 0.35 10*3/MM3 (ref 0–0.05)
IMM GRANULOCYTES NFR BLD AUTO: 2 % (ref 0–0.5)
KETONES UR QL STRIP: ABNORMAL
LEUKOCYTE ESTERASE UR QL STRIP.AUTO: ABNORMAL
LYMPHOCYTES # BLD AUTO: 0.58 10*3/MM3 (ref 0.7–3.1)
LYMPHOCYTES NFR BLD AUTO: 3.3 % (ref 19.6–45.3)
MCH RBC QN AUTO: 33.1 PG (ref 26.6–33)
MCHC RBC AUTO-ENTMCNC: 33.8 G/DL (ref 31.5–35.7)
MCV RBC AUTO: 97.9 FL (ref 79–97)
MONOCYTES # BLD AUTO: 2.05 10*3/MM3 (ref 0.1–0.9)
MONOCYTES NFR BLD AUTO: 11.6 % (ref 5–12)
NEUTROPHILS NFR BLD AUTO: 14.68 10*3/MM3 (ref 1.7–7)
NEUTROPHILS NFR BLD AUTO: 82.8 % (ref 42.7–76)
NITRITE UR QL STRIP: NEGATIVE
NRBC BLD AUTO-RTO: 0.1 /100 WBC (ref 0–0.2)
PH UR STRIP.AUTO: 5.5 [PH] (ref 5–8)
PLATELET # BLD AUTO: 333 10*3/MM3 (ref 140–450)
PMV BLD AUTO: 11.2 FL (ref 6–12)
POTASSIUM SERPL-SCNC: 4.6 MMOL/L (ref 3.5–5.2)
PROCALCITONIN SERPL-MCNC: 0.08 NG/ML (ref 0–0.25)
PROT UR QL STRIP: ABNORMAL
RBC # BLD AUTO: 3.84 10*6/MM3 (ref 4.14–5.8)
RBC # UR STRIP: ABNORMAL /HPF
REF LAB TEST METHOD: ABNORMAL
SODIUM SERPL-SCNC: 139 MMOL/L (ref 136–145)
SP GR UR STRIP: >=1.03 (ref 1–1.03)
SQUAMOUS #/AREA URNS HPF: ABNORMAL /HPF
UROBILINOGEN UR QL STRIP: ABNORMAL
WBC # UR STRIP: ABNORMAL /HPF
WBC NRBC COR # BLD: 17.71 10*3/MM3 (ref 3.4–10.8)

## 2023-09-09 PROCEDURE — 70450 CT HEAD/BRAIN W/O DYE: CPT

## 2023-09-09 PROCEDURE — 25010000002 CEFTRIAXONE PER 250 MG: Performed by: EMERGENCY MEDICINE

## 2023-09-09 PROCEDURE — 71045 X-RAY EXAM CHEST 1 VIEW: CPT

## 2023-09-09 PROCEDURE — 86140 C-REACTIVE PROTEIN: CPT | Performed by: HOSPITALIST

## 2023-09-09 PROCEDURE — 72193 CT PELVIS W/DYE: CPT

## 2023-09-09 PROCEDURE — 82550 ASSAY OF CK (CPK): CPT | Performed by: HOSPITALIST

## 2023-09-09 PROCEDURE — 25510000001 IOPAMIDOL 61 % SOLUTION: Performed by: EMERGENCY MEDICINE

## 2023-09-09 PROCEDURE — 36415 COLL VENOUS BLD VENIPUNCTURE: CPT | Performed by: EMERGENCY MEDICINE

## 2023-09-09 PROCEDURE — P9612 CATHETERIZE FOR URINE SPEC: HCPCS

## 2023-09-09 PROCEDURE — 72192 CT PELVIS W/O DYE: CPT

## 2023-09-09 PROCEDURE — 87040 BLOOD CULTURE FOR BACTERIA: CPT | Performed by: EMERGENCY MEDICINE

## 2023-09-09 PROCEDURE — 99285 EMERGENCY DEPT VISIT HI MDM: CPT

## 2023-09-09 PROCEDURE — 85025 COMPLETE CBC W/AUTO DIFF WBC: CPT | Performed by: EMERGENCY MEDICINE

## 2023-09-09 PROCEDURE — 83605 ASSAY OF LACTIC ACID: CPT | Performed by: EMERGENCY MEDICINE

## 2023-09-09 PROCEDURE — 81001 URINALYSIS AUTO W/SCOPE: CPT | Performed by: EMERGENCY MEDICINE

## 2023-09-09 PROCEDURE — 84145 PROCALCITONIN (PCT): CPT | Performed by: HOSPITALIST

## 2023-09-09 PROCEDURE — 80048 BASIC METABOLIC PNL TOTAL CA: CPT | Performed by: EMERGENCY MEDICINE

## 2023-09-09 RX ORDER — FERROUS SULFATE 325(65) MG
325 TABLET ORAL
Status: DISCONTINUED | OUTPATIENT
Start: 2023-09-10 | End: 2023-09-21 | Stop reason: HOSPADM

## 2023-09-09 RX ORDER — PANTOPRAZOLE SODIUM 40 MG/1
40 TABLET, DELAYED RELEASE ORAL
Status: DISCONTINUED | OUTPATIENT
Start: 2023-09-10 | End: 2023-09-21 | Stop reason: HOSPADM

## 2023-09-09 RX ORDER — MELATONIN
2000 DAILY
Status: DISCONTINUED | OUTPATIENT
Start: 2023-09-09 | End: 2023-09-21 | Stop reason: HOSPADM

## 2023-09-09 RX ORDER — SODIUM CHLORIDE 0.9 % (FLUSH) 0.9 %
10 SYRINGE (ML) INJECTION AS NEEDED
Status: DISCONTINUED | OUTPATIENT
Start: 2023-09-09 | End: 2023-09-14

## 2023-09-09 RX ORDER — ESCITALOPRAM OXALATE 10 MG/1
10 TABLET ORAL DAILY
Status: DISCONTINUED | OUTPATIENT
Start: 2023-09-09 | End: 2023-09-16

## 2023-09-09 RX ORDER — ACETAMINOPHEN 500 MG
1000 TABLET ORAL ONCE
Status: COMPLETED | OUTPATIENT
Start: 2023-09-09 | End: 2023-09-09

## 2023-09-09 RX ORDER — GABAPENTIN 300 MG/1
600 CAPSULE ORAL DAILY
Status: DISCONTINUED | OUTPATIENT
Start: 2023-09-09 | End: 2023-09-14

## 2023-09-09 RX ORDER — ATORVASTATIN CALCIUM 20 MG/1
10 TABLET, FILM COATED ORAL NIGHTLY
Status: DISCONTINUED | OUTPATIENT
Start: 2023-09-09 | End: 2023-09-21 | Stop reason: HOSPADM

## 2023-09-09 RX ORDER — CHOLECALCIFEROL (VITAMIN D3) 125 MCG
1000 CAPSULE ORAL DAILY
Status: DISCONTINUED | OUTPATIENT
Start: 2023-09-09 | End: 2023-09-21 | Stop reason: HOSPADM

## 2023-09-09 RX ADMIN — APIXABAN 5 MG: 5 TABLET, FILM COATED ORAL at 20:37

## 2023-09-09 RX ADMIN — GABAPENTIN 600 MG: 300 CAPSULE ORAL at 20:37

## 2023-09-09 RX ADMIN — ACETAMINOPHEN 1000 MG: 500 TABLET, FILM COATED ORAL at 09:05

## 2023-09-09 RX ADMIN — ATORVASTATIN CALCIUM 10 MG: 20 TABLET, FILM COATED ORAL at 20:37

## 2023-09-09 RX ADMIN — IOPAMIDOL 100 ML: 612 INJECTION, SOLUTION INTRAVENOUS at 12:14

## 2023-09-09 RX ADMIN — Medication 2000 UNITS: at 20:37

## 2023-09-09 RX ADMIN — SODIUM CHLORIDE 500 ML: 9 INJECTION, SOLUTION INTRAVENOUS at 13:50

## 2023-09-09 RX ADMIN — Medication 10 ML: at 20:38

## 2023-09-09 RX ADMIN — CEFTRIAXONE 2000 MG: 2 INJECTION, POWDER, FOR SOLUTION INTRAMUSCULAR; INTRAVENOUS at 13:12

## 2023-09-09 NOTE — ED TRIAGE NOTES
Patient to ED via EMS from [facility]. Patient to ED for fall. Patient reports that he was using his walker and lost his balance. Patient reports he fell on his left buttock. Patient did not hit his head, patient takes Eliquis. Patient was seen at this facility for a fall yesterday.

## 2023-09-09 NOTE — PLAN OF CARE
Problem: Fall Injury Risk  Goal: Absence of Fall and Fall-Related Injury  Outcome: Ongoing, Not Progressing  Intervention: Promote Injury-Free Environment  Recent Flowsheet Documentation  Taken 9/9/2023 1744 by Shaina Fuentes RN  Safety Promotion/Fall Prevention:   activity supervised   assistive device/personal items within reach   clutter free environment maintained   fall prevention program maintained   nonskid shoes/slippers when out of bed   room organization consistent   safety round/check completed  Taken 9/9/2023 1440 by Shaina Fuentes RN  Safety Promotion/Fall Prevention:   activity supervised   assistive device/personal items within reach   clutter free environment maintained   fall prevention program maintained   nonskid shoes/slippers when out of bed   room organization consistent   safety round/check completed     Problem: Adult Inpatient Plan of Care  Goal: Plan of Care Review  Outcome: Ongoing, Not Progressing  Flowsheets (Taken 9/9/2023 1746)  Progress: no change  Plan of Care Reviewed With: patient  Outcome Evaluation: disoriented to situation, VSS, room air, urinal, awaiting admission orders.  Goal: Patient-Specific Goal (Individualized)  Outcome: Ongoing, Not Progressing  Goal: Absence of Hospital-Acquired Illness or Injury  Outcome: Ongoing, Not Progressing  Intervention: Identify and Manage Fall Risk  Recent Flowsheet Documentation  Taken 9/9/2023 1744 by Shaina Fuentes RN  Safety Promotion/Fall Prevention:   activity supervised   assistive device/personal items within reach   clutter free environment maintained   fall prevention program maintained   nonskid shoes/slippers when out of bed   room organization consistent   safety round/check completed  Taken 9/9/2023 1440 by Shaina Fuentes RN  Safety Promotion/Fall Prevention:   activity supervised   assistive device/personal items within reach   clutter free environment maintained   fall prevention program maintained    nonskid shoes/slippers when out of bed   room organization consistent   safety round/check completed  Intervention: Prevent Skin Injury  Recent Flowsheet Documentation  Taken 9/9/2023 1744 by Shaina Fuentes RN  Body Position: supine  Taken 9/9/2023 1440 by Shaina Fuentes RN  Body Position: supine  Goal: Optimal Comfort and Wellbeing  Outcome: Ongoing, Not Progressing  Goal: Readiness for Transition of Care  Outcome: Ongoing, Not Progressing  Intervention: Mutually Develop Transition Plan  Recent Flowsheet Documentation  Taken 9/9/2023 1445 by Shaina Fuentes RN  Equipment Currently Used at Home:   commode   grab bar   shower chair   rollator  Transportation Anticipated: family or friend will provide  Patient/Family Anticipated Services at Transition:   Patient/Family Anticipates Transition to: home with help/services     Problem: Behavioral Health Comorbidity  Goal: Maintenance of Behavioral Health Symptom Control  Outcome: Ongoing, Not Progressing     Problem: Osteoarthritis Comorbidity  Goal: Maintenance of Osteoarthritis Symptom Control  Outcome: Ongoing, Not Progressing   Goal Outcome Evaluation:  Plan of Care Reviewed With: patient        Progress: no change  Outcome Evaluation: disoriented to situation, VSS, room air, urinal, awaiting admission orders.

## 2023-09-09 NOTE — H&P
[facility]   HISTORY AND PHYSICAL    Patient Name: Jonathan Trejo  : 1935  MRN: [id number]  Primary Care Physician:  Provider, No Known  Date of admission: 2023    Subjective   Subjective     Chief Complaint: Falls    History of Present Illness      88-year-old M with a history of atrial flutter with prior ablation and history of tachycardia mediated cardiomyopathy status post recovery of left ventricular function, COPD, chronic kidney disease who presents to the hospital because of a fall.  The patient fell on Thursday and then fell Friday morning.  After the Friday morning fall he came to the emergency room.  He had a CT scan of the head which was negative.  Patient was discharged back to his facility [facility].  Patient was again found on the floor today and was brought in by EMS.  Patient has a significant hematoma on his left buttock.  CT imaging shows hematomas reasonably contained.  He was just admitted to the hospital with pulmonary embolism and is on Eliquis and aspirin.      In the emergency room urine is noted fairly unremarkable.  His white blood cell count is elevated at 17-18.  Chest x-ray is unremarkable.  Lactic acid is 2.3.  Patient's procalcitonin is 0.08 and CRP is 0.64.    Currently not having any headaches.      Review of Systems   Unable to perform ROS: Dementia    But systems fairly well reviewed with the daughter at the bedside.  Patient however is not a reliable historian      Personal History     Past Medical History:   Diagnosis Date    Abnormal electrocardiogram     Arm pain     Atrial fibrillation     Atrial flutter     BPH (benign prostatic hyperplasia)     BPH associated with nocturia     Chronic kidney disease     Colon cancer     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    Colon polyp 2015    Depression     Dyspnea     Enlarged prostate without lower urinary tract symptoms (luts)     Episode of generalized weakness     knees were weak - had to be helped by wife  to sit down    Esophagitis, reflux     Fatigue     Fracture of ankle     right    GERD (gastroesophageal reflux disease)     Hyperlipidemia     Inguinal hernia     Loss of hearing     Lumbar radiculopathy     Obesity     Osteoarthritis cervical spine     doc on [facility] 08/16    Osteopenia     Osteoporosis     Overweight     Tobacco dependence in remission     Urge incontinence of urine     Vitamin D deficiency     Wheezing        Past Surgical History:   Procedure Laterality Date    CARDIAC CATHETERIZATION N/A 7/27/2017    Procedure: Valve assessment;  Surgeon: Adonis Solis MD;  Location: [facility] CATH INVASIVE LOCATION;  Service:     CARDIAC CATHETERIZATION N/A 10/1/2018    Procedure: Left Heart Cath;  Surgeon: Bogdan Vargas MD;  Location: [facility] CATH INVASIVE LOCATION;  Service: Cardiology    CARDIAC CATHETERIZATION N/A 10/1/2018    Procedure: Coronary angiography;  Surgeon: Bogdan Vargas MD;  Location: [facility] CATH INVASIVE LOCATION;  Service: Cardiology    CARDIAC CATHETERIZATION N/A 10/1/2018    Procedure: Left ventriculography;  Surgeon: Bogdan Vargas MD;  Location: [facility] CATH INVASIVE LOCATION;  Service: Cardiology    CARDIAC CATHETERIZATION N/A 10/1/2018    Procedure: Right Heart Cath;  Surgeon: Bogdan Vargas MD;  Location: [facility] CATH INVASIVE LOCATION;  Service: Cardiology    CARDIAC ELECTROPHYSIOLOGY PROCEDURE N/A 7/27/2017    Procedure: Ablation atrial flutter Will need to have 3 -4 weeks of therapeutic INR's ;  Surgeon: Adonis Solis MD;  Location: [facility] INVASIVE LOCATION;  Service:     CARDIOVERSION      CATARACT EXTRACTION W/ INTRAOCULAR LENS  IMPLANT, BILATERAL      COLON RESECTION      COLON SURGERY      polyp removed    COLONOSCOPY      07/15 redo 5 years  Seg[facility]    KNEE SURGERY      benign tumor removed, age 6    LASIK      LUMBAR EPIDURAL INJECTION N/A 5/25/2022    Procedure: LUMBAR EPIDURAL 1ST VISIT L3-4 (right of midline);  Surgeon: Bonnie Brown MD;  Location:  [facility];  Service: Pain Management;  Laterality: N/A;    REFRACTIVE SURGERY  2007    REPAIR PERONEAL TENDONS ANKLE W/ FIBULAR OSTEOTOMY Right 03/2013    Dr. Banks       Family History: family history includes Breast cancer in his mother; Heart attack in his father; Heart disease in his father; Hypertension in his father and sister. Otherwise pertinent FHx was reviewed and not pertinent to current issue.    Social History:  reports that he quit smoking about 15 years ago. His smoking use included cigarettes. He has a 27.50 pack-year smoking history. He has never used smokeless tobacco. He reports that he does not currently use alcohol. He reports that he does not use drugs.    Home Medications:  Vitamin D3, acetaminophen, albuterol sulfate HFA, apixaban, aspirin, atorvastatin, escitalopram, ferrous sulfate, gabapentin, omeprazole, and vitamin B-12    Allergies:  No Known Allergies    Objective    Objective     Vitals:   Temp:  [98.3 °F (36.8 °C)-98.4 °F (36.9 °C)] 98.4 °F (36.9 °C)  Heart Rate:  [66-78] 76  Resp:  [16-17] 16  BP: (123-147)/(63-93) 147/63    Physical Exam  Constitutional:       General: He is not in acute distress.     Appearance: He is well-developed. He is not diaphoretic.   HENT:      Head: Normocephalic and atraumatic.   Eyes:      Conjunctiva/sclera: Conjunctivae normal.   Cardiovascular:      Rate and Rhythm: Normal rate and regular rhythm.      Heart sounds: Normal heart sounds. No murmur heard.    No friction rub. No gallop.   Pulmonary:      Effort: Pulmonary effort is normal.      Breath sounds: Normal breath sounds. No wheezing or rales.   Abdominal:      General: Bowel sounds are normal. There is no distension.      Palpations: Abdomen is soft. There is no mass.      Tenderness: There is no abdominal tenderness. There is no guarding or rebound.   Skin:     General: Skin is warm and dry.      Comments: Left buttock with bluish dark ecchymotic area   Neurological:      General: No  focal deficit present.      Mental Status: He is alert and oriented to person, place, and time.   Psychiatric:         Behavior: Behavior normal.       Result Review    Result Review:  I have personally reviewed the results from the time of this admission to 9/9/2023 19:00 EDT and agree with these findings:  []  Laboratory list / accordion  [x]  Microbiology  [x]  Radiology  [x]  EKG/Telemetry   [x]  Cardiology/Vascular   [x]  Pathology  [x]  Old records  []  Other:  Most notable findings include: Unremarkable evaluation      Assessment & Plan   Assessment / Plan     Brief Patient Summary:  Jonathan Trejo is a 88 y.o. male who multiple falls and is on Eliquis and aspirin.    Active Hospital Problems:  Active Hospital Problems    Diagnosis     **Sepsis      Plan:     Concern for sepsis etiology unknown.  His Pro-Harsha is negative.  We will hold on further antibiotics.  He was given a dose of Rocephin in the emergency room  -Recheck white blood cell count in the morning    Possible syncope  -Patient unable to give any sort of information concerning these events where he falls.  Concern he is having syncopal spells.  Will ask cardiology to reevaluate.  -Noted he had a recent echocardiogram which was fairly unremarkable with a normal ejection fraction    Chronic kidney disease  -Creatinine just slightly above baseline  -Hold on any further fluids  -Patient is provided a diet, encouraged to take good oral intake        DVT prophylaxis:  No DVT prophylaxis order currently exists.    CODE STATUS:       Admission Status:  I believe this patient meets observation status.    Cash Romeo MD

## 2023-09-09 NOTE — ED NOTES
"Nursing report ED to floor  Jonathan Trejo  88 y.o.  male    HPI :   Chief Complaint   Patient presents with    Fall       Admitting doctor:   Cash Romeo MD    Admitting diagnosis:   The primary encounter diagnosis was Sepsis, due to unspecified organism, unspecified whether acute organ dysfunction present. Diagnoses of Frequent falls and Hematoma were also pertinent to this visit.    Code status:   Current Code Status       Date Active Code Status Order ID Comments User Context       Prior            Allergies:   Patient has no known allergies.    Isolation:   No active isolations    Intake and Output  No intake or output data in the 24 hours ending 09/09/23 1335    Weight:       09/09/23  1126   Weight: 79.4 kg (175 lb)       Most recent vitals:   Vitals:    09/09/23 0859 09/09/23 1112 09/09/23 1126 09/09/23 1127   BP:  129/93     Pulse: 66 70  68   Resp:       Temp:       TempSrc:       SpO2: 97% 94%  97%   Weight:   79.4 kg (175 lb)    Height:   167.6 cm (66\")        Active LDAs/IV Access:   Lines, Drains & Airways       Active LDAs       Name Placement date Placement time Site Days    Peripheral IV 09/09/23 1126 Left Antecubital 09/09/23  1126  Antecubital  less than 1                    Labs (abnormal labs have a star):   Labs Reviewed   BASIC METABOLIC PANEL - Abnormal; Notable for the following components:       Result Value    Glucose 127 (*)     BUN 34 (*)     Creatinine 1.51 (*)     eGFR 44.7 (*)     All other components within normal limits    Narrative:     GFR Normal >60  Chronic Kidney Disease <60  Kidney Failure <15    The GFR formula is only valid for adults with stable renal function between ages 18 and 70.   CBC WITH AUTO DIFFERENTIAL - Abnormal; Notable for the following components:    WBC 17.71 (*)     RBC 3.84 (*)     Hemoglobin 12.7 (*)     MCV 97.9 (*)     MCH 33.1 (*)     RDW 12.2 (*)     Neutrophil % 82.8 (*)     Lymphocyte % 3.3 (*)     Eosinophil % 0.0 (*)     Immature Grans % 2.0 " (*)     Neutrophils, Absolute 14.68 (*)     Lymphocytes, Absolute 0.58 (*)     Monocytes, Absolute 2.05 (*)     Immature Grans, Absolute 0.35 (*)     All other components within normal limits   URINALYSIS W/ CULTURE IF INDICATED - Abnormal; Notable for the following components:    Color, UA Orange (*)     Appearance, UA Cloudy (*)     Ketones, UA 15 mg/dL (1+) (*)     Protein, UA 30 mg/dL (1+) (*)     Leuk Esterase, UA Trace (*)     All other components within normal limits    Narrative:     In absence of clinical symptoms, the presence of pyuria, bacteria, and/or nitrites on the urinalysis result does not correlate with infection.   LACTIC ACID, PLASMA - Abnormal; Notable for the following components:    Lactate 2.3 (*)     All other components within normal limits   URINALYSIS, MICROSCOPIC ONLY - Abnormal; Notable for the following components:    WBC, UA 3-5 (*)     All other components within normal limits   BLOOD CULTURE   BLOOD CULTURE   LACTIC ACID, REFLEX   CBC AND DIFFERENTIAL    Narrative:     The following orders were created for panel order CBC & Differential.  Procedure                               Abnormality         Status                     ---------                               -----------         ------                     CBC Auto Differential[[id number]]        Abnormal            Final result                 Please view results for these tests on the individual orders.       EKG:   No orders to display       Meds given in ED:   Medications   sodium chloride 0.9 % flush 10 mL (has no administration in time range)   cefTRIAXone (ROCEPHIN) 2,000 mg in sodium chloride 0.9 % 100 mL IVPB-VTB (2,000 mg Intravenous New Bag 9/9/23 1312)   sodium chloride 0.9 % bolus 500 mL (has no administration in time range)   acetaminophen (TYLENOL) tablet 1,000 mg (1,000 mg Oral Given 9/9/23 0905)   iopamidol (ISOVUE-300) 61 % injection 100 mL (100 mL Intravenous Given by Other 9/9/23 1214)       Imaging  results:  CT Head Without Contrast    Result Date: 9/8/2023   No CT evidence for acute traumatic intracranial pathology.   This report was finalized on 9/8/2023 1:58 PM by Dr. Harry Danielle M.D.      CT Pelvis Without Contrast    Result Date: 9/9/2023   A large hematoma in the left gluteus musculature, partly included. No acute fracture identified.  This report was finalized on 9/9/2023 10:31 AM by Dr. Sami Avila M.D.      XR Chest 1 View    Result Date: 9/9/2023  No focal pulmonary consolidation. Follow-up as clinical indications persist.  This report was finalized on 9/9/2023 1:21 PM by Dr. Sami Avila M.D.       Ambulatory status:   - bedrest    Social issues:   Social History     Socioeconomic History    Marital status:     Number of children: 2    Years of education: COLLEGE GRADUATE   Tobacco Use    Smoking status: Former     Packs/day: 0.50     Years: 55.00     Pack years: 27.50     Types: Cigarettes     Quit date: 5/1/2008     Years since quitting: 15.3    Smokeless tobacco: Never    Tobacco comments:     QUIT 10 YRS   Vaping Use    Vaping Use: Never used   Substance and Sexual Activity    Alcohol use: Not Currently     Comment: SOCIALLY    Drug use: No    Sexual activity: Defer       NIH Stroke Scale:       Kelle Pablo RN  09/09/23 13:35 EDT

## 2023-09-10 PROBLEM — N18.31 STAGE 3A CHRONIC KIDNEY DISEASE: Status: ACTIVE | Noted: 2023-09-10

## 2023-09-10 PROBLEM — D47.2 NEUROPATHY ASSOCIATED WITH MGUS: Status: ACTIVE | Noted: 2023-09-10

## 2023-09-10 PROBLEM — E83.39 HYPOPHOSPHATEMIA: Status: ACTIVE | Noted: 2023-09-10

## 2023-09-10 PROBLEM — N18.9 CKD (CHRONIC KIDNEY DISEASE): Status: ACTIVE | Noted: 2023-09-10

## 2023-09-10 PROBLEM — Z86.711 HISTORY OF PULMONARY EMBOLISM: Status: ACTIVE | Noted: 2023-09-10

## 2023-09-10 PROBLEM — G63 NEUROPATHY ASSOCIATED WITH MGUS: Status: ACTIVE | Noted: 2023-09-10

## 2023-09-10 LAB
ALBUMIN SERPL-MCNC: 3.1 G/DL (ref 3.5–5.2)
ALBUMIN SERPL-MCNC: 3.3 G/DL (ref 3.5–5.2)
ALBUMIN/GLOB SERPL: 1.8 G/DL
ALBUMIN/GLOB SERPL: 1.9 G/DL
ALP SERPL-CCNC: 71 U/L (ref 39–117)
ALP SERPL-CCNC: 74 U/L (ref 39–117)
ALT SERPL W P-5'-P-CCNC: 8 U/L (ref 1–41)
ALT SERPL W P-5'-P-CCNC: 9 U/L (ref 1–41)
ANION GAP SERPL CALCULATED.3IONS-SCNC: 10.3 MMOL/L (ref 5–15)
ANION GAP SERPL CALCULATED.3IONS-SCNC: 7.7 MMOL/L (ref 5–15)
AST SERPL-CCNC: 8 U/L (ref 1–40)
AST SERPL-CCNC: 8 U/L (ref 1–40)
BILIRUB SERPL-MCNC: 0.7 MG/DL (ref 0–1.2)
BILIRUB SERPL-MCNC: 1 MG/DL (ref 0–1.2)
BUN SERPL-MCNC: 28 MG/DL (ref 8–23)
BUN SERPL-MCNC: 29 MG/DL (ref 8–23)
BUN/CREAT SERPL: 23.6 (ref 7–25)
BUN/CREAT SERPL: 25.2 (ref 7–25)
CALCIUM SPEC-SCNC: 8.5 MG/DL (ref 8.6–10.5)
CALCIUM SPEC-SCNC: 8.9 MG/DL (ref 8.6–10.5)
CHLORIDE SERPL-SCNC: 108 MMOL/L (ref 98–107)
CHLORIDE SERPL-SCNC: 110 MMOL/L (ref 98–107)
CK SERPL-CCNC: 67 U/L (ref 20–200)
CO2 SERPL-SCNC: 20.7 MMOL/L (ref 22–29)
CO2 SERPL-SCNC: 21.3 MMOL/L (ref 22–29)
CREAT SERPL-MCNC: 1.11 MG/DL (ref 0.76–1.27)
CREAT SERPL-MCNC: 1.23 MG/DL (ref 0.76–1.27)
CRP SERPL-MCNC: 2.01 MG/DL (ref 0–0.5)
DEPRECATED RDW RBC AUTO: 43.9 FL (ref 37–54)
EGFRCR SERPLBLD CKD-EPI 2021: 57.2 ML/MIN/1.73
EGFRCR SERPLBLD CKD-EPI 2021: 64.7 ML/MIN/1.73
ERYTHROCYTE [DISTWIDTH] IN BLOOD BY AUTOMATED COUNT: 12.1 % (ref 12.3–15.4)
GLOBULIN UR ELPH-MCNC: 1.7 GM/DL
GLOBULIN UR ELPH-MCNC: 1.7 GM/DL
GLUCOSE SERPL-MCNC: 115 MG/DL (ref 65–99)
GLUCOSE SERPL-MCNC: 119 MG/DL (ref 65–99)
HCT VFR BLD AUTO: 30.5 % (ref 37.5–51)
HGB BLD-MCNC: 10.4 G/DL (ref 13–17.7)
MAGNESIUM SERPL-MCNC: 2 MG/DL (ref 1.6–2.4)
MAGNESIUM SERPL-MCNC: 2 MG/DL (ref 1.6–2.4)
MCH RBC QN AUTO: 33.7 PG (ref 26.6–33)
MCHC RBC AUTO-ENTMCNC: 34.1 G/DL (ref 31.5–35.7)
MCV RBC AUTO: 98.7 FL (ref 79–97)
PHOSPHATE SERPL-MCNC: 1.9 MG/DL (ref 2.5–4.5)
PHOSPHATE SERPL-MCNC: 2 MG/DL (ref 2.5–4.5)
PLATELET # BLD AUTO: 278 10*3/MM3 (ref 140–450)
PMV BLD AUTO: 11.7 FL (ref 6–12)
POTASSIUM SERPL-SCNC: 3.7 MMOL/L (ref 3.5–5.2)
POTASSIUM SERPL-SCNC: 3.9 MMOL/L (ref 3.5–5.2)
PROCALCITONIN SERPL-MCNC: 0.08 NG/ML (ref 0–0.25)
PROT SERPL-MCNC: 4.8 G/DL (ref 6–8.5)
PROT SERPL-MCNC: 5 G/DL (ref 6–8.5)
RBC # BLD AUTO: 3.09 10*6/MM3 (ref 4.14–5.8)
SODIUM SERPL-SCNC: 139 MMOL/L (ref 136–145)
SODIUM SERPL-SCNC: 139 MMOL/L (ref 136–145)
TSH SERPL DL<=0.05 MIU/L-ACNC: 0.5 UIU/ML (ref 0.27–4.2)
WBC NRBC COR # BLD: 13.54 10*3/MM3 (ref 3.4–10.8)

## 2023-09-10 PROCEDURE — 99222 1ST HOSP IP/OBS MODERATE 55: CPT | Performed by: INTERNAL MEDICINE

## 2023-09-10 PROCEDURE — 80053 COMPREHEN METABOLIC PANEL: CPT | Performed by: HOSPITALIST

## 2023-09-10 PROCEDURE — 83735 ASSAY OF MAGNESIUM: CPT | Performed by: HOSPITALIST

## 2023-09-10 PROCEDURE — 84443 ASSAY THYROID STIM HORMONE: CPT | Performed by: HOSPITALIST

## 2023-09-10 PROCEDURE — 82550 ASSAY OF CK (CPK): CPT | Performed by: HOSPITALIST

## 2023-09-10 PROCEDURE — 84100 ASSAY OF PHOSPHORUS: CPT | Performed by: HOSPITALIST

## 2023-09-10 PROCEDURE — 86140 C-REACTIVE PROTEIN: CPT | Performed by: HOSPITALIST

## 2023-09-10 PROCEDURE — 84145 PROCALCITONIN (PCT): CPT | Performed by: HOSPITALIST

## 2023-09-10 PROCEDURE — 85027 COMPLETE CBC AUTOMATED: CPT | Performed by: HOSPITALIST

## 2023-09-10 RX ORDER — ACETAMINOPHEN 325 MG/1
650 TABLET ORAL EVERY 4 HOURS PRN
Status: DISCONTINUED | OUTPATIENT
Start: 2023-09-10 | End: 2023-09-21 | Stop reason: HOSPADM

## 2023-09-10 RX ADMIN — APIXABAN 5 MG: 5 TABLET, FILM COATED ORAL at 08:59

## 2023-09-10 RX ADMIN — Medication 1000 MCG: at 09:00

## 2023-09-10 RX ADMIN — GABAPENTIN 600 MG: 300 CAPSULE ORAL at 08:59

## 2023-09-10 RX ADMIN — APIXABAN 5 MG: 5 TABLET, FILM COATED ORAL at 19:58

## 2023-09-10 RX ADMIN — Medication 2 PACKET: at 16:58

## 2023-09-10 RX ADMIN — ACETAMINOPHEN 650 MG: 325 TABLET, FILM COATED ORAL at 22:02

## 2023-09-10 RX ADMIN — ESCITALOPRAM OXALATE 10 MG: 10 TABLET, FILM COATED ORAL at 08:59

## 2023-09-10 RX ADMIN — ATORVASTATIN CALCIUM 10 MG: 20 TABLET, FILM COATED ORAL at 19:58

## 2023-09-10 RX ADMIN — Medication 2000 UNITS: at 08:59

## 2023-09-10 RX ADMIN — FERROUS SULFATE TAB 325 MG (65 MG ELEMENTAL FE) 325 MG: 325 (65 FE) TAB at 08:59

## 2023-09-10 RX ADMIN — PANTOPRAZOLE SODIUM 40 MG: 40 TABLET, DELAYED RELEASE ORAL at 05:45

## 2023-09-10 NOTE — PLAN OF CARE
Goal Outcome Evaluation:              Outcome Evaluation: SBP in the low side w/o any symptoms. Phosphorous low and replaced. Neuro and PT consult for tomorrow. Bruises pictures taken. On RA, NSR, No S/S of distress.

## 2023-09-10 NOTE — CONSULTS
Date of Hospital Visit: 2023  Date of consult: 9/10/23  Encounter Provider: Manuel Arshad MD  Place of Service: [facility] CARDIOLOGY  Patient Name: Jonathan Trejo  :1935  Referral Provider: Cash Romeo MD    Chief complaint: Fall    Reason for consult: Syncope     History of Present Illness: Jonathan Trejo is an 88 year old male who does not follow with a cardiologist and has a past medical history that is significant for atrial fibrillation/flutter, CKD, colon cancer, COPD, GERD, HLD, tachycardia mediated cardiomyopathy s/p LV recover. He was seen by Dr. Miranda when he was admitted with a PE.      He presented to the ED on 23 after a fall. He was walking with his walker and lost his balance, he fell on his left buttock. He was seen on 23 after as a fall as well, his CT of the head was negative and he was discharged back to his facility. Today he presented with a significant hematoma on his left buttock, CT showed that this was reasonably contained. He is on Eliquis and aspirin given his recent PE. His WBC was 17.71, chest xray is unremarkable. Cardiology has been consulted to evaluate him for syncope given his recent falls.     ECHO 23    Left ventricular systolic function is normal. Calculated left ventricular EF = 64.9%    Left ventricular diastolic function is consistent with (grade II w/high LAP) pseudonormalization.    Calculated right ventricular systolic pressure from tricuspid regurgitation is 41 mmHg.    Stress Test with Myocardial Perfusion 18  Myocardial perfusion imaging indicates a normal myocardial perfusion study with no evidence of ischemia.  Left ventricular ejection fraction is normal (Calculated EF = 50%).  Impressions are consistent with a low risk study.    Cardiac Catheterization 10/1/18  Assessment:  1.  Minimal coronary atherosclerosis involving primarily the proximal RCA  2.  Normal left ventricular function  3.  Normal  intracardiac pressures    Past Medical History:   Diagnosis Date    Abnormal electrocardiogram     Arm pain     Atrial fibrillation     Atrial flutter     BPH (benign prostatic hyperplasia)     BPH associated with nocturia     Chronic kidney disease     Colon cancer     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    Colon polyp 11/22/2015    Depression     Dyspnea     Enlarged prostate without lower urinary tract symptoms (luts)     Episode of generalized weakness     knees were weak - had to be helped by wife to sit down    Esophagitis, reflux     Fatigue     Fracture of ankle     right    GERD (gastroesophageal reflux disease)     Hyperlipidemia     Inguinal hernia     Loss of hearing     Lumbar radiculopathy     Obesity     Osteoarthritis cervical spine     doc on [facility] 08/16    Osteopenia     Osteoporosis     Overweight     Tobacco dependence in remission     Urge incontinence of urine     Vitamin D deficiency     Wheezing        Past Surgical History:   Procedure Laterality Date    CARDIAC CATHETERIZATION N/A 7/27/2017    Procedure: Valve assessment;  Surgeon: Adonis Solis MD;  Location[facility] CATH INVASIVE LOC[facility];  Service:     CARDIAC CATHETERIZATION N/A 10/1/2018    Procedure: Left Heart Cath;  Surgeon: Bogdan Vargas MD;  Location: [facility] CATH INVASIVE LOCATION;  Service: Cardiology    CARDIAC CATHETERIZATION N/A 10/1/2018    Procedure: Coronary angiography;  Surgeon: Bogdan Vargas MD;  Location: [facility] CATH INVASIVE LOCATION;  Service: Cardiology    CARDIAC CATHETERIZATION N/A 10/1/2018    Procedure: Left ventriculography;  Surgeon: Bogdan Vargas MD;  Location: [facility] CATH INVASIVE LOCATION;  Service: Cardiology    CARDIAC CATHETERIZATION N/A 10/1/2018    Procedure: Right Heart Cath;  Surgeon: Bogdan Vargas MD;  Location: [facility] CATH INVASIVE LOCATION;  Service: Cardiology    CARDIAC ELECTROPHYSIOLOGY PROCEDURE N/A 7/27/2017    Procedure: Ablation atrial flutter Will need to have 3  -4 weeks of therapeutic INR's ;  Surgeon: Adonis Solis MD;  Location: [facility] INVASIVE LOCATION;  Service:     CARDIOVERSION      CATARACT EXTRACTION W/ INTRAOCULAR LENS  IMPLANT, BILATERAL      COLON RESECTION      COLON SURGERY      polyp removed    COLONOSCOPY      07/15 redo 5 years  Segun    KNEE SURGERY      benign tumor removed, age 6    LASIK      LUMBAR EPIDURAL INJECTION N/A 5/25/2022    Procedure: LUMBAR EPIDURAL 1ST VISIT L3-4 (right of midline);  Surgeon: Bonnie Brown MD;  Location: [facility] MAIN OR;  Service: Pain Management;  Laterality: N/A;    REFRACTIVE SURGERY  2007    REPAIR PERONEAL TENDONS ANKLE W/ FIBULAR OSTEOTOMY Right 03/2013    Dr. Banks       Medications Prior to Admission   Medication Sig Dispense Refill Last Dose    acetaminophen (TYLENOL) 325 MG tablet Take 2 tablets by mouth Every 6 (Six) Hours As Needed for Mild Pain.       albuterol sulfate  (90 Base) MCG/ACT inhaler Inhale 2 puffs 4 (Four) Times a Day As Needed for Wheezing.       apixaban (ELIQUIS) 5 MG tablet tablet Take 1 tablet by mouth Every 12 (Twelve) Hours. Indications: DVT/PE (active thrombosis) 60 tablet 0     aspirin 81 MG chewable tablet Chew 1 tablet Daily.       atorvastatin (LIPITOR) 10 MG tablet TAKE 1 TABLET BY MOUTH DAILY. 90 tablet 1     Cholecalciferol (VITAMIN D3) 2000 UNITS tablet Take 1 tablet by mouth Daily.       escitalopram (LEXAPRO) 10 MG tablet Take 1 tablet by mouth Daily.       ferrous sulfate 325 (65 FE) MG tablet Take 1 tablet by mouth Daily With Breakfast. 30 tablet 0     omeprazole (priLOSEC) 40 MG capsule TAKE 1 CAPSULE BY MOUTH DAILY. 30 capsule 1     gabapentin (NEURONTIN) 600 MG tablet Take 1 tablet by mouth 2 (Two) Times a Day.       vitamin B-12 (CYANOCOBALAMIN) 1000 MCG tablet Take 1 tablet by mouth Daily. For b12 def (Patient taking differently: Take 1 tablet by mouth Daily.) 90 tablet 3        Current Meds  Scheduled Meds:apixaban, 5 mg, Oral, Q12H  atorvastatin, 10 mg,  "Oral, Nightly  cholecalciferol, 2,000 Units, Oral, Daily  escitalopram, 10 mg, Oral, Daily  ferrous sulfate, 325 mg, Oral, Daily With Breakfast  gabapentin, 600 mg, Oral, Daily  pantoprazole, 40 mg, Oral, Q AM  vitamin B-12, 1,000 mcg, Oral, Daily      Continuous Infusions:   PRN Meds:.  Phosphorus Replacement - Follow Nurse / BPA Driven Protocol    [COMPLETED] Insert Peripheral IV **AND** sodium chloride    Allergies as of 09/09/2023    (No Known Allergies)       Social History     Socioeconomic History    Marital status:     Number of children: 2    Years of education: COLLEGE GRADUATE   Tobacco Use    Smoking status: Former     Packs/day: 0.50     Years: 55.00     Pack years: 27.50     Types: Cigarettes     Quit date: 5/1/2008     Years since quitting: 15.3    Smokeless tobacco: Never    Tobacco comments:     QUIT 10 YRS   Vaping Use    Vaping Use: Never used   Substance and Sexual Activity    Alcohol use: Not Currently     Comment: SOCIALLY    Drug use: No    Sexual activity: Defer       Family History   Problem Relation Age of Onset    Breast cancer Mother     Heart disease Father     Hypertension Father     Heart attack Father     Hypertension Sister        REVIEW OF SYSTEMS:   All systems reviewed and pertinent positives include in HPI otherwise negative review of systems.       Objective:   Temp:  [98.1 °F (36.7 °C)-99 °F (37.2 °C)] 98.8 °F (37.1 °C)  Heart Rate:  [61-69] 66  Resp:  [14-18] 16  BP: ()/(54-62) 111/62  Body mass index is 28.25 kg/m².  Flowsheet Rows      Flowsheet Row First Filed Value   Admission Height 167.6 cm (66\") Documented at 09/09/2023 1126   Admission Weight 79.4 kg (175 lb) Documented at 09/09/2023 1126          Vitals:    09/10/23 1428   BP: 111/62   Pulse: 66   Resp: 16   Temp: 98.8 °F (37.1 °C)   SpO2: 96%       General Appearance:    Alert, cooperative, in no acute distress   Head:    Normocephalic, without obvious abnormality, atraumatic   Eyes:            Lids and " lashes normal, conjunctivae and sclerae normal, no   icterus, no pallor, corneas clear, PERRLA   Ears:    Ears appear intact with no abnormalities noted   Throat:   No oral lesions, no thrush, oral mucosa moist   Neck:   No adenopathy, supple, trachea midline, no thyromegaly, no   carotid bruit, no JVD   Back:     No kyphosis present, no scoliosis present, no skin lesions, erythema or scars, no tenderness to percussion or palpation, range of motion normal   Lungs:     Clear to auscultation,respirations regular, even and unlabored    Heart:    Regular rhythm and normal rate, normal S1 and S2, no murmur, no gallop, no rub, no click   Chest Wall:    No abnormalities observed   Abdomen:     Normal bowel sounds, no masses, no organomegaly, soft nontender, nondistended, no guarding, no rebound  tenderness   Extremities:   Moves all extremities well, no edema, no cyanosis, no redness   Pulses:   Pulses palpable and equal bilaterally. Normal radial, carotid, femoral, dorsalis pedis and posterior tibial pulses bilaterally. Normal abdominal aorta   Skin:  Neurology:   Psychiatric:   No bleeding, bruising or rash   Normal speech and cranial nerve exam, no focal deficit   Alert and oriented x 3, normal mood and affect             Review of Data:      Results from last 7 days   Lab Units 09/10/23  1617   SODIUM mmol/L 139   POTASSIUM mmol/L 3.9   CHLORIDE mmol/L 110*   CO2 mmol/L 21.3*   BUN mg/dL 28*   CREATININE mg/dL 1.11   CALCIUM mg/dL 8.5*   BILIRUBIN mg/dL 0.7   ALK PHOS U/L 71   ALT (SGPT) U/L 9   AST (SGOT) U/L 8   GLUCOSE mg/dL 115*     Results from last 7 days   Lab Units 09/10/23  0928 09/09/23  1125   CK TOTAL U/L 67 75     @[email or web address]@  Results from last 7 days   Lab Units 09/10/23  0928 09/09/23  1125   WBC 10*3/mm3 13.54* 17.71*   HEMOGLOBIN g/dL 10.4* 12.7*   HEMATOCRIT % 30.5* 37.6   PLATELETS 10*3/mm3 278 333         Results from last 7 days   Lab Units 09/10/23  1617   MAGNESIUM mg/dL 2.0      @[facility](chol,trig,hdl,ldl)        I personally viewed and interpreted the patient's EKG/Telemetry data  )   Sepsis    Hyperlipidemia    Degenerative disc disease, cervical    S/P ablation of atrial flutter    Frequent falls    Other secondary parkinsonism    Monoclonal gammopathy of unknown significance (MGUS)    Myasthenia gravis without exacerbation    Stage 3a chronic kidney disease    History of pulmonary embolism    Hypophosphatemia    Neuropathy associated with MGUS        Assessment and Plan:    Mr Trejo has had recurrent falls lately and on further questioning he denies really feeling lightheaded or having syncope prior to falls but admits generalized body weakness and balance issues leading up to the fall.  He could have orthostatic hypotension.  He had evidence for SPENCER on admission supporting possible intermittent low blood pressure.  He was admitted and treated for PE in July 2023  No evidence for arrhythmia on telemetry.  EKG unremarkable    Recurrent falls without passing out-check orthostatic vital signs.  Treat hypotension  SPENCER-resolved with fluid.  Prior history of atrial flutter and ablation-no significant recurrence  History of bilateral PE in July 2023 currently on Eliquis  Left buttock hematoma  Neuropathy    Patient is positive orthostatic hypotension with signs of fluid may need low-dose midodrine because of frequent falls.  He will need aggressive physical therapy and inpatient rehabilitation    No further cardiac testing recommended at this point  Cardiology will sign off but call with any questions.    Manuel Arshad MD  09/10/23  17:27 EDT.      Time spent in reviewing chart, discussion and examination:

## 2023-09-10 NOTE — PLAN OF CARE
Goal Outcome Evaluation:  Plan of Care Reviewed With: patient        Progress: no change  Outcome Evaluation: Mr. Trejo, admitted 9/09, for sepsis and falls at assisted living. Vitals stable this shift on RA. No complaints of pain or SOA. Cardio consulted for possible syncope re: falls. Patient remains confused - oriented to self and month but unclear on location or situation. Hematoma marked on buttocks. Home meds restarted. Patient resting comfortably at this time and safety maintained.

## 2023-09-10 NOTE — PROGRESS NOTES
Name: Jonathan Trejo ADMIT: 2023   : 1935  PCP: Provider, No Known    MRN: [id number] LOS: 1 days   AGE/SEX: 88 y.o. male  ROOM: E4/     Subjective   Subjective   Feeling better today. Only complaint is of pain in left buttock. Does not recall falling at [facility]. Voiding well. No LUTS. Tolerating diet. No N/V/D/abd pain. No F/C/NS. No HA or vis changes. No SOA or CP or palp. No cough.       Objective   Objective   Vital Signs  Temp:  [98.1 °F (36.7 °C)-99 °F (37.2 °C)] 98.1 °F (36.7 °C)  Heart Rate:  [61-76] 69  Resp:  [14-18] 18  BP: ()/(54-63) 94/57  SpO2:  [95 %-96 %] 95 %  on   ;   Device (Oxygen Therapy): room air  Body mass index is 28.25 kg/m².  Physical Exam  Vitals and nursing note reviewed.   Constitutional:       General: He is not in acute distress.     Appearance: He is ill-appearing (chronically). He is not toxic-appearing or diaphoretic.   HENT:      Head: Normocephalic.      Nose: Nose normal.      Mouth/Throat:      Mouth: Mucous membranes are moist.      Pharynx: Oropharynx is clear.   Eyes:      General: No scleral icterus.        Right eye: No discharge.         Left eye: No discharge.      Extraocular Movements: Extraocular movements intact.      Conjunctiva/sclera: Conjunctivae normal.   Cardiovascular:      Rate and Rhythm: Normal rate. Rhythm irregular.      Pulses: Normal pulses.   Pulmonary:      Effort: Pulmonary effort is normal. No respiratory distress.      Breath sounds: Normal breath sounds. No wheezing or rales.   Abdominal:      General: Bowel sounds are normal. There is no distension.      Palpations: Abdomen is soft.      Tenderness: There is no abdominal tenderness.   Musculoskeletal:         General: Swelling (trace in BLEs) present. Normal range of motion.      Cervical back: Neck supple. No rigidity.   Skin:     General: Skin is warm and dry.      Capillary Refill: Capillary refill takes less than 2 seconds.      Coloration: Skin is not jaundiced.       Findings: Bruising (left buttock) present.   Neurological:      Mental Status: He is alert. Mental status is at baseline.      Cranial Nerves: No cranial nerve deficit.      Coordination: Coordination normal.      Comments: Oriented to person   Psychiatric:         Mood and Affect: Mood normal.      Comments: Pleasant     Results Review     I reviewed the patient's new clinical results.  Results from last 7 days   Lab Units 09/10/23  0928 09/09/23  1125   WBC 10*3/mm3 13.54* 17.71*   HEMOGLOBIN g/dL 10.4* 12.7*   PLATELETS 10*3/mm3 278 333     Results from last 7 days   Lab Units 09/10/23  0928 09/09/23  1125   SODIUM mmol/L 139 139   POTASSIUM mmol/L 3.7 4.6   CHLORIDE mmol/L 108* 107   CO2 mmol/L 20.7* 23.0   BUN mg/dL 29* 34*   CREATININE mg/dL 1.23 1.51*   GLUCOSE mg/dL 119* 127*   EGFR mL/min/1.73 57.2* 44.7*     Results from last 7 days   Lab Units 09/10/23  0928   ALBUMIN g/dL 3.3*   BILIRUBIN mg/dL 1.0   ALK PHOS U/L 74   AST (SGOT) U/L 8   ALT (SGPT) U/L 8     Results from last 7 days   Lab Units 09/10/23  0928 09/09/23  1125   CALCIUM mg/dL 8.9 9.1   ALBUMIN g/dL 3.3*  --    MAGNESIUM mg/dL 2.0  --    PHOSPHORUS mg/dL 1.9*  --      Results from last 7 days   Lab Units 09/10/23  0928 09/09/23  1602 09/09/23  1233 09/09/23  1125   PROCALCITONIN ng/mL 0.08  --   --  0.08   LACTATE mmol/L  --  1.7 2.3*  --      No results found for: HGBA1C, POCGLU    CT Pelvis Without Contrast    Result Date: 9/9/2023   A large hematoma in the left gluteus musculature, partly included. No acute fracture identified.  This report was finalized on 9/9/2023 10:31 AM by Dr. Sami Avila M.D.      XR Chest 1 View    Result Date: 9/9/2023  No focal pulmonary consolidation. Follow-up as clinical indications persist.  This report was finalized on 9/9/2023 1:21 PM by Dr. Sami Avila M.D.       I have personally reviewed all medications:  Scheduled Medications  apixaban, 5 mg, Oral, Q12H  atorvastatin, 10 mg, Oral,  Nightly  cholecalciferol, 2,000 Units, Oral, Daily  escitalopram, 10 mg, Oral, Daily  ferrous sulfate, 325 mg, Oral, Daily With Breakfast  gabapentin, 600 mg, Oral, Daily  pantoprazole, 40 mg, Oral, Q AM  vitamin B-12, 1,000 mcg, Oral, Daily    Infusions   Diet  Diet: Cardiac Diets; Healthy Heart (2-3 Na+); Texture: Regular Texture (IDDSI 7); Fluid Consistency: Thin (IDDSI 0)    I have personally reviewed:  [x]  Laboratory   [x]  Microbiology   [x]  Radiology   [x]  EKG/Telemetry  []  Cardiology/Vascular   []  Pathology    [x]  Records       Assessment/Plan     Active Hospital Problems    Diagnosis  POA    **Sepsis [A41.9]  Yes    Stage 3a chronic kidney disease [N18.31]  Yes    History of pulmonary embolism [Z86.711]  Yes    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     87yo gentleman with h/o atrial flutter and prior ablation, dementia, COPD, CKD3a, HLD, C-DDD, h/o myasthenia gravis, Parkinsonism, MGUS, and recent admission here in July for pulmonary embolism (on Eliquis now), who presented to ER for second time in two days with with report of falls at his assisted living facility. Found to have large left buttock hematoma as well as elevated WBC and lactate. He was admitted to our service with concern for sepsis. He was given a dose of Rocephin in ER and blood cultures were sent. Cardiology consulted regarding recurrent falls.    Elevated WBC and Lactate: WBC falling, LA wnl, do not suspect sepsis, blood cultures NGTD, no fever, continue to monitor  "off abx    Recurrent falls  Neuropathy associated with MGUS  Parkinsonism  Myasthenia gravis: pt has plenty of reasons for falling, some concern for possible syncope or arrhythmia--Card consulted  Consult Neuro as well--followed by Dr. Teddy Campa, for neuropathy associated with MGUS, will see if they have anything to add    Left buttock hematoma: appears contained on CT, continue to monitor Hgb    Acute blood loss anemia: due to large left buttock hematoma and Eliquis, monitor Hgb    CKD3a: Cr has normalized, continue to monitor    Recent diagnosis of bilateral PE: continue Eliquis, full AC not ideal in setting of recurrent falls, but we are treating bilateral PEs so our hands are tied really    Dementia NOS: pt diagnosed with \"mild cognitive impairment\" on neuropsych testing 7 years ago, I have d/w dtr--pt has mild-moderate dementia    Hypophosphatemia: replace with protocol      Eliquis (home med) for DVT prophylaxis.  Full code.  Discussed with patient, dtr (on phone), and RN.  Anticipate discharge TBD, PT and CCP to assist, timing yet to be determined.      Adonis Celestin MD  [facility]  09/10/23  14:22 EDT      "

## 2023-09-11 LAB
ALBUMIN SERPL-MCNC: 2.8 G/DL (ref 3.5–5.2)
ALBUMIN/GLOB SERPL: 1.3 G/DL
ALP SERPL-CCNC: 68 U/L (ref 39–117)
ALT SERPL W P-5'-P-CCNC: 6 U/L (ref 1–41)
ANION GAP SERPL CALCULATED.3IONS-SCNC: 5.3 MMOL/L (ref 5–15)
AST SERPL-CCNC: 10 U/L (ref 1–40)
BILIRUB SERPL-MCNC: 0.9 MG/DL (ref 0–1.2)
BUN SERPL-MCNC: 24 MG/DL (ref 8–23)
BUN/CREAT SERPL: 22 (ref 7–25)
CALCIUM SPEC-SCNC: 8.5 MG/DL (ref 8.6–10.5)
CHLORIDE SERPL-SCNC: 106 MMOL/L (ref 98–107)
CO2 SERPL-SCNC: 24.7 MMOL/L (ref 22–29)
CREAT SERPL-MCNC: 1.09 MG/DL (ref 0.76–1.27)
DEPRECATED RDW RBC AUTO: 44.3 FL (ref 37–54)
EGFRCR SERPLBLD CKD-EPI 2021: 66.1 ML/MIN/1.73
ERYTHROCYTE [DISTWIDTH] IN BLOOD BY AUTOMATED COUNT: 12.2 % (ref 12.3–15.4)
GLOBULIN UR ELPH-MCNC: 2.2 GM/DL
GLUCOSE SERPL-MCNC: 98 MG/DL (ref 65–99)
HCT VFR BLD AUTO: 30.7 % (ref 37.5–51)
HGB BLD-MCNC: 10.2 G/DL (ref 13–17.7)
MAGNESIUM SERPL-MCNC: 2.1 MG/DL (ref 1.6–2.4)
MCH RBC QN AUTO: 33 PG (ref 26.6–33)
MCHC RBC AUTO-ENTMCNC: 33.2 G/DL (ref 31.5–35.7)
MCV RBC AUTO: 99.4 FL (ref 79–97)
PHOSPHATE SERPL-MCNC: 2.2 MG/DL (ref 2.5–4.5)
PHOSPHATE SERPL-MCNC: 3.2 MG/DL (ref 2.5–4.5)
PLATELET # BLD AUTO: 241 10*3/MM3 (ref 140–450)
PMV BLD AUTO: 11.6 FL (ref 6–12)
POTASSIUM SERPL-SCNC: 4.1 MMOL/L (ref 3.5–5.2)
PROT SERPL-MCNC: 5 G/DL (ref 6–8.5)
RBC # BLD AUTO: 3.09 10*6/MM3 (ref 4.14–5.8)
SODIUM SERPL-SCNC: 136 MMOL/L (ref 136–145)
WBC NRBC COR # BLD: 10.82 10*3/MM3 (ref 3.4–10.8)

## 2023-09-11 PROCEDURE — 97166 OT EVAL MOD COMPLEX 45 MIN: CPT

## 2023-09-11 PROCEDURE — 97162 PT EVAL MOD COMPLEX 30 MIN: CPT

## 2023-09-11 PROCEDURE — 84100 ASSAY OF PHOSPHORUS: CPT | Performed by: HOSPITALIST

## 2023-09-11 PROCEDURE — 83735 ASSAY OF MAGNESIUM: CPT | Performed by: HOSPITALIST

## 2023-09-11 PROCEDURE — 99222 1ST HOSP IP/OBS MODERATE 55: CPT | Performed by: PSYCHIATRY & NEUROLOGY

## 2023-09-11 PROCEDURE — 97535 SELF CARE MNGMENT TRAINING: CPT

## 2023-09-11 PROCEDURE — 97530 THERAPEUTIC ACTIVITIES: CPT

## 2023-09-11 PROCEDURE — 85027 COMPLETE CBC AUTOMATED: CPT | Performed by: HOSPITALIST

## 2023-09-11 PROCEDURE — 80053 COMPREHEN METABOLIC PANEL: CPT | Performed by: HOSPITALIST

## 2023-09-11 RX ORDER — MIDODRINE HYDROCHLORIDE 2.5 MG/1
2.5 TABLET ORAL
Status: DISCONTINUED | OUTPATIENT
Start: 2023-09-11 | End: 2023-09-13

## 2023-09-11 RX ORDER — SODIUM CHLORIDE 9 MG/ML
50 INJECTION, SOLUTION INTRAVENOUS CONTINUOUS
Status: ACTIVE | OUTPATIENT
Start: 2023-09-11 | End: 2023-09-12

## 2023-09-11 RX ORDER — SODIUM PHOSPHATE IN 0.9 % NACL 15MMOL/100
15 PLASTIC BAG, INJECTION (ML) INTRAVENOUS ONCE
Status: COMPLETED | OUTPATIENT
Start: 2023-09-11 | End: 2023-09-11

## 2023-09-11 RX ADMIN — MIDODRINE HYDROCHLORIDE 2.5 MG: 2.5 TABLET ORAL at 13:37

## 2023-09-11 RX ADMIN — MIDODRINE HYDROCHLORIDE 2.5 MG: 2.5 TABLET ORAL at 16:51

## 2023-09-11 RX ADMIN — Medication 1000 MCG: at 08:41

## 2023-09-11 RX ADMIN — APIXABAN 5 MG: 5 TABLET, FILM COATED ORAL at 08:41

## 2023-09-11 RX ADMIN — APIXABAN 5 MG: 5 TABLET, FILM COATED ORAL at 20:18

## 2023-09-11 RX ADMIN — PANTOPRAZOLE SODIUM 40 MG: 40 TABLET, DELAYED RELEASE ORAL at 05:49

## 2023-09-11 RX ADMIN — FERROUS SULFATE TAB 325 MG (65 MG ELEMENTAL FE) 325 MG: 325 (65 FE) TAB at 08:41

## 2023-09-11 RX ADMIN — Medication 15 MMOL: at 11:30

## 2023-09-11 RX ADMIN — ACETAMINOPHEN 650 MG: 325 TABLET, FILM COATED ORAL at 23:58

## 2023-09-11 RX ADMIN — GABAPENTIN 600 MG: 300 CAPSULE ORAL at 08:41

## 2023-09-11 RX ADMIN — ACETAMINOPHEN 650 MG: 325 TABLET, FILM COATED ORAL at 08:45

## 2023-09-11 RX ADMIN — ACETAMINOPHEN 650 MG: 325 TABLET, FILM COATED ORAL at 20:18

## 2023-09-11 RX ADMIN — ESCITALOPRAM OXALATE 10 MG: 10 TABLET, FILM COATED ORAL at 08:44

## 2023-09-11 RX ADMIN — Medication 2000 UNITS: at 08:41

## 2023-09-11 RX ADMIN — SODIUM CHLORIDE 50 ML/HR: 9 INJECTION, SOLUTION INTRAVENOUS at 12:30

## 2023-09-11 RX ADMIN — ATORVASTATIN CALCIUM 10 MG: 20 TABLET, FILM COATED ORAL at 20:17

## 2023-09-11 NOTE — CONSULTS
Neurology Consult Note    Referring Provider: Dr. Celestin  Reason for Consultation: falling    History of present illness:    The patient is an 88 year old man admitted 9/9/23 after falling. He lost his balance and fell back on his left buttock.  He developed a large hematoma as he is on Eliquis for bilateral PE and atrial fibrillation.    He is followed by Dr. 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  His chart also lists myasthenia and parkinson's but Dr. Campa does not believe he has those disorders.    He has shown significant orthostasis with drop to 75/50.    Past Medical History  Past Medical History:   Diagnosis Date    Abnormal electrocardiogram     Arm pain     Atrial fibrillation     Atrial flutter     BPH (benign prostatic hyperplasia)     BPH associated with nocturia     Chronic kidney disease     Colon cancer     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    Colon polyp 11/22/2015    Depression     Dyspnea     Enlarged prostate without lower urinary tract symptoms (luts)     Episode of generalized weakness     knees were weak - had to be helped by wife to sit down    Esophagitis, reflux     Fatigue     Fracture of ankle     right    GERD (gastroesophageal reflux disease)     Hyperlipidemia     Inguinal hernia     Loss of hearing     Lumbar radiculopathy     Obesity     Osteoarthritis cervical spine     doc on Sray 08/16    Osteopenia     Osteoporosis     Overweight     Tobacco dependence in remission     Urge incontinence of urine     Vitamin D deficiency     Wheezing        Past Surgical History  Past Surgical History:   Procedure Laterality Date    CARDIAC CATHETERIZATION N/A 7/27/2017    Procedure: Valve assessment;  Surgeon: dAonis Solis MD;  Location:  [facility] CATH INVASIVE LOCATION;  Service:     CARDIAC CATHETERIZATION N/A 10/1/2018    Procedure: Left Heart Cath;  Surgeon: Bogdan Vargas MD;  Location:  [facility] CATH INVASIVE  LOCATION;  Service: Cardiology    CARDIAC CATHETERIZATION N/A 10/1/2018    Procedure: Coronary angiography;  Surgeon: Bogdan Vargas MD;  Location: [facility] CATH INVASIVE LOCATION;  Service: Cardiology    CARDIAC CATHETERIZATION N/A 10/1/2018    Procedure: Left ventriculography;  Surgeon: Bogdan Vargas MD;  Location: [facility] CATH INVASIVE LOCATION;  Service: Cardiology    CARDIAC CATHETERIZATION N/A 10/1/2018    Procedure: Right Heart Cath;  Surgeon: Bogdan Vargas MD;  Location: [facility] CATH INVASIVE LOCATION;  Service: Cardiology    CARDIAC ELECTROPHYSIOLOGY PROCEDURE N/A 7/27/2017    Procedure: Ablation atrial flutter Will need to have 3 -4 weeks of therapeutic INR's ;  Surgeon: Adonis Solis MD;  Location: [facility] CATH INVASIVE LOCATION;  Service:     CARDIOVERSION      CATARACT EXTRACTION W/ INTRAOCULAR LENS  IMPLANT, BILATERAL      COLON RESECTION      COLON SURGERY      polyp removed    COLONOSCOPY      07/15 redo 5 years  Segun    KNEE SURGERY      benign tumor removed, age 6    LASIK      LUMBAR EPIDURAL INJECTION N/A 5/25/2022    Procedure: LUMBAR EPIDURAL 1ST VISIT L3-4 (right of midline);  Surgeon: Bonnie Brown MD;  [place] [facility] [place];  Service: Pain Management;  Laterality: N/A;    REFRACTIVE SURGERY  2007    REPAIR PERONEAL TENDONS ANKLE W/ FIBULAR OSTEOTOMY Right 03/2013    Dr. Banks       Family History  Family History   Problem Relation Age of Onset    Breast cancer Mother     Heart disease Father     Hypertension Father     Heart attack Father     Hypertension Sister        No Known Allergies    Social History  Not , lives alone in an apartment, former tobacco use, no alcohol use      Review of Systems   Musculoskeletal:  Positive for back pain and gait problem.   All other systems reviewed and are negative.    Medications  Scheduled Meds:apixaban, 5 mg, Oral, Q12H  atorvastatin, 10 mg, Oral, Nightly  cholecalciferol, 2,000 Units, Oral, Daily  escitalopram, 10 mg,  Oral, Daily  ferrous sulfate, 325 mg, Oral, Daily With Breakfast  gabapentin, 600 mg, Oral, Daily  midodrine, 2.5 mg, Oral, TID AC  pantoprazole, 40 mg, Oral, Q AM  vitamin B-12, 1,000 mcg, Oral, Daily      Continuous Infusions:sodium chloride, 50 mL/hr, Last Rate: 50 mL/hr (09/11/23 1230)      PRN Meds:.  acetaminophen    Phosphorus Replacement - Follow Nurse / BPA Driven Protocol    [COMPLETED] Insert Peripheral IV **AND** sodium chloride    Vital Signs   Temp:  [97.2 °F (36.2 °C)-98.6 °F (37 °C)] 98.6 °F (37 °C)  Heart Rate:  [61-86] 86  Resp:  [16] 16  BP: ()/(45-58) 104/52    Examination:  Constitutional: Well-groomed, well-nourished  HENT:  normal  Eyes: Normal conjunctivae  CVS:  Regular rate and rhythm.    Resp :   Nonlabored respirations  Musculoskeletal:  No signs of peripheral edema, normal range, no deformities  Skin:  No rash, normal turgor  Neurologic:    Alert oriented and fluent   No ptosis   Normal neck flexion power   No facial weakness  No dysarthria  EOMF without nystagmus  Pupils symmetric and equally reactive  Face symmetric  Normal power in all extremities proximally and distally  Normal coordination  No tremor or rigidity  No bradykineisa  Reflexes symmetric and not hyperactive  Plantar responses flexor  Decreased vibratory sensation at both knees  Gait not tested  Psychiatric: No anxiety, low mood    Results Review:  Results from last 7 days   Lab Units 09/11/23  0530 09/10/23  0928 09/09/23  1125   WBC 10*3/mm3 10.82* 13.54* 17.71*   HEMOGLOBIN g/dL 10.2* 10.4* 12.7*   HEMATOCRIT % 30.7* 30.5* 37.6   PLATELETS 10*3/mm3 241 278 333        Results from last 7 days   Lab Units 09/11/23  0530 09/10/23  1617 09/10/23  0928   SODIUM mmol/L 136 139 139   POTASSIUM mmol/L 4.1 3.9 3.7   CHLORIDE mmol/L 106 110* 108*   CO2 mmol/L 24.7 21.3* 20.7*   BUN mg/dL 24* 28* 29*   CREATININE mg/dL 1.09 1.11 1.23   CALCIUM mg/dL 8.5* 8.5* 8.9   BILIRUBIN mg/dL 0.9 0.7 1.0   ALK PHOS U/L 68 71 74   ALT  (SGPT) U/L 6 9 8   AST (SGOT) U/L 10 8 8   GLUCOSE mg/dL 98 115* 119*      Lab Results   Component Value Date    DOMCOIAH73 1,623 (H) 07/15/2023     Lab Results   Component Value Date    TSH 0.503 09/10/2023     Radiology  Head CT shows no acute pathology   Images reviewed independently    Medical Decision Making and Recommendations  Gait disorder   Falling is likely multifactorial and due to orthostatic hypotension, peripheral neuropathy and lumbar spinal stenosis    Neuropathy is associated with MGUS (monoclonal gammopathy)   He has declined bone marrow biopsy in the past    There is no evidence of myasthenia gravis on exam     There is no evidence of parkinson's disease on exam    Agree with PT/OT  Recommend acute rehab evaluation    I discussed these findings and my recommendations with patient    Neurology signing off, please call if needed further.    Katie Alicia MD  09/11/23  17:51 EDT    Addendum: Discussed these findings and recommendations with daughter, BRYANNA Chaidez on Tuesday 9/12/2023.

## 2023-09-11 NOTE — PROGRESS NOTES
Name: Jonathan Trejo ADMIT: 2023   : 1935  PCP: Provider, No Known    MRN: [id number] LOS: 2 days   AGE/SEX: 88 y.o. male  ROOM: E4/     Subjective   Subjective   Feeling okay today. Only physical complaint is of pain in left buttock. He reports having a bad attitude about his health right now. Feeling discouraged. Voiding well. No LUTS. Tolerating diet. No N/V/D/abd pain. No F/C/NS. No HA or vis changes. No SOA or CP or palp. No cough.  Memory poor. Doesn't remember anything we talked about yesterday.       Objective   Objective   Vital Signs  Temp:  [97.2 °F (36.2 °C)-98.8 °F (37.1 °C)] 97.2 °F (36.2 °C)  Heart Rate:  [61-66] 66  Resp:  [16] 16  BP: ()/(45-62) 75/50  SpO2:  [95 %-98 %] 98 %  on   ;   Device (Oxygen Therapy): room air  Body mass index is 28.25 kg/m².    (No change in exam today)    Physical Exam  Vitals and nursing note reviewed.   Constitutional:       General: He is not in acute distress.     Appearance: He is ill-appearing (chronically). He is not toxic-appearing or diaphoretic.   HENT:      Head: Normocephalic.      Nose: Nose normal.      Mouth/Throat:      Mouth: Mucous membranes are moist.      Pharynx: Oropharynx is clear.   Eyes:      General: No scleral icterus.        Right eye: No discharge.         Left eye: No discharge.      Extraocular Movements: Extraocular movements intact.      Conjunctiva/sclera: Conjunctivae normal.   Cardiovascular:      Rate and Rhythm: Normal rate. Rhythm irregular.      Pulses: Normal pulses.   Pulmonary:      Effort: Pulmonary effort is normal. No respiratory distress.      Breath sounds: Normal breath sounds. No wheezing or rales.   Abdominal:      General: Bowel sounds are normal. There is no distension.      Palpations: Abdomen is soft.      Tenderness: There is no abdominal tenderness.   Musculoskeletal:         General: Swelling (trace in BLEs) present. Normal range of motion.      Cervical back: Neck supple. No rigidity.    Skin:     General: Skin is warm and dry.      Capillary Refill: Capillary refill takes less than 2 seconds.      Coloration: Skin is not jaundiced.      Findings: Bruising (left buttock) present.   Neurological:      Mental Status: He is alert. Mental status is at baseline.      Cranial Nerves: No cranial nerve deficit.      Coordination: Coordination normal.      Comments: Oriented to person   Psychiatric:         Mood and Affect: Mood normal.      Comments: Pleasant     Results Review     I reviewed the patient's new clinical results.  Results from last 7 days   Lab Units 09/11/23  0530 09/10/23  0928 09/09/23  1125   WBC 10*3/mm3 10.82* 13.54* 17.71*   HEMOGLOBIN g/dL 10.2* 10.4* 12.7*   PLATELETS 10*3/mm3 241 278 333       Results from last 7 days   Lab Units 09/11/23  0530 09/10/23  1617 09/10/23  0928 09/09/23  1125   SODIUM mmol/L 136 139 139 139   POTASSIUM mmol/L 4.1 3.9 3.7 4.6   CHLORIDE mmol/L 106 110* 108* 107   CO2 mmol/L 24.7 21.3* 20.7* 23.0   BUN mg/dL 24* 28* 29* 34*   CREATININE mg/dL 1.09 1.11 1.23 1.51*   GLUCOSE mg/dL 98 115* 119* 127*   EGFR mL/min/1.73 66.1 64.7 57.2* 44.7*       Results from last 7 days   Lab Units 09/11/23  0530 09/10/23  1617 09/10/23  0928   ALBUMIN g/dL 2.8* 3.1* 3.3*   BILIRUBIN mg/dL 0.9 0.7 1.0   ALK PHOS U/L 68 71 74   AST (SGOT) U/L 10 8 8   ALT (SGPT) U/L 6 9 8       Results from last 7 days   Lab Units 09/11/23  0530 09/10/23  1617 09/10/23  0928 09/09/23  1125   CALCIUM mg/dL 8.5* 8.5* 8.9 9.1   ALBUMIN g/dL 2.8* 3.1* 3.3*  --    MAGNESIUM mg/dL 2.1 2.0 2.0  --    PHOSPHORUS mg/dL 2.2* 2.0* 1.9*  --        Results from last 7 days   Lab Units 09/10/23  0928 09/09/23  1602 09/09/23  1233 09/09/23  1125   PROCALCITONIN ng/mL 0.08  --   --  0.08   LACTATE mmol/L  --  1.7 2.3*  --        No results found for: HGBA1C, POCGLU    XR Chest 1 View    Result Date: 9/9/2023  No focal pulmonary consolidation. Follow-up as clinical indications persist.  This report was  finalized on 9/9/2023 1:21 PM by Dr. Sami Avila M.D.       I have personally reviewed all medications:  Scheduled Medications  apixaban, 5 mg, Oral, Q12H  atorvastatin, 10 mg, Oral, Nightly  cholecalciferol, 2,000 Units, Oral, Daily  escitalopram, 10 mg, Oral, Daily  ferrous sulfate, 325 mg, Oral, Daily With Breakfast  gabapentin, 600 mg, Oral, Daily  midodrine, 2.5 mg, Oral, TID AC  pantoprazole, 40 mg, Oral, Q AM  sodium phosphate, 15 mmol, Intravenous, Once  vitamin B-12, 1,000 mcg, Oral, Daily    Infusions  sodium chloride, 50 mL/hr    Diet  Diet: Cardiac Diets; Healthy Heart (2-3 Na+); Texture: Regular Texture (IDDSI 7); Fluid Consistency: Thin (IDDSI 0)    I have personally reviewed:  [x]  Laboratory   [x]  Microbiology   []  Radiology   [x]  EKG/Telemetry  []  Cardiology/Vascular   []  Pathology    []  Records       Assessment/Plan     Active Hospital Problems    Diagnosis  POA    **Sepsis [A41.9]  Yes    Stage 3a chronic kidney disease [N18.31]  Yes    History of pulmonary embolism [Z86.711]  Yes    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     89yo gentleman with h/o atrial flutter and prior ablation, dementia, COPD, CKD3a, HLD, C-DDD, h/o myasthenia gravis, Parkinsonism, MGUS, and recent admission here in July for pulmonary embolism (on Eliquis now), who presented to ER for second time in two days with with report of falls at his assisted living facility. Found to have large left buttock hematoma as well as elevated WBC and lactate.  "He was admitted to our service with concern for sepsis. He was given a dose of Rocephin in ER and blood cultures were sent. Cardiology consulted regarding recurrent falls.    Elevated WBC and Lactate: WBC still falling nicely, LA wnl, do not suspect sepsis, blood cultures NGTD, no fever, continue to monitor off abx    Recurrent falls  Orthostatic hypotension  Neuropathy associated with MGUS  Parkinsonism  Myasthenia gravis: pt has plenty of reasons for falling, he is quite orthostatic this AM  Card has seen and recommends tx for orthostasis--have ordered Midodrine and a liter of cautious IV NS  Consulted Neuro as well--followed by Dr. Teddy Campa, for neuropathy associated with MGUS, will see if they have anything to add    Left buttock hematoma: appears contained on CT    Acute blood loss anemia: due to large left buttock hematoma and Eliquis, Hgb stable, continue to monitor    CKD3a: Cr has normalized, continue to monitor    Recent diagnosis of bilateral PE: continue Eliquis, full AC not ideal in setting of recurrent falls, but we are treating bilateral PEs so our hands are tied really    Dementia NOS: pt diagnosed with \"mild cognitive impairment\" on neuropsych testing 7 years ago, I have d/w dtr--pt has mild-moderate dementia  Neuro to see    Hypophosphatemia: replacing with protocol      Eliquis (home med) should suffice for DVT prophylaxis.  Full code.  Discussed with patient. D/w RN, CCP, and Pharm at morning huddle.  Anticipate discharge TBD, PT/OT evals pending, CCP to assist, timing yet to be determined.      Adonis Celestin MD  [facility]  09/11/23  12:28 EDT      "

## 2023-09-11 NOTE — PLAN OF CARE
Goal Outcome Evaluation:  Plan of Care Reviewed With: patient              Patient is an 88 y.o. male admitted to [facility] after a fall which resulted in L buttock hematoma on 9/9/2023. PMHx includes CKD, COPD, colon CA, dementia. Patient is ind at baseline with use of rollator and lives in ILF. Today, patient performed bed mobility with Reagan, required min-modA for transfers, and took sidesteps at EOB requiring minAx2. Pt with R lateral lean in sitting and standing with VC to correct. Pt required CGA-Reagan for sitting balance. Strength, activity tolerance, balance, and postural control deficits noted. Patient may benefit from skilled PT services to address functional deficits and improve level of independence prior to discharge. Anticipate SNF upon DC.      Anticipated Discharge Disposition (PT): sub acute care setting, skilled nursing facility (possible need to transition to [facility] after SNF stay)   Private car

## 2023-09-11 NOTE — THERAPY EVALUATION
Patient Name: Jonathan Trejo  : 1935    MRN: [id number]                              Today's Date: 2023       Admit Date: 2023    Visit Dx:     ICD-10-CM ICD-9-CM   1. Sepsis, due to unspecified organism, unspecified whether acute organ dysfunction present  A41.9 038.9     995.91   2. Frequent falls  R29.6 V15.88   3. Hematoma  T14.8XXA 924.9     Patient Active Problem List   Diagnosis    Benign prostatic hyperplasia with urinary obstruction    Chronic renal impairment, stage 3 (moderate)    Depression    Gastroesophageal reflux disease with esophagitis    Hyperlipidemia    Nocturia    Obesity (BMI 30-39.9)    Osteopenia    Osteoporosis    Seborrhea    Abnormal EKG    Bradycardia    Degenerative disc disease, cervical    Typical atrial flutter    Lumbar radiculopathy    AVNRT (AV tarun re-entry tachycardia)    S/P ablation of atrial flutter    S/P catheter ablation of slow pathway    Cardiomyopathy    GARCIA (dyspnea on exertion)    Urinary tract infection without hematuria    Right foot drop    Frequent falls    Metabolic encephalopathy    Acute renal failure    Other secondary parkinsonism    Moderate single current episode of major depressive disorder    Mass of left lower leg    Pulmonary emphysema    Monoclonal gammopathy of unknown significance (MGUS)    Myasthenia gravis without exacerbation    Displacement of lumbar intervertebral disc without myelopathy    Acute anemia    Closed compression fracture of L3 lumbar vertebra, initial encounter    Closed compression fracture of L4 lumbar vertebra, initial encounter    Pulmonary embolus    Sepsis    Stage 3a chronic kidney disease    History of pulmonary embolism    Hypophosphatemia    Neuropathy associated with MGUS     Past Medical History:   Diagnosis Date    Abnormal electrocardiogram     Arm pain     Atrial fibrillation     Atrial flutter     BPH (benign prostatic hyperplasia)     BPH associated with nocturia     Chronic kidney disease     Colon  cancer     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    Colon polyp 11/22/2015    Depression     Dyspnea     Enlarged prostate without lower urinary tract symptoms (luts)     Episode of generalized weakness     knees were weak - had to be helped by wife to sit down    Esophagitis, reflux     Fatigue     Fracture of ankle     right    GERD (gastroesophageal reflux disease)     Hyperlipidemia     Inguinal hernia     Loss of hearing     Lumbar radiculopathy     Obesity     Osteoarthritis cervical spine     doc on Sray 08/16    Osteopenia     Osteoporosis     Overweight     Tobacco dependence in remission     Urge incontinence of urine     Vitamin D deficiency     Wheezing      Past Surgical History:   Procedure Laterality Date    CARDIAC CATHETERIZATION N/A 7/27/2017    Procedure: Valve assessment;  Surgeon: Adonis Solis MD;  Location: [facility] CATH INVASIVE LOCATION;  Service:     CARDIAC CATHETERIZATION N/A 10/1/2018    Procedure: Left Heart Cath;  Surgeon: Bogdan Vargas MD;  Location: [facility] CATH INVASIVE LOCATION;  Service: Cardiology    CARDIAC CATHETERIZATION N/A 10/1/2018    Procedure: Coronary angiography;  Surgeon: Bogdan Vargas MD;  Location: [facility] CATH INVASIVE LOCATION;  Service: Cardiology    CARDIAC CATHETERIZATION N/A 10/1/2018    Procedure: Left ventriculography;  Surgeon: Bogdan Vargas MD;  Location: [facility] CATH INVASIVE LOCATION;  Service: Cardiology    CARDIAC CATHETERIZATION N/A 10/1/2018    Procedure: Right Heart Cath;  Surgeon: Bogdan Vargas MD;  Location: [facility] CATH INVASIVE LOCATION;  Service: Cardiology    CARDIAC ELECTROPHYSIOLOGY PROCEDURE N/A 7/27/2017    Procedure: Ablation atrial flutter Will need to have 3 -4 weeks of therapeutic INR's ;  Surgeon: Adonis Solis MD;  Location: [facility] CATH INVASIVE LOCATION;  Service:     CARDIOVERSION      CATARACT EXTRACTION W/ INTRAOCULAR LENS  IMPLANT, BILATERAL      COLON RESECTION      COLON SURGERY      polyp removed     COLONOSCOPY      07/15 redo 5 years  Segun    KNEE SURGERY      benign tumor removed, age 6    LASIK      LUMBAR EPIDURAL INJECTION N/A 5/25/2022    Procedure: LUMBAR EPIDURAL 1ST VISIT L3-4 (right of midline);  Surgeon: Bonnie Brown MD;  Location: [facility];  Service: Pain Management;  Laterality: N/A;    REFRACTIVE SURGERY  2007    REPAIR PERONEAL TENDONS ANKLE W/ FIBULAR OSTEOTOMY Right 03/2013    Dr. Banks      General Information       Row Name 09/11/23 1343          OT Time and Intention    Document Type evaluation  -     Mode of Treatment co-treatment;physical therapy;occupational therapy  -       Row Name 09/11/23 1343          General Information    Patient Profile Reviewed yes  -     Prior Level of Function independent:;ADL's;gait  rollator  -     Existing Precautions/Restrictions fall  -     Barriers to Rehab none identified  -       Row Name 09/11/23 1343          Living Environment    People in Home alone  independent living  -       Row Name 09/11/23 1343          Home Main Entrance    Number of Stairs, Main Entrance --  elevator  -       Row Name 09/11/23 1343          Cognition    Orientation Status (Cognition) oriented x 3  -       Row Name 09/11/23 1343          Safety Issues, Functional Mobility    Impairments Affecting Function (Mobility) balance;endurance/activity tolerance;postural/trunk control;strength  -               User Key  (r) = Recorded By, (t) = Taken By, (c) = Cosigned By      Initials Name Provider Type    Zina Alcantara, OT Occupational Therapist                     Mobility/ADL's       Row Name 09/11/23 1344          Bed Mobility    Bed Mobility sit-supine  -     Sit-Supine [place] (Bed Mobility) minimum assist (75% patient effort);verbal cues  -     Assistive Device (Bed Mobility) leg   -     Comment, (Bed Mobility) EOB with RN upon arrival  -       Row Name 09/11/23 1344          Transfers    Transfers toilet transfer  -        [facility] Name 09/11/23 1344          Sit-Stand Transfer    Sit-Stand [place] (Transfers) minimum assist (75% patient effort);moderate assist (50% patient effort);2 person assist;verbal cues  -     Assistive Device (Sit-Stand Transfers) --  HHA  -     Comment, (Sit-Stand Transfer) x3 from EOB to work on toileting needs. Pt demo R lateral lean with standing and required VC to correct. Improved on 3rd STS  -[facility] 09/11/23 1344          Functional Mobility    Functional Mobility- Comment Steps to HOB. Deferred further due to low BP  -KA       Row Name 09/11/23 1344          Activities of Daily Living    BADL Assessment/Intervention lower body dressing;toileting  -KA       Row Name 09/11/23 1344          Lower Body Dressing Assessment/Training    [place] Level (Lower Body Dressing) lower body dressing skills;doff;don;socks;maximum assist (25% patient effort)  -KA       Row Name 09/11/23 1344          Toileting Assessment/Training    [place] Level (Toileting) toileting skills;moderate assist (50% patient effort);adjust/manage clothing;change pad/brief;perform perineal hygiene  -     Position (Toileting) supported sitting;supported standing  -     Comment, (Toileting) Able to perform posterior hygiene while standing with assist for balance. Mod A for management of brief  -               User Key  (r) = Recorded By, (t) = Taken By, (c) = Cosigned By      Initials Name Provider Type    Zina Alcantara OT Occupational Therapist                   Obj/Interventions       Row Name 09/11/23 1346          Sensory Assessment (Somatosensory)    Sensory Assessment (Somatosensory) sensation intact  -KA       Row Name 09/11/23 1346          Range of Motion Comprehensive    General Range of Motion bilateral upper extremity ROM WFL  -KA       Row Name 09/11/23 1346          Strength Comprehensive (MMT)    General Manual Muscle Testing (MMT) Assessment upper extremity strength deficits identified   -[facility] Name 09/11/23 1346          Balance    Static Sitting Balance contact guard;minimal assist  R lateral lean at times  -KA     Dynamic Sitting Balance contact guard;minimal assist  -KA     Position, Sitting Balance sitting edge of bed  -     Static Standing Balance minimal assist;moderate assist;1-person assist;2-person assist;verbal cues  -KA     Dynamic Standing Balance minimal assist;moderate assist;1-person assist;2-person assist  -KA     Position/Device Used, Standing Balance --  HHA  -               User Key  (r) = Recorded By, (t) = Taken By, (c) = Cosigned By      Initials Name Provider Type    Zina Alcantara, OT Occupational Therapist                   Goals/Plan       Row Name 09/11/23 1350          Transfer Goal 1 (OT)    Activity/Assistive Device (Transfer Goal 1, OT) transfers, all  -KA     [place] Level/Cues Needed (Transfer Goal 1, OT) minimum assist (75% or more patient effort)  -KA     Time Frame (Transfer Goal 1, OT) short term goal (STG);2 weeks  -KA     Progress/Outcome (Transfer Goal 1, OT) goal ongoing  -KA       Row Name 09/11/23 1350          Bathing Goal 1 (OT)    Activity/Device (Bathing Goal 1, OT) bathing skills, all  -KA     [place] Level/Cues Needed (Bathing Goal 1, OT) contact guard required  -KA     Time Frame (Bathing Goal 1, OT) short term goal (STG);2 weeks  -KA     Progress/Outcomes (Bathing Goal 1, OT) goal ongoing  -KA       Row Name 09/11/23 1350          Toileting Goal 1 (OT)    Activity/Device (Toileting Goal 1, OT) toileting skills, all  -KA     [place] Level/Cues Needed (Toileting Goal 1, OT) minimum assist (75% or more patient effort)  -KA     Time Frame (Toileting Goal 1, OT) short term goal (STG);2 weeks  -KA     Progress/Outcome (Toileting Goal 1, OT) goal ongoing  -KA       Row Name 09/11/23 1350          Therapy Assessment/Plan (OT)    Planned Therapy Interventions (OT) activity tolerance training;BADL retraining;ROM/therapeutic  exercise;strengthening exercise;transfer/mobility retraining;functional balance retraining;patient/caregiver education/training;occupation/activity based interventions  -               User Key  (r) = Recorded By, (t) = Taken By, (c) = Cosigned By      Initials Name Provider Type    Zina Alcantara, EVAN Occupational Therapist                   Clinical Impression       Row Name 09/11/23 1346          Pain Assessment    Pretreatment Pain Rating 0/10 - no pain  -     Posttreatment Pain Rating 0/10 - no pain  -       Row Name 09/11/23 1346          Plan of Care Review    Plan of Care Reviewed With patient  -     Outcome Evaluation Pt seen for OT eval this AM. Admitted after fall resulting in L buttock hematoma. Pt reports he is independent at baseline and uses a rollator. He lives in an ILF and reports difficulty with long distances recently. He performed bed mobility with min A and stood with min-mod A. pt with R lateral lean noted throughout. Improved on 3rd STS. Pt performed posterior hygiene with assist for balance and required mod A for brief management. Pt presents with decreased strength, balance, endurance and postrual control. Pt to benefit from skilled OT to address deficits. Rec SNF at [place].  -       Row Name 09/11/23 1346          Therapy Assessment/Plan (OT)    Rehab Potential (OT) good, to achieve stated therapy goals  -     Criteria for Skilled Therapeutic Interventions Met (OT) yes  -     Therapy Frequency (OT) 5 times/wk  -       Row Name 09/11/23 1346          Therapy Plan Review/Discharge Plan (OT)    Anticipated Discharge Disposition (OT) skilled nursing facility  -       Row Name 09/11/23 1346          Vital Signs    Pre Patient Position Sitting  -     Intra Patient Position Standing  -KA     Post Patient Position Supine  -[facility] Name 09/11/23 1346          Positioning and Restraints    Pre-Treatment Position in bed  -KA     Post Treatment Position bed  -KA     In Bed  call light within reach;encouraged to call for assist;supine;notified nsg  RN notified pt's bed would not set alarm although pt was centered in bed  -KA               User Key  (r) = Recorded By, (t) = Taken By, (c) = Cosigned By      Initials Name Provider Type    Zina Alcantara OT Occupational Therapist                   Outcome Measures       Row Name 09/11/23 1353          How much help from another is currently needed...    Putting on and taking off regular lower body clothing? 1  -KA     Bathing (including washing, rinsing, and drying) 2  -KA     Toileting (which includes using toilet bed pan or urinal) 3  -KA     Putting on and taking off regular upper body clothing 3  -KA     Taking care of personal grooming (such as brushing teeth) 3  -KA     Eating meals 3  -KA     AM-PAC 6 Clicks Score (OT) 15  -KA       Row Name 09/11/23 1316          How much help from another person do you currently need...    Turning from your back to your side while in flat bed without using bedrails? 3  -CB     Moving from lying on back to sitting on the side of a flat bed without bedrails? 3  -CB     Moving to and from a bed to a chair (including a wheelchair)? 2  -CB     Standing up from a chair using your arms (e.g., wheelchair, bedside chair)? 2  -CB     Climbing 3-5 steps with a railing? 1  -CB     To walk in hospital room? 1  -CB     AM-PAC 6 Clicks Score (PT) 12  -CB     Highest level of mobility 4 --> Transferred to chair/commode  -CB       Row Name 09/11/23 1353 09/11/23 1316       Functional Assessment    Outcome Measure Options AM-PAC 6 Clicks Daily Activity (OT)  -KA AM-PAC 6 Clicks Basic Mobility (PT)  -CB              User Key  (r) = Recorded By, (t) = Taken By, (c) = Cosigned By      Initials Name Provider Type    Kelly Moore, PT Physical Therapist    Zina Alcantara OT Occupational Therapist                    Occupational Therapy Education       Title: PT OT SLP Therapies (In Progress)       Topic:  Occupational Therapy (Done)       Point: ADL training (Done)       Description:   Instruct learner(s) on proper safety adaptation and remediation techniques during self care or transfers.   Instruct in proper use of assistive devices.                  Learning Progress Summary             Patient Acceptance, E, DU,VU by OK at 9/11/2023 1353                         Point: Home exercise program (Done)       Description:   Instruct learner(s) on appropriate technique for monitoring, assisting and/or progressing therapeutic exercises/activities.                  Learning Progress Summary             Patient Acceptance, E, DU,VU by OK at 9/11/2023 1353                         Point: Precautions (Done)       Description:   Instruct learner(s) on prescribed precautions during self-care and functional transfers.                  Learning Progress Summary             Patient Acceptance, E, DU,VU by OK at 9/11/2023 1353                                         User Key       Initials Effective Dates Name Provider Type Discipline    OK 09/22/22 -  Zina Young OT Occupational Therapist OT                  OT Recommendation and Plan  Planned Therapy Interventions (OT): activity tolerance training, BADL retraining, ROM/therapeutic exercise, strengthening exercise, transfer/mobility retraining, functional balance retraining, patient/caregiver education/training, occupation/activity based interventions  Therapy Frequency (OT): 5 times/wk  Plan of Care Review  Plan of Care Reviewed With: patient  Outcome Evaluation: Pt seen for OT eval this AM. Admitted after fall resulting in L buttock hematoma. Pt reports he is independent at baseline and uses a rollator. He lives in an [facility] and reports difficulty with long distances recently. He performed bed mobility with min A and stood with min-mod A. pt with R lateral lean noted throughout. Improved on 3rd STS. Pt performed posterior hygiene with assist for balance and required mod A for  brief management. Pt presents with decreased strength, balance, endurance and postrual control. Pt to benefit from skilled OT to address deficits. Rec SNF at [place].     Time Calculation:   Evaluation Complexity (OT)  Review Occupational Profile/Medical/Therapy History Complexity: expanded/moderate complexity  Assessment, Occupational Performance/Identification of Deficit Complexity: 3-5 performance deficits  Clinical Decision Making Complexity (OT): detailed assessment/moderate complexity  Overall Complexity of Evaluation (OT): moderate complexity     Time Calculation- OT       Row Name 09/11/23 1354             Time Calculation- OT    OT Start Time 1133  -KA      OT Stop Time 1150  -KA      OT Time Calculation (min) 17 min  -KA      Total Timed Code Minutes- OT 10 minute(s)  -KA      OT Received On 09/11/23  -KA      OT - Next Appointment 09/12/23  -KA      OT Goal Re-Cert Due Date 09/25/23  -KA         Timed Charges    63703 - OT Self Care/Mgmt Minutes 10  -KA         Total Minutes    Timed Charges Total Minutes 10  -KA       Total Minutes 10  -KA                User Key  (r) = Recorded By, (t) = Taken By, (c) = Cosigned By      Initials Name Provider Type     Zina Young OT Occupational Therapist                  Therapy Charges for Today       Code Description Service Date Service Provider Modifiers Qty    03685879963 HC OT SELF CARE/MGMT/TRAIN EA 15 MIN 9/11/2023 Zina Young OT GO 1    69680540309 HC OT EVAL MOD COMPLEXITY 2 9/11/2023 Zina Young OT GO 1                 Zina Young OT  9/11/2023

## 2023-09-11 NOTE — PLAN OF CARE
Goal Outcome Evaluation:  Plan of Care Reviewed With: patient           Outcome Evaluation: Pt seen for OT eval this AM. Admitted after fall resulting in L buttock hematoma. Pt reports he is independent at baseline and uses a rollator. He lives in an ILF and reports difficulty with long distances recently. He performed bed mobility with min A and stood with min-mod A. pt with R lateral lean noted throughout. Improved on 3rd STS. Pt performed posterior hygiene with assist for balance and required mod A for brief management. Pt presents with decreased strength, balance, endurance and postrual control. Pt to benefit from skilled OT to address deficits. Rec SNF at [place].      Anticipated Discharge Disposition (OT): skilled nursing facility

## 2023-09-11 NOTE — DISCHARGE PLACEMENT REQUEST
"Jonathan Trejo (88 y.o. Male)       Date of Birth   1935    Social Security Number       Address   [address] [place] [address]    Home Phone   [phone]    MRN   [id number]       Gnosticist   [employer]ar[employer]    Marital Status                               Admission Date   9/9/23    Admission Type   Emergency    Admitting Provider   Cash Romeo MD    Attending Provider   Adonis Celestin MD    Department, Room/Bed   [address], [address]       Discharge Date       Discharge Disposition       Discharge Destination                                 Attending Provider: Adonis Celestin MD    Allergies: No Known Allergies    Isolation: None   Infection: None   Code Status: CPR    Ht: 167.6 cm (66\")   Wt: 79.4 kg (175 lb)    Admission Cmt: None   Principal Problem: Sepsis [A41.9]                   Active Insurance as of 9/9/2023       Primary Coverage       Payor Plan Insurance Group Employer/Plan Group    MEDICARE MEDICARE A & B        Payor Plan Address Payor Plan Phone Number Payor Plan Fax Number Effective Dates    [address] [phone]  3/1/2002 - None Entered    [facility] [address]         Subscriber Name Subscriber Birth Date Member ID       JONATHAN TREJO 1935 [id number]               Secondary Coverage       Payor Plan Insurance Group Employer/Plan Group    AARP  SUP AARP HEALTH CARE OPTIONS        Payor Plan Address Payor Plan Phone Number Payor Plan Fax Number Effective Dates    [facility] [phone]  1/1/2016 - None Entered    [address]       [facility] [address]         Subscriber Name Subscriber Birth Date Member ID       JONATHAN TREJO 1935 [id number]                     Emergency Contacts        (Rel.) Home Phone Work Phone Mobile Phone    YoKaylynn hernandez (Daughter) [phone] -- [phone]    Terrence Davenport (Friend) -- -- [phone]                "

## 2023-09-11 NOTE — CASE MANAGEMENT/SOCIAL WORK
Discharge Planning Assessment  [facility]     Patient Name: Jonathan Trejo  MRN: [id number]  Today's Date: 9/11/2023    Admit Date: 9/9/2023    Plan: Rehab referral pending   Discharge Needs Assessment       Row Name 09/11/23 1320       Living Environment    People in Home alone    Current Living Arrangements independent living facility    Potentially Unsafe Housing Conditions none    Primary Care Provided by self    Provides Primary Care For no one    Family Caregiver if Needed child(hannah), adult    Family Caregiver Names Kaylynn Armstrong [phone]    Quality of Family Relationships helpful;involved;supportive    Able to Return to Prior Arrangements yes       Resource/Environmental Concerns    Resource/Environmental Concerns none       Transition Planning    Patient/Family Anticipates Transition to home    Patient/Family Anticipated Services at Transition none    Transportation Anticipated family or friend will provide       Discharge Needs Assessment    Equipment Currently Used at Home rollator    Concerns to be Addressed discharge planning                   Discharge Plan       Row Name 09/11/23 4910       Plan    Plan Rehab referral pending    Patient/Family in Agreement with Plan yes    Plan Comments CCP spoke with patient's daughter, Kaylynn, via phone; CCP role explained, face sheet verified, and discharge plan discussed. Patient resides at [facility]. Patient is independent with ADLs at baseline and uses a rollator. Daughter denies use of any other DME. Confirms pharmacy is [facility] on [facility] and PCP is Marychuy Johnson through [facility]. Patient has used [facility]  in the past and has been to [facility] SNF in the past. Patient ambulated 4 steps with PT; PT recommending SNF. Daughter states patient was recently at \[facility]\"" rehab in [place] in July and requested referral sent to \[facility]\"" (pending). Daughter may be able to transport at discharge. CCP to  follow. Quentin LOCKWOOD LCSW                  Continued Care and Services - Admitted Since 9/9/2023    Coordination has not been started for this encounter.       Selected Continued Care - Prior Encounters Includes continued care and service providers with selected services from prior encounters from 6/11/2023 to 9/11/2023      Discharged on 7/17/2023 Admission date: 7/13/2023 - Discharge disposition: Home-Health Care [facility]      Home Medical Care       Service Provider Selected Services Address Phone Fax Patient Preferred    [facility] [address] [place] [address], [address], [facility] [address] [phone] --                             Demographic Summary       Row Name 09/11/23 1319       General Information    Admission Type inpatient    Arrived From home    Reason for Consult discharge planning    Preferred Language English                   Functional Status       Row Name 09/11/23 1319       Functional Status    Usual Activity Tolerance moderate    Current Activity Tolerance moderate       Functional Status, IADL    Medications independent    Meal Preparation independent    Housekeeping independent    Laundry independent    Shopping independent       Mental Status    General Appearance WDL WDL                   Psychosocial    No documentation.                  Abuse/Neglect    No documentation.                  Legal    No documentation.                  Substance Abuse    No documentation.                  Patient Forms    No documentation.                     Quentin LOCKWOOD LCSW

## 2023-09-11 NOTE — THERAPY EVALUATION
Patient Name: Jonathan Trejo  : 1935    MRN: [id number]                              Today's Date: 2023       Admit Date: 2023    Visit Dx:     ICD-10-CM ICD-9-CM   1. Sepsis, due to unspecified organism, unspecified whether acute organ dysfunction present  A41.9 038.9     995.91   2. Frequent falls  R29.6 V15.88   3. Hematoma  T14.8XXA 924.9     Patient Active Problem List   Diagnosis    Benign prostatic hyperplasia with urinary obstruction    Chronic renal impairment, stage 3 (moderate)    Depression    Gastroesophageal reflux disease with esophagitis    Hyperlipidemia    Nocturia    Obesity (BMI 30-39.9)    Osteopenia    Osteoporosis    Seborrhea    Abnormal EKG    Bradycardia    Degenerative disc disease, cervical    Typical atrial flutter    Lumbar radiculopathy    AVNRT (AV tarun re-entry tachycardia)    S/P ablation of atrial flutter    S/P catheter ablation of slow pathway    Cardiomyopathy    GARCIA (dyspnea on exertion)    Urinary tract infection without hematuria    Right foot drop    Frequent falls    Metabolic encephalopathy    Acute renal failure    Other secondary parkinsonism    Moderate single current episode of major depressive disorder    Mass of left lower leg    Pulmonary emphysema    Monoclonal gammopathy of unknown significance (MGUS)    Myasthenia gravis without exacerbation    Displacement of lumbar intervertebral disc without myelopathy    Acute anemia    Closed compression fracture of L3 lumbar vertebra, initial encounter    Closed compression fracture of L4 lumbar vertebra, initial encounter    Pulmonary embolus    Sepsis    Stage 3a chronic kidney disease    History of pulmonary embolism    Hypophosphatemia    Neuropathy associated with MGUS     Past Medical History:   Diagnosis Date    Abnormal electrocardiogram     Arm pain     Atrial fibrillation     Atrial flutter     BPH (benign prostatic hyperplasia)     BPH associated with nocturia     Chronic kidney disease     Colon  cancer     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    Colon polyp 11/22/2015    Depression     Dyspnea     Enlarged prostate without lower urinary tract symptoms (luts)     Episode of generalized weakness     knees were weak - had to be helped by wife to sit down    Esophagitis, reflux     Fatigue     Fracture of ankle     right    GERD (gastroesophageal reflux disease)     Hyperlipidemia     Inguinal hernia     Loss of hearing     Lumbar radiculopathy     Obesity     Osteoarthritis cervical spine     doc on Sray 08/16    Osteopenia     Osteoporosis     Overweight     Tobacco dependence in remission     Urge incontinence of urine     Vitamin D deficiency     Wheezing      Past Surgical History:   Procedure Laterality Date    CARDIAC CATHETERIZATION N/A 7/27/2017    Procedure: Valve assessment;  Surgeon: Adonis Solis MD;  [facility];  Service:     CARDIAC CATHETERIZATION N/A 10/1/2018    Procedure: Left Heart Cath;  Surgeon: Bogdan Vargas MD;  [facility];  Service: Cardiology    CARDIAC CATHETERIZATION N/A 10/1/2018    Procedure: Coronary angiography;  Surgeon: Bogdan Vargas MD;  [facility];  Service: Cardiology    CARDIAC CATHETERIZATION N/A 10/1/2018    Procedure: Left ventriculography;  Surgeon: Bogdan Vargas MD;  Location: [facility] CATH INVASIVE LOCATION;  Service: Cardiology    CARDIAC CATHETERIZATION N/A 10/1/2018    Procedure: Right Heart Cath;  Surgeon: Bogdan Vargas MD;  Location: [facility] CATH INVASIVE LOCATION;  Service: Cardiology    CARDIAC ELECTROPHYSIOLOGY PROCEDURE N/A 7/27/2017    Procedure: Ablation atrial flutter Will need to have 3 -4 weeks of therapeutic INR's ;  Surgeon: Adonis Solis MD;  Location: [facility] CATH INVASIVE LOCATION;  Service:     CARDIOVERSION      CATARACT EXTRACTION W/ INTRAOCULAR LENS  IMPLANT, BILATERAL      COLON RESECTION      COLON SURGERY      polyp removed     COLONOSCOPY      07/15 redo 5 years  Segun    KNEE SURGERY      benign tumor removed, age 6    LASIK      LUMBAR EPIDURAL INJECTION N/A 5/25/2022    Procedure: LUMBAR EPIDURAL 1ST VISIT L3-4 (right of midline);  Surgeon: Bonnie Brown MD;  Location: [facility] MAIN OR;  Service: Pain Management;  Laterality: N/A;    REFRACTIVE SURGERY  2007    REPAIR PERONEAL TENDONS ANKLE W/ FIBULAR OSTEOTOMY Right 03/2013    Dr. Banks      General Information       Row Name 09/11/23 1310          Physical Therapy Time and Intention    Document Type evaluation  -CB     Mode of Treatment co-treatment;physical therapy;occupational therapy  -CB       Row Name 09/11/23 1310          General Information    Patient Profile Reviewed yes  -CB     Prior Level of Function independent:;gait;transfer;bed mobility  rollator at baseline  -CB     Existing Precautions/Restrictions fall  -CB     Barriers to Rehab none identified  -CB       Row Name 09/11/23 1310          Living Environment    People in Home alone  ILF at [facility]  -CB       Row Name 09/11/23 1310          Home Main Entrance    Number of Stairs, Main Entrance none  elevator  -CB       Row Name 09/11/23 1310          Cognition    Orientation Status (Cognition) oriented x 3  -CB       Row Name 09/11/23 1310          Safety Issues, Functional Mobility    Impairments Affecting Function (Mobility) balance;endurance/activity tolerance;postural/trunk control;strength  -CB               User Key  (r) = Recorded By, (t) = Taken By, (c) = Cosigned By      Initials Name Provider Type    CB Kelly Littlejohn PT Physical Therapist                   Mobility       Row Name 09/11/23 1311          Bed Mobility    Bed Mobility sit-supine  -CB     Sit-Supine Hanson (Bed Mobility) minimum assist (75% patient effort);verbal cues  -CB     Assistive Device (Bed Mobility) leg   -CB     Comment, (Bed Mobility) sitting EOB with RN upon arrival  -CB       Row Name 09/11/23 1311          Sit-Stand  Transfer    Sit-Stand Callahan (Transfers) minimum assist (75% patient effort);moderate assist (50% patient effort);2 person assist;verbal cues  -CB     Assistive Device (Sit-Stand Transfers) --  HHA  -CB     Comment, (Sit-Stand Transfer) x3 STS with R lateral lean in sitting and standing with VC to correct  -CB       Row Name 09/11/23 1311          Gait/Stairs (Locomotion)    Callahan Level (Gait) minimum assist (75% patient effort);2 person assist;verbal cues  -CB     Assistive Device (Gait) --  B HHA  -CB     Distance in Feet (Gait) 4 sidesteps to HOB  -CB     Deviations/Abnormal Patterns (Gait) gait speed decreased;stride length decreased;ирина decreased  -CB     Right Sided Gait Deviations leans right  -CB     Comment, (Gait/Stairs) cues for midline posture  -CB               User Key  (r) = Recorded By, (t) = Taken By, (c) = Cosigned By      Initials Name Provider Type    Kelly Moore, PT Physical Therapist                   Obj/Interventions       Row Name 09/11/23 1313          Range of Motion Comprehensive    General Range of Motion bilateral lower extremity ROM WFL  -CB       Row Name 09/11/23 1313          Strength Comprehensive (MMT)    General Manual Muscle Testing (MMT) Assessment lower extremity strength deficits identified  -CB       Row Name 09/11/23 1313          Balance    Balance Assessment standing static balance;standing dynamic balance;sitting dynamic balance;sitting static balance  -CB     Static Sitting Balance contact guard;minimal assist;verbal cues  -CB     Dynamic Sitting Balance contact guard;minimal assist;verbal cues  -CB     Position, Sitting Balance sitting edge of bed  R lateral lean  -CB     Static Standing Balance minimal assist;moderate assist;verbal cues;1-person assist;2-person assist  -CB     Dynamic Standing Balance minimal assist;moderate assist;verbal cues;1-person assist;2-person assist  -CB     Position/Device Used, Standing Balance supported  HHA  -CB      Balance Interventions sitting;standing;sit to stand;supported;static;dynamic;minimal challenge  -CB       Row Name 09/11/23 1313          Sensory Assessment (Somatosensory)    Sensory Assessment (Somatosensory) sensation intact  -CB               User Key  (r) = Recorded By, (t) = Taken By, (c) = Cosigned By      Initials Name Provider Type    CB Kelly Littlejohn, PT Physical Therapist                   Goals/Plan       Row Name 09/11/23 1315          Bed Mobility Goal 1 (PT)    Activity/Assistive Device (Bed Mobility Goal 1, PT) bed mobility activities, all  -CB     Green Level/Cues Needed (Bed Mobility Goal 1, PT) standby assist  -CB     Time Frame (Bed Mobility Goal 1, PT) long term goal (LTG);1 week  -CB       Row Name 09/11/23 1315          Transfer Goal 1 (PT)    Activity/Assistive Device (Transfer Goal 1, PT) sit-to-stand/stand-to-sit;bed-to-chair/chair-to-bed;walker, rolling  -CB     Green Level/Cues Needed (Transfer Goal 1, PT) contact guard required  -CB     Time Frame (Transfer Goal 1, PT) long term goal (LTG);1 week  -CB       Row Name 09/11/23 1315          Gait Training Goal 1 (PT)    Activity/Assistive Device (Gait Training Goal 1, PT) gait (walking locomotion);assistive device use;improve balance and speed;increase endurance/gait distance;decrease fall risk;diminish gait deviation;walker, rolling  -CB     Green Level (Gait Training Goal 1, PT) verbal cues required;minimum assist (75% or more patient effort)  -CB     Distance (Gait Training Goal 1, PT) 50ft  -CB     Time Frame (Gait Training Goal 1, PT) long term goal (LTG);1 week  -CB       Row Name 09/11/23 1315          Therapy Assessment/Plan (PT)    Planned Therapy Interventions (PT) balance training;bed mobility training;gait training;home exercise program;patient/family education;transfer training;strengthening  -CB               User Key  (r) = Recorded By, (t) = Taken By, (c) = Cosigned By      Initials Name Provider Type     CB Kelly Littlejohn, PT Physical Therapist                   Clinical Impression       Row Name 09/11/23 1314          Pain    Pre/Posttreatment Pain Comment pt does not report pain this date  -CB       Row Name 09/11/23 1314          Plan of Care Review    Plan of Care Reviewed With patient  -CB     Outcome Evaluation Patient is an 88 y.o. male admitted to [facility] after a fall which resulted in L buttock hematoma on 9/9/2023. PMHx includes CKD, COPD, colon CA, dementia. Patient is ind at baseline with use of rollator and lives in ILF. Today, patient performed bed mobility with Reagan, required min-modA for transfers, and took sidesteps at EOB requiring minAx2. Pt with R lateral lean in sitting and standing with VC to correct. Pt required CGA-Reagan for sitting balance. Strength, activity tolerance, balance, and postural control deficits noted. Patient may benefit from skilled PT services to address functional deficits and improve level of independence prior to discharge. Anticipate SNF upon DC.  -CB       Row Name 09/11/23 1314          Therapy Assessment/Plan (PT)    Rehab Potential (PT) good, to achieve stated therapy goals  -CB     Criteria for Skilled Interventions Met (PT) yes  -CB     Therapy Frequency (PT) 5 times/wk  -CB       Row Name 09/11/23 1314          Vital Signs    Recovery Time see RN charting for orthostatics  -CB       Row Name 09/11/23 1314          Positioning and Restraints    Pre-Treatment Position in bed  -CB     Post Treatment Position bed  -CB     In Bed notified nsg;fowlers;call light within reach;encouraged to call for assist;side rails up x3  exit alarm would not set even when pt was centered in bed. RN notified.  -CB               User Key  (r) = Recorded By, (t) = Taken By, (c) = Cosigned By      Initials Name Provider Type    Kelly Moore, PT Physical Therapist                   Outcome Measures       Row Name 09/11/23 1316          How much help from another person do you currently  need...    Turning from your back to your side while in flat bed without using bedrails? 3  -CB     Moving from lying on back to sitting on the side of a flat bed without bedrails? 3  -CB     Moving to and from a bed to a chair (including a wheelchair)? 2  -CB     Standing up from a chair using your arms (e.g., wheelchair, bedside chair)? 2  -CB     Climbing 3-5 steps with a railing? 1  -CB     To walk in hospital room? 1  -CB     AM-PAC 6 Clicks Score (PT) 12  -CB     Highest level of mobility 4 --> Transferred to chair/commode  -CB       Row Name 09/11/23 1316          Functional Assessment    Outcome Measure Options AM-PAC 6 Clicks Basic Mobility (PT)  -CB               User Key  (r) = Recorded By, (t) = Taken By, (c) = Cosigned By      Initials Name Provider Type    CB Kelly Littlejohn, PT Physical Therapist                                 Physical Therapy Education       Title: PT OT SLP Therapies (In Progress)       Topic: Physical Therapy (In Progress)       Point: Mobility training (Done)       Learning Progress Summary             Patient Acceptance, E,TB, VU,NR by CB at 9/11/2023 1316                         Point: Home exercise program (Not Started)       Learner Progress:  Not documented in this visit.              Point: Body mechanics (Done)       Learning Progress Summary             Patient Acceptance, E,TB, VU,NR by CB at 9/11/2023 1316                         Point: Precautions (Done)       Learning Progress Summary             Patient Acceptance, E,TB, VU,NR by CB at 9/11/2023 1316                                         User Key       Initials Effective Dates Name Provider Type Discipline     10/22/21 -  Kelly Littlejohn, ARNAV Physical Therapist PT                  PT Recommendation and Plan  Planned Therapy Interventions (PT): balance training, bed mobility training, gait training, home exercise program, patient/family education, transfer training, strengthening  Plan of Care Reviewed With:  patient  Outcome Evaluation: Patient is an 88 y.o. male admitted to [facility] after a fall which resulted in L buttock hematoma on 9/9/2023. PMHx includes CKD, COPD, colon CA, dementia. Patient is ind at baseline with use of rollator and lives in ILF. Today, patient performed bed mobility with Reagan, required min-modA for transfers, and took sidesteps at EOB requiring minAx2. Pt with R lateral lean in sitting and standing with VC to correct. Pt required CGA-Reagan for sitting balance. Strength, activity tolerance, balance, and postural control deficits noted. Patient may benefit from skilled PT services to address functional deficits and improve level of independence prior to discharge. Anticipate SNF upon DC.     Time Calculation:         PT Charges       Row Name 09/11/23 1319             Time Calculation    Start Time 1133  -CB      Stop Time 1150  -CB      Time Calculation (min) 17 min  -CB      PT Received On 09/11/23  -CB      PT - Next Appointment 09/12/23  -CB      PT Goal Re-Cert Due Date 09/18/23  -CB         Time Calculation- PT    Total Timed Code Minutes- PT 10 minute(s)  -CB         Timed Charges    83907 - PT Therapeutic Activity Minutes 10  -CB         Total Minutes    Timed Charges Total Minutes 10  -CB       Total Minutes 10  -CB                User Key  (r) = Recorded By, (t) = Taken By, (c) = Cosigned By      Initials Name Provider Type    CB Kelly Littlejohn, PT Physical Therapist                  Therapy Charges for Today       Code Description Service Date Service Provider Modifiers Qty    65120907602 HC PT THERAPEUTIC ACT EA 15 MIN 9/11/2023 Kelly Littlejohn, PT GP 1    25213061063 HC PT EVAL MOD COMPLEXITY 3 9/11/2023 Kelly Littlejohn, PT GP 1            PT G-Codes  Outcome Measure Options: AM-PAC 6 Clicks Basic Mobility (PT)  AM-PAC 6 Clicks Score (PT): 12  PT Discharge Summary  Anticipated Discharge Disposition (PT): sub acute care setting, skilled nursing facility (possible need to transition to [facility]  after SNF stay)    Kelly Littlejohn, PT  9/11/2023

## 2023-09-11 NOTE — PLAN OF CARE
Goal Outcome Evaluation:  Plan of Care Reviewed With: patient        Progress: no change  Outcome Evaluation: Vitals stable this shift. Complained of back pain - PRN tylenol ordered and adminsitered. Incontinence care completed. Turns completed q 2 hrs. Phos recheck to be done this AM. Safety maintained and needs met.

## 2023-09-12 LAB
ALBUMIN SERPL-MCNC: 3 G/DL (ref 3.5–5.2)
ALBUMIN/GLOB SERPL: 1.6 G/DL
ALP SERPL-CCNC: 74 U/L (ref 39–117)
ALT SERPL W P-5'-P-CCNC: 8 U/L (ref 1–41)
ANION GAP SERPL CALCULATED.3IONS-SCNC: 6.6 MMOL/L (ref 5–15)
AST SERPL-CCNC: 8 U/L (ref 1–40)
BILIRUB SERPL-MCNC: 1 MG/DL (ref 0–1.2)
BUN SERPL-MCNC: 19 MG/DL (ref 8–23)
BUN/CREAT SERPL: 19.2 (ref 7–25)
CALCIUM SPEC-SCNC: 8.4 MG/DL (ref 8.6–10.5)
CHLORIDE SERPL-SCNC: 112 MMOL/L (ref 98–107)
CO2 SERPL-SCNC: 22.4 MMOL/L (ref 22–29)
CREAT SERPL-MCNC: 0.99 MG/DL (ref 0.76–1.27)
DEPRECATED RDW RBC AUTO: 45.2 FL (ref 37–54)
EGFRCR SERPLBLD CKD-EPI 2021: 74.2 ML/MIN/1.73
ERYTHROCYTE [DISTWIDTH] IN BLOOD BY AUTOMATED COUNT: 12.4 % (ref 12.3–15.4)
GLOBULIN UR ELPH-MCNC: 1.9 GM/DL
GLUCOSE SERPL-MCNC: 98 MG/DL (ref 65–99)
HCT VFR BLD AUTO: 30.7 % (ref 37.5–51)
HGB BLD-MCNC: 10.3 G/DL (ref 13–17.7)
MAGNESIUM SERPL-MCNC: 2.1 MG/DL (ref 1.6–2.4)
MCH RBC QN AUTO: 33.2 PG (ref 26.6–33)
MCHC RBC AUTO-ENTMCNC: 33.6 G/DL (ref 31.5–35.7)
MCV RBC AUTO: 99 FL (ref 79–97)
PHOSPHATE SERPL-MCNC: 2.6 MG/DL (ref 2.5–4.5)
PLATELET # BLD AUTO: 262 10*3/MM3 (ref 140–450)
PMV BLD AUTO: 11.3 FL (ref 6–12)
POTASSIUM SERPL-SCNC: 3.9 MMOL/L (ref 3.5–5.2)
PROT SERPL-MCNC: 4.9 G/DL (ref 6–8.5)
RBC # BLD AUTO: 3.1 10*6/MM3 (ref 4.14–5.8)
SODIUM SERPL-SCNC: 141 MMOL/L (ref 136–145)
WBC NRBC COR # BLD: 9.76 10*3/MM3 (ref 3.4–10.8)

## 2023-09-12 PROCEDURE — 84100 ASSAY OF PHOSPHORUS: CPT | Performed by: HOSPITALIST

## 2023-09-12 PROCEDURE — 97110 THERAPEUTIC EXERCISES: CPT

## 2023-09-12 PROCEDURE — 80053 COMPREHEN METABOLIC PANEL: CPT | Performed by: HOSPITALIST

## 2023-09-12 PROCEDURE — 97530 THERAPEUTIC ACTIVITIES: CPT

## 2023-09-12 PROCEDURE — 85027 COMPLETE CBC AUTOMATED: CPT | Performed by: HOSPITALIST

## 2023-09-12 PROCEDURE — 83735 ASSAY OF MAGNESIUM: CPT | Performed by: HOSPITALIST

## 2023-09-12 RX ORDER — SODIUM CHLORIDE 9 MG/ML
100 INJECTION, SOLUTION INTRAVENOUS CONTINUOUS
Status: ACTIVE | OUTPATIENT
Start: 2023-09-12 | End: 2023-09-13

## 2023-09-12 RX ADMIN — Medication 10 ML: at 20:17

## 2023-09-12 RX ADMIN — ACETAMINOPHEN 650 MG: 325 TABLET, FILM COATED ORAL at 05:25

## 2023-09-12 RX ADMIN — PANTOPRAZOLE SODIUM 40 MG: 40 TABLET, DELAYED RELEASE ORAL at 05:25

## 2023-09-12 RX ADMIN — MIDODRINE HYDROCHLORIDE 2.5 MG: 2.5 TABLET ORAL at 17:57

## 2023-09-12 RX ADMIN — APIXABAN 5 MG: 5 TABLET, FILM COATED ORAL at 08:17

## 2023-09-12 RX ADMIN — GABAPENTIN 600 MG: 300 CAPSULE ORAL at 08:17

## 2023-09-12 RX ADMIN — MIDODRINE HYDROCHLORIDE 2.5 MG: 2.5 TABLET ORAL at 08:17

## 2023-09-12 RX ADMIN — Medication 2000 UNITS: at 08:17

## 2023-09-12 RX ADMIN — FERROUS SULFATE TAB 325 MG (65 MG ELEMENTAL FE) 325 MG: 325 (65 FE) TAB at 08:17

## 2023-09-12 RX ADMIN — Medication 1000 MCG: at 08:17

## 2023-09-12 RX ADMIN — ATORVASTATIN CALCIUM 10 MG: 20 TABLET, FILM COATED ORAL at 20:17

## 2023-09-12 RX ADMIN — APIXABAN 5 MG: 5 TABLET, FILM COATED ORAL at 20:16

## 2023-09-12 RX ADMIN — MIDODRINE HYDROCHLORIDE 2.5 MG: 2.5 TABLET ORAL at 11:29

## 2023-09-12 RX ADMIN — ACETAMINOPHEN 650 MG: 325 TABLET, FILM COATED ORAL at 20:16

## 2023-09-12 RX ADMIN — ACETAMINOPHEN 650 MG: 325 TABLET, FILM COATED ORAL at 23:57

## 2023-09-12 RX ADMIN — SODIUM CHLORIDE 50 ML/HR: 9 INJECTION, SOLUTION INTRAVENOUS at 08:16

## 2023-09-12 RX ADMIN — SODIUM CHLORIDE 100 ML/HR: 9 INJECTION, SOLUTION INTRAVENOUS at 15:55

## 2023-09-12 RX ADMIN — ESCITALOPRAM OXALATE 10 MG: 10 TABLET, FILM COATED ORAL at 08:17

## 2023-09-12 NOTE — THERAPY TREATMENT NOTE
Patient Name: Jonathan Trejo  : 1935    MRN: [id number]                              Today's Date: 2023       Admit Date: 2023    Visit Dx:     ICD-10-CM ICD-9-CM   1. Sepsis, due to unspecified organism, unspecified whether acute organ dysfunction present  A41.9 038.9     995.91   2. Frequent falls  R29.6 V15.88   3. Hematoma  T14.8XXA 924.9     Patient Active Problem List   Diagnosis    Benign prostatic hyperplasia with urinary obstruction    Chronic renal impairment, stage 3 (moderate)    Depression    Gastroesophageal reflux disease with esophagitis    Hyperlipidemia    Nocturia    Obesity (BMI 30-39.9)    Osteopenia    Osteoporosis    Seborrhea    Abnormal EKG    Bradycardia    Degenerative disc disease, cervical    Typical atrial flutter    Lumbar radiculopathy    AVNRT (AV tarun re-entry tachycardia)    S/P ablation of atrial flutter    S/P catheter ablation of slow pathway    Cardiomyopathy    GARCIA (dyspnea on exertion)    Urinary tract infection without hematuria    Right foot drop    Frequent falls    Metabolic encephalopathy    Acute renal failure    Other secondary parkinsonism    Moderate single current episode of major depressive disorder    Mass of left lower leg    Pulmonary emphysema    Monoclonal gammopathy of unknown significance (MGUS)    Myasthenia gravis without exacerbation    Displacement of lumbar intervertebral disc without myelopathy    Acute anemia    Closed compression fracture of L3 lumbar vertebra, initial encounter    Closed compression fracture of L4 lumbar vertebra, initial encounter    Pulmonary embolus    Sepsis    Stage 3a chronic kidney disease    History of pulmonary embolism    Hypophosphatemia    Neuropathy associated with MGUS     Past Medical History:   Diagnosis Date    Abnormal electrocardiogram     Arm pain     Atrial fibrillation     Atrial flutter     BPH (benign prostatic hyperplasia)     BPH associated with nocturia     Chronic kidney disease     Colon  cancer     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    Colon polyp 11/22/2015    Depression     Dyspnea     Enlarged prostate without lower urinary tract symptoms (luts)     Episode of generalized weakness     knees were weak - had to be helped by wife to sit down    Esophagitis, reflux     Fatigue     Fracture of ankle     right    GERD (gastroesophageal reflux disease)     Hyperlipidemia     Inguinal hernia     Loss of hearing     Lumbar radiculopathy     Obesity     Osteoarthritis cervical spine     doc on [facility] 08/16    Osteopenia     Osteoporosis     Overweight     Tobacco dependence in remission     Urge incontinence of urine     Vitamin D deficiency     Wheezing      Past Surgical History:   Procedure Laterality Date    CARDIAC CATHETERIZATION N/A 7/27/2017    Procedure: Valve assessment;  Surgeon: Adonis Solis MD;  [facility];  Service:     CARDIAC CATHETERIZATION N/A 10/1/2018    Procedure: Left Heart Cath;  Surgeon: Bogdan Vargas MD;  [facility];  Service: Cardiology    CARDIAC CATHETERIZATION N/A 10/1/2018    Procedure: Coronary angiography;  Surgeon: Bogdan Vargas MD;  [facility];  Service: Cardiology    CARDIAC CATHETERIZATION N/A 10/1/2018    Procedure: Left ventriculography;  Surgeon: Bogdan Vargas MD;  Location: [facility] CATH INVASIVE LOCATION;  Service: Cardiology    CARDIAC CATHETERIZATION N/A 10/1/2018    Procedure: Right Heart Cath;  Surgeon: Bogdan Vargas MD;  Location: [facility] CATH INVASIVE LOCATION;  Service: Cardiology    CARDIAC ELECTROPHYSIOLOGY PROCEDURE N/A 7/27/2017    Procedure: Ablation atrial flutter Will need to have 3 -4 weeks of therapeutic INR's ;  Surgeon: Adonis Solis MD;  Location: [facility] CATH INVASIVE LOCATION;  Service:     CARDIOVERSION      CATARACT EXTRACTION W/ INTRAOCULAR LENS  IMPLANT, BILATERAL      COLON RESECTION      COLON SURGERY      polyp removed     COLONOSCOPY      07/15 redo 5 years  [facility]    KNEE SURGERY      benign tumor removed, age 6    LASIK      LUMBAR EPIDURAL INJECTION N/A 5/25/2022    Procedure: LUMBAR EPIDURAL 1ST VISIT L3-4 (right of midline);  Surgeon: Bonnie Brown MD;  Location: [facility] MAIN OR;  Service: Pain Management;  Laterality: N/A;    REFRACTIVE SURGERY  2007    REPAIR PERONEAL TENDONS ANKLE W/ FIBULAR OSTEOTOMY Right 03/2013    Dr. Banks      General Information       Row Name 09/12/23 1622          Physical Therapy Time and Intention    Document Type therapy note (daily note)  -     Mode of Treatment individual therapy;physical therapy  -       Row Name 09/12/23 1622          General Information    Patient Profile Reviewed yes  -     Existing Precautions/Restrictions fall  -       Row Name 09/12/23 1622          Cognition    Orientation Status (Cognition) oriented x 3  -       Row Name 09/12/23 1622          Safety Issues, Functional Mobility    Safety Issues Affecting Function (Mobility) judgment;positioning of assistive device;problem-solving  -     Impairments Affecting Function (Mobility) balance;endurance/activity tolerance;strength  -               User Key  (r) = Recorded By, (t) = Taken By, (c) = Cosigned By      Initials Name Provider Type     Sheyla Park PTA Physical Therapist Assistant                   Mobility       Row Name 09/12/23 1623          Bed Mobility    Bed Mobility supine-sit  -     Supine-Sit [place] (Bed Mobility) 2 person assist;moderate assist (50% patient effort)  -     Sit-Supine [place] (Bed Mobility) 2 person assist;moderate assist (50% patient effort)  -     Assistive Device (Bed Mobility) draw sheet;head of bed elevated  -     Comment, (Bed Mobility) pt req assist of 2 due to heavy R lateral lean-pt reports trying to avoid WB on L hip-painful-educ offered on safety  -       Row Name 09/12/23 1623          Bed-Chair Transfer    Bed-Chair [place]  (Transfers) unable to assess  -[facility] Name 09/12/23 1623          Sit-Stand Transfer    Sit-Stand [place] (Transfers) 2 person assist;minimum assist (75% patient effort)  -     Assistive Device (Sit-Stand Transfers) walker, front-wheeled  -     Comment, (Sit-Stand Transfer) x2, 2nd attempt pt able to take 2 side steps, but did have to take seated rest due to dizziness  -               User Key  (r) = Recorded By, (t) = Taken By, (c) = Cosigned By      Initials Name Provider Type    Sheyla Ruby PTA Physical Therapist Assistant                   Obj/Interventions    No documentation.                  Goals/Plan    No documentation.                  Clinical Impression       [facility] Name 09/12/23 1626          Pain    Pretreatment Pain Rating 4/10  -     Posttreatment Pain Rating 6/10  -     Pain Location - Side/Orientation Left  -     Pain Location - buttock;hip  -     Pre/Posttreatment Pain Comment unable to WB, throwing himself into R lateral leanand some post lean to get comfortable -educ on safety doing this at EOB  -     Pain Intervention(s) Repositioned  -JM       Row Name 09/12/23 1626          Plan of Care Review    Plan of Care Reviewed With patient  -     Outcome Evaluation Pt agreed to try PT session, orhtostatics taken per nsg request, pt inocencia sup to sit w/ assist of 2, once EOB pt w/R lateral /post lean; pt perf STS x2, then able to take 2 side-steps to L toward HOB , dizziness limiting, + orthostatics, 121/62-89/39-78/45-RN aware, will need SNU at [place]  -[facility] Name 09/12/23 1626          Therapy Assessment/Plan (PT)    Rehab Potential (PT) fair, will monitor progress closely  -     Criteria for Skilled Interventions Met (PT) yes  -[facility] Name 09/12/23 1626          Positioning and Restraints    Pre-Treatment Position in bed  -     Post Treatment Position bed  -     In Bed fowlers;call light within reach;encouraged to call for assist;notified nsg  no alarm  upon entry, bed malfunctioning, says center pt and he was  -               User Key  (r) = Recorded By, (t) = Taken By, (c) = Cosigned By      Initials Name Provider Type    Sheyla Ruby PTA Physical Therapist Assistant                   Outcome Measures       Row Name 09/12/23 1631          How much help from another person do you currently need...    Turning from your back to your side while in flat bed without using bedrails? 3  -JM     Moving from lying on back to sitting on the side of a flat bed without bedrails? 2  -JM     Moving to and from a bed to a chair (including a wheelchair)? 1  -JM     Standing up from a chair using your arms (e.g., wheelchair, bedside chair)? 2  -JM     Climbing 3-5 steps with a railing? 1  -JM     To walk in hospital room? 1  -     AM-PAC 6 Clicks Score (PT) 10  -JM     Highest level of mobility 4 --> Transferred to chair/commode  -       Row Name 09/12/23 1120          Functional Assessment    Outcome Measure Options AM-PAC 6 Clicks Daily Activity (OT)  -PP               User Key  (r) = Recorded By, (t) = Taken By, (c) = Cosigned By      Initials Name Provider Type    Sheyla Ruby PTA Physical Therapist Assistant    Hiram Villa OT Occupational Therapist                                 Physical Therapy Education       Title: PT OT SLP Therapies (Done)       Topic: Physical Therapy (Done)       Point: Mobility training (Done)       Learning Progress Summary             Patient Acceptance, E,TB,D, VU,NR by KONRAD at 9/12/2023 1632    Acceptance, E,TB, VU,NR by  at 9/11/2023 1316                         Point: Home exercise program (Done)       Learning Progress Summary             Patient Acceptance, E,TB,D, VU,NR by KONRAD at 9/12/2023 1632                         Point: Body mechanics (Done)       Learning Progress Summary             Patient Acceptance, E,TB,D, VU,NR by KONRAD at 9/12/2023 1632    Acceptance, E,TB, VU,NR by  at 9/11/2023 1316                          Point: Precautions (Done)       Learning Progress Summary             Patient Acceptance, E,TB,D, VU,NR by  at 9/12/2023 1632    Acceptance, E,TB, VU,NR by LOLIS at 9/11/2023 1316                                         User Key       Initials Effective Dates Name Provider Type [facility] 03/07/18 -  Sheyla Park PTA Physical Therapist Assistant PT    [facility] 10/22/21 -  Kelly Littlejohn, ARNAV Physical Therapist PT                  PT Recommendation and Plan     Plan of Care Reviewed With: patient  Outcome Evaluation: Pt agreed to try PT session, orhtostatics taken per nsg request, pt inocencia sup to sit w/ assist of 2, once EOB pt w/R lateral /post lean; pt perf STS x2, then able to take 2 side-steps to L toward HOB , dizziness limiting, + orthostatics, 121/62-89/39-78/45-RN aware, will need SNU at [place]     Time Calculation:         PT Charges       Row Name 09/12/23 1632             Time Calculation    Start Time 1134  -      Stop Time 1158  -      Time Calculation (min) 24 min  -      PT Received On 09/12/23  -      PT - Next Appointment 09/13/23  -                User Key  (r) = Recorded By, (t) = Taken By, (c) = Cosigned By      Initials Name Provider Type     Sheyla Park PTA Physical Therapist Assistant                  Therapy Charges for Today       Code Description Service Date Service Provider Modifiers Qty    31861367151 HC PT THER PROC EA 15 MIN 9/12/2023 Sheyla Park PTA GP 2    [id number] HC PT THER SUPP EA 15 MIN 9/12/2023 Sheyla Park PTA GP 2            PT G-Codes  Outcome Measure Options: AM-PAC 6 Clicks Daily Activity (OT)  AM-PAC 6 Clicks Score (PT): 10  AM-PAC 6 Clicks Score (OT): 15  PT Discharge Summary  Anticipated Discharge Disposition (PT): skilled nursing facility    Sheyla Park PTA  9/12/2023

## 2023-09-12 NOTE — PLAN OF CARE
Goal Outcome Evaluation:            Pt confused x1-2 (time and situation sometimes), Q2 hr turns, pain treated with Tylenol per pt request.  [place].  BM this shift.  Urinal to void.  IVFs continued.  SB, no symptoms.            Problem: Fall Injury Risk  Goal: Absence of Fall and Fall-Related Injury  Outcome: Ongoing, Progressing  Intervention: Identify and Manage Contributors  Description: Develop a fall prevention plan with the patient and caregiver/family.  Provide reorientation, appropriate sensory stimulation and routines with changes in mental status to decrease risk of fall.  Promote use of personal vision and auditory aids.  Assess assistance level required for safe and effective self-care; provide support as needed, such as toileting, mobilization. For age 65 and older, implement timed toileting with assistance.  Encourage physical activity, such as performance of mobility and self-care at highest level of patient ability, multicomponent exercise program and provision of appropriate assistive devices.  If fall occurs, assess the severity of injury; implement fall injury protocol. Determine the cause and revise fall injury prevention plan.  Regularly review medication contribution to fall risk; adjust medication administration times to minimize risk of falling.  Consider risk related to polypharmacy and age.  Balance adequate pain management with potential for oversedation.  Recent Flowsheet Documentation  Taken 9/12/2023 0352 by Gwen Weeks, RN  Medication Review/Management:   medications reviewed   high-risk medications identified  Taken 9/12/2023 0207 by Gwen Weeks, RN  Medication Review/Management: medications reviewed  Taken 9/11/2023 2357 by Gwen Weeks, RN  Medication Review/Management:   medications reviewed   high-risk medications identified  Self-Care Promotion:   independence encouraged   BADL personal objects within reach   safe use of adaptive equipment encouraged  Taken 9/11/2023  2211 by Gwen Weeks, RN  Medication Review/Management:   medications reviewed   high-risk medications identified  Taken 9/11/2023 2011 by Gwen Weeks RN  Medication Review/Management:   medications reviewed   high-risk medications identified  Self-Care Promotion:   independence encouraged   BADL personal objects within reach   safe use of adaptive equipment encouraged  Intervention: Promote Injury-Free Environment  Description: Provide a safe, barrier-free environment that encourages independent activity.  Keep care area uncluttered and well-lighted.  Determine need for increased observation or monitoring.  Avoid use of devices that minimize mobility, such as restraints or indwelling urinary catheter.  Recent Flowsheet Documentation  Taken 9/12/2023 0352 by Gwen Weeks, RN  Safety Promotion/Fall Prevention: safety round/check completed  Taken 9/12/2023 0207 by Gwen Weeks, RN  Safety Promotion/Fall Prevention: safety round/check completed  Taken 9/11/2023 2357 by Gwen Weeks, RN  Safety Promotion/Fall Prevention: safety round/check completed  Taken 9/11/2023 2211 by Gwen Weeks RN  Safety Promotion/Fall Prevention: safety round/check completed  Taken 9/11/2023 2011 by Gwen Weeks RN  Safety Promotion/Fall Prevention: safety round/check completed     Problem: Adult Inpatient Plan of Care  Goal: Plan of Care Review  Outcome: Ongoing, Progressing  Goal: Optimal Comfort and Wellbeing  Outcome: Ongoing, Progressing  Intervention: Monitor Pain and Promote Comfort  Description: Assess pain level, treatment efficacy and patient response at regular intervals using a consistent pain scale.  Consider the presence and impact of preexisting chronic pain.  Encourage patient and caregiver involvement in pain assessment, interventions and safety measures.  Recent Flowsheet Documentation  Taken 9/11/2023 2357 by Gwen Weeks, RN  Pain Management Interventions:   care clustered   diversional  activity provided   pillow support provided   position adjusted   quiet environment facilitated   see MAR  Taken 9/11/2023 2011 by Gwen Weeks, RN  Pain Management Interventions:   care clustered   diversional activity provided   pillow support provided   position adjusted   quiet environment facilitated   see MAR  Intervention: Provide Person-Centered Care  Description: Use a family-focused approach to care.  Develop trust and rapport by proactively providing information, encouraging questions, addressing concerns and offering reassurance.  Acknowledge emotional response to hospitalization.  Recognize and utilize personal coping strategies.  Honor spiritual and cultural preferences.  Recent Flowsheet Documentation  Taken 9/11/2023 3047 by Gwen Weeks, RN  Trust Relationship/Rapport:   choices provided   care explained   reassurance provided   thoughts/feelings acknowledged   questions encouraged   questions answered   empathic listening provided  Taken 9/11/2023 2011 by Gwen Weeks RN  Trust Relationship/Rapport:   care explained   choices provided   empathic listening provided   reassurance provided   thoughts/feelings acknowledged   questions answered   questions encouraged  Goal: Readiness for Transition of Care  Outcome: Ongoing, Progressing     Problem: Behavioral Health Comorbidity  Goal: Maintenance of Behavioral Health Symptom Control  Outcome: Ongoing, Progressing  Intervention: Maintain Behavioral Health Symptom Control  Description: Confirm mental health diagnosis and current treatment.  Evaluate adherence to previously identified self-management plan.  Advocate continuation of home strategies, including medication.  Evaluate effectiveness of self-management strategies and coping skills.  Communicate with providers to ensure continuity and follow-up at transition.  Recent Flowsheet Documentation  Taken 9/12/2023 0352 by Gwen Weeks, RN  Medication Review/Management:   medications  reviewed   high-risk medications identified  Taken 9/12/2023 0207 by Gwen Weeks, RN  Medication Review/Management: medications reviewed  Taken 9/11/2023 2357 by Gwen Weeks RN  Medication Review/Management:   medications reviewed   high-risk medications identified  Taken 9/11/2023 2211 by Gwen Weeks RN  Medication Review/Management:   medications reviewed   high-risk medications identified  Taken 9/11/2023 2011 by Gwen Weeks RN  Medication Review/Management:   medications reviewed   high-risk medications identified     Problem: Skin Injury Risk Increased  Goal: Skin Health and Integrity  Outcome: Ongoing, Progressing  Intervention: Promote and Optimize Oral Intake  Description: Perform a nutrition assessment; include a nutrition-focused physical exam.  Determine calorie, protein, vitamin, mineral and fluid requirements.  Assess for micronutrient deficiencies; supplement if depleted.  Assess need and assist with meal set-up and feeding.  Adjust diet or meal schedule based on preferences and tolerance.  Offer oral supplemental food or drinks to enhance calorie and protein intake.  Establish bowel elimination program to increase comfort and appetite.  Minimize unnecessary dietary restrictions to increase oral intake.  Provide and encourage oral hygiene to enhance desire to eat.  Consider enteral nutrition support if oral intake remains inadequate; provide parenteral nutrition if enteral is contraindicated.  Recent Flowsheet Documentation  Taken 9/11/2023 2357 by Gwen Weeks, RN  Oral Nutrition Promotion:   rest periods promoted   safe use of adaptive equipment encouraged  Taken 9/11/2023 2011 by Gwen Weeks RN  Oral Nutrition Promotion:   rest periods promoted   safe use of adaptive equipment encouraged  Intervention: Optimize Skin Protection  Description: Perform a full pressure injury risk assessment, as indicated by screening, upon admission to care unit.  Reassess skin (injury  risk, full inspection) frequently (e.g., scheduled interval, with change in condition) to provide optimal early detection and prevention.  Maintain adequate tissue perfusion (e.g., encourage fluid balance; avoid crossing legs, constrictive clothing or devices) to promote tissue oxygenation.  Maintain head of bed at lowest degree of elevation tolerated, considering medical condition and other restrictions.  Avoid positioning onto an area that remains reddened.  Minimize incontinence and moisture (e.g., toileting schedule; moisture-wicking pad, diaper or incontinence collection device; skin moisture barrier).  Cleanse skin promptly and gently when soiled utilizing a pH-balanced cleanser.  Relieve and redistribute pressure (e.g., scheduled position changes, weight shifts, use of support surface, medical device repositioning, protective dressing application, use of positioning device, microclimate control, use of pressure-injury-monitor  Encourage increased activity, such as sitting in a chair at the bedside or early mobilization, when able to tolerate.  Recent Flowsheet Documentation  Taken 9/12/2023 0352 by Gwen Weeks RN  Head of Bed (HOB) Positioning: HOB at 20-30 degrees  Taken 9/12/2023 0207 by Gwen Weeks RN  Head of Bed ([facility]) Positioning: HOB elevated  Taken 9/11/2023 2357 by Gwen Weeks RN  Pressure Reduction Techniques:   frequent weight shift encouraged   weight shift assistance provided   heels elevated off bed  Head of Bed (HOB) Positioning: HOB elevated  Pressure Reduction Devices:   pressure-redistributing mattress utilized   positioning supports utilized  Skin Protection:   adhesive use limited   incontinence pads utilized   tubing/devices free from skin contact   transparent dressing maintained  Taken 9/11/2023 2211 by Gwen Weeks RN  Head of Bed ([facility]) Positioning: HOB elevated  Taken 9/11/2023 2011 by Gwen Weeks RN  Pressure Reduction Techniques:   frequent weight shift  encouraged   weight shift assistance provided  Head of Bed (HOB) Positioning: HOB at 20-30 degrees  Pressure Reduction Devices:   positioning supports utilized   pressure-redistributing mattress utilized  Skin Protection:   adhesive use limited   incontinence pads utilized   transparent dressing maintained   tubing/devices free from skin contact

## 2023-09-12 NOTE — PLAN OF CARE
Goal Outcome Evaluation:  Plan of Care Reviewed With: patient        Progress: no change  Outcome Evaluation: No c/o pain or soa. Still with + orthostatics. 1L NS ordered. Jobst stockings ordered from [place], waiting on delivery. Plan on [facility] rehab at discharge. Continues on midodrine.

## 2023-09-12 NOTE — THERAPY TREATMENT NOTE
Patient Name: Jonathan Trejo  : 1935    MRN: [id number]                              Today's Date: 2023       Admit Date: 2023    Visit Dx:     ICD-10-CM ICD-9-CM   1. Sepsis, due to unspecified organism, unspecified whether acute organ dysfunction present  A41.9 038.9     995.91   2. Frequent falls  R29.6 V15.88   3. Hematoma  T14.8XXA 924.9     Patient Active Problem List   Diagnosis    Benign prostatic hyperplasia with urinary obstruction    Chronic renal impairment, stage 3 (moderate)    Depression    Gastroesophageal reflux disease with esophagitis    Hyperlipidemia    Nocturia    Obesity (BMI 30-39.9)    Osteopenia    Osteoporosis    Seborrhea    Abnormal EKG    Bradycardia    Degenerative disc disease, cervical    Typical atrial flutter    Lumbar radiculopathy    AVNRT (AV tarun re-entry tachycardia)    S/P ablation of atrial flutter    S/P catheter ablation of slow pathway    Cardiomyopathy    GARCIA (dyspnea on exertion)    Urinary tract infection without hematuria    Right foot drop    Frequent falls    Metabolic encephalopathy    Acute renal failure    Other secondary parkinsonism    Moderate single current episode of major depressive disorder    Mass of left lower leg    Pulmonary emphysema    Monoclonal gammopathy of unknown significance (MGUS)    Myasthenia gravis without exacerbation    Displacement of lumbar intervertebral disc without myelopathy    Acute anemia    Closed compression fracture of L3 lumbar vertebra, initial encounter    Closed compression fracture of L4 lumbar vertebra, initial encounter    Pulmonary embolus    Sepsis    Stage 3a chronic kidney disease    History of pulmonary embolism    Hypophosphatemia    Neuropathy associated with MGUS     Past Medical History:   Diagnosis Date    Abnormal electrocardiogram     Arm pain     Atrial fibrillation     Atrial flutter     BPH (benign prostatic hyperplasia)     BPH associated with nocturia     Chronic kidney disease     Colon  cancer     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    Colon polyp 11/22/2015    Depression     Dyspnea     Enlarged prostate without lower urinary tract symptoms (luts)     Episode of generalized weakness     knees were weak - had to be helped by wife to sit down    Esophagitis, reflux     Fatigue     Fracture of ankle     right    GERD (gastroesophageal reflux disease)     Hyperlipidemia     Inguinal hernia     Loss of hearing     Lumbar radiculopathy     Obesity     Osteoarthritis cervical spine     doc on Sray 08/16    Osteopenia     Osteoporosis     Overweight     Tobacco dependence in remission     Urge incontinence of urine     Vitamin D deficiency     Wheezing      Past Surgical History:   Procedure Laterality Date    CARDIAC CATHETERIZATION N/A 7/27/2017    Procedure: Valve assessment;  Surgeon: Adonis Solis MD;  Location: [facility] CATH [facility]VASIVE LOC[facility];  Service:     CARDIAC CATHETERIZATION N/A 10/1/2018    Procedure: Left Heart Cath;  Surgeon: Bogdan Vargas MD;  Location: [facility] LOC[facility];  Service: Cardiology    CARDIAC CATHETERIZATION N/A 10/1/2018    Procedure: Coronary angiography;  Surgeon: Bogdan Vargas MD;  Location: [facility] LOC[facility];  Service: Cardiology    CARDIAC CATHETERIZATION N/A 10/1/2018    Procedure: Left ventriculography;  Surgeon: Bogdan Vargas MD;  Location: [facility] CATH INVASIVE LOCATION;  Service: Cardiology    CARDIAC CATHETERIZATION N/A 10/1/2018    Procedure: Right Heart Cath;  Surgeon: Bogdan Vargas MD;  Location: [facility] CATH INVASIVE LOCATION;  Service: Cardiology    CARDIAC ELECTROPHYSIOLOGY PROCEDURE N/A 7/27/2017    Procedure: Ablation atrial flutter Will need to have 3 -4 weeks of therapeutic INR's ;  Surgeon: Adonis Solis MD;  Location: [facility] CATH INVASIVE LOCATION;  Service:     CARDIOVERSION      CATARACT EXTRACTION W/ INTRAOCULAR LENS  IMPLANT, BILATERAL      COLON RESECTION      COLON SURGERY      polyp removed     COLONOSCOPY      07/15 redo 5 years  Segun    KNEE SURGERY      benign tumor removed, age 6    LASIK      LUMBAR EPIDURAL INJECTION N/A 5/25/2022    Procedure: LUMBAR EPIDURAL 1ST VISIT L3-4 (right of midline);  Surgeon: Bonnie Brown MD;  [place] [facility] [place];  Service: Pain Management;  Laterality: N/A;    REFRACTIVE SURGERY  2007    REPAIR PERONEAL TENDONS ANKLE W/ FIBULAR OSTEOTOMY Right 03/2013    Dr. Banks      General Information       Row Name 09/12/23 0932          OT Time and Intention    Document Type therapy note (daily note)  -PP     Mode of Treatment individual therapy;occupational therapy  -PP       Row Name 09/12/23 0932          General Information    Patient Profile Reviewed yes  -PP     Existing Precautions/Restrictions fall  -PP     Barriers to Rehab none identified  -PP       Row Name 09/12/23 0932          Cognition    Orientation Status (Cognition) oriented x 3  -PP       Row Name 09/12/23 0932          Safety Issues, Functional Mobility    Impairments Affecting Function (Mobility) balance;endurance/activity tolerance;postural/trunk control;strength  -PP               User Key  (r) = Recorded By, (t) = Taken By, (c) = Cosigned By      Initials Name Provider Type    PP Hiram Monroe, OT Occupational Therapist                     Mobility/ADL's       Row Name 09/12/23 0933          Bed Mobility    Bed Mobility sit-supine  -PP     Sit-Supine [place] (Bed Mobility) standby assist;verbal cues  -PP     Comment, (Bed Mobility) Pt req extra time d/t slowed movements in anticipation of pain, VCs for sequencing and hand placement  -PP       Row Name 09/12/23 0933          Transfers    Transfers sit-stand transfer;stand-sit transfer  -PP       Row Name 09/12/23 0933          Sit-Stand Transfer    Sit-Stand [place] (Transfers) minimum assist (75% patient effort);verbal cues;1 person assist;contact guard  -PP     Assistive Device (Sit-Stand Transfers) walker, front-wheeled  -PP      Comment, (Sit-Stand Transfer) x3 from EOB to prepare for xfers and fxnl mob,CGA-MIN A, showing improvement each time  -PP       Row Name 09/12/23 0933          Stand-Sit Transfer    Stand-Sit [place] (Transfers) contact guard;verbal cues  -PP     Assistive Device (Stand-Sit Transfers) walker, front-wheeled  -PP     Comment, (Stand-Sit Transfer) VC for reaching back before sitting  -PP       Row Name 09/12/23 0933          Functional Mobility    Functional Mobility- Comment Pt tolerated taking several steps towards HOB for repositioning. declined further d/t fatigue and pain  -PP       Row Name 09/12/23 0933          Lower Body Dressing Assessment/Training    Position (Lower Body Dressing) sitting up in bed  -PP       Row Name 09/12/23 0933          Toileting Assessment/Training    [place] Level (Toileting) moderate assist (50% patient effort);verbal cues;adjust/manage clothing  -PP     Assistive Devices (Toileting) urinal  -PP     Position (Toileting) supported sitting  -PP     Comment, (Toileting) pt req assist w/ clothing mgmt; VCs for initation, and squencing  -PP               User Key  (r) = Recorded By, (t) = Taken By, (c) = Cosigned By      Initials Name Provider Type    PP Hiram Monroe OT Occupational Therapist                   Obj/Interventions       Row Name 09/12/23 1103          Motor Skills    Therapeutic Exercise --  Pt performed light B UE AROM within tolerable range for shoulder and elbow but declined further ex d/t c/o fatigue from standing while sitting EOB.  -PP       Row Name 09/12/23 1103          Balance    Balance Assessment sitting static balance;sit to stand dynamic balance;standing static balance;standing dynamic balance  -PP     Static Sitting Balance standby assist  -PP     Dynamic Sitting Balance contact guard  -PP     Position, Sitting Balance sitting edge of bed;unsupported  -PP     Sit to Stand Dynamic Balance contact guard;minimal assist;1-person assist  -PP      Static Standing Balance contact guard;1-person assist  -PP     Dynamic Standing Balance contact guard;1-person assist  -PP     Position/Device Used, Standing Balance supported;walker, front-wheeled  -PP     Balance Interventions sitting;standing;sit to stand;supported;static;dynamic  -PP     Comment, Balance Pt tolerated static standing 3x, he stood for 30 secs first attempt and then decreased to 10 secs for last two trials.  -PP               User Key  (r) = Recorded By, (t) = Taken By, (c) = Cosigned By      Initials Name Provider Type    PP Hiram Monroe OT Occupational Therapist                   Goals/Plan    No documentation.                  Clinical Impression       Row Name 09/12/23 1108          Pain Assessment    Pain Location - Side/Orientation Left  -PP     Pain Location - buttock  -PP     Pre/Posttreatment Pain Comment Pt c/o L buttocks pain but unable to rate numerically  -PP     Additional Documentation Pain Scale: FACES Pre/Post-Treatment (Group)  -PP       Row Name 09/12/23 1108          Pain Scale: FACES Pre/Post-Treatment    Pain: FACES Scale, Pretreatment 0-->no hurt  -PP     Posttreatment Pain Rating 2-->hurts little bit  -PP     Pain Location - Side/Orientation Left  -PP     Pain Location - other (see comments)  buttocks  -PP     Pre/Posttreatment Pain Comment Pt reports pain decreased when he returned to bed  -PP       Row Name 09/12/23 1100          Plan of Care Review    Plan of Care Reviewed With patient  -PP     Outcome Evaluation Pt was seen for OT tx session this am, A&O and agreeable after some encouragement. Pt shows some improvement in STS xfer, and mob. He was able to perform bed mob supine <> sit EOB w/ SBA, req extra time for slowed movements in anticipation of pain. He tolerated 3x STS from EOB w/ CGA-MIN A using RWX, VCs for proper hand placement. Pt tolerated standing w/ CGA for 30secs 1x and 10 sec 2x before req seated rest following each stand. Pt MOD A for toileting w/  urinal sitting upright in bed, req VCs for sequencing and initation to task. Pt declined further activity sitting EOB d/t fatigue and fxnl mob d/t pain. DC rec to SNF at this time pending progress before return to [facility]. OT will continue to monitor.  -PP       Row Name 09/12/23 1108          Therapy Plan Review/Discharge Plan (OT)    Anticipated Discharge Disposition (OT) skilled nursing facility  -PP       Row Name 09/12/23 1108          Positioning and Restraints    Pre-Treatment Position in bed  -PP     Post Treatment Position bed  -PP     In Bed notified nsg;call light within reach;encouraged to call for assist;exit alarm on;legs elevated  -PP               User Key  (r) = Recorded By, (t) = Taken By, (c) = Cosigned By      Initials Name Provider Type    Hiram Villa OT Occupational Therapist                   Outcome Measures       Row Name 09/12/23 1120          How much help from another is currently needed...    Putting on and taking off regular lower body clothing? 1  -PP     Bathing (including washing, rinsing, and drying) 2  -PP     Toileting (which includes using toilet bed pan or urinal) 3  -PP     Putting on and taking off regular upper body clothing 3  -PP     Taking care of personal grooming (such as brushing teeth) 3  -PP     Eating meals 3  -PP     AM-PAC 6 Clicks Score (OT) 15  -PP       Row Name 09/12/23 1120          Functional Assessment    Outcome Measure Options AM-PAC 6 Clicks Daily Activity (OT)  -PP               User Key  (r) = Recorded By, (t) = Taken By, (c) = Cosigned By      Initials Name Provider Type    Hiram Villa OT Occupational Therapist                    Occupational Therapy Education       Title: PT OT SLP Therapies (In Progress)       Topic: Occupational Therapy (Done)       Point: ADL training (Done)       Description:   Instruct learner(s) on proper safety adaptation and remediation techniques during self care or transfers.   Instruct in proper use of  assistive devices.                  Learning Progress Summary             Patient Acceptance, E, DU,VU by OK at 9/11/2023 1353                         Point: Home exercise program (Done)       Description:   Instruct learner(s) on appropriate technique for monitoring, assisting and/or progressing therapeutic exercises/activities.                  Learning Progress Summary             Patient Acceptance, E, DU,VU by OK at 9/11/2023 1353                         Point: Precautions (Done)       Description:   Instruct learner(s) on prescribed precautions during self-care and functional transfers.                  Learning Progress Summary             Patient Acceptance, E, DU,VU by OK at 9/11/2023 1353                                         User Key       Initials Effective Dates Name Provider Type Discipline     09/22/22 -  Zina Young, EVAN Occupational Therapist OT                  OT Recommendation and Plan     Plan of Care Review  Plan of Care Reviewed With: patient  Outcome Evaluation: Pt was seen for OT tx session this am, A&O and agreeable after some encouragement. Pt shows some improvement in STS xfer, and mob. He was able to perform bed mob supine <> sit EOB w/ SBA, req extra time for slowed movements in anticipation of pain. He tolerated 3x STS from EOB w/ CGA-MIN A using RWX, VCs for proper hand placement. Pt tolerated standing w/ CGA for 30secs 1x and 10 sec 2x before req seated rest following each stand. Pt MOD A for toileting w/ urinal sitting upright in bed, req VCs for sequencing and initation to task. Pt declined further activity sitting EOB d/t fatigue and fxnl mob d/t pain. DC rec to SNF at this time pending progress before return to [facility]. OT will continue to monitor.     Time Calculation:         Time Calculation- OT       Row Name 09/12/23 1120             Time Calculation- OT    OT Start Time 0850  -PP      OT Stop Time 0917  -PP      OT Time Calculation (min) 27 min  -PP      Total Timed  Code Minutes- OT 27 minute(s)  -PP      OT Received On 09/12/23  -PP      OT - Next Appointment 09/13/23  -PP         Timed Charges    95797 - OT Therapeutic Activity Minutes 27  -PP         Total Minutes    Timed Charges Total Minutes 27  -PP       Total Minutes 27  -PP                User Key  (r) = Recorded By, (t) = Taken By, (c) = Cosigned By      Initials Name Provider Type    PP Hiram Monroe, OT Occupational Therapist                  Therapy Charges for Today       Code Description Service Date Service Provider Modifiers Qty    [id number]  OT THERAPEUTIC ACT EA 15 MIN 9/12/2023 Hiram Monroe, OT GO 2                 Hiram Monroe, OT  9/12/2023

## 2023-09-12 NOTE — PROGRESS NOTES
Name: Jonathan Trejo ADMIT: 2023   : 1935  PCP: Marychuy Johnson SAMI    MRN: [id number] LOS: 3 days   AGE/SEX: 88 y.o. male  ROOM: [facility]/     Subjective   Subjective   Feeling okay today. Only physical complaint is of pain in left buttock. Voiding well. No LUTS. Tolerating diet. No N/V/D/abd pain. No F/C/NS. No HA or vis changes. No SOA or CP or palp. No cough.       Objective   Objective   Vital Signs  Temp:  [98.5 °F (36.9 °C)-98.8 °F (37.1 °C)] 98.6 °F (37 °C)  Heart Rate:  [55-65] 65  Resp:  [16] 16  BP: ()/(39-65) 78/45  SpO2:  [93 %-96 %] 94 %  on   ;   Device (Oxygen Therapy): room air  Body mass index is 28.25 kg/m².    (No change in exam today)    Physical Exam  Vitals and nursing note reviewed.   Constitutional:       General: He is not in acute distress.     Appearance: He is ill-appearing (chronically). He is not toxic-appearing or diaphoretic.   HENT:      Head: Normocephalic.      Nose: Nose normal.      Mouth/Throat:      Mouth: Mucous membranes are moist.      Pharynx: Oropharynx is clear.   Eyes:      General: No scleral icterus.        Right eye: No discharge.         Left eye: No discharge.      Extraocular Movements: Extraocular movements intact.      Conjunctiva/sclera: Conjunctivae normal.   Cardiovascular:      Rate and Rhythm: Normal rate. Rhythm irregular.      Pulses: Normal pulses.   Pulmonary:      Effort: Pulmonary effort is normal. No respiratory distress.      Breath sounds: Normal breath sounds. No wheezing or rales.   Abdominal:      General: Bowel sounds are normal. There is no distension.      Palpations: Abdomen is soft.      Tenderness: There is no abdominal tenderness.   Musculoskeletal:         General: Swelling (trace in BLEs) present. Normal range of motion.      Cervical back: Neck supple. No rigidity.   Skin:     General: Skin is warm and dry.      Capillary Refill: Capillary refill takes less than 2 seconds.      Coloration: Skin is not jaundiced.       Findings: Bruising (left buttock) present.   Neurological:      Mental Status: He is alert. Mental status is at baseline.      Cranial Nerves: No cranial nerve deficit.      Coordination: Coordination normal.      Comments: Oriented to person   Psychiatric:         Mood and Affect: Mood normal.      Comments: Pleasant     Results Review     I reviewed the patient's new clinical results.  Results from last 7 days   Lab Units 09/12/23  0540 09/11/23  0530 09/10/23  0928 09/09/23  1125   WBC 10*3/mm3 9.76 10.82* 13.54* 17.71*   HEMOGLOBIN g/dL 10.3* 10.2* 10.4* 12.7*   PLATELETS 10*3/mm3 262 241 278 333       Results from last 7 days   Lab Units 09/12/23  0540 09/11/23  0530 09/10/23  1617 09/10/23  0928   SODIUM mmol/L 141 136 139 139   POTASSIUM mmol/L 3.9 4.1 3.9 3.7   CHLORIDE mmol/L 112* 106 110* 108*   CO2 mmol/L 22.4 24.7 21.3* 20.7*   BUN mg/dL 19 24* 28* 29*   CREATININE mg/dL 0.99 1.09 1.11 1.23   GLUCOSE mg/dL 98 98 115* 119*   EGFR mL/min/1.73 74.2 66.1 64.7 57.2*       Results from last 7 days   Lab Units 09/12/23  0540 09/11/23  0530 09/10/23  1617 09/10/23  0928   ALBUMIN g/dL 3.0* 2.8* 3.1* 3.3*   BILIRUBIN mg/dL 1.0 0.9 0.7 1.0   ALK PHOS U/L 74 68 71 74   AST (SGOT) U/L 8 10 8 8   ALT (SGPT) U/L 8 6 9 8       Results from last 7 days   Lab Units 09/12/23  0540 09/11/23  1654 09/11/23  0530 09/10/23  1617 09/10/23  0928   CALCIUM mg/dL 8.4*  --  8.5* 8.5* 8.9   ALBUMIN g/dL 3.0*  --  2.8* 3.1* 3.3*   MAGNESIUM mg/dL 2.1  --  2.1 2.0 2.0   PHOSPHORUS mg/dL 2.6 3.2 2.2* 2.0* 1.9*       Results from last 7 days   Lab Units 09/10/23  0928 09/09/23  1602 09/09/23  1233 09/09/23  1125   PROCALCITONIN ng/mL 0.08  --   --  0.08   LACTATE mmol/L  --  1.7 2.3*  --        No results found for: HGBA1C, POCGLU    No radiology results for the last day    I have personally reviewed all medications:  Scheduled Medications  apixaban, 5 mg, Oral, Q12H  atorvastatin, 10 mg, Oral, Nightly  cholecalciferol, 2,000 Units,  Oral, Daily  escitalopram, 10 mg, Oral, Daily  ferrous sulfate, 325 mg, Oral, Daily With Breakfast  gabapentin, 600 mg, Oral, Daily  midodrine, 2.5 mg, Oral, TID AC  pantoprazole, 40 mg, Oral, Q AM  vitamin B-12, 1,000 mcg, Oral, Daily    Infusions     Diet  Diet: Cardiac Diets; Healthy Heart (2-3 Na+); Texture: Regular Texture (IDDSI 7); Fluid Consistency: Thin (IDDSI 0)    I have personally reviewed:  [x]  Laboratory   [x]  Microbiology   []  Radiology   [x]  EKG/Telemetry  []  Cardiology/Vascular   []  Pathology    []  Records       Assessment/Plan     Active Hospital Problems    Diagnosis  POA    **Sepsis [A41.9]  Yes    Stage 3a chronic kidney disease [N18.31]  Yes    History of pulmonary embolism [Z86.711]  Yes    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     87yo gentleman with h/o atrial flutter and prior ablation, dementia, COPD, CKD3a, HLD, C-DDD, h/o myasthenia gravis, Parkinsonism, MGUS, and recent admission here in July for pulmonary embolism (on Eliquis now), who presented to ER for second time in two days with with report of falls at his assisted living facility. Found to have large left buttock hematoma as well as elevated WBC and lactate. He was admitted to our service with concern for sepsis. He was given a dose of Rocephin in ER and blood cultures were sent. Cardiology consulted regarding recurrent falls.    Elevated WBC and Lactate: WBC normalized, LA wnl, do not suspect sepsis, blood cultures NGTD, no fever, continue to monitor off  "abx    Recurrent falls  Orthostatic hypotension  Neuropathy associated with MGUS  Parkinsonism?  Myasthenia gravis?: pt has plenty of reasons for falling (orthostasis and neuropathy)  Card has seen and recommends tx for orthostasis--ordered Midodrine and a liter of cautious IV NS  Still orthostatic this AM, will give another liter of IV NS and repeat orthostatics in AM, have ordered compression stockings as well  Consulted Neuro--followed by Dr. Teddy Campa, for neuropathy associated with MGUS, they agree with above and do not see any evidence of MG or PD on exam (not sure why these are listed in his chart)  Would benefit from rehab per PT eval/recs    Left buttock hematoma: appears contained on CT    Acute blood loss anemia: due to large left buttock hematoma and Eliquis, Hgb stable, continue to monitor    CKD3a: Cr has normalized, continue to monitor    Recent diagnosis of bilateral PE: continue Eliquis, full AC not ideal in setting of recurrent falls, but we are treating bilateral PEs so our hands are tied really    Dementia NOS: pt diagnosed with \"mild cognitive impairment\" on neuropsych testing 7 years ago, I have d/w dtr--pt has mild-moderate dementia at this time    Hypophosphatemia: replaced with protocol      Eliquis (home med) should suffice for DVT prophylaxis.  Full code.  Discussed with patient. D/w RN, CCP, and Pharm at morning huddle.  Anticipate discharge to [facility] in 1-2 days, spot available tomorrow and Thursday.      Adonis Celestin MD  [facility]  09/12/23  14:59 EDT      "

## 2023-09-12 NOTE — PLAN OF CARE
Goal Outcome Evaluation:  Plan of Care Reviewed With: patient           Outcome Evaluation: Pt agreed to try PT session, orhtostatics taken per nsg request, pt inocencia sup to sit w/ assist of 2, once EOB pt w/R lateral /post lean; pt perf STS x2, then able to take 2 side-steps to L toward HOB , dizziness limiting, + orthostatics, 121/62-89/39-78/45-RN aware, will need SNU at DC      Anticipated Discharge Disposition (PT): skilled nursing facility

## 2023-09-12 NOTE — PLAN OF CARE
Goal Outcome Evaluation:  Plan of Care Reviewed With: patient           Outcome Evaluation: Pt was seen for OT tx session this am, A&O and agreeable after some encouragement. Pt shows some improvement in STS xfer, and mob. He was able to perform bed mob supine <> sit EOB w/ SBA, req extra time for slowed movements in anticipation of pain. He tolerated 3x STS from EOB w/ CGA-MIN A using RWX, VCs for proper hand placement. Pt tolerated standing w/ CGA for 30secs 1x and 10 sec 2x before req seated rest following each stand. Pt MOD A for toileting w/ urinal sitting upright in bed, req VCs for sequencing and initation to task. Pt declined further activity sitting EOB d/t fatigue and fxnl mob d/t pain. DC rec to SNF at this time pending progress before return to [facility]. OT will continue to monitor.      Anticipated Discharge Disposition (OT): skilled nursing facility

## 2023-09-12 NOTE — CASE MANAGEMENT/SOCIAL WORK
Continued Stay Note  [facility]     Patient Name: Jonathan Trejo  MRN: [id number]  Today's Date: 9/12/2023    Admit Date: 9/9/2023    Plan: [facility]. Will need WC van transport at D/C. Per Melanie/[facility], can accept Weds or Thurs. No pharmacy needed for Acute Rehab.   Discharge Plan       Row Name 09/12/23 1300       Plan    Plan [facility]. Will need WC van transport at D/C. Per Mel[facility], can accept Weds or Thurs. No pharmacy needed for Acute Rehab.    Patient/Family in Agreement with Plan yes    Plan Comments Met with daughter in pt's room. Discussed acceptance to [facility]. Daughter states she will be out of town starting today and pt will need transport. Per Melanie/[facility], can accept Weds or Thurs. Pharmacy not updated as Acute Rehab has their own Pharmacy. Roel Varela RN-BC                   Discharge Codes    No documentation.                 Expected Discharge Date and Time       Expected Discharge Date Expected Discharge Time    Sep 14, 2023               Roel Varela RN

## 2023-09-13 PROBLEM — I95.1 ORTHOSTATIC HYPOTENSION: Status: ACTIVE | Noted: 2023-09-13

## 2023-09-13 LAB
ALBUMIN SERPL-MCNC: 2.7 G/DL (ref 3.5–5.2)
ALBUMIN/GLOB SERPL: 1.2 G/DL
ALP SERPL-CCNC: 66 U/L (ref 39–117)
ALT SERPL W P-5'-P-CCNC: 6 U/L (ref 1–41)
ANION GAP SERPL CALCULATED.3IONS-SCNC: 7 MMOL/L (ref 5–15)
AST SERPL-CCNC: 11 U/L (ref 1–40)
BILIRUB SERPL-MCNC: 1.2 MG/DL (ref 0–1.2)
BUN SERPL-MCNC: 15 MG/DL (ref 8–23)
BUN/CREAT SERPL: 15.3 (ref 7–25)
CALCIUM SPEC-SCNC: 8.3 MG/DL (ref 8.6–10.5)
CHLORIDE SERPL-SCNC: 113 MMOL/L (ref 98–107)
CO2 SERPL-SCNC: 21 MMOL/L (ref 22–29)
CREAT SERPL-MCNC: 0.98 MG/DL (ref 0.76–1.27)
DEPRECATED RDW RBC AUTO: 42.5 FL (ref 37–54)
EGFRCR SERPLBLD CKD-EPI 2021: 75.1 ML/MIN/1.73
ERYTHROCYTE [DISTWIDTH] IN BLOOD BY AUTOMATED COUNT: 11.9 % (ref 12.3–15.4)
GLOBULIN UR ELPH-MCNC: 2.2 GM/DL
GLUCOSE SERPL-MCNC: 96 MG/DL (ref 65–99)
HCT VFR BLD AUTO: 29 % (ref 37.5–51)
HGB BLD-MCNC: 9.6 G/DL (ref 13–17.7)
MAGNESIUM SERPL-MCNC: 1.9 MG/DL (ref 1.6–2.4)
MCH RBC QN AUTO: 32.3 PG (ref 26.6–33)
MCHC RBC AUTO-ENTMCNC: 33.1 G/DL (ref 31.5–35.7)
MCV RBC AUTO: 97.6 FL (ref 79–97)
PHOSPHATE SERPL-MCNC: 2 MG/DL (ref 2.5–4.5)
PHOSPHATE SERPL-MCNC: 2.3 MG/DL (ref 2.5–4.5)
PLATELET # BLD AUTO: 292 10*3/MM3 (ref 140–450)
PMV BLD AUTO: 11.2 FL (ref 6–12)
POTASSIUM SERPL-SCNC: 3.8 MMOL/L (ref 3.5–5.2)
PROT SERPL-MCNC: 4.9 G/DL (ref 6–8.5)
RBC # BLD AUTO: 2.97 10*6/MM3 (ref 4.14–5.8)
SODIUM SERPL-SCNC: 141 MMOL/L (ref 136–145)
WBC NRBC COR # BLD: 8.7 10*3/MM3 (ref 3.4–10.8)

## 2023-09-13 PROCEDURE — 83735 ASSAY OF MAGNESIUM: CPT | Performed by: HOSPITALIST

## 2023-09-13 PROCEDURE — 80053 COMPREHEN METABOLIC PANEL: CPT | Performed by: HOSPITALIST

## 2023-09-13 PROCEDURE — 85027 COMPLETE CBC AUTOMATED: CPT | Performed by: HOSPITALIST

## 2023-09-13 PROCEDURE — 84100 ASSAY OF PHOSPHORUS: CPT | Performed by: HOSPITALIST

## 2023-09-13 RX ORDER — SODIUM CHLORIDE 9 MG/ML
100 INJECTION, SOLUTION INTRAVENOUS CONTINUOUS
Status: DISCONTINUED | OUTPATIENT
Start: 2023-09-13 | End: 2023-09-14

## 2023-09-13 RX ORDER — MIDODRINE HYDROCHLORIDE 5 MG/1
5 TABLET ORAL
Status: DISCONTINUED | OUTPATIENT
Start: 2023-09-13 | End: 2023-09-14

## 2023-09-13 RX ADMIN — APIXABAN 5 MG: 5 TABLET, FILM COATED ORAL at 08:28

## 2023-09-13 RX ADMIN — FERROUS SULFATE TAB 325 MG (65 MG ELEMENTAL FE) 325 MG: 325 (65 FE) TAB at 08:28

## 2023-09-13 RX ADMIN — GABAPENTIN 600 MG: 300 CAPSULE ORAL at 08:28

## 2023-09-13 RX ADMIN — MIDODRINE HYDROCHLORIDE 5 MG: 5 TABLET ORAL at 17:15

## 2023-09-13 RX ADMIN — Medication 2 PACKET: at 07:02

## 2023-09-13 RX ADMIN — ACETAMINOPHEN 650 MG: 325 TABLET, FILM COATED ORAL at 08:28

## 2023-09-13 RX ADMIN — SODIUM CHLORIDE 100 ML/HR: 9 INJECTION, SOLUTION INTRAVENOUS at 14:08

## 2023-09-13 RX ADMIN — ACETAMINOPHEN 650 MG: 325 TABLET, FILM COATED ORAL at 04:00

## 2023-09-13 RX ADMIN — Medication 2000 UNITS: at 08:28

## 2023-09-13 RX ADMIN — ESCITALOPRAM OXALATE 10 MG: 10 TABLET, FILM COATED ORAL at 08:28

## 2023-09-13 RX ADMIN — MIDODRINE HYDROCHLORIDE 2.5 MG: 2.5 TABLET ORAL at 11:49

## 2023-09-13 RX ADMIN — MIDODRINE HYDROCHLORIDE 2.5 MG: 2.5 TABLET ORAL at 08:28

## 2023-09-13 RX ADMIN — Medication 1000 MCG: at 08:28

## 2023-09-13 RX ADMIN — APIXABAN 5 MG: 5 TABLET, FILM COATED ORAL at 20:23

## 2023-09-13 RX ADMIN — PANTOPRAZOLE SODIUM 40 MG: 40 TABLET, DELAYED RELEASE ORAL at 07:02

## 2023-09-13 RX ADMIN — ATORVASTATIN CALCIUM 10 MG: 20 TABLET, FILM COATED ORAL at 20:23

## 2023-09-13 NOTE — PLAN OF CARE
Goal Outcome Evaluation:  Plan of Care Reviewed With: patient        Progress: no change  Outcome Evaluation: Pt c/o some discomfort in buttock. Orthostatics still +, midodrine dose increased and jobst stockings finally delivered and applied to patient. 1L NS running again.  Recheck orthostatics in the morning. Possible discharge to [facility] if improved. Slept on and off today. Didnt eat much.

## 2023-09-13 NOTE — PROGRESS NOTES
Name: Jonathan Trejo ADMIT: 2023   : 1935  PCP: Alex Marychuy APRPERLA    MRN: [id number] LOS: 4 days   AGE/SEX: 88 y.o. male  ROOM: E4/     Subjective   Subjective   Feeling okay again today. Only physical complaint is of pain in left buttock. Voiding well. No LUTS. Tolerating diet. No N/V/D/abd pain. No F/C/NS. No HA or vis changes. No SOA or CP or palp. No cough.  +orthostasis this AM--symptomatic       Objective   Objective   Vital Signs  Temp:  [97.7 °F (36.5 °C)-99 °F (37.2 °C)] 98.6 °F (37 °C)  Heart Rate:  [52-71] 57  Resp:  [16] 16  BP: ()/(25-67) 137/62  SpO2:  [96 %-97 %] 96 %  on   ;   Device (Oxygen Therapy): room air  Body mass index is 28.25 kg/m².    (No change in exam today)    Physical Exam  Vitals and nursing note reviewed.   Constitutional:       General: He is not in acute distress.     Appearance: He is ill-appearing (chronically). He is not toxic-appearing or diaphoretic.   HENT:      Head: Normocephalic.      Nose: Nose normal.      Mouth/Throat:      Mouth: Mucous membranes are moist.      Pharynx: Oropharynx is clear.   Eyes:      General: No scleral icterus.        Right eye: No discharge.         Left eye: No discharge.      Extraocular Movements: Extraocular movements intact.      Conjunctiva/sclera: Conjunctivae normal.   Cardiovascular:      Rate and Rhythm: Normal rate. Rhythm irregular.      Pulses: Normal pulses.   Pulmonary:      Effort: Pulmonary effort is normal. No respiratory distress.      Breath sounds: Normal breath sounds. No wheezing or rales.   Abdominal:      General: Bowel sounds are normal. There is no distension.      Palpations: Abdomen is soft.      Tenderness: There is no abdominal tenderness.   Musculoskeletal:         General: Swelling (trace in BLEs) present. Normal range of motion.      Cervical back: Neck supple. No rigidity.   Skin:     General: Skin is warm and dry.      Capillary Refill: Capillary refill takes less than 2 seconds.       Coloration: Skin is not jaundiced.      Findings: Bruising (left buttock) present.   Neurological:      Mental Status: He is alert. Mental status is at baseline.      Cranial Nerves: No cranial nerve deficit.      Coordination: Coordination normal.      Comments: Oriented to person   Psychiatric:         Mood and Affect: Mood normal.      Comments: Pleasant     Results Review     I reviewed the patient's new clinical results.  Results from last 7 days   Lab Units 09/13/23 0435 09/12/23  0540 09/11/23  0530 09/10/23  0928   WBC 10*3/mm3 8.70 9.76 10.82* 13.54*   HEMOGLOBIN g/dL 9.6* 10.3* 10.2* 10.4*   PLATELETS 10*3/mm3 292 262 241 278       Results from last 7 days   Lab Units 09/13/23 0435 09/12/23 0540 09/11/23  0530 09/10/23  1617   SODIUM mmol/L 141 141 136 139   POTASSIUM mmol/L 3.8 3.9 4.1 3.9   CHLORIDE mmol/L 113* 112* 106 110*   CO2 mmol/L 21.0* 22.4 24.7 21.3*   BUN mg/dL 15 19 24* 28*   CREATININE mg/dL 0.98 0.99 1.09 1.11   GLUCOSE mg/dL 96 98 98 115*   EGFR mL/min/1.73 75.1 74.2 66.1 64.7       Results from last 7 days   Lab Units 09/13/23 0435 09/12/23  0540 09/11/23  0530 09/10/23  1617   ALBUMIN g/dL 2.7* 3.0* 2.8* 3.1*   BILIRUBIN mg/dL 1.2 1.0 0.9 0.7   ALK PHOS U/L 66 74 68 71   AST (SGOT) U/L 11 8 10 8   ALT (SGPT) U/L 6 8 6 9       Results from last 7 days   Lab Units 09/13/23 0435 09/12/23  0540 09/11/23  1654 09/11/23  0530 09/10/23  1617   CALCIUM mg/dL 8.3* 8.4*  --  8.5* 8.5*   ALBUMIN g/dL 2.7* 3.0*  --  2.8* 3.1*   MAGNESIUM mg/dL 1.9 2.1  --  2.1 2.0   PHOSPHORUS mg/dL 2.0* 2.6 3.2 2.2* 2.0*       Results from last 7 days   Lab Units 09/10/23  0928 09/09/23  1602 09/09/23  1233 09/09/23  1125   PROCALCITONIN ng/mL 0.08  --   --  0.08   LACTATE mmol/L  --  1.7 2.3*  --        No results found for: HGBA1C, POCGLU    No radiology results for the last day    I have personally reviewed all medications:  Scheduled Medications  apixaban, 5 mg, Oral, Q12H  atorvastatin, 10 mg, Oral,  Nightly  cholecalciferol, 2,000 Units, Oral, Daily  escitalopram, 10 mg, Oral, Daily  ferrous sulfate, 325 mg, Oral, Daily With Breakfast  gabapentin, 600 mg, Oral, Daily  midodrine, 2.5 mg, Oral, TID AC  pantoprazole, 40 mg, Oral, Q AM  vitamin B-12, 1,000 mcg, Oral, Daily    Infusions     Diet  Diet: Cardiac Diets; Healthy Heart (2-3 Na+); Texture: Regular Texture (IDDSI 7); Fluid Consistency: Thin (IDDSI 0)    I have personally reviewed:  [x]  Laboratory   [x]  Microbiology   []  Radiology   [x]  EKG/Telemetry  []  Cardiology/Vascular   []  Pathology    []  Records       Assessment/Plan     Active Hospital Problems    Diagnosis  POA    Orthostatic hypotension [I95.1]  Yes    Stage 3a chronic kidney disease [N18.31]  Yes    History of pulmonary embolism [Z86.711]  Yes    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     87yo gentleman with h/o atrial flutter and prior ablation, dementia, COPD, CKD3a, HLD, C-DDD, h/o myasthenia gravis, Parkinsonism, MGUS, and recent admission here in July for pulmonary embolism (on Eliquis now), who presented to ER for second time in two days with with report of falls at his assisted living facility. Found to have large left buttock hematoma as well as elevated WBC and lactate. He was admitted to our service with concern for sepsis. He was given a dose of Rocephin in ER and blood cultures were sent. Cardiology consulted regarding recurrent falls.    Elevated WBC and Lactate: WBC normalized, LA wnl, do not suspect sepsis,  "blood cultures NGTD, no fever, continue to monitor off abx    Recurrent falls  Orthostatic hypotension  Neuropathy associated with MGUS  Parkinsonism?  Myasthenia gravis?: pt has plenty of reasons for falling (orthostasis and neuropathy)  Card has seen and recommends tx for orthostasis--ordered Midodrine and a liter of cautious IV NS  Still orthostatic yesterday AM, gave another liter of IV NS and ordered compression stockings  Still very orthostatic this AM, SBP dropped into low 70s  Jobst stockings placed today, will give another liter of IVFs, increase Midodrine (no supine hypertension noted)  Consulted Neuro--pt is followed by Dr. Teddy Campa for neuropathy associated with MGUS, Neuro agrees with above and does not see any evidence of MG or PD on exam (not sure why these are listed in his chart)  Would benefit from rehab per PT eval/recs    Left buttock hematoma: appeared contained on CT    Acute blood loss anemia: due to large left buttock hematoma and Eliquis, Hgb stable, continue to monitor    CKD3a: Cr has normalized, continue to monitor    Recent diagnosis of bilateral PE: continue Eliquis, full AC not ideal in setting of recurrent falls, but we are treating bilateral PEs so our hands are tied really    Dementia NOS: pt diagnosed with \"mild cognitive impairment\" on neuropsych testing 7 years ago, I have d/w dtr--I believe pt has mild-moderate dementia at this time    Hypophosphatemia: replacing with protocol      Eliquis (home med) should suffice for DVT prophylaxis.  Full code.  Discussed with patient. D/w RN, CCP, and Pharm at morning huddle.  Anticipate discharge to [facility] tomorrow, spot available today and tomorrow.      Adonis Celestin MD  [facility]  09/13/23  12:42 EDT      "

## 2023-09-13 NOTE — SIGNIFICANT NOTE
Pt reports not sleeping well last night and just not feeling up to it, still w/low BP, awaits Chrissie Mendenhall, RN aware pt unable to participate, will F/U 9/14

## 2023-09-13 NOTE — PLAN OF CARE
Goal Outcome Evaluation:                        Problem: Fall Injury Risk  Goal: Absence of Fall and Fall-Related Injury  Outcome: Ongoing, Progressing  Intervention: Identify and Manage Contributors  Description: Develop a fall prevention plan with the patient and caregiver/family.  Provide reorientation, appropriate sensory stimulation and routines with changes in mental status to decrease risk of fall.  Promote use of personal vision and auditory aids.  Assess assistance level required for safe and effective self-care; provide support as needed, such as toileting, mobilization. For age 65 and older, implement timed toileting with assistance.  Encourage physical activity, such as performance of mobility and self-care at highest level of patient ability, multicomponent exercise program and provision of appropriate assistive devices.  If fall occurs, assess the severity of injury; implement fall injury protocol. Determine the cause and revise fall injury prevention plan.  Regularly review medication contribution to fall risk; adjust medication administration times to minimize risk of falling.  Consider risk related to polypharmacy and age.  Balance adequate pain management with potential for oversedation.  Recent Flowsheet Documentation  Taken 9/13/2023 0600 by Gwen Weeks, RN  Medication Review/Management: medications reviewed  Taken 9/13/2023 0402 by Gwen Weeks, RN  Medication Review/Management:   medications reviewed   high-risk medications identified  Taken 9/13/2023 0200 by Gwen Weeks, RN  Medication Review/Management:   medications reviewed   high-risk medications identified  Taken 9/12/2023 2357 by Gwen Weeks, RN  Medication Review/Management:   medications reviewed   high-risk medications identified  Self-Care Promotion:   independence encouraged   BADL personal objects within reach   safe use of adaptive equipment encouraged  Taken 9/12/2023 2215 by Gwen Weeks, RN  Medication  Review/Management: medications reviewed  Taken 9/12/2023 2012 by Gwen Weeks RN  Medication Review/Management:   medications reviewed   high-risk medications identified  Self-Care Promotion:   BADL personal objects within reach   independence encouraged   safe use of adaptive equipment encouraged  Intervention: Promote Injury-Free Environment  Description: Provide a safe, barrier-free environment that encourages independent activity.  Keep care area uncluttered and well-lighted.  Determine need for increased observation or monitoring.  Avoid use of devices that minimize mobility, such as restraints or indwelling urinary catheter.  Recent Flowsheet Documentation  Taken 9/13/2023 0600 by Gwen Weeks, RN  Safety Promotion/Fall Prevention: safety round/check completed  Taken 9/13/2023 0402 by Gwen Weeks RN  Safety Promotion/Fall Prevention: safety round/check completed  Taken 9/13/2023 0200 by Gwen Weeks, RN  Safety Promotion/Fall Prevention: safety round/check completed  Taken 9/12/2023 2357 by Gwen Weeks RN  Safety Promotion/Fall Prevention: safety round/check completed  Taken 9/12/2023 2215 by Gwen Weeks RN  Safety Promotion/Fall Prevention: safety round/check completed  Taken 9/12/2023 2012 by Gwen Weeks RN  Safety Promotion/Fall Prevention: safety round/check completed     Problem: Adult Inpatient Plan of Care  Goal: Plan of Care Review  Outcome: Ongoing, Progressing  Goal: Optimal Comfort and Wellbeing  Outcome: Ongoing, Progressing  Intervention: Monitor Pain and Promote Comfort  Description: Assess pain level, treatment efficacy and patient response at regular intervals using a consistent pain scale.  Consider the presence and impact of preexisting chronic pain.  Encourage patient and caregiver involvement in pain assessment, interventions and safety measures.  Recent Flowsheet Documentation  Taken 9/12/2023 2357 by Gwen Weeks, RN  Pain Management  Interventions:   care clustered   diversional activity provided   pillow support provided   position adjusted   quiet environment facilitated   see MAR  Taken 9/12/2023 2012 by Gwen Weeks RN  Pain Management Interventions:   care clustered   diversional activity provided   pillow support provided   position adjusted   quiet environment facilitated   see MAR  Intervention: Provide Person-Centered Care  Description: Use a family-focused approach to care.  Develop trust and rapport by proactively providing information, encouraging questions, addressing concerns and offering reassurance.  Acknowledge emotional response to hospitalization.  Recognize and utilize personal coping strategies.  Honor spiritual and cultural preferences.  Recent Flowsheet Documentation  Taken 9/12/2023 2357 by Gwen Weeks, RN  Trust Relationship/Rapport:   choices provided   care explained   questions answered   reassurance provided   thoughts/feelings acknowledged   questions encouraged   emotional support provided  Taken 9/12/2023 2012 by Gwen Weeks RN  Trust Relationship/Rapport:   care explained   choices provided   questions encouraged   thoughts/feelings acknowledged   reassurance provided   empathic listening provided   questions answered  Goal: Readiness for Transition of Care  Outcome: Ongoing, Progressing     Problem: Skin Injury Risk Increased  Goal: Skin Health and Integrity  Outcome: Ongoing, Progressing  Intervention: Promote and Optimize Oral Intake  Description: Perform a nutrition assessment; include a nutrition-focused physical exam.  Determine calorie, protein, vitamin, mineral and fluid requirements.  Assess for micronutrient deficiencies; supplement if depleted.  Assess need and assist with meal set-up and feeding.  Adjust diet or meal schedule based on preferences and tolerance.  Offer oral supplemental food or drinks to enhance calorie and protein intake.  Establish bowel elimination program to  increase comfort and appetite.  Minimize unnecessary dietary restrictions to increase oral intake.  Provide and encourage oral hygiene to enhance desire to eat.  Consider enteral nutrition support if oral intake remains inadequate; provide parenteral nutrition if enteral is contraindicated.  Recent Flowsheet Documentation  Taken 9/12/2023 2357 by Gwen Weeks RN  Oral Nutrition Promotion:   safe use of adaptive equipment encouraged   rest periods promoted  Taken 9/12/2023 2012 by Gwen Weeks, RN  Oral Nutrition Promotion:   rest periods promoted   safe use of adaptive equipment encouraged  Intervention: Optimize Skin Protection  Description: Perform a full pressure injury risk assessment, as indicated by screening, upon admission to care unit.  Reassess skin (injury risk, full inspection) frequently (e.g., scheduled interval, with change in condition) to provide optimal early detection and prevention.  Maintain adequate tissue perfusion (e.g., encourage fluid balance; avoid crossing legs, constrictive clothing or devices) to promote tissue oxygenation.  Maintain head of bed at lowest degree of elevation tolerated, considering medical condition and other restrictions.  Avoid positioning onto an area that remains reddened.  Minimize incontinence and moisture (e.g., toileting schedule; moisture-wicking pad, diaper or incontinence collection device; skin moisture barrier).  Cleanse skin promptly and gently when soiled utilizing a pH-balanced cleanser.  Relieve and redistribute pressure (e.g., scheduled position changes, weight shifts, use of support surface, medical device repositioning, protective dressing application, use of positioning device, microclimate control, use of pressure-injury-monitor  Encourage increased activity, such as sitting in a chair at the bedside or early mobilization, when able to tolerate.  Recent Flowsheet Documentation  Taken 9/13/2023 0600 by Gwen Weeks, RN  Head of Bed (HOB)  Positioning: HOB at 45 degrees  Taken 9/13/2023 0402 by Gwen Weeks RN  Head of Bed (HOB) Positioning: HOB at 30-45 degrees  Taken 9/13/2023 0200 by Gwen Weeks RN  Head of Bed (HOB) Positioning: HOB elevated  Taken 9/12/2023 2357 by Gwen Weeks RN  Pressure Reduction Techniques:   frequent weight shift encouraged   weight shift assistance provided  Head of Bed (HOB) Positioning: HOB at 20-30 degrees  Pressure Reduction Devices:   alternating pressure pump (ADD)   positioning supports utilized   pressure-redistributing mattress utilized  Skin Protection:   adhesive use limited   tubing/devices free from skin contact   transparent dressing maintained  Taken 9/12/2023 2215 by Gwen Weeks RN  Head of Bed (HOB) Positioning: HOB at 30-45 degrees  Taken 9/12/2023 2012 by Gwen Weeks RN  Pressure Reduction Techniques:   frequent weight shift encouraged   weight shift assistance provided  Head of Bed (HOB) Positioning: HOB at 30-45 degrees  Pressure Reduction Devices:   alternating pressure pump (ADD)   pressure-redistributing mattress utilized  Skin Protection:   adhesive use limited   tubing/devices free from skin contact   transparent dressing maintained   incontinence pads utilized

## 2023-09-14 LAB
ALBUMIN SERPL-MCNC: 2.8 G/DL (ref 3.5–5.2)
ALBUMIN/GLOB SERPL: 1.2 G/DL
ALP SERPL-CCNC: 73 U/L (ref 39–117)
ALT SERPL W P-5'-P-CCNC: 9 U/L (ref 1–41)
ANION GAP SERPL CALCULATED.3IONS-SCNC: 7.5 MMOL/L (ref 5–15)
AST SERPL-CCNC: 12 U/L (ref 1–40)
BACTERIA SPEC AEROBE CULT: NORMAL
BACTERIA SPEC AEROBE CULT: NORMAL
BILIRUB SERPL-MCNC: 1.4 MG/DL (ref 0–1.2)
BUN SERPL-MCNC: 12 MG/DL (ref 8–23)
BUN/CREAT SERPL: 12.9 (ref 7–25)
CALCIUM SPEC-SCNC: 8.5 MG/DL (ref 8.6–10.5)
CHLORIDE SERPL-SCNC: 110 MMOL/L (ref 98–107)
CO2 SERPL-SCNC: 22.5 MMOL/L (ref 22–29)
CREAT SERPL-MCNC: 0.93 MG/DL (ref 0.76–1.27)
DEPRECATED RDW RBC AUTO: 43 FL (ref 37–54)
EGFRCR SERPLBLD CKD-EPI 2021: 80 ML/MIN/1.73
ERYTHROCYTE [DISTWIDTH] IN BLOOD BY AUTOMATED COUNT: 11.9 % (ref 12.3–15.4)
GLOBULIN UR ELPH-MCNC: 2.4 GM/DL
GLUCOSE SERPL-MCNC: 83 MG/DL (ref 65–99)
HCT VFR BLD AUTO: 31.8 % (ref 37.5–51)
HGB BLD-MCNC: 10.4 G/DL (ref 13–17.7)
MAGNESIUM SERPL-MCNC: 2 MG/DL (ref 1.6–2.4)
MCH RBC QN AUTO: 32.5 PG (ref 26.6–33)
MCHC RBC AUTO-ENTMCNC: 32.7 G/DL (ref 31.5–35.7)
MCV RBC AUTO: 99.4 FL (ref 79–97)
PHOSPHATE SERPL-MCNC: 2.1 MG/DL (ref 2.5–4.5)
PLATELET # BLD AUTO: 316 10*3/MM3 (ref 140–450)
PMV BLD AUTO: 11.1 FL (ref 6–12)
POTASSIUM SERPL-SCNC: 4 MMOL/L (ref 3.5–5.2)
PROT SERPL-MCNC: 5.2 G/DL (ref 6–8.5)
RBC # BLD AUTO: 3.2 10*6/MM3 (ref 4.14–5.8)
SODIUM SERPL-SCNC: 140 MMOL/L (ref 136–145)
WBC NRBC COR # BLD: 8.81 10*3/MM3 (ref 3.4–10.8)

## 2023-09-14 PROCEDURE — 97110 THERAPEUTIC EXERCISES: CPT

## 2023-09-14 PROCEDURE — 84100 ASSAY OF PHOSPHORUS: CPT | Performed by: HOSPITALIST

## 2023-09-14 PROCEDURE — 85027 COMPLETE CBC AUTOMATED: CPT | Performed by: HOSPITALIST

## 2023-09-14 PROCEDURE — 83735 ASSAY OF MAGNESIUM: CPT | Performed by: HOSPITALIST

## 2023-09-14 PROCEDURE — 80053 COMPREHEN METABOLIC PANEL: CPT | Performed by: HOSPITALIST

## 2023-09-14 RX ORDER — GABAPENTIN 100 MG/1
200 CAPSULE ORAL EVERY 8 HOURS SCHEDULED
Status: DISCONTINUED | OUTPATIENT
Start: 2023-09-14 | End: 2023-09-21 | Stop reason: HOSPADM

## 2023-09-14 RX ORDER — MIDODRINE HYDROCHLORIDE 5 MG/1
10 TABLET ORAL
Status: DISCONTINUED | OUTPATIENT
Start: 2023-09-14 | End: 2023-09-21 | Stop reason: HOSPADM

## 2023-09-14 RX ADMIN — ACETAMINOPHEN 650 MG: 325 TABLET, FILM COATED ORAL at 01:55

## 2023-09-14 RX ADMIN — MIDODRINE HYDROCHLORIDE 5 MG: 5 TABLET ORAL at 08:15

## 2023-09-14 RX ADMIN — APIXABAN 5 MG: 5 TABLET, FILM COATED ORAL at 20:50

## 2023-09-14 RX ADMIN — SODIUM CHLORIDE 500 ML: 9 INJECTION, SOLUTION INTRAVENOUS at 15:11

## 2023-09-14 RX ADMIN — ATORVASTATIN CALCIUM 10 MG: 20 TABLET, FILM COATED ORAL at 20:50

## 2023-09-14 RX ADMIN — MIDODRINE HYDROCHLORIDE 10 MG: 5 TABLET ORAL at 18:07

## 2023-09-14 RX ADMIN — GABAPENTIN 600 MG: 300 CAPSULE ORAL at 08:15

## 2023-09-14 RX ADMIN — PANTOPRAZOLE SODIUM 40 MG: 40 TABLET, DELAYED RELEASE ORAL at 05:50

## 2023-09-14 RX ADMIN — GABAPENTIN 200 MG: 100 CAPSULE ORAL at 21:09

## 2023-09-14 RX ADMIN — MIDODRINE HYDROCHLORIDE 5 MG: 5 TABLET ORAL at 12:02

## 2023-09-14 RX ADMIN — Medication 1000 MCG: at 08:15

## 2023-09-14 RX ADMIN — Medication 2000 UNITS: at 08:15

## 2023-09-14 RX ADMIN — Medication 2 PACKET: at 08:15

## 2023-09-14 RX ADMIN — APIXABAN 5 MG: 5 TABLET, FILM COATED ORAL at 08:15

## 2023-09-14 RX ADMIN — FERROUS SULFATE TAB 325 MG (65 MG ELEMENTAL FE) 325 MG: 325 (65 FE) TAB at 08:15

## 2023-09-14 RX ADMIN — ESCITALOPRAM OXALATE 10 MG: 10 TABLET, FILM COATED ORAL at 08:15

## 2023-09-14 RX ADMIN — ACETAMINOPHEN 650 MG: 325 TABLET, FILM COATED ORAL at 08:25

## 2023-09-14 NOTE — PLAN OF CARE
Goal Outcome Evaluation:  Plan of Care Reviewed With: patient        Progress: no change  Outcome Evaluation: Continues to be treated for positive orthostatic hypotension. Midodrine dose titrated up. Compression stockings on today. 500ml bolus given today. Appetite good today. Palliative consult placed today but they have yet to see the patient. Patient appears to be in a sad mood. Daughter updated. No further issues noted.

## 2023-09-14 NOTE — PROGRESS NOTES
Name: Jonathan Trejo ADMIT: 2023   : 1935  PCP: Marychuy Johnson APRN    MRN: [id number] LOS: 5 days   AGE/SEX: 88 y.o. male  ROOM: [facility]     Subjective   Subjective   Reports continued weakness and lightheadedness upon standing.  No other specific complaints while sitting in bed.  No nausea or vomiting.       Objective   Objective   Vital Signs  Temp:  [98 °F (36.7 °C)-98.4 °F (36.9 °C)] 98 °F (36.7 °C)  Heart Rate:  [51-67] 57  Resp:  [16] 16  BP: ()/(48-70) 124/67  SpO2:  [93 %-97 %] 97 %  on   ;   Device (Oxygen Therapy): room air  Body mass index is 28.25 kg/m².    (No change in exam today)    Physical Exam  Vitals and nursing note reviewed.   Constitutional:       Appearance: He is ill-appearing (chronically).   Cardiovascular:      Rate and Rhythm: Normal rate. Rhythm irregular.   Pulmonary:      Effort: Pulmonary effort is normal.      Breath sounds: Normal breath sounds.   Abdominal:      General: Bowel sounds are normal.      Palpations: Abdomen is soft.   Musculoskeletal:         General: Swelling (trace in BLEs) present.   Skin:     General: Skin is warm and dry.      Findings: Bruising (left buttock) present.   Neurological:      Mental Status: He is alert.      Comments: Oriented to person   Psychiatric:      Comments: Pleasant     Results Review     I reviewed the patient's new clinical results.  Results from last 7 days   Lab Units 23  0523  0530   WBC 10*3/mm3 8.81 8.70 9.76 10.82*   HEMOGLOBIN g/dL 10.4* 9.6* 10.3* 10.2*   PLATELETS 10*3/mm3 316 292 262 241       Results from last 7 days   Lab Units 23  0435 23  0540 23  0530   SODIUM mmol/L 140 141 141 136   POTASSIUM mmol/L 4.0 3.8 3.9 4.1   CHLORIDE mmol/L 110* 113* 112* 106   CO2 mmol/L 22.5 21.0* 22.4 24.7   BUN mg/dL 12 15 19 24*   CREATININE mg/dL 0.93 0.98 0.99 1.09   GLUCOSE mg/dL 83 96 98 98   EGFR mL/min/1.73 80.0 75.1 74.2 66.1        Results from last 7 days   Lab Units 09/14/23  0536 09/13/23  0435 09/12/23  0540 09/11/23  0530   ALBUMIN g/dL 2.8* 2.7* 3.0* 2.8*   BILIRUBIN mg/dL 1.4* 1.2 1.0 0.9   ALK PHOS U/L 73 66 74 68   AST (SGOT) U/L 12 11 8 10   ALT (SGPT) U/L 9 6 8 6       Results from last 7 days   Lab Units 09/14/23  0536 09/13/23  1324 09/13/23  0435 09/12/23  0540 09/11/23  1654 09/11/23  0530   CALCIUM mg/dL 8.5*  --  8.3* 8.4*  --  8.5*   ALBUMIN g/dL 2.8*  --  2.7* 3.0*  --  2.8*   MAGNESIUM mg/dL 2.0  --  1.9 2.1  --  2.1   PHOSPHORUS mg/dL 2.1* 2.3* 2.0* 2.6   < > 2.2*    < > = values in this interval not displayed.       Results from last 7 days   Lab Units 09/10/23  0928 09/09/23  1602 09/09/23  1233 09/09/23  1125   PROCALCITONIN ng/mL 0.08  --   --  0.08   LACTATE mmol/L  --  1.7 2.3*  --        No results found for: HGBA1C, POCGLU    No radiology results for the last day    I have personally reviewed all medications:  Scheduled Medications  apixaban, 5 mg, Oral, Q12H  atorvastatin, 10 mg, Oral, Nightly  cholecalciferol, 2,000 Units, Oral, Daily  escitalopram, 10 mg, Oral, Daily  ferrous sulfate, 325 mg, Oral, Daily With Breakfast  gabapentin, 600 mg, Oral, Daily  midodrine, 5 mg, Oral, TID AC  pantoprazole, 40 mg, Oral, Q AM  vitamin B-12, 1,000 mcg, Oral, Daily    Infusions     Diet  Diet: Cardiac Diets; Healthy Heart (2-3 Na+); Texture: Regular Texture (IDDSI 7); Fluid Consistency: Thin (IDDSI 0)    I have personally reviewed:  [x]  Laboratory   [x]  Microbiology   []  Radiology   [x]  EKG/Telemetry  []  Cardiology/Vascular   []  Pathology    []  Records       Assessment/Plan     Active Hospital Problems    Diagnosis  POA    Orthostatic hypotension [I95.1]  Yes    Stage 3a chronic kidney disease [N18.31]  Yes    History of pulmonary embolism [Z86.711]  Yes    Hypophosphatemia [E83.39]  Yes    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       87yo gentleman with h/o atrial flutter and prior ablation, dementia, COPD, CKD3a, HLD, C-DDD, h/o myasthenia gravis, Parkinsonism, MGUS, and recent admission here in July for pulmonary embolism (on Eliquis now), who presented to ER for second time in two days with with report of falls at his assisted living facility. Found to have large left buttock hematoma as well as elevated WBC and lactate. He was admitted to our service with concern for sepsis. He was given a dose of Rocephin in ER and blood cultures were sent. Cardiology consulted regarding recurrent falls.    Orthostatics this morning noted.  Systolic blood pressure dropped into the 60s and patient was lightheaded.  This is despite already receiving IV fluids and being started on midodrine.  He has Jobst stockings in place.  Will increase his midodrine and administer another small bolus of fluid.  He is on gabapentin which could be contributing and will lower his dose further.  Physical therapy continuing to work with patient and explained to him the importance of continuing to do this.  Patient is admittedly depressed and is not real keen on pursuing any therapies that prolong life.  His primary goal is to treat any symptoms and be allowed to pass away comfortably.  He is agreeable to discussing this further with the palliative care team.  He is not actively dying and therefore will still need to find a suitable disposition.  Plan has been to go to a SNU facility.  He is reluctant to do this but understands the situation.  We will have the palliative care team discuss this further with him.  "      Acute blood loss anemia: due to large left buttock hematoma and Eliquis, Hgb trending back up.  Back on Eliquis.    CKD3a: Cr has normalized, continue to monitor    Recent diagnosis of bilateral PE: continue Eliquis    Dementia NOS: pt diagnosed with \"mild cognitive impairment\" on neuropsych testing 7 years ago, I have d/w dtr--I believe pt has mild-moderate dementia at this time    Hypophosphatemia: replacing with protocol      Eliquis (home med) should suffice for DVT prophylaxis.  Full code.  Discussed with patient, nursing staff, and care team on multidisciplinary rounds.   Anticipate discharge to [facility] rehab maybe tomorrow.       Troy Parham MD  [facility]  09/14/23  12:57 EDT      "

## 2023-09-14 NOTE — CASE MANAGEMENT/SOCIAL WORK
Continued Stay Note  [facility]     Patient Name: Jonathan Trejo  MRN: [id number]  Today's Date: 9/14/2023    Admit Date: 9/9/2023    Plan: [facility]. Has bed available when medically ready. Thigh High 20-30 Jobst Hose  provided through [facility].   Discharge Plan       Row Name 09/14/23 1704       Plan    Plan [facility]. Has bed available when medically ready. Thigh High 20-30 Jobst Hose  provided through [facility].    Patient/Family in Agreement with Plan yes    Plan Comments Per Mel[facility], pt has bed available when medically ready.  Continues with positive orthostatics with B/P from 125/60 to 62/48 when standing. Palliative Consult pending. Roel Varela RN-BC                   Discharge Codes    No documentation.                 Expected Discharge Date and Time       Expected Discharge Date Expected Discharge Time    Sep 16, 2023               Roel Varela RN

## 2023-09-14 NOTE — PLAN OF CARE
Problem: Adult Inpatient Plan of Care  Goal: Plan of Care Review  Outcome: Ongoing, Progressing  Flowsheets (Taken 9/14/2023 0318)  Outcome Evaluation: VSS. Patient Alert oriented x4 this shift. All due medications given. Able to sleep for a few hours. Given pain medication for buttocks pain. Will continue to monitor. To be endorsed accordingly.

## 2023-09-14 NOTE — PLAN OF CARE
"Goal Outcome Evaluation:  Plan of Care Reviewed With: patient           Outcome Evaluation: Upon entering room, pt. supine in bed, awake/alert, and agreeable to P.T. this date. Pt. declined any OOB mobility this date due to fatigue and feeling \"in a bad mood\" but is agreeable to ther. ex with verbal encouragement from P.T.  Pt. performed BUE/LE ther. ex. program x 10 reps for general strengthening. Encouraged pt. to continue ther. ex. program on his own throughout the day as well. Will continue to progress functional mobility as tolerated.       Patient was not wearing a face mask during this therapy encounter. Therapist used appropriate personal protective equipment including eye protection, mask, and gloves.  Mask used was standard procedure mask. Appropriate PPE was worn during the entire therapy session. Hand hygiene was completed before and after therapy session. Patient is not in enhanced droplet precautions.     "

## 2023-09-14 NOTE — THERAPY TREATMENT NOTE
Patient Name: Jonathan Trejo  : 1935    MRN: [id number]                              Today's Date: 2023       Admit Date: 2023    Visit Dx:     ICD-10-CM ICD-9-CM   1. Sepsis, due to unspecified organism, unspecified whether acute organ dysfunction present  A41.9 038.9     995.91   2. Frequent falls  R29.6 V15.88   3. Hematoma  T14.8XXA 924.9     Patient Active Problem List   Diagnosis    Benign prostatic hyperplasia with urinary obstruction    Chronic renal impairment, stage 3 (moderate)    Depression    Gastroesophageal reflux disease with esophagitis    Hyperlipidemia    Nocturia    Obesity (BMI 30-39.9)    Osteopenia    Osteoporosis    Seborrhea    Abnormal EKG    Bradycardia    Degenerative disc disease, cervical    Typical atrial flutter    Lumbar radiculopathy    AVNRT (AV tarun re-entry tachycardia)    S/P ablation of atrial flutter    S/P catheter ablation of slow pathway    Cardiomyopathy    GARCIA (dyspnea on exertion)    Urinary tract infection without hematuria    Right foot drop    Frequent falls    Metabolic encephalopathy    Acute renal failure    Other secondary parkinsonism    Moderate single current episode of major depressive disorder    Mass of left lower leg    Pulmonary emphysema    Monoclonal gammopathy of unknown significance (MGUS)    Myasthenia gravis without exacerbation    Displacement of lumbar intervertebral disc without myelopathy    Acute anemia    Closed compression fracture of L3 lumbar vertebra, initial encounter    Closed compression fracture of L4 lumbar vertebra, initial encounter    Pulmonary embolus    Stage 3a chronic kidney disease    History of pulmonary embolism    Hypophosphatemia    Neuropathy associated with MGUS    Orthostatic hypotension     Past Medical History:   Diagnosis Date    Abnormal electrocardiogram     Arm pain     Atrial fibrillation     Atrial flutter     BPH (benign prostatic hyperplasia)     BPH associated with nocturia     Chronic kidney  disease     Colon cancer     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    Colon polyp 11/22/2015    Depression     Dyspnea     Enlarged prostate without lower urinary tract symptoms (luts)     Episode of generalized weakness     knees were weak - had to be helped by wife to sit down    Esophagitis, reflux     Fatigue     Fracture of ankle     right    GERD (gastroesophageal reflux disease)     Hyperlipidemia     Inguinal hernia     Loss of hearing     Lumbar radiculopathy     Obesity     Osteoarthritis cervical spine     doc on Sray 08/16    Osteopenia     Osteoporosis     Overweight     Tobacco dependence in remission     Urge incontinence of urine     Vitamin D deficiency     Wheezing      Past Surgical History:   Procedure Laterality Date    CARDIAC CATHETERIZATION N/A 7/27/2017    Procedure: Valve assessment;  Surgeon: Adonis Solis MD;  [facility]AS[facility];  Service:     CARDIAC CATHETERIZATION N/A 10/1/2018    Procedure: Left Heart Cath;  Surgeon: Bogdan Vargas MD;  Location: [facility] CATH [facility]VASIVE LOCATION;  Service: Cardiology    CARDIAC CATHETERIZATION N/A 10/1/2018    Procedure: Coronary angiography;  Surgeon: Bogdan Vargas MD;  Location: [facility] CATH [facility]VASIVE LOC[facility];  Service: Cardiology    CARDIAC CATHETERIZATION N/A 10/1/2018    Procedure: Left ventriculography;  Surgeon: Bogdan Vargas MD;  Location: [facility] CATH [facility]VAS[facility] LOC[facility];  Service: Cardiology    CARDIAC CATHETERIZATION N/A 10/1/2018    Procedure: Right Heart Cath;  Surgeon: Bogdan Vargas MD;  Location: [facility] CATH INVASIVE LOCATION;  Service: Cardiology    CARDIAC ELECTROPHYSIOLOGY PROCEDURE N/A 7/27/2017    Procedure: Ablation atrial flutter Will need to have 3 -4 weeks of therapeutic INR's ;  Surgeon: Adonis Solis MD;  Location[facility];  Service:     CARDIOVERSION      CATARACT EXTRACTION W/ INTRAOCULAR LENS  IMPLANT, BILATERAL      COLON RESECTION      COLON SURGERY       polyp removed    COLONOSCOPY      07/15 redo 5 years  Segun    KNEE SURGERY      benign tumor removed, age 6    LASIK      LUMBAR EPIDURAL INJECTION N/A 5/25/2022    Procedure: LUMBAR EPIDURAL 1ST VISIT L3-4 (right of midline);  Surgeon: Bonnie Brown MD;  [place] [facility] [place]  Service: Pain Management;  Laterality: N/A;    REFRACTIVE SURGERY  2007    REPAIR PERONEAL TENDONS ANKLE W/ FIBULAR OSTEOTOMY Right 03/2013    Dr. Banks      General Information       Row Name 09/14/23 1532          Physical Therapy Time and Intention    Document Type therapy note (daily note)  -MS     Mode of Treatment physical therapy;individual therapy  -MS       Row Name 09/14/23 1532          General Information    Patient Profile Reviewed yes  -MS     Existing Precautions/Restrictions fall   Exit alarm  -MS     Barriers to Rehab none identified  -MS       Row Name 09/14/23 1532          Cognition    Orientation Status (Cognition) oriented x 3  -MS               User Key  (r) = Recorded By, (t) = Taken By, (c) = Cosigned By      Initials Name Provider Type    Jimmie Snyder, PT Physical Therapist                   Mobility       Row Name 09/14/23 1532          Bed Mobility    Comment, (Bed Mobility) Pt. refused attempts at OOB mobility this date but is agreeable to bed ther. ex. with verbal encouragement from P.T.   -MS               User Key  (r) = Recorded By, (t) = Taken By, (c) = Cosigned By      Initials Name Provider Type    Jimmie Snyder, PT Physical Therapist                   Obj/Interventions       Row Name 09/14/23 1533          Motor Skills    Therapeutic Exercise --  BUE/LE ther. ex. program x 10 reps completed (Ankle pumps, Heel Slides, Hip Abduction, Shld Flexion, Shld Shrugs); Encouraged pt. to continue these ther. ex. on his own throughout the day as well.  -MS               User Key  (r) = Recorded By, (t) = Taken By, (c) = Cosigned By      Initials Name Provider Type    Jimmie Snyder, PT  Physical Therapist                   Goals/Plan    No documentation.                  Clinical Impression       Row Name 09/14/23 1533          Pain    Pretreatment Pain Rating 3/10  -MS     Posttreatment Pain Rating 3/10  -MS     Pain Location - Side/Orientation Left  -MS     Pain Location - hip  -MS       Row Name 09/14/23 1533          Positioning and Restraints    Pre-Treatment Position in bed  -MS     Post Treatment Position bed  -MS     In Bed notified nsg;supine;call light within reach;encouraged to call for assist;exit alarm on  All lines intact.  -MS               User Key  (r) = Recorded By, (t) = Taken By, (c) = Cosigned By      Initials Name Provider Type    MS SaxenaJimmie, PT Physical Therapist                   Outcome Measures       Row Name 09/14/23 1534 09/14/23 0825       How much help from another person do you currently need...    Turning from your back to your side while in flat bed without using bedrails? 2  -MS 3  -JUSTIN    Moving from lying on back to sitting on the side of a flat bed without bedrails? 2  -MS 2  -JUSTIN    Moving to and from a bed to a chair (including a wheelchair)? 2  -MS 1  -JUSTIN    Standing up from a chair using your arms (e.g., wheelchair, bedside chair)? 2  -MS 2  -JUSTIN    Climbing 3-5 steps with a railing? 2  -MS 1  -JUSTIN    To walk in hospital room? 2  -MS 1  -JUSTIN    AM-PAC 6 Clicks Score (PT) 12  -MS 10  -JUSTIN    Highest level of mobility 4 --> Transferred to chair/commode  -MS 4 --> Transferred to chair/commode  -JUSTIN      Row Name 09/14/23 1534          Functional Assessment    Outcome Measure Options AM-PAC 6 Clicks Basic Mobility (PT)  -MS               User Key  (r) = Recorded By, (t) = Taken By, (c) = Cosigned By      Initials Name Provider Type    Jimmie Snyder, PT Physical Therapist    Anne Barrios, RN Registered Nurse                                 Physical Therapy Education       Title: PT OT SLP Therapies (Done)       Topic: Physical Therapy (Done)   "     Point: Mobility training (Done)       Learning Progress Summary             Patient Acceptance, E,D, VU,NR by MS at 9/14/2023 1534    Acceptance, E,TB,D, VU,NR by  at 9/12/2023 1632    Acceptance, E,TB, VU,NR by CB at 9/11/2023 1316                         Point: Home exercise program (Done)       Learning Progress Summary             Patient Acceptance, E,D, VU,NR by MS at 9/14/2023 1534    Acceptance, E,TB,D, VU,NR by  at 9/12/2023 1632                         Point: Body mechanics (Done)       Learning Progress Summary             Patient Acceptance, E,TB,D, VU,NR by  at 9/12/2023 1632    Acceptance, E,TB, VU,NR by CB at 9/11/2023 1316                         Point: Precautions (Done)       Learning Progress Summary             Patient Acceptance, E,TB,D, VU,NR by  at 9/12/2023 1632    Acceptance, E,TB, VU,NR by CB at 9/11/2023 1316                                         User Key       Initials Effective Dates Name Provider Type Discipline     03/07/18 -  Sheyla Park, PTA Physical Therapist Assistant PT    MS 06/16/21 -  Jimmie Saxena PT Physical Therapist PT    CB 10/22/21 -  Kelly Littlejohn PT Physical Therapist PT                  PT Recommendation and Plan     Plan of Care Reviewed With: patient  Outcome Evaluation: Upon entering room, pt. supine in bed, awake/alert, and agreeable to P.T. this date. Pt. declined any OOB mobility this date due to fatigue and feeling \"in a bad mood\" but is agreeable to ther. ex with verbal encouragement from P.T.  Pt. performed BUE/LE ther. ex. program x 10 reps for general strengthening. Encouraged pt. to continue ther. ex. program on his own throughout the day as well. Will continue to progress functional mobility as tolerated.     Time Calculation:         PT Charges       Row Name 09/14/23 1536             Time Calculation    Start Time 1320  -MS      Stop Time 1335  -MS      Time Calculation (min) 15 min  -MS      PT Received On 09/14/23  -MS      " PT - Next Appointment 09/15/23  -MS         Time Calculation- PT    Total Timed Code Minutes- PT 14 minute(s)  -MS                User Key  (r) = Recorded By, (t) = Taken By, (c) = Cosigned By      Initials Name Provider Type    Jimmie Snyder, PT Physical Therapist                  Therapy Charges for Today       Code Description Service Date Service Provider Modifiers Qty    13779625975 HC PT THER PROC EA 15 MIN 9/14/2023 Jimmie Saexna, PT GP 1            PT G-Codes  Outcome Measure Options: AM-PAC 6 Clicks Basic Mobility (PT)  AM-PAC 6 Clicks Score (PT): 12  AM-PAC 6 Clicks Score (OT): 15       Jimmie Saxena, PT  9/14/2023

## 2023-09-15 PROCEDURE — 99222 1ST HOSP IP/OBS MODERATE 55: CPT | Performed by: NURSE PRACTITIONER

## 2023-09-15 PROCEDURE — 99497 ADVNCD CARE PLAN 30 MIN: CPT | Performed by: NURSE PRACTITIONER

## 2023-09-15 RX ADMIN — GABAPENTIN 200 MG: 100 CAPSULE ORAL at 21:26

## 2023-09-15 RX ADMIN — ATORVASTATIN CALCIUM 10 MG: 20 TABLET, FILM COATED ORAL at 20:07

## 2023-09-15 RX ADMIN — ESCITALOPRAM OXALATE 10 MG: 10 TABLET, FILM COATED ORAL at 09:27

## 2023-09-15 RX ADMIN — Medication 2000 UNITS: at 09:28

## 2023-09-15 RX ADMIN — MIDODRINE HYDROCHLORIDE 10 MG: 5 TABLET ORAL at 09:28

## 2023-09-15 RX ADMIN — APIXABAN 5 MG: 5 TABLET, FILM COATED ORAL at 09:28

## 2023-09-15 RX ADMIN — APIXABAN 5 MG: 5 TABLET, FILM COATED ORAL at 20:07

## 2023-09-15 RX ADMIN — MIDODRINE HYDROCHLORIDE 10 MG: 5 TABLET ORAL at 17:57

## 2023-09-15 RX ADMIN — GABAPENTIN 200 MG: 100 CAPSULE ORAL at 05:30

## 2023-09-15 RX ADMIN — MIDODRINE HYDROCHLORIDE 10 MG: 5 TABLET ORAL at 13:17

## 2023-09-15 RX ADMIN — FERROUS SULFATE TAB 325 MG (65 MG ELEMENTAL FE) 325 MG: 325 (65 FE) TAB at 09:28

## 2023-09-15 RX ADMIN — PANTOPRAZOLE SODIUM 40 MG: 40 TABLET, DELAYED RELEASE ORAL at 05:30

## 2023-09-15 RX ADMIN — Medication 1000 MCG: at 09:27

## 2023-09-15 NOTE — PLAN OF CARE
Goal Outcome Evaluation:  Plan of Care Reviewed With: patient           Outcome Evaluation: Sinus bradycardia with bbb and occassional pac on the monitor. HR 40s-50s. Denies soa, dizziness, n/v. Rested well during this shift

## 2023-09-15 NOTE — CONSULTS
.            [facility]    Palliative Care Initial Consult   Attending Physician: Troy Parham MD  Referring Provider: Dr Parham    Reason for Referral: assistance with clarification of goals of care  Family/Support:     Code Status and Medical Interventions:   Ordered at: 09/10/23 0804     Code Status (Patient has no pulse and is not breathing):    CPR (Attempt to Resuscitate)     Medical Interventions (Patient has pulse or is breathing):    Full Support     Goals of Care: TBD.    HPI:   88 y.o. male with history of atrial fibrillation/flutter, chronic kidney disease stage IIIa, colon cancer, chronic obstructive pulmonary disease, neuropathy associated with MGUS, tachycardia mediated cardiomyopathy s/p LV recover, dementia, and recent bilateral pulmonary embolism. He resided at [facility] assisted living [facility] prior to admission.   Patient presented to [facility] on 9/9/2023 related to fall with a large hematoma on left buttock. Workup in ER, revealed CT head negative. CT of pelvis revealed hematoma was contained.  He did present with elevated leukocytosis with WBC 17.71, with elevated lactate (2.3) and normal procalcitonin. CXR with no acute findings. Consults were placed for cardiology and neurology. He was noted have ortho hypotension with recommendations to start midodrine, jobst compression stockings and aggressive physical therapy and rehabilitation. The palliative care team was consulted for support with goals of care conversation secondary to chronic conditions.   Palliative Care Spoke With: patient and family  Quality of life: poor   Functional Status: pt inocencia sup to sit w/ assist of 2, once EOB pt w/R lateral /post lean; pt perf STS x2, then able to take 2 side-steps to L toward HOB , dizziness limiting, + orthostatics, per PT notes on 9/12/2023  Due to the Palliative Care Topics Discussed: palliative care, goals of care, care options, resuscitation  status, [facility] scattered bed status, and discharge options we will establish an advance care plan.   Advance Care Planning   Advance Care Planning Discussion: see below          Review of Systems   Constitutional: Positive for decreased appetite and malaise/fatigue.   Cardiovascular:  Positive for dyspnea on exertion. Negative for chest pain.   Respiratory:  Negative for cough and shortness of breath.    Musculoskeletal:  Positive for back pain (chronic).   Gastrointestinal:  Negative for abdominal pain and nausea.   Neurological:  Positive for dizziness (with movement-sitting to standing) and weakness.   Psychiatric/Behavioral:  Positive for depression. The patient is nervous/anxious.      1- Pain Assessment  Nonverbal Indicators of Pain: nonverbal indicators absent  Pain Location: buttock, back  Pain Description: intermittent, aching    Past Medical History:   Diagnosis Date    Abnormal electrocardiogram     Arm pain     Atrial fibrillation     Atrial flutter     BPH (benign prostatic hyperplasia)     BPH associated with nocturia     Chronic kidney disease     Colon cancer     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    Colon polyp 11/22/2015    Depression     Dyspnea     Enlarged prostate without lower urinary tract symptoms (luts)     Episode of generalized weakness     knees were weak - had to be helped by wife to sit down    Esophagitis, reflux     Fatigue     Fracture of ankle     right    GERD (gastroesophageal reflux disease)     Hyperlipidemia     Inguinal hernia     Loss of hearing     Lumbar radiculopathy     Obesity     Osteoarthritis cervical spine     doc on [facility] 08/16    Osteopenia     Osteoporosis     Overweight     Tobacco dependence in remission     Urge incontinence of urine     Vitamin D deficiency     Wheezing      Past Surgical History:   Procedure Laterality Date    CARDIAC CATHETERIZATION N/A 7/27/2017    Procedure: Valve assessment;  Surgeon: Adonis Solis MD;  Location: [facility]  CATH INVASIVE LOCATION;  Service:     CARDIAC CATHETERIZATION N/A 10/1/2018    Procedure: Left Heart Cath;  Surgeon: Bogdan Vargas MD;  Location: [facility] CATH INVASIVE LOCATION;  Service: Cardiology    CARDIAC CATHETERIZATION N/A 10/1/2018    Procedure: Coronary angiography;  Surgeon: Bogdan Vargas MD;  Location: [facility] CATH INVASIVE LOCATION;  Service: Cardiology    CARDIAC CATHETERIZATION N/A 10/1/2018    Procedure: Left ventriculography;  Surgeon: Bogdan Vargas MD;  Location: [facility] CATH INVASIVE LOCATION;  Service: Cardiology    CARDIAC CATHETERIZATION N/A 10/1/2018    Procedure: Right Heart Cath;  Surgeon: Bogdan Vargas MD;  Location: [facility] CATH INVASIVE LOCATION;  Service: Cardiology    CARDIAC ELECTROPHYSIOLOGY PROCEDURE N/A 7/27/2017    Procedure: Ablation atrial flutter Will need to have 3 -4 weeks of therapeutic INR's ;  Surgeon: Adonis Solis MD;  Location: [facility] CATH INVASIVE LOCATION;  Service:     CARDIOVERSION      CATARACT EXTRACTION W/ INTRAOCULAR LENS  IMPLANT, BILATERAL      COLON RESECTION      COLON SURGERY      polyp removed    COLONOSCOPY      07/15 redo 5 years  Segun    KNEE SURGERY      benign tumor removed, age 6    LASIK      LUMBAR EPIDURAL INJECTION N/A 5/25/2022    Procedure: LUMBAR EPIDURAL 1ST VISIT L3-4 (right of midline);  Surgeon: Bonnie Brown MD;  Location[facility] OR;  Service: Pain Management;  Laterality: N/A;    REFRACTIVE SURGERY  2007    REPAIR PERONEAL TENDONS ANKLE W/ FIBULAR OSTEOTOMY Right 03/2013    Dr. Banks     Social History     Socioeconomic History    Marital status:     Number of children: 2    Years of education: COLLEGE GRADUATE   Tobacco Use    Smoking status: Former     Packs/day: 0.50     Years: 55.00     Pack years: 27.50     Types: Cigarettes     Quit date: 5/1/2008     Years since quitting: 15.3    Smokeless tobacco: Never    Tobacco comments:     QUIT 10 YRS   Vaping Use    Vaping Use: Never used   Substance  and Sexual Activity    Alcohol use: Not Currently     Comment: SOCIALLY    Drug use: No    Sexual activity: Defer       Current Facility-Administered Medications   Medication Dose Route Frequency Provider Last Rate Last Admin    acetaminophen (TYLENOL) tablet 650 mg  650 mg Oral Q4H PRN Navjot Sanford APRN   650 mg at 09/14/23 0825    apixaban (ELIQUIS) tablet 5 mg  5 mg Oral Q12H Cash Romeo MD   5 mg at 09/14/23 2050    atorvastatin (LIPITOR) tablet 10 mg  10 mg Oral Nightly Cash Romeo MD   10 mg at 09/14/23 2050    cholecalciferol (VITAMIN D3) tablet 2,000 Units  2,000 Units Oral Daily Cash Romeo MD   2,000 Units at 09/14/23 0815    escitalopram (LEXAPRO) tablet 10 mg  10 mg Oral Daily Cash Romeo MD   10 mg at 09/14/23 0815    ferrous sulfate tablet 325 mg  325 mg Oral Daily With Breakfast Cash Romeo MD   325 mg at 09/14/23 0815    gabapentin (NEURONTIN) capsule 200 mg  200 mg Oral Q8H Troy Parham MD   200 mg at 09/15/23 0530    midodrine (PROAMATINE) tablet 10 mg  10 mg Oral TID AC Troy Parham MD   10 mg at 09/14/23 1807    pantoprazole (PROTONIX) EC tablet 40 mg  40 mg Oral Q AM Cash Romeo MD   40 mg at 09/15/23 0530    vitamin B-12 (CYANOCOBALAMIN) tablet 1,000 mcg  1,000 mcg Oral Daily Cash Romeo MD   1,000 mcg at 09/14/23 0815          acetaminophen    No Known Allergies  Attest that current medications reviewed  including but not limited to prescriptions, over-the counter, herbals and vitamin/mineral/dietary (nutritional) supplements for name, route of administration, type, dose and frequency and are current using all immediate resources available at time of dictation.      Intake/Output Summary (Last 24 hours) at 9/15/2023 0747  Last data filed at 9/15/2023 0741  Gross per 24 hour   Intake 120 ml   Output 510 ml   Net -390 ml       Physical Exam:    Diagnostics: Reviewed  /57 (BP Location: Left arm, Patient Position: Lying)   Pulse  "59   Temp 98.4 °F (36.9 °C) (Oral)   Resp 16   Ht 167.6 cm (66\")   Wt 79.4 kg (175 lb)   SpO2 94%   BMI 28.25 kg/m²     Constitutional:       Appearance: Not in distress. Chronically ill-appearing.      Comments: Elderly    Pulmonary:      Effort: Pulmonary effort is normal.      Breath sounds: Normal breath sounds.   Cardiovascular:      PMI at left midclavicular line. Bradycardia present. Regular rhythm.      Murmurs: There is no murmur.      Comments: Bilateral jobst stockings  Edema:     Peripheral edema absent.   Abdominal:      General: Bowel sounds are normal.      Palpations: Abdomen is soft.      Tenderness: There is no abdominal tenderness.      Comments: obese   Neurological:      Mental Status: Alert and oriented to person, place, and time.   Psychiatric:         Mood and Affect: Mood and affect normal.         Speech: Speech normal.         Behavior: Behavior is cooperative.         Thought Content: Thought content normal.      Comments: Forgetful at times     Patient status: Disease state: Controlled with current treatments.  Functional status: Palliative Performance Scale Score: Performance 50% based on the following measures: Ambulation: Mainly sit or lie down, Activity and Evidence of Disease: Unable to do any work, extensive evidence of disease, Self-Care: Considerable assistance required,  Intake: Normal or reduced, LOC: Full or confusion   Nutritional status: Albumin  2.8 on 9/14/2023 Body mass index is 28.25 kg/m².   Family support: The patient receives support from his children..  Advance Directives: Advance Directive Status: Patient has advance directive, copy in chart   POA/Healthcare surrogate-daughterKaylynn.        Impression/Problem List:    Orthostatic hypotension   Left buttock hematoma  Chronic obstructive pulmonary disease   Peripheral neuropathy secondary to MGUS     Chronic kidney disease stage IIIa  History of bilateral  pulmonary embolism  Degenerative disc " "disease  Dementia        Recommendations/Plan:  1.Provide support with goals of care.  2. DNR/ DNI, no cardioversion/defibrillator        2.  Palliative care encounter  I spoke with patient regarding goals of care. He has a fairly good understanding of his medical conditions, which we reviewed. The patient resided at [facility] assisted living facility prior to admission. He was independent with ADL's and utilized walker. He does have living will and his oldest daughter, Kaylynn is his healthcare surrogate. He does have another daughter who resides in [place]. We talked about his goals in detail. He has some resistance with going to rehab and I explained to him the reasoning for therapy based on his current orthostatic hypotension. Also the hope that it would just be temporary with hopes of  him returning back to [facility]. We also discussed concerns with him returning to assisted living facility with dizziness and his fear of falling at this time. 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 He reports having poor quality of life right now.  He denies any spiritual concerns. He is of [place] ro[place]. I provided information regarding palliative care, [facility] and [facility]. We also discussed CODE status and medical interventions and he wishes to be  DNR/DNI and no cardioversion/defibrillator.  Due to his reported dementia I did reach out to his daughter, Kaylynn.    She has a very good understanding of her fathers conditions, which we reviewed. At this time she wants him to go rehab (which was discussed with patient prior to her business trip and he seemed agreeable) and understands safety concerns regarding him returning to [facility]. She plans to be back in town from business trip later today and will talk with him further. She does tell me that she will " support him in any decision he makes. In addition, she plans to discuss with [facility] to determine what is required for him to return back their since that has been his home for so long and he really enjoys being there. There was discussion prior too about implement caregivers to assist her father with bathing etc. I did provide information regarding palliative care, [facility] and [facility] services as well. In addition, left handouts for her at bedside.  I did confirm that her father wishes to be DNR/DNI and no cardioversion and she agrees. She will plan to call me with any questions or concerns as I left her my contact information. I spoke with RAFAL Luna and YAMILET Jin.       Thank you for this consult and allowing us to participate in patient's plan of care. Palliative Care Team will sign off and see PRN.     Time spent:60 minutes spent reviewing medical and medication records, assessing and examining patient, discussing with family, answering questions, providing some guidance about a plan and documentation of care, and coordinating care with other healthcare members, with > 50% time spent face to face.   30  minutes spent on advance care planning.    Naty Mccarty, APRN  9/15/2023  07:47 EDT

## 2023-09-15 NOTE — CASE MANAGEMENT/SOCIAL WORK
Continued Stay Note  [facility]     Patient Name: Jonathan Trejo  MRN: [id number]  Today's Date: 9/15/2023    Admit Date: 9/9/2023    Plan: [facility]. Can accept over weekend if medically ready.Thigh High 20-30 Jobst Hose provided through [employer]. Naty Palliative APRN provided information on palliative care, [facility] and [facility].   Discharge Plan       Row Name 09/15/23 1723       Plan    Plan [facility]. Can accept over weekend if medically ready.Thigh High 20-30 Jobst Hose provided through [employer]. Naty Palliative APRN provided information on palliative care, [facility] and [facility].    Patient/Family in Agreement with Plan yes    Plan Comments Palliative met with pt and daughter today. Pt states he just wants to let things end. Naty Palliative APRN provided information on palliative care, [facility] and [facility]. Code status was also addressed. Pt is from [facility]. Daughter to discuss pt's return with [facility]. Per Melanie/[facility], can accept to [facility] over weekend. Roel Varela RN-BC                   Discharge Codes    No documentation.                 Expected Discharge Date and Time       Expected Discharge Date Expected Discharge Time    Sep 16, 2023               Roel Varela RN

## 2023-09-15 NOTE — PROGRESS NOTES
Name: Jonathan Trejo ADMIT: 2023   : 1935  PCP: Marychuy Johnson APRN    MRN: [id number] LOS: 6 days   AGE/SEX: 88 y.o. male  ROOM: [facility]     Subjective   Subjective   Unable to participate with orthostatic BP checks today because of weakness.  Not necessarily lightheadedness.  Otherwise no new complaints.  Still depressed.       Objective   Objective   Vital Signs  Temp:  [97.7 °F (36.5 °C)-99 °F (37.2 °C)] 98.4 °F (36.9 °C)  Heart Rate:  [49-62] 59  Resp:  [16] 16  BP: (123-152)/(57-76) 123/57  SpO2:  [94 %-97 %] 94 %  on   ;   Device (Oxygen Therapy): room air  Body mass index is 28.25 kg/m².    Physical Exam  Vitals and nursing note reviewed.   Constitutional:       Appearance: He is ill-appearing (chronically).   Cardiovascular:      Rate and Rhythm: Normal rate. Rhythm irregular.   Pulmonary:      Effort: Pulmonary effort is normal.      Breath sounds: Normal breath sounds.   Abdominal:      General: Bowel sounds are normal.      Palpations: Abdomen is soft.   Musculoskeletal:         General: Swelling (trace in BLEs) present.   Skin:     General: Skin is warm and dry.      Findings: Bruising (left buttock) present.   Neurological:      Mental Status: He is alert.   Psychiatric:         Mood and Affect: Mood is depressed.     Results Review     I reviewed the patient's new clinical results.  Results from last 7 days   Lab Units 23  0523  0530   WBC 10*3/mm3 8.81 8.70 9.76 10.82*   HEMOGLOBIN g/dL 10.4* 9.6* 10.3* 10.2*   PLATELETS 10*3/mm3 316 292 262 241       Results from last 7 days   Lab Units 23  0435 23  0540 23  0530   SODIUM mmol/L 140 141 141 136   POTASSIUM mmol/L 4.0 3.8 3.9 4.1   CHLORIDE mmol/L 110* 113* 112* 106   CO2 mmol/L 22.5 21.0* 22.4 24.7   BUN mg/dL 12 15 19 24*   CREATININE mg/dL 0.93 0.98 0.99 1.09   GLUCOSE mg/dL 83 96 98 98   EGFR mL/min/1.73 80.0 75.1 74.2 66.1       Results from last 7 days    Lab Units 09/14/23  0536 09/13/23  0435 09/12/23  0540 09/11/23  0530   ALBUMIN g/dL 2.8* 2.7* 3.0* 2.8*   BILIRUBIN mg/dL 1.4* 1.2 1.0 0.9   ALK PHOS U/L 73 66 74 68   AST (SGOT) U/L 12 11 8 10   ALT (SGPT) U/L 9 6 8 6       Results from last 7 days   Lab Units 09/14/23  0536 09/13/23  1324 09/13/23  0435 09/12/23  0540 09/11/23  1654 09/11/23  0530   CALCIUM mg/dL 8.5*  --  8.3* 8.4*  --  8.5*   ALBUMIN g/dL 2.8*  --  2.7* 3.0*  --  2.8*   MAGNESIUM mg/dL 2.0  --  1.9 2.1  --  2.1   PHOSPHORUS mg/dL 2.1* 2.3* 2.0* 2.6   < > 2.2*    < > = values in this interval not displayed.       Results from last 7 days   Lab Units 09/10/23  0928 09/09/23  1602 09/09/23  1233 09/09/23  1125   PROCALCITONIN ng/mL 0.08  --   --  0.08   LACTATE mmol/L  --  1.7 2.3*  --          No radiology results for the last day    I have personally reviewed all medications:  Scheduled Medications  apixaban, 5 mg, Oral, Q12H  atorvastatin, 10 mg, Oral, Nightly  cholecalciferol, 2,000 Units, Oral, Daily  escitalopram, 10 mg, Oral, Daily  ferrous sulfate, 325 mg, Oral, Daily With Breakfast  gabapentin, 200 mg, Oral, Q8H  midodrine, 10 mg, Oral, TID AC  pantoprazole, 40 mg, Oral, Q AM  vitamin B-12, 1,000 mcg, Oral, Daily    Infusions     Diet  Diet: Cardiac Diets; Healthy Heart (2-3 Na+); Texture: Regular Texture (IDDSI 7); Fluid Consistency: Thin (IDDSI 0)    I have personally reviewed:  [x]  Laboratory   [x]  Microbiology blood cultures final result negative  []  Radiology   [x]  EKG/Telemetry  []  Cardiology/Vascular   []  Pathology    []  Records       Assessment/Plan     Active Hospital Problems    Diagnosis  POA    Orthostatic hypotension [I95.1]  Yes    Stage 3a chronic kidney disease [N18.31]  Yes    History of pulmonary embolism [Z86.711]  Yes    Hypophosphatemia [E83.39]  Yes    Neuropathy associated with MGUS [D47.2, G63]  Yes    Myasthenia gravis without exacerbation [G70.00]  Yes    Monoclonal gammopathy of unknown significance  "(MGUS) [D47.2]  Yes    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       87yo gentleman with h/o atrial flutter and prior ablation, dementia, COPD, CKD3a, HLD, C-DDD, h/o myasthenia gravis, Parkinsonism, MGUS, and recent admission here in July for pulmonary embolism (on Eliquis now), who presented to ER for second time in two days with with report of falls at his assisted living facility. Found to have large left buttock hematoma as well as elevated WBC and lactate. He was admitted to our service with concern for sepsis. He was given a dose of Rocephin in ER and blood cultures were sent. Cardiology consulted regarding recurrent falls.    Discussed with patient and daughter at bedside.  Unable to check orthostatics but unclear if this is due to just general weakness or lightheadedness upon standing.  Midodrine dose increased yesterday and will see how BP response.  He really needs to progress with physical therapy and would like to discharge to acute rehab tomorrow or Sunday.  He was seen by the palliative care team.  Not appropriate for inpatient palliative care.  He is DNR.  Psychiatry consulted per family request due to depression.      Acute blood loss anemia: due to large left buttock hematoma and Eliquis, Hgb trending back up.  Back on Eliquis.    CKD3a: Cr has normalized, continue to monitor    Recent diagnosis of bilateral PE: continue Eliquis    Dementia NOS: pt diagnosed with \"mild cognitive impairment\" on neuropsych testing 7 years ago, I have d/w dtr--I believe pt has mild-moderate dementia at this time    Hypophosphatemia: replacing with protocol      Eliquis (home med) should suffice for DVT prophylaxis.  Full code.  Discussed with patient, family, " nursing staff, and care team on multidisciplinary rounds.   Anticipate discharge to [facility] rehab maybe tomorrow.       Troy Parham MD  [facility]  09/15/23  09:47 EDT

## 2023-09-16 LAB
ALBUMIN SERPL-MCNC: 3.1 G/DL (ref 3.5–5.2)
ANION GAP SERPL CALCULATED.3IONS-SCNC: 8.3 MMOL/L (ref 5–15)
BUN SERPL-MCNC: 11 MG/DL (ref 8–23)
BUN/CREAT SERPL: 10.9 (ref 7–25)
CALCIUM SPEC-SCNC: 8.7 MG/DL (ref 8.6–10.5)
CHLORIDE SERPL-SCNC: 108 MMOL/L (ref 98–107)
CO2 SERPL-SCNC: 24.7 MMOL/L (ref 22–29)
CREAT SERPL-MCNC: 1.01 MG/DL (ref 0.76–1.27)
DEPRECATED RDW RBC AUTO: 43.9 FL (ref 37–54)
EGFRCR SERPLBLD CKD-EPI 2021: 72.4 ML/MIN/1.73
ERYTHROCYTE [DISTWIDTH] IN BLOOD BY AUTOMATED COUNT: 12 % (ref 12.3–15.4)
GLUCOSE SERPL-MCNC: 98 MG/DL (ref 65–99)
HCT VFR BLD AUTO: 32.4 % (ref 37.5–51)
HGB BLD-MCNC: 10.8 G/DL (ref 13–17.7)
MCH RBC QN AUTO: 33.1 PG (ref 26.6–33)
MCHC RBC AUTO-ENTMCNC: 33.3 G/DL (ref 31.5–35.7)
MCV RBC AUTO: 99.4 FL (ref 79–97)
PHOSPHATE SERPL-MCNC: 2.5 MG/DL (ref 2.5–4.5)
PLATELET # BLD AUTO: 341 10*3/MM3 (ref 140–450)
PMV BLD AUTO: 10.8 FL (ref 6–12)
POTASSIUM SERPL-SCNC: 3.7 MMOL/L (ref 3.5–5.2)
RBC # BLD AUTO: 3.26 10*6/MM3 (ref 4.14–5.8)
SODIUM SERPL-SCNC: 141 MMOL/L (ref 136–145)
WBC NRBC COR # BLD: 10.12 10*3/MM3 (ref 3.4–10.8)

## 2023-09-16 PROCEDURE — 80069 RENAL FUNCTION PANEL: CPT | Performed by: HOSPITALIST

## 2023-09-16 PROCEDURE — 85027 COMPLETE CBC AUTOMATED: CPT | Performed by: HOSPITALIST

## 2023-09-16 PROCEDURE — 97530 THERAPEUTIC ACTIVITIES: CPT

## 2023-09-16 RX ORDER — FLUDROCORTISONE ACETATE 0.1 MG/1
100 TABLET ORAL DAILY
Status: DISCONTINUED | OUTPATIENT
Start: 2023-09-16 | End: 2023-09-19

## 2023-09-16 RX ORDER — UREA 10 %
2 LOTION (ML) TOPICAL NIGHTLY PRN
Status: DISCONTINUED | OUTPATIENT
Start: 2023-09-16 | End: 2023-09-19

## 2023-09-16 RX ADMIN — Medication 1000 MCG: at 09:13

## 2023-09-16 RX ADMIN — SODIUM CHLORIDE 500 ML: 9 INJECTION, SOLUTION INTRAVENOUS at 14:30

## 2023-09-16 RX ADMIN — ESCITALOPRAM OXALATE 10 MG: 10 TABLET, FILM COATED ORAL at 09:13

## 2023-09-16 RX ADMIN — GABAPENTIN 200 MG: 100 CAPSULE ORAL at 21:40

## 2023-09-16 RX ADMIN — APIXABAN 5 MG: 5 TABLET, FILM COATED ORAL at 09:28

## 2023-09-16 RX ADMIN — MIDODRINE HYDROCHLORIDE 10 MG: 5 TABLET ORAL at 12:25

## 2023-09-16 RX ADMIN — FLUDROCORTISONE ACETATE 100 MCG: 0.1 TABLET ORAL at 13:29

## 2023-09-16 RX ADMIN — Medication 2000 UNITS: at 09:13

## 2023-09-16 RX ADMIN — APIXABAN 5 MG: 5 TABLET, FILM COATED ORAL at 20:20

## 2023-09-16 RX ADMIN — MIDODRINE HYDROCHLORIDE 10 MG: 5 TABLET ORAL at 09:13

## 2023-09-16 RX ADMIN — PANTOPRAZOLE SODIUM 40 MG: 40 TABLET, DELAYED RELEASE ORAL at 05:53

## 2023-09-16 RX ADMIN — FERROUS SULFATE TAB 325 MG (65 MG ELEMENTAL FE) 325 MG: 325 (65 FE) TAB at 09:13

## 2023-09-16 RX ADMIN — GABAPENTIN 200 MG: 100 CAPSULE ORAL at 13:29

## 2023-09-16 RX ADMIN — ATORVASTATIN CALCIUM 10 MG: 20 TABLET, FILM COATED ORAL at 20:20

## 2023-09-16 NOTE — PLAN OF CARE
Goal Outcome Evaluation:  Plan of Care Reviewed With: patient        Progress: improving  Outcome Evaluation: Patient was agreeable to working with therapy with max encouragement. He expressed his anxiety and fears at the beginning of the session and PT educated him on importance of mobilizing while he is here to maintain strength/mobility. He required min/modA to transfer to sitting EOB and sat EOB for ~10 mins with mild-mod lightheadedness. He stood twice with CGA and took 4 sidesteps toward HOB with CGA and HHA. Patient was steady with no LOB. Patient will benefit from continued skilled PT addresing limitations in functional mobiltiy to improve sfaety and QOL.

## 2023-09-16 NOTE — PROGRESS NOTES
Name: Jonathan Trejo ADMIT: 2023   : 1935  PCP: Marychuy Johnson APRN    MRN: [id number] LOS: 7 days   AGE/SEX: 88 y.o. male  ROOM: [facility]/     Subjective   Subjective   Still with obvious severe depression generalized pain not wanting to participate in therapies or even get his orthostatics checked this morning.       Objective   Objective   Vital Signs  Temp:  [97.7 °F (36.5 °C)-98.6 °F (37 °C)] 97.7 °F (36.5 °C)  Heart Rate:  [50-56] 54  Resp:  [16-18] 18  BP: (110-135)/(60-72) 126/72  SpO2:  [95 %-96 %] 96 %  on   ;   Device (Oxygen Therapy): room air  Body mass index is 28.25 kg/m².    Physical Exam  Vitals and nursing note reviewed.   Constitutional:       Appearance: He is ill-appearing (chronically).   Cardiovascular:      Rate and Rhythm: Normal rate. Rhythm irregular.   Pulmonary:      Effort: Pulmonary effort is normal.      Breath sounds: Normal breath sounds.   Abdominal:      General: Bowel sounds are normal.      Palpations: Abdomen is soft.   Musculoskeletal:         General: Swelling (trace in BLEs) present.   Skin:     General: Skin is warm and dry.      Findings: Bruising (left buttock) present.   Neurological:      Mental Status: He is alert.   Psychiatric:         Mood and Affect: Mood is depressed.     Results Review     I reviewed the patient's new clinical results.  Results from last 7 days   Lab Units 23  0540   WBC 10*3/mm3 10.12 8.81 8.70 9.76   HEMOGLOBIN g/dL 10.8* 10.4* 9.6* 10.3*   PLATELETS 10*3/mm3 341 316 292 262       Results from last 7 days   Lab Units 2336 23  0540   SODIUM mmol/L 141 140 141 141   POTASSIUM mmol/L 3.7 4.0 3.8 3.9   CHLORIDE mmol/L 108* 110* 113* 112*   CO2 mmol/L 24.7 22.5 21.0* 22.4   BUN mg/dL 11 12 15 19   CREATININE mg/dL 1.01 0.93 0.98 0.99   GLUCOSE mg/dL 98 83 96 98   EGFR mL/min/1.73 72.4 80.0 75.1 74.2       Results from last 7 days   Lab Units  09/16/23  0522 09/14/23  0536 09/13/23  0435 09/12/23  0540 09/11/23  0530   ALBUMIN g/dL 3.1* 2.8* 2.7* 3.0* 2.8*   BILIRUBIN mg/dL  --  1.4* 1.2 1.0 0.9   ALK PHOS U/L  --  73 66 74 68   AST (SGOT) U/L  --  12 11 8 10   ALT (SGPT) U/L  --  9 6 8 6       Results from last 7 days   Lab Units 09/16/23  0522 09/14/23  0536 09/13/23  1324 09/13/23  0435 09/12/23  0540 09/11/23  1654 09/11/23  0530   CALCIUM mg/dL 8.7 8.5*  --  8.3* 8.4*  --  8.5*   ALBUMIN g/dL 3.1* 2.8*  --  2.7* 3.0*  --  2.8*   MAGNESIUM mg/dL  --  2.0  --  1.9 2.1  --  2.1   PHOSPHORUS mg/dL 2.5 2.1* 2.3* 2.0* 2.6   < > 2.2*    < > = values in this interval not displayed.       Results from last 7 days   Lab Units 09/10/23  0928 09/09/23  1602 09/09/23  1233 09/09/23  1125   PROCALCITONIN ng/mL 0.08  --   --  0.08   LACTATE mmol/L  --  1.7 2.3*  --          No radiology results for the last day    I have personally reviewed all medications:  Scheduled Medications  apixaban, 5 mg, Oral, Q12H  atorvastatin, 10 mg, Oral, Nightly  cholecalciferol, 2,000 Units, Oral, Daily  escitalopram, 10 mg, Oral, Daily  ferrous sulfate, 325 mg, Oral, Daily With Breakfast  gabapentin, 200 mg, Oral, Q8H  midodrine, 10 mg, Oral, TID AC  pantoprazole, 40 mg, Oral, Q AM  vitamin B-12, 1,000 mcg, Oral, Daily    Infusions     Diet  Diet: Cardiac Diets; Healthy Heart (2-3 Na+); Texture: Regular Texture (IDDSI 7); Fluid Consistency: Thin (IDDSI 0)    I have personally reviewed:  [x]  Laboratory   [x]  Microbiology blood cultures final result negative  []  Radiology   [x]  EKG/Telemetry  []  Cardiology/Vascular   []  Pathology    []  Records       Assessment/Plan     Active Hospital Problems    Diagnosis  POA    Orthostatic hypotension [I95.1]  Yes    Stage 3a chronic kidney disease [N18.31]  Yes    History of pulmonary embolism [Z86.711]  Yes    Hypophosphatemia [E83.39]  Yes    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       87yo gentleman with h/o atrial flutter and prior ablation, dementia, COPD, CKD3a, HLD, C-DDD, h/o myasthenia gravis, Parkinsonism, MGUS, and recent admission here in July for pulmonary embolism (on Eliquis now), who presented to ER for second time in two days with with report of falls at his assisted living facility. Found to have large left buttock hematoma as well as elevated WBC and lactate. He was admitted to our service with concern for sepsis. He was given a dose of Rocephin in ER and blood cultures were sent. Cardiology consulted regarding recurrent falls.    Very long discussion with patient and daughter at bedside, well over 30 minutes.  Unfortunately he still is quite orthostatic and symptomatic as he could only stand for about 1 minute.  Pressure dropped into 60s systolic.  He has been maximized on midodrine.  Will try another small bolus of IV fluids.  Will add Florinef.  Continue compression stockings.      Patient continues to express that he no longer wishes to be alive and admits that he is very depressed.  He states he absolutely is unwilling to participate in acute rehab at this time.  While arrangements had been made for him to go to acute rehab in [place] if he is not willing to participate they would likely just send him back here.  I explained to patient I do not feel he is actively dying.  Discussed with him regarding palliative care again.  He was seen by the palliative care nurse yesterday.  I agree with his daughter and that I would not want to make a decision about full comfort care in his current  "depressed state of mind.  Psychiatry consult still pending today.  He is already on Lexapro.  For now we will plan to transfer to [facility] but continue all current medications.  No plans to initiate palliative care order set quite yet.  Will add Florinef and see if helps with his blood pressure.  If not improvement in few days consider reconsulting cardiology and starting droxidopa.    Ask palliative care team to continue to follow.       Acute blood loss anemia: due to large left buttock hematoma and Eliquis, Hgb trending back up.  Back on Eliquis.    CKD3a: Cr has normalized    Recent diagnosis of bilateral PE: continue Eliquis    Dementia NOS: pt diagnosed with \"mild cognitive impairment\" on neuropsych testing 7 years ago, I have d/w dtr--I believe pt has mild-moderate dementia at this time    Hypophosphatemia: resolved      Eliquis (home med) should suffice for DVT prophylaxis.  Full code.  Discussed with patient, family, nursing staff, and cardiology .   Disposition TBD.      Troy Parham MD  [facility]  09/16/23  11:13 EDT      "

## 2023-09-16 NOTE — THERAPY TREATMENT NOTE
Patient Name: Jonathan Trejo  : 1935    MRN: [id number]                              Today's Date: 2023       Admit Date: 2023    Visit Dx:     ICD-10-CM ICD-9-CM   1. Sepsis, due to unspecified organism, unspecified whether acute organ dysfunction present  A41.9 038.9     995.91   2. Frequent falls  R29.6 V15.88   3. Hematoma  T14.8XXA 924.9     Patient Active Problem List   Diagnosis    Benign prostatic hyperplasia with urinary obstruction    Chronic renal impairment, stage 3 (moderate)    Depression    Gastroesophageal reflux disease with esophagitis    Hyperlipidemia    Nocturia    Obesity (BMI 30-39.9)    Osteopenia    Osteoporosis    Seborrhea    Abnormal EKG    Bradycardia    Degenerative disc disease, cervical    Typical atrial flutter    Lumbar radiculopathy    AVNRT (AV tarun re-entry tachycardia)    S/P ablation of atrial flutter    S/P catheter ablation of slow pathway    Cardiomyopathy    GARCIA (dyspnea on exertion)    Urinary tract infection without hematuria    Right foot drop    Frequent falls    Metabolic encephalopathy    Acute renal failure    Other secondary parkinsonism    Moderate single current episode of major depressive disorder    Mass of left lower leg    Pulmonary emphysema    Monoclonal gammopathy of unknown significance (MGUS)    Myasthenia gravis without exacerbation    Displacement of lumbar intervertebral disc without myelopathy    Acute anemia    Closed compression fracture of L3 lumbar vertebra, initial encounter    Closed compression fracture of L4 lumbar vertebra, initial encounter    Pulmonary embolus    Stage 3a chronic kidney disease    History of pulmonary embolism    Hypophosphatemia    Neuropathy associated with MGUS    Orthostatic hypotension     Past Medical History:   Diagnosis Date    Abnormal electrocardiogram     Arm pain     Atrial fibrillation     Atrial flutter     BPH (benign prostatic hyperplasia)     BPH associated with nocturia     Chronic kidney  disease     Colon cancer     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    Colon polyp 11/22/2015    Depression     Dyspnea     Enlarged prostate without lower urinary tract symptoms (luts)     Episode of generalized weakness     knees were weak - had to be helped by wife to sit down    Esophagitis, reflux     Fatigue     Fracture of ankle     right    GERD (gastroesophageal reflux disease)     Hyperlipidemia     Inguinal hernia     Loss of hearing     Lumbar radiculopathy     Obesity     Osteoarthritis cervical spine     doc on Sray 08/16    Osteopenia     Osteoporosis     Overweight     Tobacco dependence in remission     Urge incontinence of urine     Vitamin D deficiency     Wheezing      Past Surgical History:   Procedure Laterality Date    CARDIAC CATHETERIZATION N/A 7/27/2017    Procedure: Valve assessment;  Surgeon: Adonis Solis MD;  [facility];  Service:     CARDIAC CATHETERIZATION N/A 10/1/2018    Procedure: Left Heart Cath;  Surgeon: Bogdan Vargas MD;  Location: [facility];  Service: Cardiology    CARDIAC CATHETERIZATION N/A 10/1/2018    Procedure: Coronary angiography;  Surgeon: Bogdan Vargas MD;  [facility];  Service: Cardiology    CARDIAC CATHETERIZATION N/A 10/1/2018    Procedure: Left ventriculography;  Surgeon: Bogdan Vargas MD;  [facility];  Service: Cardiology    CARDIAC CATHETERIZATION N/A 10/1/2018    Procedure: Right Heart Cath;  Surgeon: Bogdan Vargas MD;  Location: [facility] CATH INVASIVE LOCATION;  Service: Cardiology    CARDIAC ELECTROPHYSIOLOGY PROCEDURE N/A 7/27/2017    Procedure: Ablation atrial flutter Will need to have 3 -4 weeks of therapeutic INR's ;  Surgeon: Adonis Solis MD;  [facility]: [facility];  Service:     CARDIOVERSION      CATARACT EXTRACTION W/ INTRAOCULAR LENS  IMPLANT, BILATERAL      COLON RESECTION      COLON SURGERY       polyp removed    COLONOSCOPY      07/15 redo 5 years  Segun    KNEE SURGERY      benign tumor removed, age 6    LASIK      LUMBAR EPIDURAL INJECTION N/A 5/25/2022    Procedure: LUMBAR EPIDURAL 1ST VISIT L3-4 (right of midline);  Surgeon: Bonnie Brown MD;  Location: [facility] MAIN OR;  Service: Pain Management;  Laterality: N/A;    REFRACTIVE SURGERY  2007    REPAIR PERONEAL TENDONS ANKLE W/ FIBULAR OSTEOTOMY Right 03/2013    Dr. Banks      General Information       Row Name 09/16/23 1452          Physical Therapy Time and Intention    Document Type therapy note (daily note)  -LW     Mode of Treatment physical therapy;individual therapy  -LW       Row Name 09/16/23 1452          General Information    Patient Profile Reviewed yes  -LW     Existing Precautions/Restrictions fall  -LW       Row Name 09/16/23 1452          Cognition    Orientation Status (Cognition) oriented x 3  -       Row Name 09/16/23 1452          Safety Issues, Functional Mobility    Impairments Affecting Function (Mobility) balance;endurance/activity tolerance;strength  -LW               User Key  (r) = Recorded By, (t) = Taken By, (c) = Cosigned By      Initials Name Provider Type    LW Vonda Trujillo PT Physical Therapist                   Mobility       Row Name 09/16/23 1453          Bed Mobility    Bed Mobility supine-sit;sit-supine  -LW     Supine-Sit [place] (Bed Mobility) minimum assist (75% patient effort);moderate assist (50% patient effort)  -LW     Sit-Supine [place] (Bed Mobility) minimum assist (75% patient effort)  -LW     Assistive Device (Bed Mobility) head of bed elevated;bed rails  -       Row Name 09/16/23 1453          Sit-Stand Transfer    Sit-Stand [place] (Transfers) contact guard  -LW     Assistive Device (Sit-Stand Transfers) other (see comments)  no AD  -LW     Comment, (Sit-Stand Transfer) stood 2x for ~2-3 min, lightheadedness was limiting factor  -LW       Row Name 09/16/23 1453          Gait/Stairs  "(Locomotion)    [place] Level (Gait) contact guard;minimum assist (75% patient effort)  -LW     Assistive Device (Gait) other (see comments)  HHAx1  -LW     Distance in Feet (Gait) 4 sidesteps to HOB  -LW     Deviations/Abnormal Patterns (Gait) gait speed decreased;stride length decreased;ирина decreased  -LW               User Key  (r) = Recorded By, (t) = Taken By, (c) = Cosigned By      Initials Name Provider Type    Vonda Roa PT Physical Therapist                   Obj/Interventions       Row Name 09/16/23 1741          Balance    Balance Assessment sitting static balance;sitting dynamic balance;standing static balance;standing dynamic balance  -LW     Static Sitting Balance standby assist  -LW     Dynamic Sitting Balance standby assist  -LW     Position, Sitting Balance sitting edge of bed;unsupported  -LW     Static Standing Balance contact guard  -LW     Dynamic Standing Balance contact guard  -LW     Position/Device Used, Standing Balance other (see comments)  HHA  -LW     Balance Interventions sitting;standing;sit to stand;static;dynamic  -LW     Comment, Balance tolerated sitting EOB for 10 mins & standing for ~3 mins; limited by becoming lightheaded  -LW               User Key  (r) = Recorded By, (t) = Taken By, (c) = Cosigned By      Initials Name Provider Type    Vonda Roa PT Physical Therapist                   Goals/Plan    No documentation.                  Clinical Impression       Row Name 09/16/23 1557          Pain    Pre/Posttreatment Pain Comment pt expressed pain \"all over\" and increased anxiety contributing to it, did not provide rating; noted specific pain in L buttcheek  -LW     Pain Intervention(s) Ambulation/increased activity;Repositioned;Rest  -LW       Row Name 09/16/23 0452          Plan of Care Review    Plan of Care Reviewed With patient  -LW     Progress improving  -LW     Outcome Evaluation Patient was agreeable to working with therapy with max encouragement. " He expressed his anxiety and fears at the beginning of the session and PT educated him on importance of mobilizing while he is here to maintain strength/mobility. He required min/modA to transfer to sitting EOB and sat EOB for ~10 mins with mild-mod lightheadedness. He stood twice with CGA and took 4 sidesteps toward HOB with CGA and HHA. Patient was steady with no LOB. Patient will benefit from continued skilled PT addresing limitations in functional mobiltiy to improve sfaety and QOL.  -LW       Row Name 09/16/23 2897          Positioning and Restraints    Pre-Treatment Position in bed  -LW     Post Treatment Position bed  -LW     In Bed supine;call light within reach;encouraged to call for assist;exit alarm on  -LW               User Key  (r) = Recorded By, (t) = Taken By, (c) = Cosigned By      Initials Name Provider Type    Vonda Roa PT Physical Therapist                   Outcome Measures       Row Name 09/16/23 1508          How much help from another person do you currently need...    Turning from your back to your side while in flat bed without using bedrails? 3  -LW     Moving from lying on back to sitting on the side of a flat bed without bedrails? 3  -LW     Moving to and from a bed to a chair (including a wheelchair)? 2  -LW     Standing up from a chair using your arms (e.g., wheelchair, bedside chair)? 3  -LW     Climbing 3-5 steps with a railing? 2  -LW     To walk in hospital room? 2  -LW     AM-PAC 6 Clicks Score (PT) 15  -LW     Highest level of mobility 4 --> Transferred to chair/commode  -LW               User Key  (r) = Recorded By, (t) = Taken By, (c) = Cosigned By      Initials Name Provider Type    Vonda Roa PT Physical Therapist                                 Physical Therapy Education       Title: PT OT SLP Therapies (Done)       Topic: Physical Therapy (Done)       Point: Mobility training (Done)       Learning Progress Summary             Patient Acceptance, E,D, VU,NR by  LW at 9/16/2023 1508    Acceptance, E,D, VU,NR by MS at 9/14/2023 1534    Acceptance, E,TB,D, VU,NR by  at 9/12/2023 1632    Acceptance, E,TB, VU,NR by CB at 9/11/2023 1316                         Point: Home exercise program (Done)       Learning Progress Summary             Patient Acceptance, E,D, VU,NR by LW at 9/16/2023 1508    Acceptance, E,D, VU,NR by MS at 9/14/2023 1534    Acceptance, E,TB,D, VU,NR by  at 9/12/2023 1632                         Point: Body mechanics (Done)       Learning Progress Summary             Patient Acceptance, E,D, VU,NR by LW at 9/16/2023 1508    Acceptance, E,TB,D, VU,NR by  at 9/12/2023 1632    Acceptance, E,TB, VU,NR by CB at 9/11/2023 1316                         Point: Precautions (Done)       Learning Progress Summary             Patient Acceptance, E,D, VU,NR by LW at 9/16/2023 1508    Acceptance, E,TB,D, VU,NR by  at 9/12/2023 1632    Acceptance, E,TB, VU,NR by CB at 9/11/2023 1316                                         User Key       Initials Effective Dates Name Provider Type Discipline     03/07/18 -  Sheyla Park, PTA Physical Therapist Assistant PT    MS 06/16/21 -  Jimmie Saxena, PT Physical Therapist PT     10/22/21 -  Kelly Littlejohn, ARNAV Physical Therapist PT     05/08/23 -  Vonda Trujillo, ARNAV Physical Therapist PT                  PT Recommendation and Plan     Plan of Care Reviewed With: patient  Progress: improving  Outcome Evaluation: Patient was agreeable to working with therapy with max encouragement. He expressed his anxiety and fears at the beginning of the session and PT educated him on importance of mobilizing while he is here to maintain strength/mobility. He required min/modA to transfer to sitting EOB and sat EOB for ~10 mins with mild-mod lightheadedness. He stood twice with CGA and took 4 sidesteps toward HOB with CGA and HHA. Patient was steady with no LOB. Patient will benefit from continued skilled PT addresing limitations in  functional mobiltiy to improve sfaety and QOL.     Time Calculation:         PT Charges       Row Name 09/16/23 1509             Time Calculation    Start Time 1314  -LW      Stop Time 1346  -LW      Time Calculation (min) 32 min  -LW      PT Received On 09/16/23  -LW      PT - Next Appointment 09/18/23  -LW         Time Calculation- PT    Total Timed Code Minutes- PT 25 minute(s)  -LW         Timed Charges    57482 - PT Therapeutic Activity Minutes 25  -LW         Total Minutes    Timed Charges Total Minutes 25  -LW       Total Minutes 25  -LW                User Key  (r) = Recorded By, (t) = Taken By, (c) = Cosigned By      Initials Name Provider Type    LW Vonda Trujillo, PT Physical Therapist                  Therapy Charges for Today       Code Description Service Date Service Provider Modifiers Qty    [id number]  PT THERAPEUTIC ACT EA 15 MIN 9/16/2023 Vonda Trujillo, PT GP 2            PT G-Codes  Outcome Measure Options: AM-PAC 6 Clicks Basic Mobility (PT)  AM-PAC 6 Clicks Score (PT): 15  AM-PAC 6 Clicks Score (OT): 15       Vonda Trujillo PT  9/16/2023

## 2023-09-16 NOTE — CONSULTS
"IDENTIFYING INFORMATION: The patient is an 88-year-old white male admitted complaining of falls.  He is seen by psychiatry related to worsening depression.    CHIEF COMPLAINT: I am sick    INFORMANT: Patient and chart    RELIABILITY: Fair    HISTORY OF PRESENT ILLNESS: The patient is an 88-year-old white male admitted after suffering several falls at his care facility.  The patient had been followed as an outpatient in my office for several years but had been lost to follow-up sometime ago.  He continues on Lexapro 10 mg daily.  He has been expressing feelings of depression since hospitalization, complaining of poor quality of life, fatigue and loss of appetite and anxiety.  The patient carries a dementia diagnosis but does reasonably well during today's interview.  He does report depressed mood secondary to his \"quality of life\".  The patient has history of orthostatic hypertension, stage III renal disease, pulmonary embolism, hypophosphatemia, neuropathy, myasthenia gravis, monoclonal gammopathy, atrial fibrillation degenerative disc disease and hyperlipidemia.  He resides at a facility called [facility]  There is no reported use of alcohol tobacco or street drugs.  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  He reports no suicidal or homicidal ideation when seen today but is notably irritable and dysphoric and implies that he was unhappy with the care I provided when he was under my care as an outpatient, though he cannot elaborate as to why.    PAST PSYCHIATRIC HISTORY: Significant for history of chronic depression and a recent diagnosis of a dementing illness    PAST MEDICAL HISTORY: As above    MEDICATIONS:   Current Facility-Administered Medications   Medication Dose Route Frequency Provider Last Rate Last Admin    acetaminophen (TYLENOL) tablet 650 mg  650 mg Oral Q4H PRN Navjot Sanford APRN   650 mg at 09/14/23 0825    apixaban (ELIQUIS) tablet 5 mg  5 mg Oral Q12H Cash Romeo MD   5 " "mg at 09/16/23 0928    atorvastatin (LIPITOR) tablet 10 mg  10 mg Oral Nightly Cash Romeo MD   10 mg at 09/15/23 2007    cholecalciferol (VITAMIN D3) tablet 2,000 Units  2,000 Units Oral Daily Cash Romeo MD   2,000 Units at 09/16/23 0913    [START ON 9/17/2023] escitalopram (LEXAPRO) tablet 15 mg  15 mg Oral Daily Joey Warren III, MD        ferrous sulfate tablet 325 mg  325 mg Oral Daily With Breakfast Cash Romeo MD   325 mg at 09/16/23 0913    fludrocortisone tablet 100 mcg  100 mcg Oral Daily Troy Parham MD   100 mcg at 09/16/23 1329    gabapentin (NEURONTIN) capsule 200 mg  200 mg Oral Q8H Troy Parham MD   200 mg at 09/16/23 1329    melatonin tablet 2 mg  2 mg Oral Nightly PRN Joey Warren III, MD        midodrine (PROAMATINE) tablet 10 mg  10 mg Oral TID AC Troy Parham MD   10 mg at 09/16/23 1225    pantoprazole (PROTONIX) EC tablet 40 mg  40 mg Oral Q AM Cash Romeo MD   40 mg at 09/16/23 0553    vitamin B-12 (CYANOCOBALAMIN) tablet 1,000 mcg  1,000 mcg Oral Daily Cash Romeo MD   1,000 mcg at 09/16/23 0913         ALLERGIES: None    FAMILY HISTORY: Not obtained    SOCIAL HISTORY: The patient resides at [facility]  He is a retired executive from [employer].  There is no current use of alcohol tobaccos or street drugs    MENTAL STATUS EXAM: The patient is an elderly white male who is abed dressed in hospital garb.  He is awake and alert but does not comply with testing of orientation and memory or cognition.  He denies active suicidal ideation but does express hopelessness.  He denies any psychotic symptoms.  He does not appear to be responding to any internal stimuli.    ASSETS/LIABILITIES: To be assessed/health issues    DIAGNOSTIC IMPRESSION: Major depressive disorder recurrent moderate, dementia NOS, medical problems as described previously    PLAN: I have increased the patient's dose of Lexapro to 15 mg daily and will add " melatonin for sleep.  The patient was less than optimally cooperative during today's interview and seems to have a less than positive attitude regarding this physician.  Accordingly, I will sign off with these medication changes in place.

## 2023-09-16 NOTE — NURSING NOTE
Orthostatic BP checks this AM.   1117: Lying 141/68, MAP 88, HR 54  1120: Sitting 92/60, MAP 71, HR 81   1122: Standing 66/36, MAP 46, HR 75

## 2023-09-16 NOTE — PLAN OF CARE
Goal Outcome Evaluation:  Plan of Care Reviewed With: patient        Progress: no change  Outcome Evaluation: Continued treatment for orthostatic hypotension. Midodrine continued. Fludrocortisone added. 1x 500ml fluid bolus given. Lexapro dose increased by psychiatry. Poor intake today - ate half of lunch and none of breakfast. Transferred to [facility].

## 2023-09-16 NOTE — PLAN OF CARE
Goal Outcome Evaluation:  Plan of Care Reviewed With: patient           Outcome Evaluation: Sinus bradycardia 40s-50s. No c/o pain, dizzines.Will continue to monitor

## 2023-09-16 NOTE — PLAN OF CARE
Goal Outcome Evaluation:            Orthostatic  unable to be completed due to pt unable to stand to complete test. SBP sitting up dropped to 90. Pt sad and depressed in the afternoon, not wanting to take medication. Daughter at bedside concern of pt's psychological condition. Dr Parham informed and psych consult ordered. At dinner time, pt more cooperative and positive for Rehab placement at D/C. Pt on RA

## 2023-09-17 RX ORDER — HYDROCODONE BITARTRATE AND ACETAMINOPHEN 5; 325 MG/1; MG/1
1 TABLET ORAL EVERY 6 HOURS PRN
Status: DISCONTINUED | OUTPATIENT
Start: 2023-09-17 | End: 2023-09-21 | Stop reason: HOSPADM

## 2023-09-17 RX ORDER — HYDROXYZINE HYDROCHLORIDE 25 MG/1
25 TABLET, FILM COATED ORAL 3 TIMES DAILY PRN
Status: DISCONTINUED | OUTPATIENT
Start: 2023-09-17 | End: 2023-09-21 | Stop reason: HOSPADM

## 2023-09-17 RX ADMIN — HYDROXYZINE HYDROCHLORIDE 25 MG: 25 TABLET ORAL at 16:17

## 2023-09-17 RX ADMIN — APIXABAN 5 MG: 5 TABLET, FILM COATED ORAL at 08:19

## 2023-09-17 RX ADMIN — Medication 2 MG: at 22:01

## 2023-09-17 RX ADMIN — MIDODRINE HYDROCHLORIDE 10 MG: 5 TABLET ORAL at 06:46

## 2023-09-17 RX ADMIN — PANTOPRAZOLE SODIUM 40 MG: 40 TABLET, DELAYED RELEASE ORAL at 06:45

## 2023-09-17 RX ADMIN — HYDROCODONE BITARTRATE AND ACETAMINOPHEN 1 TABLET: 5; 325 TABLET ORAL at 16:22

## 2023-09-17 RX ADMIN — FERROUS SULFATE TAB 325 MG (65 MG ELEMENTAL FE) 325 MG: 325 (65 FE) TAB at 08:20

## 2023-09-17 RX ADMIN — GABAPENTIN 200 MG: 100 CAPSULE ORAL at 16:17

## 2023-09-17 RX ADMIN — FLUDROCORTISONE ACETATE 100 MCG: 0.1 TABLET ORAL at 08:19

## 2023-09-17 RX ADMIN — MIDODRINE HYDROCHLORIDE 10 MG: 5 TABLET ORAL at 22:02

## 2023-09-17 RX ADMIN — ATORVASTATIN CALCIUM 10 MG: 20 TABLET, FILM COATED ORAL at 22:00

## 2023-09-17 RX ADMIN — Medication 2000 UNITS: at 08:19

## 2023-09-17 RX ADMIN — APIXABAN 5 MG: 5 TABLET, FILM COATED ORAL at 22:00

## 2023-09-17 RX ADMIN — GABAPENTIN 200 MG: 100 CAPSULE ORAL at 06:45

## 2023-09-17 RX ADMIN — HYDROCODONE BITARTRATE AND ACETAMINOPHEN 1 TABLET: 5; 325 TABLET ORAL at 08:19

## 2023-09-17 RX ADMIN — Medication 1000 MCG: at 08:19

## 2023-09-17 RX ADMIN — ESCITALOPRAM OXALATE 15 MG: 10 TABLET, FILM COATED ORAL at 08:19

## 2023-09-17 RX ADMIN — HYDROXYZINE HYDROCHLORIDE 25 MG: 25 TABLET ORAL at 08:19

## 2023-09-17 RX ADMIN — GABAPENTIN 200 MG: 100 CAPSULE ORAL at 22:00

## 2023-09-17 RX ADMIN — MIDODRINE HYDROCHLORIDE 10 MG: 5 TABLET ORAL at 16:16

## 2023-09-17 NOTE — NURSING NOTE
Spoke with daughter Kaylynn about goals of care and she would like to see his anxiety and pain treated. She really would like him to go to a rehab facility but patient doesn't.

## 2023-09-17 NOTE — PROGRESS NOTES
Name: Jonathan Trejo ADMIT: 2023   : 1935  PCP: Marychuy Johnson APRN    MRN: [id number] LOS: 8 days   AGE/SEX: 88 y.o. male  ROOM: [facility]/     Subjective   Subjective   Patient resting comfortably in bed.  Had his face shaved today and feels a little better after that.  Otherwise no new complaints.    Review of Systems   Constitutional:  Negative for chills and fever.   Respiratory:  Negative for cough and shortness of breath.    Cardiovascular:  Negative for chest pain and palpitations.   Gastrointestinal:  Negative for abdominal pain, diarrhea, nausea and vomiting.   As above     Objective   Objective   Vital Signs  Temp:  [97.3 °F (36.3 °C)-97.9 °F (36.6 °C)] 97.7 °F (36.5 °C)  Heart Rate:  [52-59] 52  Resp:  [16] 16  BP: ()/(39-68) 108/63  SpO2:  [92 %-98 %] 92 %  on   ;   Device (Oxygen Therapy): room air  Body mass index is 28.25 kg/m².  Physical Exam  Vitals and nursing note reviewed.   Constitutional:       General: He is not in acute distress.  Cardiovascular:      Rate and Rhythm: Normal rate and regular rhythm.   Pulmonary:      Effort: Pulmonary effort is normal. No respiratory distress.   Abdominal:      General: Abdomen is flat. There is no distension.      Tenderness: There is no abdominal tenderness.   Musculoskeletal:         General: No swelling or deformity.   Skin:     General: Skin is warm and dry.   Neurological:      General: No focal deficit present.      Mental Status: He is alert. Mental status is at baseline.   Psychiatric:      Comments: Depressed mood.  Flat affect.       Results Review     I reviewed the patient's new clinical results.  Results from last 7 days   Lab Units 23  0522 23  0536 23  0435 23  0540   WBC 10*3/mm3 10.12 8.81 8.70 9.76   HEMOGLOBIN g/dL 10.8* 10.4* 9.6* 10.3*   PLATELETS 10*3/mm3 341 316 292 262     Results from last 7 days   Lab Units 23  0522 23  0536 23  0435 23  0540   SODIUM mmol/L 141 140  141 141   POTASSIUM mmol/L 3.7 4.0 3.8 3.9   CHLORIDE mmol/L 108* 110* 113* 112*   CO2 mmol/L 24.7 22.5 21.0* 22.4   BUN mg/dL 11 12 15 19   CREATININE mg/dL 1.01 0.93 0.98 0.99   GLUCOSE mg/dL 98 83 96 98   Estimated Creatinine Clearance: 52 mL/min (by C-G formula based on SCr of 1.01 mg/dL).  Results from last 7 days   Lab Units 09/16/23  0522 09/14/23  0536 09/13/23  0435 09/12/23  0540 09/11/23  0530   ALBUMIN g/dL 3.1* 2.8* 2.7* 3.0* 2.8*   BILIRUBIN mg/dL  --  1.4* 1.2 1.0 0.9   ALK PHOS U/L  --  73 66 74 68   AST (SGOT) U/L  --  12 11 8 10   ALT (SGPT) U/L  --  9 6 8 6     Results from last 7 days   Lab Units 09/16/23  0522 09/14/23  0536 09/13/23  1324 09/13/23  0435 09/12/23  0540 09/11/23  1654 09/11/23  0530   CALCIUM mg/dL 8.7 8.5*  --  8.3* 8.4*  --  8.5*   ALBUMIN g/dL 3.1* 2.8*  --  2.7* 3.0*  --  2.8*   MAGNESIUM mg/dL  --  2.0  --  1.9 2.1  --  2.1   PHOSPHORUS mg/dL 2.5 2.1* 2.3* 2.0* 2.6   < > 2.2*    < > = values in this interval not displayed.       COVID19   Date Value Ref Range Status   07/13/2023 Not Detected Not Detected - Ref. Range Final     No results found for: HGBA1C, POCGLU    CT Head Without Contrast  Addendum: ADDENDUM:       Radiation dose reduction techniques were utilized, including automated   exposure control and exposure modulation based on body size.           This report was finalized on 9/12/2023 5:30 PM by Dr. Harry Danielle M.D.  Narrative: CT HEAD WITHOUT CONTRAST     CLINICAL HISTORY: Fall today hitting the back of the head.     TECHNIQUE: CT scan of the head was obtained with 3 mm axial soft tissue  and 2 mm axial bone algorithm algorithm images. No intravenous contrast  was administered. Sagittal and coronal reconstructions were obtained.     COMPARISON: CT head dated 07/04/2023.     FINDINGS:       There is no evidence for a calvarial fracture. There is no evidence for  an acute extra-axial hemorrhage.     The ventricles, sulci, and cisterns are age-appropriate. The  basal  ganglia and thalami are unremarkable in appearance. There are moderate  changes of chronic small vessel ischemic phenomenon. The posterior fossa  structures are within normal limits. Atherosclerotic calcifications are  incidentally appreciated within the intracranial vasculature.     Impression:    No CT evidence for acute traumatic intracranial pathology.        This report was finalized on 9/8/2023 1:58 PM by Dr. Harry Danielle M.D.       I reviewed the patient's daily medications.  Scheduled Medications  apixaban, 5 mg, Oral, Q12H  atorvastatin, 10 mg, Oral, Nightly  cholecalciferol, 2,000 Units, Oral, Daily  escitalopram, 15 mg, Oral, Daily  ferrous sulfate, 325 mg, Oral, Daily With Breakfast  fludrocortisone, 100 mcg, Oral, Daily  gabapentin, 200 mg, Oral, Q8H  midodrine, 10 mg, Oral, TID AC  pantoprazole, 40 mg, Oral, Q AM  vitamin B-12, 1,000 mcg, Oral, Daily    Infusions   Diet  Diet: Cardiac Diets; Healthy Heart (2-3 Na+); Texture: Regular Texture (IDDSI 7); Fluid Consistency: Thin (IDDSI 0)         I have personally reviewed:  [x]  Laboratory   []  Microbiology   [x]  Radiology   []  EKG/Telemetry   []  Cardiology/Vascular   []  Pathology   [x]  Records     Assessment/Plan     Active Hospital Problems    Diagnosis  POA    Orthostatic hypotension [I95.1]  Yes    Stage 3a chronic kidney disease [N18.31]  Yes    History of pulmonary embolism [Z86.711]  Yes    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       88 y.o. male admitted with  "<principal problem not specified>.    87yo gentleman with h/o atrial flutter and prior ablation, dementia, COPD, CKD3a, HLD, C-DDD, h/o myasthenia gravis, Parkinsonism, MGUS, and recent admission here in July for pulmonary embolism (on Eliquis now), who presented to ER for second time in two days with with report of falls at his assisted living facility. Found to have large left buttock hematoma as well as elevated WBC and lactate. He was admitted to our service with concern for sepsis. He was given a dose of Rocephin in ER and blood cultures were sent. Cardiology consulted regarding recurrent falls.     Orthostatic hypotension  -Continue midodrine, compression stockings.  Florinef started yesterday.  If does not improve in the next day or so we will try droxidopa.    Acute blood loss anemia: due to large left buttock hematoma and Eliquis, Hgb trending back up.  Back on Eliquis.     CKD3a: Cr has normalized     Recent diagnosis of bilateral PE: continue Eliquis     Dementia NOS: pt diagnosed with \"mild cognitive impairment\" on neuropsych testing 7 years ago, I have d/w dtr--I believe pt has mild-moderate dementia at this time     Hypophosphatemia: resolved    Goals of care-palliative care was consulted and they have now signed off.    Eliquis (home med) for DVT prophylaxis.  Limited code (no CPR, no intubation).  Discussed with patient and nursing staff.  Anticipate discharge home with HH vs SNU facility timing yet to be determined.    Expected Discharge Date: 9/17/2023; Expected Discharge Time:  3:00 PM      Moise Crisostomo MD  [facility]  09/17/23  14:20 EDT         "

## 2023-09-17 NOTE — PLAN OF CARE
Problem: Adult Inpatient Plan of Care  Goal: Plan of Care Review  Outcome: Ongoing, Progressing  Flowsheets (Taken 9/17/2023 1825)  Progress: no change  Plan of Care Reviewed With:   patient   daughter  Outcome Evaluation: pt lightly slept today in between care. pt recieved Norco and Atarax X2 . pt ate well reg diet. daughter visited. will continue to treat per MD     Problem: Adult Inpatient Plan of Care  Goal: Patient-Specific Goal (Individualized)  Outcome: Ongoing, Progressing  Flowsheets (Taken 9/17/2023 1825)  Patient-Specific Goals (Include Timeframe): to feel better  Individualized Care Needs: PRN and scheduled meds, reg diet,

## 2023-09-18 PROCEDURE — 97530 THERAPEUTIC ACTIVITIES: CPT

## 2023-09-18 PROCEDURE — 97110 THERAPEUTIC EXERCISES: CPT

## 2023-09-18 PROCEDURE — 99231 SBSQ HOSP IP/OBS SF/LOW 25: CPT | Performed by: NURSE PRACTITIONER

## 2023-09-18 RX ADMIN — GABAPENTIN 200 MG: 100 CAPSULE ORAL at 06:12

## 2023-09-18 RX ADMIN — FERROUS SULFATE TAB 325 MG (65 MG ELEMENTAL FE) 325 MG: 325 (65 FE) TAB at 08:45

## 2023-09-18 RX ADMIN — MIDODRINE HYDROCHLORIDE 10 MG: 5 TABLET ORAL at 06:12

## 2023-09-18 RX ADMIN — ESCITALOPRAM OXALATE 15 MG: 10 TABLET, FILM COATED ORAL at 08:45

## 2023-09-18 RX ADMIN — Medication 2000 UNITS: at 08:44

## 2023-09-18 RX ADMIN — Medication 1000 MCG: at 08:46

## 2023-09-18 RX ADMIN — MIDODRINE HYDROCHLORIDE 10 MG: 5 TABLET ORAL at 11:01

## 2023-09-18 RX ADMIN — APIXABAN 5 MG: 5 TABLET, FILM COATED ORAL at 21:11

## 2023-09-18 RX ADMIN — GABAPENTIN 200 MG: 100 CAPSULE ORAL at 21:11

## 2023-09-18 RX ADMIN — FLUDROCORTISONE ACETATE 100 MCG: 0.1 TABLET ORAL at 08:46

## 2023-09-18 RX ADMIN — GABAPENTIN 200 MG: 100 CAPSULE ORAL at 14:22

## 2023-09-18 RX ADMIN — APIXABAN 5 MG: 5 TABLET, FILM COATED ORAL at 08:44

## 2023-09-18 RX ADMIN — MIDODRINE HYDROCHLORIDE 10 MG: 5 TABLET ORAL at 17:14

## 2023-09-18 RX ADMIN — ATORVASTATIN CALCIUM 10 MG: 20 TABLET, FILM COATED ORAL at 21:11

## 2023-09-18 RX ADMIN — HYDROCODONE BITARTRATE AND ACETAMINOPHEN 1 TABLET: 5; 325 TABLET ORAL at 06:12

## 2023-09-18 RX ADMIN — PANTOPRAZOLE SODIUM 40 MG: 40 TABLET, DELAYED RELEASE ORAL at 06:12

## 2023-09-18 NOTE — THERAPY TREATMENT NOTE
Patient Name: Jonathan Trejo  : 1935    MRN: [id number]                              Today's Date: 2023       Admit Date: 2023    Visit Dx:     ICD-10-CM ICD-9-CM   1. Sepsis, due to unspecified organism, unspecified whether acute organ dysfunction present  A41.9 038.9     995.91   2. Frequent falls  R29.6 V15.88   3. Hematoma  T14.8XXA 924.9     Patient Active Problem List   Diagnosis    Benign prostatic hyperplasia with urinary obstruction    Chronic renal impairment, stage 3 (moderate)    Depression    Gastroesophageal reflux disease with esophagitis    Hyperlipidemia    Nocturia    Obesity (BMI 30-39.9)    Osteopenia    Osteoporosis    Seborrhea    Abnormal EKG    Bradycardia    Degenerative disc disease, cervical    Typical atrial flutter    Lumbar radiculopathy    AVNRT (AV tarun re-entry tachycardia)    S/P ablation of atrial flutter    S/P catheter ablation of slow pathway    Cardiomyopathy    GARCIA (dyspnea on exertion)    Urinary tract infection without hematuria    Right foot drop    Frequent falls    Metabolic encephalopathy    Acute renal failure    Other secondary parkinsonism    Moderate single current episode of major depressive disorder    Mass of left lower leg    Pulmonary emphysema    Monoclonal gammopathy of unknown significance (MGUS)    Myasthenia gravis without exacerbation    Displacement of lumbar intervertebral disc without myelopathy    Acute anemia    Closed compression fracture of L3 lumbar vertebra, initial encounter    Closed compression fracture of L4 lumbar vertebra, initial encounter    Pulmonary embolus    Stage 3a chronic kidney disease    History of pulmonary embolism    Hypophosphatemia    Neuropathy associated with MGUS    Orthostatic hypotension     Past Medical History:   Diagnosis Date    Abnormal electrocardiogram     Arm pain     Atrial fibrillation     Atrial flutter     BPH (benign prostatic hyperplasia)     BPH associated with nocturia     Chronic kidney  disease     Colon cancer     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    Colon polyp 11/22/2015    Depression     Dyspnea     Enlarged prostate without lower urinary tract symptoms (luts)     Episode of generalized weakness     knees were weak - had to be helped by wife to sit down    Esophagitis, reflux     Fatigue     Fracture of ankle     right    GERD (gastroesophageal reflux disease)     Hyperlipidemia     Inguinal hernia     Loss of hearing     Lumbar radiculopathy     Obesity     Osteoarthritis cervical spine     doc on Sray 08/16    Osteopenia     Osteoporosis     Overweight     Tobacco dependence in remission     Urge incontinence of urine     Vitamin D deficiency     Wheezing      Past Surgical History:   Procedure Laterality Date    CARDIAC CATHETERIZATION N/A 7/27/2017    Procedure: Valve assessment;  Surgeon: Adonis Solis MD;  Location: [facility] CATH INVASIVE LOCATION;  Service:     CARDIAC CATHETERIZATION N/A 10/1/2018    Procedure: Left Heart Cath;  Surgeon: Bogdan Vargas MD;  Location: [facility] CATH INVASIVE LOCATION;  Service: Cardiology    CARDIAC CATHETERIZATION N/A 10/1/2018    Procedure: Coronary angiography;  Surgeon: Bogdan Vargas MD;  Location: [facility] CATH INVASIVE LOCATION;  Service: Cardiology    CARDIAC CATHETERIZATION N/A 10/1/2018    Procedure: Left ventriculography;  Surgeon: Bogdan Vargas MD;  Location: [facility] CATH INVASIVE LOCATION;  Service: Cardiology    CARDIAC CATHETERIZATION N/A 10/1/2018    Procedure: Right Heart Cath;  Surgeon: Bogdan Vargas MD;  Location: [facility] CATH INVASIVE LOCATION;  Service: Cardiology    CARDIAC ELECTROPHYSIOLOGY PROCEDURE N/A 7/27/2017    Procedure: Ablation atrial flutter Will need to have 3 -4 weeks of therapeutic INR's ;  Surgeon: Adonis Solis MD;  Location: [facility] CATH INVASIVE LOCATION;  Service:     CARDIOVERSION      CATARACT EXTRACTION W/ INTRAOCULAR LENS  IMPLANT, BILATERAL      COLON RESECTION      COLON SURGERY       polyp removed    COLONOSCOPY      07/15 redo 5 years  Segun    KNEE SURGERY      benign tumor removed, age 6    LASIK      LUMBAR EPIDURAL INJECTION N/A 5/25/2022    Procedure: LUMBAR EPIDURAL 1ST VISIT L3-4 (right of midline);  Surgeon: Bonnie Brown MD;  Location: [facility] MAIN OR;  Service: Pain Management;  Laterality: N/A;    REFRACTIVE SURGERY  2007    REPAIR PERONEAL TENDONS ANKLE W/ FIBULAR OSTEOTOMY Right 03/2013    Dr. Banks      General Information       Row Name 09/18/23 1552          Physical Therapy Time and Intention    Document Type therapy note (daily note)  -EB     Mode of Treatment physical therapy;individual therapy  -EB       Row Name 09/18/23 1552          General Information    Patient Profile Reviewed yes  -EB     Existing Precautions/Restrictions fall  -EB       Row Name 09/18/23 1552          Cognition    Orientation Status (Cognition) oriented x 3  -EB       Row Name 09/18/23 1552          Safety Issues, Functional Mobility    Impairments Affecting Function (Mobility) endurance/activity tolerance;strength;balance  -EB               User Key  (r) = Recorded By, (t) = Taken By, (c) = Cosigned By      Initials Name Provider Type    EB Danni Cheney PTA Physical Therapist Assistant                   Mobility       Row Name 09/18/23 1552          Bed Mobility    Bed Mobility scooting/bridging  -EB     Scooting/Bridging Saucier (Bed Mobility) contact guard  able to scoot to HOB while sitting EOB  -EB     Supine-Sit [place] (Bed Mobility) minimum assist (75% patient effort);verbal cues  -EB     Sit-Supine [place] (Bed Mobility) minimum assist (75% patient effort);verbal cues  -EB     Assistive Device (Bed Mobility) bed rails;head of bed elevated  -EB     Comment, (Bed Mobility) RN present to take ortho statics. Pt sat on EOB with supervision while BP being taken  -EB       Row Name 09/18/23 1552          Sit-Stand Transfer    Sit-Stand [place]ier (Transfers) contact guard  -EB   "   Assistive Device (Sit-Stand Transfers) walker, 4-wheeled  -EB     Comment, (Sit-Stand Transfer) pt stood for about 3 to 4 minutes while BP was taken. pt lightheaded and feeling a little \"foggy\".  -EB       Row Name 09/18/23 1552          Gait/Stairs (Locomotion)    Comment, (Gait/Stairs) declined gait despite encouragement.  -EB               User Key  (r) = Recorded By, (t) = Taken By, (c) = Cosigned By      Initials Name Provider Type     Danni Cheney PTA Physical Therapist Assistant                   Obj/Interventions       Row Name 09/18/23 2707          Balance    Balance Assessment sitting static balance;standing static balance  -     Static Sitting Balance standby assist  -     Static Standing Balance contact guard  -     Position/Device Used, Standing Balance supported;walker, 4-wheeled  -EB               User Key  (r) = Recorded By, (t) = Taken By, (c) = Cosigned By      Initials Name Provider Type    Danni Jose PTA Physical Therapist Assistant                   Goals/Plan    No documentation.                  Clinical Impression       Row Name 09/18/23 9469          Plan of Care Review    Plan of Care Reviewed With patient  -EB     Progress no change  -     Outcome Evaluation Pt seen for PT treatment today. RN present to assist with orthostatics. Pt required Delia with supine to sit and sit to supine. Pt able to sit on EOB with supervision while BP taken.  Pt required CGA with STS transfers and stood with CGA/Delia. Pt did c/o feeling \"foggy\", lightheaded with transitions. Pt able to stand for about 3-4 minutes but became fatigued. Encouraged pt sit up in the chair, walk, or complete exercises however pt declined all 3 of those choices. Educated on pt on the importance of trying to mobilize. Pt expressed that he wants to be comfortable and doesn't see the need of going to rehab in his condition.  Will continue to follow pt to progress as able and for d/c needs.  -       Row Name " 09/18/23 1556          Therapy Assessment/Plan (PT)    Therapy Frequency (PT) 5 times/wk  -EB       Row Name 09/18/23 1556          Positioning and Restraints    Pre-Treatment Position in bed  -EB     Post Treatment Position bed  -EB     In Bed supine;call light within reach;encouraged to call for assist;exit alarm on  -EB               User Key  (r) = Recorded By, (t) = Taken By, (c) = Cosigned By      Initials Name Provider Type    Danni Jose PTA Physical Therapist Assistant                   Outcome Measures       Row Name 09/18/23 1603 09/18/23 0800       How much help from another person do you currently need...    Turning from your back to your side while in flat bed without using bedrails? 3  -EB 3  -MC    Moving from lying on back to sitting on the side of a flat bed without bedrails? 3  -EB 3  -MC    Moving to and from a bed to a chair (including a wheelchair)? 3  -EB 2  -MC    Standing up from a chair using your arms (e.g., wheelchair, bedside chair)? 3  -EB 3  -MC    Climbing 3-5 steps with a railing? 1  -EB 2  -MC    To walk in hospital room? 2  -EB 2  -MC    AM-PAC 6 Clicks Score (PT) 15  -EB 15  -MC    Highest level of mobility 4 --> Transferred to chair/commode  -EB 4 --> Transferred to chair/commode  -MC              User Key  (r) = Recorded By, (t) = Taken By, (c) = Cosigned By      Initials Name Provider Type    Danni Jose PTA Physical Therapist Assistant    Chloé Hand RN Registered Nurse                                 Physical Therapy Education       Title: PT OT SLP Therapies (In Progress)       Topic: Physical Therapy (In Progress)       Point: Mobility training (In Progress)       Learning Progress Summary             Patient Acceptance, E, NR by EB at 9/18/2023 1603    Acceptance, E,D, VU,NR by DIANNE at 9/16/2023 1508    Acceptance, E,D, VU,NR by MS at 9/14/2023 1534    Acceptance, E,TB,D, VU,NR by JM at 9/12/2023 1632    Acceptance, E,TB, VU,NR by CB at 9/11/2023 1316     "                     Point: Home exercise program (Done)       Learning Progress Summary             Patient Acceptance, E,D, VU,NR by LW at 9/16/2023 1508    Acceptance, E,D, VU,NR by MS at 9/14/2023 1534    Acceptance, E,TB,D, VU,NR by  at 9/12/2023 1632                         Point: Body mechanics (Done)       Learning Progress Summary             Patient Acceptance, E,D, VU,NR by LW at 9/16/2023 1508    Acceptance, E,TB,D, VU,NR by JM at 9/12/2023 1632    Acceptance, E,TB, VU,NR by CB at 9/11/2023 1316                         Point: Precautions (In Progress)       Learning Progress Summary             Patient Acceptance, E, NR by EB at 9/18/2023 1603    Acceptance, E,D, VU,NR by LW at 9/16/2023 1508    Acceptance, E,TB,D, VU,NR by JM at 9/12/2023 1632    Acceptance, E,TB, VU,NR by CB at 9/11/2023 1316                                         User Key       Initials Effective Dates Name Provider Type Discipline     03/07/18 -  Sheyla Park PTA Physical Therapist Assistant PT    MS 06/16/21 -  Jimmie Saxena, PT Physical Therapist PT    EB 02/14/23 -  Danni Cheney PTA Physical Therapist Assistant PT    CB 10/22/21 -  Kelly Littlejohn, PT Physical Therapist PT    LW 05/08/23 -  Vonda Trujillo, PT Physical Therapist PT                  PT Recommendation and Plan     Plan of Care Reviewed With: patient  Progress: no change  Outcome Evaluation: Pt seen for PT treatment today. RN present to assist with orthostatics. Pt required Delia with supine to sit and sit to supine. Pt able to sit on EOB with supervision while BP taken.  Pt required CGA with STS transfers and stood with CGA/Delia. Pt did c/o feeling \"foggy\", lightheaded with transitions. Pt able to stand for about 3-4 minutes but became fatigued. Encouraged pt sit up in the chair, walk, or complete exercises however pt declined all 3 of those choices. Educated on pt on the importance of trying to mobilize. Pt expressed that he wants to be comfortable and " doesn't see the need of going to rehab in his condition.  Will continue to follow pt to progress as able and for d/c needs.     Time Calculation:         PT Charges       Row Name 09/18/23 1551             Time Calculation    Start Time 1438  -EB      Stop Time 1508  -EB      Time Calculation (min) 30 min  -EB      PT Received On 09/18/23  -EB      PT - Next Appointment 09/19/23  -EB         Time Calculation- PT    Total Timed Code Minutes- PT 23 minute(s)  -EB                User Key  (r) = Recorded By, (t) = Taken By, (c) = Cosigned By      Initials Name Provider Type    EB Danni Cheney PTA Physical Therapist Assistant                  Therapy Charges for Today       Code Description Service Date Service Provider Modifiers Qty    15185073164 HC PT THERAPEUTIC ACT EA 15 MIN 9/18/2023 Danni Cheney PTA GP 1    22575398162 HC PT THER PROC EA 15 MIN 9/18/2023 Danni Cheney PTA GP 1            PT G-Codes  Outcome Measure Options: AM-PAC 6 Clicks Basic Mobility (PT)  AM-PAC 6 Clicks Score (PT): 15  AM-PAC 6 Clicks Score (OT): 15       Danni Cheney PTA  9/18/2023

## 2023-09-18 NOTE — PROGRESS NOTES
"    Name: Jonathan Trejo ADMIT: 2023   : 1935  PCP: AlexMarychuy SAMI    MRN: [id number] LOS: 9 days   AGE/SEX: 88 y.o. male  ROOM: [facility]/     Subjective   Subjective   Patient resting in bed.  States he feels the worst that he has or has in his entire life.  States when he stands up he feels \"foggy\".  Still feeling uneasy when he stands up.  States that he is afraid to work physical therapy.    Review of Systems   Constitutional:  Negative for chills and fever.   Respiratory:  Negative for cough and shortness of breath.    Cardiovascular:  Negative for chest pain and palpitations.   Gastrointestinal:  Negative for abdominal pain, diarrhea, nausea and vomiting.   As above     Objective   Objective   Vital Signs  Temp:  [97.2 °F (36.2 °C)-98.3 °F (36.8 °C)] 97.9 °F (36.6 °C)  Heart Rate:  [47-62] 47  Resp:  [16-18] 16  BP: (103-145)/(59-70) 145/69  SpO2:  [94 %-96 %] 96 %  on   ;   Device (Oxygen Therapy): room air  Body mass index is 28.25 kg/m².  Physical Exam  Vitals and nursing note reviewed.   Constitutional:       General: He is not in acute distress.  Cardiovascular:      Rate and Rhythm: Normal rate and regular rhythm.   Pulmonary:      Effort: Pulmonary effort is normal. No respiratory distress.   Abdominal:      General: Abdomen is flat. There is no distension.      Tenderness: There is no abdominal tenderness.   Musculoskeletal:         General: No swelling or deformity.   Skin:     General: Skin is warm and dry.   Neurological:      General: No focal deficit present.      Mental Status: He is alert. Mental status is at baseline.   Psychiatric:      Comments: Depressed mood.  Flat affect.       Results Review     I reviewed the patient's new clinical results.  Results from last 7 days   Lab Units 23  0522 23  0536 23  0435 23  0540   WBC 10*3/mm3 10.12 8.81 8.70 9.76   HEMOGLOBIN g/dL 10.8* 10.4* 9.6* 10.3*   PLATELETS 10*3/mm3 341 316 292 262       Results from last 7 " days   Lab Units 09/16/23  0522 09/14/23  0536 09/13/23  0435 09/12/23  0540   SODIUM mmol/L 141 140 141 141   POTASSIUM mmol/L 3.7 4.0 3.8 3.9   CHLORIDE mmol/L 108* 110* 113* 112*   CO2 mmol/L 24.7 22.5 21.0* 22.4   BUN mg/dL 11 12 15 19   CREATININE mg/dL 1.01 0.93 0.98 0.99   GLUCOSE mg/dL 98 83 96 98     Estimated Creatinine Clearance: 52 mL/min (by C-G formula based on SCr of 1.01 mg/dL).  Results from last 7 days   Lab Units 09/16/23  0522 09/14/23  0536 09/13/23  0435 09/12/23  0540   ALBUMIN g/dL 3.1* 2.8* 2.7* 3.0*   BILIRUBIN mg/dL  --  1.4* 1.2 1.0   ALK PHOS U/L  --  73 66 74   AST (SGOT) U/L  --  12 11 8   ALT (SGPT) U/L  --  9 6 8       Results from last 7 days   Lab Units 09/16/23  0522 09/14/23  0536 09/13/23  1324 09/13/23  0435 09/12/23  0540   CALCIUM mg/dL 8.7 8.5*  --  8.3* 8.4*   ALBUMIN g/dL 3.1* 2.8*  --  2.7* 3.0*   MAGNESIUM mg/dL  --  2.0  --  1.9 2.1   PHOSPHORUS mg/dL 2.5 2.1* 2.3* 2.0* 2.6         COVID19   Date Value Ref Range Status   07/13/2023 Not Detected Not Detected - Ref. Range Final     No results found for: HGBA1C, POCGLU    CT Head Without Contrast  Addendum: ADDENDUM:       Radiation dose reduction techniques were utilized, including automated   exposure control and exposure modulation based on body size.           This report was finalized on 9/12/2023 5:30 PM by Dr. Harry Danielle M.D.  Narrative: CT HEAD WITHOUT CONTRAST     CLINICAL HISTORY: Fall today hitting the back of the head.     TECHNIQUE: CT scan of the head was obtained with 3 mm axial soft tissue  and 2 mm axial bone algorithm algorithm images. No intravenous contrast  was administered. Sagittal and coronal reconstructions were obtained.     COMPARISON: CT head dated 07/04/2023.     FINDINGS:       There is no evidence for a calvarial fracture. There is no evidence for  an acute extra-axial hemorrhage.     The ventricles, sulci, and cisterns are age-appropriate. The basal  ganglia and thalami are unremarkable  in appearance. There are moderate  changes of chronic small vessel ischemic phenomenon. The posterior fossa  structures are within normal limits. Atherosclerotic calcifications are  incidentally appreciated within the intracranial vasculature.     Impression:    No CT evidence for acute traumatic intracranial pathology.        This report was finalized on 9/8/2023 1:58 PM by Dr. Harry Danielle M.D.       I reviewed the patient's daily medications.  Scheduled Medications  apixaban, 5 mg, Oral, Q12H  atorvastatin, 10 mg, Oral, Nightly  cholecalciferol, 2,000 Units, Oral, Daily  escitalopram, 15 mg, Oral, Daily  ferrous sulfate, 325 mg, Oral, Daily With Breakfast  fludrocortisone, 100 mcg, Oral, Daily  gabapentin, 200 mg, Oral, Q8H  midodrine, 10 mg, Oral, TID AC  pantoprazole, 40 mg, Oral, Q AM  vitamin B-12, 1,000 mcg, Oral, Daily    Infusions   Diet  Diet: Cardiac Diets; Healthy Heart (2-3 Na+); Texture: Regular Texture (IDDSI 7); Fluid Consistency: Thin (IDDSI 0)         I have personally reviewed:  [x]  Laboratory   []  Microbiology   [x]  Radiology   []  EKG/Telemetry   []  Cardiology/Vascular   []  Pathology   [x]  Records     Assessment/Plan     Active Hospital Problems    Diagnosis  POA    Orthostatic hypotension [I95.1]  Yes    Stage 3a chronic kidney disease [N18.31]  Yes    History of pulmonary embolism [Z86.711]  Yes    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       88 y.o. male admitted with <principal problem not specified>.    89yo  "gentleman with h/o atrial flutter and prior ablation, dementia, COPD, CKD3a, HLD, C-DDD, h/o myasthenia gravis, Parkinsonism, MGUS, and recent admission here in July for pulmonary embolism (on Eliquis now), who presented to ER for second time in two days with with report of falls at his assisted living facility. Found to have large left buttock hematoma as well as elevated WBC and lactate. He was admitted to our service with concern for sepsis. He was given a dose of Rocephin in ER and blood cultures were sent. Cardiology consulted regarding recurrent falls.     Orthostatic hypotension  -Continue midodrine, compression stockings.  Florinef started 2 days ago.  We will try droxidopa at tomorrow if still has not improved.    Severe depressive episode   -psych has previously seen and they have increased his  Lexapro.  Patient agreeable to talk to psych again.  We will reconsult psych    Acute blood loss anemia: due to large left buttock hematoma and Eliquis, Hgb trending back up.  Back on Eliquis.     CKD3a: Cr has normalized     Recent diagnosis of bilateral PE: continue Eliquis     Dementia NOS: pt diagnosed with \"mild cognitive impairment\" on neuropsych testing 7 years ago, I have d/w dtr--I believe pt has mild-moderate dementia at this time     Hypophosphatemia: resolved    Goals of care-have discussed case with Naty with palliative care.  Difficult case as patient is not actively dying and a lot of his decision-making appears to be based on some severe depression.  Attempted to talk to patient about planning for discharge once we are able to improve his orthostatic hypotension.  Patient states that he has not thought about where he will go after this.    Attempted to call daughter at 3:40 PM and 4 p.m.    Eliquis (home med) for DVT prophylaxis.  Limited code (no CPR, no intubation).  Discussed with patient and nursing staff.  Anticipate discharge home with HH vs SNU facility timing yet to be " determined.    Expected Discharge Date: 9/20/2023; Expected Discharge Time:  3:00 PM      Moise Crisostomo MD  [facility]  09/18/23  16:00 EDT

## 2023-09-18 NOTE — PLAN OF CARE
Problem: Fall Injury Risk  Goal: Absence of Fall and Fall-Related Injury  9/18/2023 0618 by Liana Mcbride, RN  Outcome: Ongoing, Progressing     Problem: Skin Injury Risk Increased  Goal: Skin Health and Integrity  9/18/2023 0618 by Liana Mcbride RN  Outcome: Ongoing, Progressing     Problem: Pain Acute  Goal: Acceptable Pain Control and Functional Ability  Outcome: Ongoing, Progressing   Goal Outcome Evaluation:           Progress: no change  Outcome Evaluation: Pt appeared to sleep well, alert and oriented x3, coop with care, repositioned q 2 hours, pain managed with oral meds, incontinent of bladder, new iv placed, melatonin for sleep, pills whole with water.

## 2023-09-18 NOTE — PROGRESS NOTES
.            [facility] Palliative [facility]    Palliative Care Daily Progress Note   Chief complaint-follow up goals of care/advanced care planning and support for patient/family    Code Status:   Code Status and Medical Interventions:   Ordered at: 09/17/23 1481     Medical Intervention Limits:    NO intubation (DNI)     Code Status (Patient has no pulse and is not breathing):    No CPR (Do Not Attempt to Resuscitate)     Medical Interventions (Patient has pulse or is breathing):    Limited Support      Advanced Directives: Advance Directive Status: Patient has advance directive, copy requested   Goals of Care: Ongoing.     S: Medical record reviewed. Events noted.  Patient resting in bed with no complaints. No family present. Reviewed labs, medical notes and therapy notes. Appetite fair ate about 50% breakfast and no lunch today. Due to have orthostatics later today. Spoke with RAFAL Schneider.          Review of Systems   Constitutional: Negative for decreased appetite.   Cardiovascular:  Negative for chest pain.   Musculoskeletal:  Positive for back pain.   Gastrointestinal:  Negative for abdominal pain and nausea.   Neurological:  Positive for weakness.   Psychiatric/Behavioral:  Positive for depression. The patient is nervous/anxious.    Pain Assessment  Preferred Pain Scale: number (Numeric Rating Pain Scale)  Nonverbal Indicators of Pain: nonverbal indicators absent  Pain Location: groin  Pain Description: intermittent, aching  Pain Onset: new onset (days)  Pain Duration: days  Factors That Aggravate Pain: movement, positioning  Factors That Relieve Pain: rest, repositioning, medication timing  Lifestyle Changes/Adaptations in Response to Pain: decreased activity level    O:     Intake/Output Summary (Last 24 hours) at 9/18/2023 1152  Last data filed at 9/18/2023 1028  Gross per 24 hour   Intake 480 ml   Output --   Net 480 ml       Diagnostics and current medications: Reviewed.    Current  Facility-Administered Medications   Medication Dose Route Frequency Provider Last Rate Last Admin    acetaminophen (TYLENOL) tablet 650 mg  650 mg Oral Q4H PRN Troy Parham MD   650 mg at 09/14/23 0825    apixaban (ELIQUIS) tablet 5 mg  5 mg Oral Q12H Troy Parham MD   5 mg at 09/18/23 0844    atorvastatin (LIPITOR) tablet 10 mg  10 mg Oral Nightly Troy Parham MD   10 mg at 09/17/23 2200    cholecalciferol (VITAMIN D3) tablet 2,000 Units  2,000 Units Oral Daily Troy Parham MD   2,000 Units at 09/18/23 0844    escitalopram (LEXAPRO) tablet 15 mg  15 mg Oral Daily Joey Warren III, MD   15 mg at 09/18/23 0845    ferrous sulfate tablet 325 mg  325 mg Oral Daily With Breakfast Troy Parham MD   325 mg at 09/18/23 0845    fludrocortisone tablet 100 mcg  100 mcg Oral Daily Troy Parham MD   100 mcg at 09/18/23 0846    gabapentin (NEURONTIN) capsule 200 mg  200 mg Oral Q8H Troy Parham MD   200 mg at 09/18/23 0612    HYDROcodone-acetaminophen (NORCO) 5-325 MG per tablet 1 tablet  1 tablet Oral Q6H PRN Moise Crisostomo MD   1 tablet at 09/18/23 0612    hydrOXYzine (ATARAX) tablet 25 mg  25 mg Oral TID PRN Moise Crisostomo MD   25 mg at 09/17/23 1617    melatonin tablet 2 mg  2 mg Oral Nightly PRN Joey Warren III, MD   2 mg at 09/17/23 2201    midodrine (PROAMATINE) tablet 10 mg  10 mg Oral TID AC Troy Parham MD   10 mg at 09/18/23 1101    pantoprazole (PROTONIX) EC tablet 40 mg  40 mg Oral Q AM Troy Parham MD   40 mg at 09/18/23 0612    vitamin B-12 (CYANOCOBALAMIN) tablet 1,000 mcg  1,000 mcg Oral Daily Troy Parham MD   1,000 mcg at 09/18/23 0846          acetaminophen    HYDROcodone-acetaminophen    hydrOXYzine    melatonin  Attest that current medications reviewed including but not limited to prescriptions, over-the counter, herbals and vitamin/mineral/dietary (nutritional) supplements for name, route of administration, type, dose and frequency and are current using all  "immediate resources available at time of dictation.    A:    /67 (BP Location: Right arm, Patient Position: Lying)   Pulse (!) 48   Temp 97.2 °F (36.2 °C) (Oral)   Resp 18   Ht 167.6 cm (66\")   Wt 79.4 kg (175 lb)   SpO2 94%   BMI 28.25 kg/m²     Constitutional:       Appearance: Not in distress. Chronically ill-appearing.   Pulmonary:      Effort: Pulmonary effort is normal.      Breath sounds: Normal breath sounds.   Cardiovascular:      PMI at left midclavicular line. Normal rate. Regular rhythm.      Murmurs: There is no murmur.   Edema:     Peripheral edema absent.   Abdominal:      General: Bowel sounds are normal.      Palpations: Abdomen is soft.      Tenderness: There is no abdominal tenderness.   Neurological:      Mental Status: Alert and oriented to person, place, and time.   Psychiatric:         Mood and Affect: Mood and affect normal.         Speech: Speech normal.         Behavior: Behavior is cooperative.         Cognition and Memory: Memory is impaired.       Patient status: Disease state: Controlled with current treatments.  Functional status: Palliative Performance Scale Score: Performance 50% based on the following measures: Ambulation: Mainly sit or lie down, Activity and Evidence of Disease: Unable to do any work, extensive evidence of disease, Self-Care: Considerable assistance required,  Intake: Normal or reduced, LOC: Full or confusion   Nutritional status: Albumin  2.8 on 9/14/2023 Body mass index is 28.25 kg/m².   Family support: The patient receives support from his children..  Advance Directives: Advance Directive Status: Patient has advance directive, copy in chart   POA/Healthcare surrogate-daughterKaylynn.      Impression/Problem List:     Orthostatic hypotension   Left buttock hematoma  Chronic obstructive pulmonary disease   Peripheral neuropathy secondary to MGUS     Chronic kidney disease stage IIIa  History of bilateral  pulmonary embolism  Degenerative disc " "disease  Dementia          Recommendations/Plan:  1.Provide support with goals of care.             2.  Palliative care encounter  9/15/2023- I spoke with patient regarding goals of care. He has a fairly good understanding of his medical conditions, which we reviewed. The patient resided at [facility] assisted living facility prior to admission. He was independent with ADL's and utilized walker. He does have living will and his oldest daughter, Kaylynn is his healthcare surrogate. He does have another daughter who resides in [place]. We talked about his goals in detail. He has some resistance with going to rehab and I explained to him the reasoning for therapy based on his current orthostatic hypotension. Also the hope that it would just be temporary with hopes of  him returning back to [facility]. We also discussed concerns with him returning to assisted living facility with dizziness and his fear of falling at this time. He states, \"let's just let it end and get it over with it, why prolong things.\" He loves his daughters and loved working with [employer] with [employer] and feels like he has served his purpose. He reports having poor quality of life right now.  He denies any spiritual concerns. He is of Anab[employer]ist roxie. I provided information regarding palliative care, [facility] and [facility]. We also discussed CODE status and medical interventions and he wishes to be  DNR/DNI and no cardioversion/defibrillator.  Due to his reported dementia I did reach out to his daughter, Kaylynn.     She has a very good understanding of her fathers conditions, which we reviewed. At this time she wants him to go rehab (which was discussed with patient prior to her business trip and he seemed agreeable) and understands safety concerns regarding him returning to [facility]. She plans to be back in town from business trip later today and will talk with him further. She does tell me that she will support him in any " decision he makes. In addition, she plans to discuss with [facility] to determine what is required for him to return back their since that has been his home for so long and he really enjoys being there. There was discussion prior too about implement caregivers to assist her father with bathing etc. I did provide information regarding palliative care, [facility] and [facility] services as well. In addition, left handouts for her at bedside.  I did confirm that her father wishes to be DNR/DNI and no cardioversion and she agrees. She will plan to call me with any questions or concerns as I left her my contact information. I spoke with RAFAL Luna and YAMILET Jin.     9/16/2023- I had brief follow up conversation with patient regarding goals of care. At this time, he remains hesitant to the thought of going to rehab. We did discuss concerns with safety and with him returning to [facility] in assistive living facility. I did call his daughter, Kaylynn via telephone at [phone]. She has a very good understanding of the patient medical conditions. She expresses  concerns with her father not wanting to participate in therapy sessions. She is concerned about depression as well for her father. She is aware that Lexapro was adjusted. We again discussed palliative care and [facility] support. She is wanting her father to go to rehab and to see how he does. If no improvement or he declines she would consider hospice support. She does state that private pay wouldn't be an option and she would be looking at medicaid facilities. She was informed that [facility] liaison will be reaching out to her discuss their facility and goals. I spoke with Jennie Campbell RN, YAMILET Mayfield.       Thank you for this consult and allowing us to participate in patient's plan of care. Palliative Care Team will support.        Time spent:30 minutes spent reviewing medical and medication records, assessing and examining patient, discussing with family, answering  questions, providing some guidance about a plan and documentation of care, and coordinating care with other healthcare members, with > 50% time spent face to face.     Naty Mccarty, APRN  9/18/2023  11:52 EDT

## 2023-09-18 NOTE — PLAN OF CARE
"Goal Outcome Evaluation:  Plan of Care Reviewed With: patient        Progress: no change  Outcome Evaluation: Pt seen for PT treatment today. RN present to assist with orthostatics. Pt required Delia with supine to sit and sit to supine. Pt able to sit on EOB with supervision while BP taken.  Pt required CGA with STS transfers and stood with CGA/Delia. Pt did c/o feeling \"foggy\", lightheaded with transitions. Pt able to stand for about 3-4 minutes but became fatigued. Encouraged pt sit up in the chair, walk, or complete exercises however pt declined all 3 of those choices. Educated on pt on the importance of trying to mobilize. Pt expressed that he wants to be comfortable and doesn't see the need of going to rehab in his condition.  Will continue to follow pt to progress as able and for d/c needs.         "

## 2023-09-19 PROCEDURE — 97116 GAIT TRAINING THERAPY: CPT

## 2023-09-19 PROCEDURE — 97530 THERAPEUTIC ACTIVITIES: CPT

## 2023-09-19 PROCEDURE — 97110 THERAPEUTIC EXERCISES: CPT

## 2023-09-19 RX ORDER — CHOLECALCIFEROL (VITAMIN D3) 125 MCG
5 CAPSULE ORAL NIGHTLY PRN
Status: DISCONTINUED | OUTPATIENT
Start: 2023-09-19 | End: 2023-09-21 | Stop reason: HOSPADM

## 2023-09-19 RX ORDER — DROXIDOPA 100 MG/1
100 CAPSULE ORAL 3 TIMES DAILY
Status: DISCONTINUED | OUTPATIENT
Start: 2023-09-19 | End: 2023-09-20

## 2023-09-19 RX ADMIN — GABAPENTIN 200 MG: 100 CAPSULE ORAL at 06:43

## 2023-09-19 RX ADMIN — ESCITALOPRAM OXALATE 15 MG: 10 TABLET, FILM COATED ORAL at 08:13

## 2023-09-19 RX ADMIN — MIDODRINE HYDROCHLORIDE 10 MG: 5 TABLET ORAL at 11:32

## 2023-09-19 RX ADMIN — GABAPENTIN 200 MG: 100 CAPSULE ORAL at 21:18

## 2023-09-19 RX ADMIN — Medication 1000 MCG: at 08:13

## 2023-09-19 RX ADMIN — Medication 5 MG: at 21:32

## 2023-09-19 RX ADMIN — FERROUS SULFATE TAB 325 MG (65 MG ELEMENTAL FE) 325 MG: 325 (65 FE) TAB at 08:13

## 2023-09-19 RX ADMIN — MIDODRINE HYDROCHLORIDE 10 MG: 5 TABLET ORAL at 06:43

## 2023-09-19 RX ADMIN — GABAPENTIN 200 MG: 100 CAPSULE ORAL at 14:10

## 2023-09-19 RX ADMIN — Medication 2000 UNITS: at 08:13

## 2023-09-19 RX ADMIN — APIXABAN 5 MG: 5 TABLET, FILM COATED ORAL at 21:18

## 2023-09-19 RX ADMIN — ATORVASTATIN CALCIUM 10 MG: 20 TABLET, FILM COATED ORAL at 21:18

## 2023-09-19 RX ADMIN — DROXIDOPA 100 MG: 100 CAPSULE ORAL at 21:19

## 2023-09-19 RX ADMIN — PANTOPRAZOLE SODIUM 40 MG: 40 TABLET, DELAYED RELEASE ORAL at 06:43

## 2023-09-19 RX ADMIN — MIDODRINE HYDROCHLORIDE 10 MG: 5 TABLET ORAL at 17:11

## 2023-09-19 RX ADMIN — FLUDROCORTISONE ACETATE 100 MCG: 0.1 TABLET ORAL at 08:13

## 2023-09-19 RX ADMIN — DROXIDOPA 100 MG: 100 CAPSULE ORAL at 15:26

## 2023-09-19 RX ADMIN — APIXABAN 5 MG: 5 TABLET, FILM COATED ORAL at 08:13

## 2023-09-19 NOTE — PLAN OF CARE
Goal Outcome Evaluation:  Plan of Care Reviewed With: patient        Progress: improving  Outcome Evaluation: Pt seen for PT treatment this PM. Pt agreeable to participate. Pt required Delia with bed mobility and CGA/Delia with STS transfers. No c/o dizziness or lightheaded with changing positions. Pt ambulated about 30ft with rollator min/ModA. Pt had very short shuffling steps during gait. Once pt made into the room from the hallway, pt started to fatigue and fatigued very quickly and increasingly had a difficult time standing. Chair was pulled up behind pt for him to sit. after a few minutes pt was able to stand/pivot from chair to bed. Pt returned to supine. Will continue to progress pt as able.

## 2023-09-19 NOTE — THERAPY TREATMENT NOTE
Patient Name: Jonathan Trejo  : 1935    MRN: [id number]                              Today's Date: 2023       Admit Date: 2023    Visit Dx:     ICD-10-CM ICD-9-CM   1. Sepsis, due to unspecified organism, unspecified whether acute organ dysfunction present  A41.9 038.9     995.91   2. Frequent falls  R29.6 V15.88   3. Hematoma  T14.8XXA 924.9     Patient Active Problem List   Diagnosis    Benign prostatic hyperplasia with urinary obstruction    Chronic renal impairment, stage 3 (moderate)    Depression    Gastroesophageal reflux disease with esophagitis    Hyperlipidemia    Nocturia    Obesity (BMI 30-39.9)    Osteopenia    Osteoporosis    Seborrhea    Abnormal EKG    Bradycardia    Degenerative disc disease, cervical    Typical atrial flutter    Lumbar radiculopathy    AVNRT (AV tarun re-entry tachycardia)    S/P ablation of atrial flutter    S/P catheter ablation of slow pathway    Cardiomyopathy    GARCIA (dyspnea on exertion)    Urinary tract infection without hematuria    Right foot drop    Frequent falls    Metabolic encephalopathy    Acute renal failure    Other secondary parkinsonism    Moderate single current episode of major depressive disorder    Mass of left lower leg    Pulmonary emphysema    Monoclonal gammopathy of unknown significance (MGUS)    Myasthenia gravis without exacerbation    Displacement of lumbar intervertebral disc without myelopathy    Acute anemia    Closed compression fracture of L3 lumbar vertebra, initial encounter    Closed compression fracture of L4 lumbar vertebra, initial encounter    Pulmonary embolus    Stage 3a chronic kidney disease    History of pulmonary embolism    Hypophosphatemia    Neuropathy associated with MGUS    Orthostatic hypotension     Past Medical History:   Diagnosis Date    Abnormal electrocardiogram     Arm pain     Atrial fibrillation     Atrial flutter     BPH (benign prostatic hyperplasia)     BPH associated with nocturia     Chronic kidney  disease     Colon cancer     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    Colon polyp 11/22/2015    Depression     Dyspnea     Enlarged prostate without lower urinary tract symptoms (luts)     Episode of generalized weakness     knees were weak - had to be helped by wife to sit down    Esophagitis, reflux     Fatigue     Fracture of ankle     right    GERD (gastroesophageal reflux disease)     Hyperlipidemia     Inguinal hernia     Loss of hearing     Lumbar radiculopathy     Obesity     Osteoarthritis cervical spine     doc on Sray 08/16    Osteopenia     Osteoporosis     Overweight     Tobacco dependence in remission     Urge incontinence of urine     Vitamin D deficiency     Wheezing      Past Surgical History:   Procedure Laterality Date    CARDIAC CATHETERIZATION N/A 7/27/2017    Procedure: Valve assessment;  Surgeon: Adonis Solis MD;  [facility];  Service:     CARDIAC CATHETERIZATION N/A 10/1/2018    Procedure: Left Heart Cath;  Surgeon: Bogdan Vargas MD;  [facility];  Service: Cardiology    CARDIAC CATHETERIZATION N/A 10/1/2018    Procedure: Coronary angiography;  Surgeon: Bogdan Vargas MD;  [facility];  Service: Cardiology    CARDIAC CATHETERIZATION N/A 10/1/2018    Procedure: Left ventriculography;  Surgeon: Bogdan Vargas MD;  [facility];  Service: Cardiology    CARDIAC CATHETERIZATION N/A 10/1/2018    Procedure: Right Heart Cath;  Surgeon: Bogdan Vargas MD;  Location: [facility] CATH INVASIVE LOC[facility];  Service: Cardiology    CARDIAC ELECTROPHYSIOLOGY PROCEDURE N/A 7/27/2017    Procedure: Ablation atrial flutter Will need to have 3 -4 weeks of therapeutic INR's ;  Surgeon: Adonis Solis MD;  [facility];  Service:     CARDIOVERSION      CATARACT EXTRACTION W/ INTRAOCULAR LENS  IMPLANT, BILATERAL      COLON RESECTION      COLON SURGERY       polyp removed    COLONOSCOPY      07/15 redo 5 years  Segun    KNEE SURGERY      benign tumor removed, age 6    LASIK      LUMBAR EPIDURAL INJECTION N/A 5/25/2022    Procedure: LUMBAR EPIDURAL 1ST VISIT L3-4 (right of midline);  Surgeon: Bonnie Brown MD;  Location: [facility] MAIN OR;  Service: Pain Management;  Laterality: N/A;    REFRACTIVE SURGERY  2007    REPAIR PERONEAL TENDONS ANKLE W/ FIBULAR OSTEOTOMY Right 03/2013    Dr. Banks      General Information       Row Name 09/19/23 1510          OT Time and Intention    Document Type therapy note (daily note)  -     Mode of Treatment occupational therapy;individual therapy  -       Row Name 09/19/23 1510          General Information    Patient Profile Reviewed yes  -     Existing Precautions/Restrictions fall;orthostatic hypotension  -       Row Name 09/19/23 1510          Cognition    Orientation Status (Cognition) oriented x 3  -       Row Name 09/19/23 1510          Safety Issues, Functional Mobility    Safety Issues Affecting Function (Mobility) insight into deficits/self-awareness  -     Impairments Affecting Function (Mobility) balance;endurance/activity tolerance;strength;range of motion (ROM)  -               User Key  (r) = Recorded By, (t) = Taken By, (c) = Cosigned By      Initials Name Provider Type     Zee Smith OT Occupational Therapist                     Mobility/ADL's       Row Name 09/19/23 1515          Bed Mobility    Supine-Sit [place] (Bed Mobility) moderate assist (50% patient effort);verbal cues  -     Sit-Supine [place] (Bed Mobility) minimum assist (75% patient effort);verbal cues  -     Assistive Device (Bed Mobility) bed rails;head of bed elevated  -       Row Name 09/19/23 1515          Transfers    Comment, (Transfers) 5 reps standing for increased strengthening for ADL transfers  -       Row Name 09/19/23 1515          Sit-Stand Transfer    Sit-Stand [place] (Transfers) contact guard   "-     Assistive Device (Sit-Stand Transfers) walker, 4-wheeled  -               User Key  (r) = Recorded By, (t) = Taken By, (c) = Cosigned By      Initials Name Provider Type     Zee Smith OT Occupational Therapist                   Obj/Interventions       Row Name 09/19/23 1516          Shoulder (Therapeutic Exercise)    Shoulder (Therapeutic Exercise) AROM (active range of motion)  -     Shoulder AROM (Therapeutic Exercise) bilateral;flexion;extension;aBduction;aDduction;10 repetitions  forward punch  -       Row Name 09/19/23 1516          Motor Skills    Therapeutic Exercise shoulder  -[facility] Name 09/19/23 1516          Balance    Static Sitting Balance supervision  -     Position, Sitting Balance sitting edge of bed  -     Static Standing Balance contact guard  -     Position/Device Used, Standing Balance supported;walker, rolling  -               User Key  (r) = Recorded By, (t) = Taken By, (c) = Cosigned By      Initials Name Provider Type     Zee Smith OT Occupational Therapist                   Goals/Plan    No documentation.                  Clinical Impression       [facility] Name 09/19/23 1549          Pain Assessment    Pretreatment Pain Rating 0/10 - no pain  -     Posttreatment Pain Rating 0/10 - no pain  -     Pre/Posttreatment Pain Comment reports LLE \"sore to stand on due to hematoma/brusing\"  -     Pain Intervention(s) Rest  -[facility] Name 09/19/23 1542          Plan of Care Review    Plan of Care Reviewed With patient  -     Outcome Evaluation Pt agreeable to OT today after some encouragement by therapist and RN. Pt declines ADLs or \"walking \" today. He does sit EOB to perfrom a few reps of UE exercises and 5 stands with rollator. Much of session spent on education on pt goals and benefits of therapy participation. BP taken throughout session, BP drops with activity, pt denies any dizziness. See note for details. RN notified.  -       Row Name " 09/19/23 1544          Therapy Plan Review/Discharge Plan (OT)    Anticipated Discharge Disposition (OT) skilled nursing facility  -       Row Name 09/19/23 1544          Vital Signs    Pre Systolic BP Rehab 104  -SM     Pre Treatment Diastolic BP 63  -SM     Intra Systolic BP Rehab 80  -SM     Intra Treatment Diastolic BP 47  -SM     Post Systolic BP Rehab 123  -SM     Post Treatment Diastolic BP 66  -SM     Pre Patient Position Supine  start of session  -SM     Intra Patient Position Supine  following activity  -SM     Post Patient Position Supine  resting in bed after 3 min  -SM       Row Name 09/19/23 1544          Positioning and Restraints    Pre-Treatment Position in bed  -SM     Post Treatment Position bed  -SM     In Bed fowlers;call light within reach;encouraged to call for assist;exit alarm on;notified ns  -               User Key  (r) = Recorded By, (t) = Taken By, (c) = Cosigned By      Initials Name Provider Type    SM Zee Smith, OT Occupational Therapist                   Outcome Measures       Row Name 09/19/23 1547          How much help from another is currently needed...    Putting on and taking off regular lower body clothing? 1  -SM     Bathing (including washing, rinsing, and drying) 2  -SM     Toileting (which includes using toilet bed pan or urinal) 2  -SM     Putting on and taking off regular upper body clothing 3  -SM     Taking care of personal grooming (such as brushing teeth) 3  -SM     Eating meals 4  -SM     AM-PAC 6 Clicks Score (OT) 15  -SM       Row Name 09/19/23 0813          How much help from another person do you currently need...    Turning from your back to your side while in flat bed without using bedrails? 3  -SG     Moving from lying on back to sitting on the side of a flat bed without bedrails? 3  -SG     Moving to and from a bed to a chair (including a wheelchair)? 3  -SG     Standing up from a chair using your arms (e.g., wheelchair, bedside chair)? 3  -SG      Climbing 3-5 steps with a railing? 1  -SG     To walk in hospital room? 2  -SG     AM-PAC 6 Clicks Score (PT) 15  -SG     Highest level of mobility 4 --> Transferred to chair/commode  -       Row Name 09/19/23 1547          Functional Assessment    Outcome Measure Options AM-PAC 6 Clicks Daily Activity (OT)  -               User Key  (r) = Recorded By, (t) = Taken By, (c) = Cosigned By      Initials Name Provider Type     Zee Smith, EVAN Occupational Therapist    Karen Peterson, RN Registered Nurse                    Occupational Therapy Education       Title: PT OT SLP Therapies (In Progress)       Topic: Occupational Therapy (Done)       Point: ADL training (Done)       Description:   Instruct learner(s) on proper safety adaptation and remediation techniques during self care or transfers.   Instruct in proper use of assistive devices.                  Learning Progress Summary             Patient Acceptance, E, DU,VU by OK at 9/11/2023 1353                         Point: Home exercise program (Done)       Description:   Instruct learner(s) on appropriate technique for monitoring, assisting and/or progressing therapeutic exercises/activities.                  Learning Progress Summary             Patient Acceptance, E, DU,VU by OK at 9/11/2023 1353                         Point: Precautions (Done)       Description:   Instruct learner(s) on prescribed precautions during self-care and functional transfers.                  Learning Progress Summary             Patient Acceptance, E, DU,VU by  at 9/11/2023 1353                                         User Key       Initials Effective Dates Name Provider Type [facility] 09/22/22 -  Zina Young, OT Occupational Therapist OT                  OT Recommendation and Plan     Plan of Care Review  Plan of Care Reviewed With: patient  Outcome Evaluation: Pt agreeable to OT today after some encouragement by therapist and RN. Pt declines ADLs or  "\"walking \" today. He does sit EOB to perfrom a few reps of UE exercises and 5 stands with rollator. Much of session spent on education on pt goals and benefits of therapy participation. BP taken throughout session, BP drops with activity, pt denies any dizziness. See note for details. RN notified.     Time Calculation:         Time Calculation- OT       Row Name 09/19/23 1549 09/19/23 1548          Time Calculation- OT    OT Start Time -- 1458  -     OT Stop Time -- 1523  -     OT Time Calculation (min) -- 25 min  -     Total Timed Code Minutes- OT -- 25 minute(s)  -SM     OT Received On -- 09/19/23  -     OT - Next Appointment -- 09/20/23  -        Timed Charges    29975 - OT Therapeutic Exercise Minutes 10  -SM --     25579 - OT Therapeutic Activity Minutes 15  -SM --        Total Minutes    Timed Charges Total Minutes 25  -SM --      Total Minutes 25  -SM --               User Key  (r) = Recorded By, (t) = Taken By, (c) = Cosigned By      Initials Name Provider Type     Zee Smith OT Occupational Therapist                  Therapy Charges for Today       Code Description Service Date Service Provider Modifiers Qty    [id number]  OT THER PROC EA 15 MIN 9/19/2023 Zee Smith OT GO 1    [id number]  OT THERAPEUTIC ACT EA 15 MIN 9/19/2023 Zee Smith OT GO 1                 Zee Smith OT  9/19/2023  "

## 2023-09-19 NOTE — PROGRESS NOTES
Discharge Planning Assessment  [facility]     Patient Name: Jonathan Trejo  MRN: [id number]  Today's Date: 9/19/2023    Admit Date: 9/9/2023    Plan: [facility]. Can accept over weekend if medically ready.Thigh High 20-30 Jobst Hose provided through [facility]. Naty Palliative APRN provided information on palliative care, [facility] and [facility].   Discharge Needs Assessment    No documentation.                  Discharge Plan       Row Name 09/19/23 5061       Plan    Plan Comments Called Raoul/[facility] and he states the daughter asked him to evaluate today after 14:00. He is going to call her and give YAMILET/Ryann number so that she can follow up with [facility] after she talks with him. [facility]CP will continue to follow. FAWN Thrasher RN, CCP.                  Continued Care and Services - Admitted Since 9/9/2023       Destination Coordination complete.      Service Provider Request Status Selected Services Address Phone Fax Patient Preferred    [facility]  Selected Inpatient Rehabilitation [address] [place] [address] [phone] --              Durable Medical Equipment       Service Provider Request Status Selected Services Address Phone Fax Patient Preferred    [facility] Pending - Request Sent N/A [address], [place] [address] [phone] --       Internal Comment last updated by Roel Varela RN 9/12/2023 1525    Called to [facility]                             Selected Continued Care - Prior Encounters Includes continued care and service providers with selected services from prior encounters from 6/11/2023 to 9/19/2023      Discharged on 7/17/2023 Admission date: 7/13/2023 - Discharge disposition: Home-Health Care c      Home Medical Care       Service Provider Selected Services Address Phone Fax Patient Preferred    [facility] [address] [place] [facility], [address], [place]  [address] [phone] --                          Expected Discharge Date and Time       Expected Discharge Date Expected Discharge Time    Sep 20, 2023            Demographic Summary    No documentation.                  Functional Status    No documentation.                  Psychosocial    No documentation.                  Abuse/Neglect    No documentation.                  Legal    No documentation.                  Substance Abuse    No documentation.                  Patient Forms    No documentation.                     Ryann Thrasher RN

## 2023-09-19 NOTE — PLAN OF CARE
Goal Outcome Evaluation:           Progress: no change  Outcome Evaluation: Pt is A&O with some confusion. His BP and HR is low. PT came today and patient experienced orthostatic hypotension and almost fell. Droxidopa 100mg was ordered and started today. Patient is extremely pleasant and enjoys any extra company he can get. OT came today and was able to work with him, they were sweetly persistent according to the pt. Family did not come today but did call in and checked on him. Pt did enjoy an ice cream. Working on discharge plan.

## 2023-09-19 NOTE — THERAPY TREATMENT NOTE
Patient Name: Jonathan Trejo  : 1935    MRN: [id number]                              Today's Date: 2023       Admit Date: 2023    Visit Dx:     ICD-10-CM ICD-9-CM   1. Sepsis, due to unspecified organism, unspecified whether acute organ dysfunction present  A41.9 038.9     995.91   2. Frequent falls  R29.6 V15.88   3. Hematoma  T14.8XXA 924.9     Patient Active Problem List   Diagnosis    Benign prostatic hyperplasia with urinary obstruction    Chronic renal impairment, stage 3 (moderate)    Depression    Gastroesophageal reflux disease with esophagitis    Hyperlipidemia    Nocturia    Obesity (BMI 30-39.9)    Osteopenia    Osteoporosis    Seborrhea    Abnormal EKG    Bradycardia    Degenerative disc disease, cervical    Typical atrial flutter    Lumbar radiculopathy    AVNRT (AV tarun re-entry tachycardia)    S/P ablation of atrial flutter    S/P catheter ablation of slow pathway    Cardiomyopathy    GARCIA (dyspnea on exertion)    Urinary tract infection without hematuria    Right foot drop    Frequent falls    Metabolic encephalopathy    Acute renal failure    Other secondary parkinsonism    Moderate single current episode of major depressive disorder    Mass of left lower leg    Pulmonary emphysema    Monoclonal gammopathy of unknown significance (MGUS)    Myasthenia gravis without exacerbation    Displacement of lumbar intervertebral disc without myelopathy    Acute anemia    Closed compression fracture of L3 lumbar vertebra, initial encounter    Closed compression fracture of L4 lumbar vertebra, initial encounter    Pulmonary embolus    Stage 3a chronic kidney disease    History of pulmonary embolism    Hypophosphatemia    Neuropathy associated with MGUS    Orthostatic hypotension     Past Medical History:   Diagnosis Date    Abnormal electrocardiogram     Arm pain     Atrial fibrillation     Atrial flutter     BPH (benign prostatic hyperplasia)     BPH associated with nocturia     Chronic kidney  disease     Colon cancer     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    Colon polyp 11/22/2015    Depression     Dyspnea     Enlarged prostate without lower urinary tract symptoms (luts)     Episode of generalized weakness     knees were weak - had to be helped by wife to sit down    Esophagitis, reflux     Fatigue     Fracture of ankle     right    GERD (gastroesophageal reflux disease)     Hyperlipidemia     Inguinal hernia     Loss of hearing     Lumbar radiculopathy     Obesity     Osteoarthritis cervical spine     doc on [facility] 08/16    Osteopenia     Osteoporosis     Overweight     Tobacco dependence in remission     Urge incontinence of urine     Vitamin D deficiency     Wheezing      Past Surgical History:   Procedure Laterality Date    CARDIAC CATHETERIZATION N/A 7/27/2017    Procedure: Valve assessment;  Surgeon: Adonis Solis MD;  [facility];  Service:     CARDIAC CATHETERIZATION N/A 10/1/2018    Procedure: Left Heart Cath;  Surgeon: Bogdan Vargas MD;  [facility];  Service: Cardiology    CARDIAC CATHETERIZATION N/A 10/1/2018    Procedure: Coronary angiography;  Surgeon: Bogdan Vargas MD;  [facility];  Service: Cardiology    CARDIAC CATHETERIZATION N/A 10/1/2018    Procedure: Left ventriculography;  Surgeon: Bogdan Vargas MD;  [facility];  Service: Cardiology    CARDIAC CATHETERIZATION N/A 10/1/2018    Procedure: Right Heart Cath;  Surgeon: Bogdan Vargas MD;  Location: [facility] CATH INVASIVE LOCATION;  Service: Cardiology    CARDIAC ELECTROPHYSIOLOGY PROCEDURE N/A 7/27/2017    Procedure: Ablation atrial flutter Will need to have 3 -4 weeks of therapeutic INR's ;  Surgeon: Adonis Solis MD;  [facility];  Service:     CARDIOVERSION      CATARACT EXTRACTION W/ INTRAOCULAR LENS  IMPLANT, BILATERAL      COLON RESECTION      COLON SURGERY       polyp removed    COLONOSCOPY      07/15 redo 5 years  Segun    KNEE SURGERY      benign tumor removed, age 6    LASIK      LUMBAR EPIDURAL INJECTION N/A 5/25/2022    Procedure: LUMBAR EPIDURAL 1ST VISIT L3-4 (right of midline);  Surgeon: Bonnie Brown MD;  [facility];  Service: Pain Management;  Laterality: N/A;    REFRACTIVE SURGERY  2007    REPAIR PERONEAL TENDONS ANKLE W/ FIBULAR OSTEOTOMY Right 03/2013    Dr. Banks      General Information       Row Name 09/19/23 1632          Physical Therapy Time and Intention    Document Type therapy note (daily note)  -     Mode of Treatment individual therapy;physical therapy  -EB       Row Name 09/19/23 1632          General Information    Patient Profile Reviewed yes  -EB     Existing Precautions/Restrictions fall;orthostatic hypotension  -EB       Row Name 09/19/23 1632          Cognition    Orientation Status (Cognition) oriented x 3  -EB       Row Name 09/19/23 1632          Safety Issues, Functional Mobility    Impairments Affecting Function (Mobility) balance;strength;endurance/activity tolerance  -               User Key  (r) = Recorded By, (t) = Taken By, (c) = Cosigned By      Initials Name Provider Type    EB Danni Cheney PTA Physical Therapist Assistant                   Mobility       Row Name 09/19/23 1633          Bed Mobility    Supine-Sit [place] (Bed Mobility) minimum assist (75% patient effort);verbal cues;nonverbal cues (demo/gesture)  -EB     Sit-Supine [place] (Bed Mobility) minimum assist (75% patient effort);verbal cues;nonverbal cues (demo/gesture)  -EB     Assistive Device (Bed Mobility) bed rails;head of bed elevated  -EB     Comment, (Bed Mobility) no dizzy or light headedness upon sitting.  -EB       Row Name 09/19/23 1633          Bed-Chair Transfer    Bed-Chair [place] (Transfers) minimum assist (75% patient effort);moderate assist (50% patient effort)  -EB     Assistive Device (Bed-Chair Transfers) --   HHAX2  -EB       Row Name 09/19/23 1633          Sit-Stand Transfer    Sit-Stand [place] (Transfers) contact guard;minimum assist (75% patient effort)  -EB     Assistive Device (Sit-Stand Transfers) walker, 4-wheeled  -EB       Row Name 09/19/23 1633          Gait/Stairs (Locomotion)    [place] Level (Gait) minimum assist (75% patient effort);moderate assist (50% patient effort)  -EB     Assistive Device (Gait) walker, 4-wheeled  -EB     Distance in Feet (Gait) 30ft  -EB     Deviations/Abnormal Patterns (Gait) gait speed decreased;stride length decreased;ирина decreased;festinating/shuffling  -EB     Bilateral Gait Deviations forward flexed posture;weight shift ability decreased  -EB     Comment, (Gait/Stairs) pt started to fatigued once pt made it into room from hallway. Pt ambulating towards the bed but having difficulty standing and was not close to the bed to sit. attempted to have pt sit on his rollator but unable to. Yelled for and RN came to pull up a chair for pt to sit. After a couple of minutes pt was feeling better. slide chair near bed and stand/pivot pt to bed.  -EB               User Key  (r) = Recorded By, (t) = Taken By, (c) = Cosigned By      Initials Name Provider Type    Danni Jose PTA Physical Therapist Assistant                   Obj/Interventions    No documentation.                  Goals/Plan    No documentation.                  Clinical Impression       Row Name 09/19/23 1638          Plan of Care Review    Plan of Care Reviewed With patient  -EB     Progress improving  -EB     Outcome Evaluation Pt seen for PT treatment this PM. Pt agreeable to participate. Pt required Delia with bed mobility and CGA/Delia with STS transfers. No c/o dizziness or lightheaded with changing positions. Pt ambulated about 30ft with rollator min/ModA. Pt had very short shuffling steps during gait. Once pt made into the room from the hallway, pt started to fatigue and fatigued very quickly and  increasingly had a difficult time standing. Chair was pulled up behind pt for him to sit. after a few minutes pt was able to stand/pivot from chair to bed. Pt returned to supine. Will continue to progress pt as able.  -       Row Name 09/19/23 1638          Therapy Assessment/Plan (PT)    Therapy Frequency (PT) 5 times/wk  -       Row Name 09/19/23 1638          Positioning and Restraints    Pre-Treatment Position in bed  -EB     Post Treatment Position bed  -EB     In Bed supine;call light within reach;encouraged to call for assist;exit alarm on  -EB               User Key  (r) = Recorded By, (t) = Taken By, (c) = Cosigned By      Initials Name Provider Type    Danni Jose PTA Physical Therapist Assistant                   Outcome Measures       Row Name 09/19/23 1643 09/19/23 0813       How much help from another person do you currently need...    Turning from your back to your side while in flat bed without using bedrails? 3  -EB 3  -SG    Moving from lying on back to sitting on the side of a flat bed without bedrails? 3  -EB 3  -SG    Moving to and from a bed to a chair (including a wheelchair)? 3  -EB 3  -SG    Standing up from a chair using your arms (e.g., wheelchair, bedside chair)? 3  -EB 3  -SG    Climbing 3-5 steps with a railing? 1  -EB 1  -SG    To walk in hospital room? 2  -EB 2  -SG    AM-PAC 6 Clicks Score (PT) 15  -EB 15  -SG    Highest level of mobility 4 --> Transferred to chair/commode  -EB 4 --> Transferred to chair/commode  -SG      Row Name 09/19/23 1547          Functional Assessment    Outcome Measure Options AM-PAC 6 Clicks Daily Activity (OT)  -               User Key  (r) = Recorded By, (t) = Taken By, (c) = Cosigned By      Initials Name Provider Type    Danni Jose PTA Physical Therapist Assistant    Zee Landaverde OT Occupational Therapist    Karen Peterson, RN Registered Nurse                                 Physical Therapy Education       Title: PT OT SLP  Therapies (Done)       Topic: Physical Therapy (Done)       Point: Mobility training (Done)       Learning Progress Summary             Patient Acceptance, E,D, VU,NR by EB at 9/19/2023 1643    Acceptance, E, NR by EB at 9/18/2023 1603    Acceptance, E,D, VU,NR by LW at 9/16/2023 1508    Acceptance, E,D, VU,NR by MS at 9/14/2023 1534    Acceptance, E,TB,D, VU,NR by JM at 9/12/2023 1632    Acceptance, E,TB, VU,NR by CB at 9/11/2023 1316                         Point: Home exercise program (Done)       Learning Progress Summary             Patient Acceptance, E,D, VU,NR by LW at 9/16/2023 1508    Acceptance, E,D, VU,NR by MS at 9/14/2023 1534    Acceptance, E,TB,D, VU,NR by JM at 9/12/2023 1632                         Point: Body mechanics (Done)       Learning Progress Summary             Patient Acceptance, E,D, VU,NR by EB at 9/19/2023 1643    Acceptance, E,D, VU,NR by LW at 9/16/2023 1508    Acceptance, E,TB,D, VU,NR by JM at 9/12/2023 1632    Acceptance, E,TB, VU,NR by CB at 9/11/2023 1316                         Point: Precautions (Done)       Learning Progress Summary             Patient Acceptance, E,D, VU,NR by EB at 9/19/2023 1643    Acceptance, E, NR by EB at 9/18/2023 1603    Acceptance, E,D, VU,NR by LW at 9/16/2023 1508    Acceptance, E,TB,D, VU,NR by JM at 9/12/2023 1632    Acceptance, E,TB, VU,NR by CB at 9/11/2023 1316                                         User Key       Initials Effective Dates Name Provider Type Discipline     03/07/18 -  Sheyla Park, PTA Physical Therapist Assistant PT    MS 06/16/21 -  Jimmie Saxena, PT Physical Therapist PT    EB 02/14/23 -  Danni Cheney, PTA Physical Therapist Assistant PT    CB 10/22/21 -  Kelly Littlejohn, PT Physical Therapist PT    LW 05/08/23 -  Vonda Trujillo, PT Physical Therapist PT                  PT Recommendation and Plan     Plan of Care Reviewed With: patient  Progress: improving  Outcome Evaluation: Pt seen for PT treatment this PM. Pt  agreeable to participate. Pt required Delia with bed mobility and CGA/Delia with STS transfers. No c/o dizziness or lightheaded with changing positions. Pt ambulated about 30ft with rollator min/ModA. Pt had very short shuffling steps during gait. Once pt made into the room from the hallway, pt started to fatigue and fatigued very quickly and increasingly had a difficult time standing. Chair was pulled up behind pt for him to sit. after a few minutes pt was able to stand/pivot from chair to bed. Pt returned to supine. Will continue to progress pt as able.     Time Calculation:         PT Charges       Row Name 09/19/23 1631             Time Calculation    Start Time 1327  -EB      Stop Time 1345  -EB      Time Calculation (min) 18 min  -EB      PT Received On 09/19/23  -EB      PT - Next Appointment 09/20/23  -EB         Time Calculation- PT    Total Timed Code Minutes- PT 18 minute(s)  -EB                User Key  (r) = Recorded By, (t) = Taken By, (c) = Cosigned By      Initials Name Provider Type    EB Danni Cheney PTA Physical Therapist Assistant                  Therapy Charges for Today       Code Description Service Date Service Provider Modifiers Qty    71639416431 HC PT THERAPEUTIC ACT EA 15 MIN 9/18/2023 Danni Cheney PTA GP 1    94587522683 HC PT THER PROC EA 15 MIN 9/18/2023 Danni Cheney PTA GP 1    42594504198 HC GAIT TRAINING EA 15 MIN 9/19/2023 Danni Cheney PTA GP 1            PT G-Codes  Outcome Measure Options: AM-PAC 6 Clicks Daily Activity (OT)  AM-PAC 6 Clicks Score (PT): 15  AM-PAC 6 Clicks Score (OT): 15       Danni Cheney PTA  9/19/2023

## 2023-09-19 NOTE — CONSULTS
The patient has apparently agreed to see this physician again after rather rudely dismissing this physician on Saturday.  He is now complaining of poor sleep and I will increase his melatonin to 5 mg nightly, continuing Lexapro 15 mg daily for depression.

## 2023-09-19 NOTE — PLAN OF CARE
"Goal Outcome Evaluation:  Plan of Care Reviewed With: patient           Outcome Evaluation: Pt agreeable to OT today after some encouragement by therapist and RN. Pt declines ADLs or \"walking \" today. He does sit EOB to perfrom a few reps of UE exercises and 5 stands with rollator. Much of session spent on education on pt goals and benefits of therapy participation. BP taken throughout session, BP drops with activity, pt denies any dizziness. See note for details. RN notified.      Anticipated Discharge Disposition (OT): skilled nursing facility  "

## 2023-09-19 NOTE — PROGRESS NOTES
Spoke with patient following visit with PT. Patient alert and oriented, yawning periodically during visit from feeling tired. He appeared in good spirits and being pleased with his work with PT today following several days of being in the hospital. He voiced being willing and looking toward to working with PT again tomorrow. Pt shared stories of his life, what brings him cr, and gives him quality of life. He has remained active and ambitious throughout his life so mobility limitations have been a difficult life adjustment. Focused on reframing his thought process to help motivate him to continue to work towards goals and cr in his life. Patient remained engaged and participatory in discussion. He was appreciative of visit and support. Palliative team will continue to be available for ongoing support and GOC discussions.

## 2023-09-19 NOTE — PROGRESS NOTES
Name: Jonathan Trejo ADMIT: 2023   : 1935  PCP: Marychuy Johnson SAMI    MRN: [id number] LOS: 10 days   AGE/SEX: 88 y.o. male  ROOM: [facility]/     Subjective   Subjective   Patient lying in bed.  Appears a little bit better spirits than yesterday.  But states that he does not feel any better.  Discussed the importance of physical therapy and how it is currently not safe for him to go back to [facility] with his limited mobility.    Review of Systems   Constitutional:  Negative for chills and fever.   Respiratory:  Negative for cough and shortness of breath.    Cardiovascular:  Negative for chest pain and palpitations.   Gastrointestinal:  Negative for abdominal pain, diarrhea, nausea and vomiting.   As above     Objective   Objective   Vital Signs  Temp:  [97.9 °F (36.6 °C)-98.9 °F (37.2 °C)] 98.9 °F (37.2 °C)  Heart Rate:  [47-61] 57  Resp:  [16-18] 16  BP: ()/(54-81) 102/56  SpO2:  [92 %-96 %] 94 %  on   ;   Device (Oxygen Therapy): room air  Body mass index is 28.25 kg/m².  Physical Exam  Vitals and nursing note reviewed.   Constitutional:       General: He is not in acute distress.  Cardiovascular:      Rate and Rhythm: Normal rate and regular rhythm.   Pulmonary:      Effort: Pulmonary effort is normal. No respiratory distress.   Abdominal:      General: Abdomen is flat. There is no distension.      Tenderness: There is no abdominal tenderness.   Musculoskeletal:         General: No swelling or deformity.   Skin:     General: Skin is warm and dry.   Neurological:      General: No focal deficit present.      Mental Status: He is alert. Mental status is at baseline.   Psychiatric:      Comments: Depressed mood.  Flat affect.       Results Review     I reviewed the patient's new clinical results.  Results from last 7 days   Lab Units 23  0522 23  0536 23  0435   WBC 10*3/mm3 10.12 8.81 8.70   HEMOGLOBIN g/dL 10.8* 10.4* 9.6*   PLATELETS 10*3/mm3 341 316 292       Results from last 7  days   Lab Units 09/16/23  0522 09/14/23  0536 09/13/23  0435   SODIUM mmol/L 141 140 141   POTASSIUM mmol/L 3.7 4.0 3.8   CHLORIDE mmol/L 108* 110* 113*   CO2 mmol/L 24.7 22.5 21.0*   BUN mg/dL 11 12 15   CREATININE mg/dL 1.01 0.93 0.98   GLUCOSE mg/dL 98 83 96     Estimated Creatinine Clearance: 52 mL/min (by C-G formula based on SCr of 1.01 mg/dL).  Results from last 7 days   Lab Units 09/16/23  0522 09/14/23  0536 09/13/23  0435   ALBUMIN g/dL 3.1* 2.8* 2.7*   BILIRUBIN mg/dL  --  1.4* 1.2   ALK PHOS U/L  --  73 66   AST (SGOT) U/L  --  12 11   ALT (SGPT) U/L  --  9 6       Results from last 7 days   Lab Units 09/16/23  0522 09/14/23  0536 09/13/23  1324 09/13/23  0435   CALCIUM mg/dL 8.7 8.5*  --  8.3*   ALBUMIN g/dL 3.1* 2.8*  --  2.7*   MAGNESIUM mg/dL  --  2.0  --  1.9   PHOSPHORUS mg/dL 2.5 2.1* 2.3* 2.0*         COVID19   Date Value Ref Range Status   07/13/2023 Not Detected Not Detected - Ref. Range Final     No results found for: HGBA1C, POCGLU    CT Head Without Contrast  Addendum: ADDENDUM:       Radiation dose reduction techniques were utilized, including automated   exposure control and exposure modulation based on body size.           This report was finalized on 9/12/2023 5:30 PM by Dr. Harry Danielle M.D.  Narrative: CT HEAD WITHOUT CONTRAST     CLINICAL HISTORY: Fall today hitting the back of the head.     TECHNIQUE: CT scan of the head was obtained with 3 mm axial soft tissue  and 2 mm axial bone algorithm algorithm images. No intravenous contrast  was administered. Sagittal and coronal reconstructions were obtained.     COMPARISON: CT head dated 07/04/2023.     FINDINGS:       There is no evidence for a calvarial fracture. There is no evidence for  an acute extra-axial hemorrhage.     The ventricles, sulci, and cisterns are age-appropriate. The basal  ganglia and thalami are unremarkable in appearance. There are moderate  changes of chronic small vessel ischemic phenomenon. The posterior  fossa  structures are within normal limits. Atherosclerotic calcifications are  incidentally appreciated within the intracranial vasculature.     Impression:    No CT evidence for acute traumatic intracranial pathology.        This report was finalized on 9/8/2023 1:58 PM by Dr. Harry Danielle M.D.       I reviewed the patient's daily medications.  Scheduled Medications  apixaban, 5 mg, Oral, Q12H  atorvastatin, 10 mg, Oral, Nightly  cholecalciferol, 2,000 Units, Oral, Daily  escitalopram, 15 mg, Oral, Daily  ferrous sulfate, 325 mg, Oral, Daily With Breakfast  gabapentin, 200 mg, Oral, Q8H  midodrine, 10 mg, Oral, TID AC  pantoprazole, 40 mg, Oral, Q AM  vitamin B-12, 1,000 mcg, Oral, Daily    Infusions   Diet  Diet: Cardiac Diets; Healthy Heart (2-3 Na+); Texture: Regular Texture (IDDSI 7); Fluid Consistency: Thin (IDDSI 0)         I have personally reviewed:  [x]  Laboratory   []  Microbiology   [x]  Radiology   []  EKG/Telemetry   []  Cardiology/Vascular   []  Pathology   Discontinue Florinef.  Start droxidopa.  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       88 y.o. male admitted with <principal problem not specified>.    89yo gentleman with h/o atrial flutter and prior ablation, dementia, COPD, CKD3a, HLD, C-DDD, h/o myasthenia gravis,  "Parkinsonism, MGUS, and recent admission here in July for pulmonary embolism (on Eliquis now), who presented to ER for second time in two days with with report of falls at his assisted living facility. Found to have large left buttock hematoma as well as elevated WBC and lactate. He was admitted to our service with concern for sepsis. He was given a dose of Rocephin in ER and blood cultures were sent. Cardiology consulted regarding recurrent falls.     Orthostatic hypotension  -Continues to be dizzy on standing.  Compression stockings.  Stop Florinef.  Start droxidopa.  Continue midodrine for now.    Severe depressive episode   -psych has previously seen and they have increased his  Lexapro.  Psych evaluated again.  Increase his melatonin.  -Patient seemed more goal oriented today    Acute blood loss anemia: due to large left buttock hematoma and Eliquis, Hgb trending back up.  Back on Eliquis.     CKD3a: Cr has normalized     Recent diagnosis of bilateral PE: continue Eliquis     Dementia NOS: pt diagnosed with \"mild cognitive impairment\" on neuropsych testing 7 years ago     Hypophosphatemia: resolved      Eliquis (home med) for DVT prophylaxis.  Limited code (no CPR, no intubation).  Discussed with patient and nursing staff.  Anticipate discharge  [facility]  timing yet to be determined.    Expected Discharge Date: 9/21/2023; Expected Discharge Time:  3:00 PM      Moise Crisostomo MD  [facility]  09/19/23  13:57 EDT         "

## 2023-09-19 NOTE — PROGRESS NOTES
Discharge Planning Assessment  [facility]     Patient Name: Jonathan Trejo  MRN: [id number]  Today's Date: 9/19/2023    Admit Date: 9/9/2023    Plan: [facility]. Can accept over weekend if medically ready.Thigh High 20-30 Jobst Hose provided through [facility]. Naty Palliative APRN provided information on palliative care, [facility] and [facility].   Discharge Needs Assessment    No documentation.                  Discharge Plan       Row Name 09/19/23 6628       Plan    Plan Comments Dr. Crisostomo adjusting medications. Discharge plan is 1-2 days. FAWN Thrasher RN, CCP.      Row Name 09/19/23 6585       Plan    Plan Comments Called Raoul/[facility] and he states the daughter asked him to evaluate today after 14:00. He is going to call her and give YAMILET/Ryann number so that she can follow up with CCP after she talks with him. CCP will continue to follow. FAWN Thrasher RN, CCP.                  Continued Care and Services - Admitted Since 9/9/2023       Destination Coordination complete.      Service Provider Request Status Selected Services Address Phone Fax Patient Preferred    [facility]  Selected Inpatient Rehabilitation [address] [place], [place] [id number] [phone] --              Durable Medical Equipment       Service Provider Request Status Selected Services Address Phone Fax Patient Preferred    [facility] Pending - Request Sent N/A [address] [address] [phone] --       Internal Comment last updated by Roel Varela RN 9/12/2023 1525    Called to Marsha/Bernard                             Selected Continued Care - Prior Encounters Includes continued care and service providers with selected services from prior encounters from 6/11/2023 to 9/19/2023      Discharged on 7/17/2023 Admission date: 7/13/2023 - Discharge disposition: Home-Health Care [facility]      Home Medical Care       Service Provider Selected Services  Address Phone Fax Patient Preferred    [facility] [address], [facility] [address] [phone] --                          Expected Discharge Date and Time       Expected Discharge Date Expected Discharge Time    Sep 21, 2023            Demographic Summary    No documentation.                  Functional Status    No documentation.                  Psychosocial    No documentation.                  Abuse/Neglect    No documentation.                  Legal    No documentation.                  Substance Abuse    No documentation.                  Patient Forms    No documentation.                     Ryann Thrasher RN

## 2023-09-20 RX ORDER — DROXIDOPA 100 MG/1
200 CAPSULE ORAL 3 TIMES DAILY
Status: DISCONTINUED | OUTPATIENT
Start: 2023-09-20 | End: 2023-09-20

## 2023-09-20 RX ORDER — DROXIDOPA 100 MG/1
200 CAPSULE ORAL 3 TIMES DAILY
Status: DISCONTINUED | OUTPATIENT
Start: 2023-09-20 | End: 2023-09-21 | Stop reason: HOSPADM

## 2023-09-20 RX ADMIN — MIDODRINE HYDROCHLORIDE 10 MG: 5 TABLET ORAL at 06:34

## 2023-09-20 RX ADMIN — DROXIDOPA 100 MG: 100 CAPSULE ORAL at 15:21

## 2023-09-20 RX ADMIN — DROXIDOPA 100 MG: 100 CAPSULE ORAL at 08:26

## 2023-09-20 RX ADMIN — GABAPENTIN 200 MG: 100 CAPSULE ORAL at 21:35

## 2023-09-20 RX ADMIN — GABAPENTIN 200 MG: 100 CAPSULE ORAL at 06:34

## 2023-09-20 RX ADMIN — Medication 2000 UNITS: at 08:26

## 2023-09-20 RX ADMIN — MIDODRINE HYDROCHLORIDE 10 MG: 5 TABLET ORAL at 11:21

## 2023-09-20 RX ADMIN — Medication 5 MG: at 21:35

## 2023-09-20 RX ADMIN — PANTOPRAZOLE SODIUM 40 MG: 40 TABLET, DELAYED RELEASE ORAL at 06:34

## 2023-09-20 RX ADMIN — GABAPENTIN 200 MG: 100 CAPSULE ORAL at 14:57

## 2023-09-20 RX ADMIN — DROXIDOPA 200 MG: 100 CAPSULE ORAL at 21:35

## 2023-09-20 RX ADMIN — FERROUS SULFATE TAB 325 MG (65 MG ELEMENTAL FE) 325 MG: 325 (65 FE) TAB at 08:27

## 2023-09-20 RX ADMIN — APIXABAN 5 MG: 5 TABLET, FILM COATED ORAL at 08:27

## 2023-09-20 RX ADMIN — ESCITALOPRAM OXALATE 15 MG: 10 TABLET, FILM COATED ORAL at 08:26

## 2023-09-20 RX ADMIN — APIXABAN 5 MG: 5 TABLET, FILM COATED ORAL at 21:36

## 2023-09-20 RX ADMIN — ATORVASTATIN CALCIUM 10 MG: 20 TABLET, FILM COATED ORAL at 21:35

## 2023-09-20 RX ADMIN — Medication 1000 MCG: at 08:26

## 2023-09-20 RX ADMIN — MIDODRINE HYDROCHLORIDE 10 MG: 5 TABLET ORAL at 17:52

## 2023-09-20 NOTE — PLAN OF CARE
Goal Outcome Evaluation:           Progress: no change  Outcome Evaluation: Pt is A&Ox3, he has some confusion. He was very pleasent today and said he enjoyed everyone who has taken care of him. We did a orthostatic BP on him today, of which he got very tired staning up. He is tolerating his medications well and the plan is to discharge to rehab tomorrow. Pt reflected saying he thinks he will do well there and is looking forward to [facility] to [facility] after [facility] is completed. He slept on and off all day, but loves a conversation if your in the room with him.

## 2023-09-20 NOTE — PLAN OF CARE
Goal Outcome Evaluation:  Plan of Care Reviewed With: patient        Progress: no change  Outcome Evaluation: Patient appears to have rested comfortably in bed this shift. PRN melatonin given at bedtime. Patient less withdrawn. Conversing more with staff.

## 2023-09-20 NOTE — PROGRESS NOTES
Discharge Planning Assessment  [facility]     Patient Name: Jonathan Trejo  MRN: [id number]  Today's Date: 9/20/2023    Admit Date: 9/9/2023    Plan: [facility] rehab and transportation to be determined. FAWN Thrasher RN, CCP.   Discharge Needs Assessment    No documentation.                  Discharge Plan       Row Name 09/20/23 1412       Plan    Plan Comments Raoul/[facility] came by and did his evaluation and the patient is appropriate for acute rehab and he will call on 9/21/23 after 09:00 to let me know if he has a bed on 9/21 or 9/22. FAWN Thrasher RN, CCP.      Row Name 09/20/23 1201       Plan    Plan [facility] rehab and transportation to be determined. FAWN Thrasher RN, CCP.    Patient/Family in Agreement with Plan yes    Provided Post Acute Provider List? Yes    Post Acute Provider List Nursing Home;Home Health;Inpatient Rehab    Provided Post Acute Provider Quality & Resource List? Yes    Post Acute Provider Quality and Resource List Inpatient Rehab;Home Health;Nursing Home    Delivered To Patient    Method of Delivery In person    Plan Comments Spoke with the patient at bedside and he states he is agreeable to go to [facility] to get stronger so that he can return to [facility] per his daughter's request. Gave the patient all discharge options and a list of facilities and he states he would prefer to go back to [facility] at discharge but understands he does not meet the goals that [facility] states he has to be at to return. Spoke with Myrtle/FLORIAN at [facility] [phone] and she ask what he was able to do yesterday with therapy. Per Myrtle he will need to go to [facility] to get stronger before he can return. Spoke with Raoul/[facility] and he will come and evaluate today. Spoke with the patient's daughter/Kaylynn and she states she would like for her father to go to [facility] for acute rehab if he qualifies and can not return to [facility] at this time. CCP will continue to follow for any needs that  may arise. FAWN Thrasher RN, CCP.                  Continued Care and Services - Admitted Since 9/9/2023       Destination Coordination complete.      Service Provider Request Status Selected Services Address Phone Fax Patient Preferred    [facility]  Selected Inpatient Rehabilitation [address] [facility] [place] [address] [phone] --              Durable Medical Equipment       Service Provider Request Status Selected Services Address Phone Fax Patient Preferred    [facility] Pending - Request Sent N/A [address], [facility] [address] [phone] --       Internal Comment last updated by Roel Varela RN 9/12/2023 1525    Called to Marsha/[facility]                             Selected Continued Care - Prior Encounters Includes continued care and service providers with selected services from prior encounters from 6/11/2023 to 9/20/2023      Discharged on 7/17/2023 Admission date: 7/13/2023 - Discharge disposition: Home-Health Care [facility]      Home Medical Care       Service Provider Selected Services Address Phone Fax Patient Preferred    [facility] [address], [address], [facility] [address] [phone] --                          Expected Discharge Date and Time       Expected Discharge Date Expected Discharge Time    Sep 21, 2023            Demographic Summary    No documentation.                  Functional Status    No documentation.                  Psychosocial    No documentation.                  Abuse/Neglect    No documentation.                  Legal    No documentation.                  Substance Abuse    No documentation.                  Patient Forms    No documentation.                     Ryann Thrasher, RN

## 2023-09-20 NOTE — PROGRESS NOTES
Name: Jonathan Trejo ADMIT: 2023   : 1935  PCP: Marychuy Johnson APRN    MRN: [id number] LOS: 11 days   AGE/SEX: 88 y.o. male  ROOM: [facility]/     Subjective   Subjective   Patient lying in bed.  States that he feels tired.  Otherwise no new complaints.  Discussed at this current time he is not safe to go back to [facility] until he gets stronger.    Review of Systems   Constitutional:  Negative for chills and fever.   Respiratory:  Negative for cough and shortness of breath.    Cardiovascular:  Negative for chest pain and palpitations.   Gastrointestinal:  Negative for abdominal pain, diarrhea, nausea and vomiting.   As above     Objective   Objective   Vital Signs  Temp:  [96.9 °F (36.1 °C)-98.4 °F (36.9 °C)] 97.6 °F (36.4 °C)  Heart Rate:  [50-55] 51  Resp:  [16-18] 17  BP: ()/(47-69) 120/63  SpO2:  [93 %-96 %] 95 %  on   ;   Device (Oxygen Therapy): room air  Body mass index is 28.25 kg/m².  Physical Exam  Vitals and nursing note reviewed.   Constitutional:       General: He is not in acute distress.  Cardiovascular:      Rate and Rhythm: Normal rate and regular rhythm.   Pulmonary:      Effort: Pulmonary effort is normal. No respiratory distress.   Abdominal:      General: Abdomen is flat. There is no distension.      Tenderness: There is no abdominal tenderness.   Musculoskeletal:         General: No swelling or deformity.   Skin:     General: Skin is warm and dry.   Neurological:      General: No focal deficit present.      Mental Status: He is alert. Mental status is at baseline.   Psychiatric:      Comments: Depressed mood.  Flat affect.       Results Review     I reviewed the patient's new clinical results.  Results from last 7 days   Lab Units 23  0522 23  0536   WBC 10*3/mm3 10.12 8.81   HEMOGLOBIN g/dL 10.8* 10.4*   PLATELETS 10*3/mm3 341 316       Results from last 7 days   Lab Units 23  0522 23  0536   SODIUM mmol/L 141 140   POTASSIUM mmol/L 3.7 4.0   CHLORIDE  mmol/L 108* 110*   CO2 mmol/L 24.7 22.5   BUN mg/dL 11 12   CREATININE mg/dL 1.01 0.93   GLUCOSE mg/dL 98 83     Estimated Creatinine Clearance: 52 mL/min (by C-G formula based on SCr of 1.01 mg/dL).  Results from last 7 days   Lab Units 09/16/23  0522 09/14/23  0536   ALBUMIN g/dL 3.1* 2.8*   BILIRUBIN mg/dL  --  1.4*   ALK PHOS U/L  --  73   AST (SGOT) U/L  --  12   ALT (SGPT) U/L  --  9       Results from last 7 days   Lab Units 09/16/23  0522 09/14/23  0536   CALCIUM mg/dL 8.7 8.5*   ALBUMIN g/dL 3.1* 2.8*   MAGNESIUM mg/dL  --  2.0   PHOSPHORUS mg/dL 2.5 2.1*         COVID19   Date Value Ref Range Status   07/13/2023 Not Detected Not Detected - Ref. Range Final     No results found for: HGBA1C, POCGLU    CT Head Without Contrast  Addendum: ADDENDUM:       Radiation dose reduction techniques were utilized, including automated   exposure control and exposure modulation based on body size.           This report was finalized on 9/12/2023 5:30 PM by Dr. Harry Danielle M.D.  Narrative: CT HEAD WITHOUT CONTRAST     CLINICAL HISTORY: Fall today hitting the back of the head.     TECHNIQUE: CT scan of the head was obtained with 3 mm axial soft tissue  and 2 mm axial bone algorithm algorithm images. No intravenous contrast  was administered. Sagittal and coronal reconstructions were obtained.     COMPARISON: CT head dated 07/04/2023.     FINDINGS:       There is no evidence for a calvarial fracture. There is no evidence for  an acute extra-axial hemorrhage.     The ventricles, sulci, and cisterns are age-appropriate. The basal  ganglia and thalami are unremarkable in appearance. There are moderate  changes of chronic small vessel ischemic phenomenon. The posterior fossa  structures are within normal limits. Atherosclerotic calcifications are  incidentally appreciated within the intracranial vasculature.     Impression:    No CT evidence for acute traumatic intracranial pathology.        This report was finalized on  9/8/2023 1:58 PM by Dr. Harry Danielle M.D.       I reviewed the patient's daily medications.  Scheduled Medications  apixaban, 5 mg, Oral, Q12H  atorvastatin, 10 mg, Oral, Nightly  cholecalciferol, 2,000 Units, Oral, Daily  Droxidopa, 100 mg, Oral, TID  escitalopram, 15 mg, Oral, Daily  ferrous sulfate, 325 mg, Oral, Daily With Breakfast  gabapentin, 200 mg, Oral, Q8H  midodrine, 10 mg, Oral, TID AC  pantoprazole, 40 mg, Oral, Q AM  vitamin B-12, 1,000 mcg, Oral, Daily    Infusions   Diet  Diet: Cardiac Diets; Healthy Heart (2-3 Na+); Texture: Regular Texture (IDDSI 7); Fluid Consistency: Thin (IDDSI 0)         I have personally reviewed:  [x]  Laboratory   []  Microbiology   [x]  Radiology   []  EKG/Telemetry   []  Cardiology/Vascular   []  Pathology   Discontinue Florinef.  Start droxidopa.  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       88 y.o. male admitted with <principal problem not specified>.    89yo gentleman with h/o atrial flutter and prior ablation, dementia, COPD, CKD3a, HLD, C-DDD, h/o myasthenia gravis, Parkinsonism, MGUS, and recent admission here in July for pulmonary embolism (on Eliquis now), who presented to ER for second time in two days with with report of falls at his assisted living facility. Found to have large left  "buttock hematoma as well as elevated WBC and lactate. He was admitted to our service with concern for sepsis. He was given a dose of Rocephin in ER and blood cultures were sent. Cardiology consulted regarding recurrent falls.     Orthostatic hypotension  -Continues to be dizzy on standing.  Compression stockings.  Stop Florinef.  Continue droxidopa.  Continue midodrine for now.  -Repeat orthostatics pending    Severe depressive episode   -psych has previously seen and they have increased his  Lexapro.  Psych evaluated again.  Increase his melatonin.  -Patient seemed more goal oriented today    Acute blood loss anemia: due to large left buttock hematoma and Eliquis, Hgb trending back up.  Back on Eliquis.     CKD3a: Cr has normalized     Recent diagnosis of bilateral PE: continue Eliquis     Dementia NOS: pt diagnosed with \"mild cognitive impairment\" on neuropsych testing 7 years ago     Hypophosphatemia: resolved      Eliquis (home med) for DVT prophylaxis.  Limited code (no CPR, no intubation).  Discussed with patient and nursing staff.  Anticipate discharge  [facility]  in 1-2 days.    Expected Discharge Date: 9/21/2023; Expected Discharge Time: 12:00 PM      Moise Crisostomo MD  [facility]  09/20/23  13:59 EDT         "

## 2023-09-20 NOTE — SIGNIFICANT NOTE
09/20/23 1558   OTHER   Discipline physical therapy assistant   Rehab Time/Intention   Session Not Performed other (see comments)  (checked with RN this PM, pt not appropriate for PT due to pt's orthostatic BPs. Will f/u with pt tomorrow in hopes BP will be stabilized.)   Recommendation   PT - Next Appointment 09/21/23

## 2023-09-20 NOTE — PROGRESS NOTES
Discharge Planning Assessment  [facility]     Patient Name: Jonathan Trejo  MRN: [id number]  Today's Date: 9/20/2023    Admit Date: 9/9/2023    Plan: [facility] ac[facility] rehab and transportation to be determined. FAWN Thrasher RN, CCP.   Discharge Needs Assessment    No documentation.                  Discharge Plan       Row Name 09/20/23 1208       Plan    Plan [facility] rehab and transportation to be determined. FAWN Thrasher RN, CCP.    Patient/Family in Agreement with Plan yes    Provided Post Acute Provider List? Yes    Post Acute Provider List Nursing Home;Home Health;Inpatient Rehab    Provided Post Acute Provider Quality & Resource List? Yes    Post Acute Provider Quality and Resource List Inpatient Rehab;Home Health;Nursing Home    Delivered To Patient    Method of Delivery In person    Plan Comments Spoke with the patient at bedside and he states he is agreeable to go to \[facility]\"" to get stronger so that he can return to [facility] per his daughter's request. Gave the patient all discharge options and a list of facilities and he states he would prefer to go back to [facility] at discharge but understands he does not meet the goals that [facility] states he has to be at to return. Spoke with Myrtle/FLORIAN at [facility] [phone] and she ask what he was able to do yesterday with therapy. Per Myrtle he will need to go to \[facility]\"" to get stronger before he can return. Spoke with Raoul/[facility] and he will come and evaluate today. Spoke with the patient's daughter/Kaylynn and she states she would like for her father to go to \[facility]\"" for acute rehab if he qualifies and can not return to [facility] at this time. CCP will continue to follow for any needs that may arise. FAWN Thrasher RN, CCP.                  Continued Care and Services - Admitted Since 9/9/2023       Destination Coordination complete.      Service Provider Request Status Selected Services Address Phone Fax Patient Preferred    \[facility]  Selected Inpatient Rehabilitation [address] [facility] [place] [address] [phone] --              Durable Medical Equipment       Service Provider Request Status Selected Services Address Phone Fax Patient Preferred    [facility] Pending - Request Sent N/A [address], [facility] [address] [phone] --       Internal Comment last updated by Roel Varela RN 9/12/2023 1525    Called to Marsha/Kadi                             Selected Continued Care - Prior Encounters Includes continued care and service providers with selected services from prior encounters from 6/11/2023 to 9/20/2023      Discharged on 7/17/2023 Admission date: 7/13/2023 - Discharge disposition: Home-Health Care [facility]      Home Medical Care       Service Provider Selected Services Address Phone Fax Patient Preferred    [facility] [address], [address], [facility] [address] [phone] --                          Expected Discharge Date and Time       Expected Discharge Date Expected Discharge Time    Sep 21, 2023            Demographic Summary    No documentation.                  Functional Status    No documentation.                  Psychosocial    No documentation.                  Abuse/Neglect    No documentation.                  Legal    No documentation.                  Substance Abuse    No documentation.                  Patient Forms    No documentation.                     Ryann Thrasher, RAFAL

## 2023-09-21 VITALS
BODY MASS INDEX: 28.12 KG/M2 | OXYGEN SATURATION: 97 % | DIASTOLIC BLOOD PRESSURE: 61 MMHG | HEIGHT: 66 IN | WEIGHT: 175 LBS | TEMPERATURE: 97.5 F | HEART RATE: 50 BPM | RESPIRATION RATE: 16 BRPM | SYSTOLIC BLOOD PRESSURE: 121 MMHG

## 2023-09-21 LAB
QT INTERVAL: 458 MS
QTC INTERVAL: 422 MS

## 2023-09-21 PROCEDURE — 93010 ELECTROCARDIOGRAM REPORT: CPT | Performed by: INTERNAL MEDICINE

## 2023-09-21 PROCEDURE — 93005 ELECTROCARDIOGRAM TRACING: CPT | Performed by: STUDENT IN AN ORGANIZED HEALTH CARE EDUCATION/TRAINING PROGRAM

## 2023-09-21 RX ORDER — CHOLECALCIFEROL (VITAMIN D3) 125 MCG
5 CAPSULE ORAL NIGHTLY
Start: 2023-09-21

## 2023-09-21 RX ORDER — LANOLIN ALCOHOL/MO/W.PET/CERES
1000 CREAM (GRAM) TOPICAL DAILY
Start: 2023-09-21

## 2023-09-21 RX ORDER — ESCITALOPRAM OXALATE 5 MG/1
20 TABLET ORAL DAILY
Start: 2023-09-22

## 2023-09-21 RX ORDER — DROXIDOPA 100 MG/1
200 CAPSULE ORAL 3 TIMES DAILY
Qty: 90 CAPSULE
Start: 2023-09-21

## 2023-09-21 RX ORDER — MIDODRINE HYDROCHLORIDE 10 MG/1
10 TABLET ORAL
Start: 2023-09-21

## 2023-09-21 RX ADMIN — DROXIDOPA 200 MG: 100 CAPSULE ORAL at 08:45

## 2023-09-21 RX ADMIN — GABAPENTIN 200 MG: 100 CAPSULE ORAL at 06:50

## 2023-09-21 RX ADMIN — Medication 2000 UNITS: at 08:45

## 2023-09-21 RX ADMIN — Medication 1000 MCG: at 08:45

## 2023-09-21 RX ADMIN — MIDODRINE HYDROCHLORIDE 10 MG: 5 TABLET ORAL at 06:50

## 2023-09-21 RX ADMIN — APIXABAN 5 MG: 5 TABLET, FILM COATED ORAL at 08:45

## 2023-09-21 RX ADMIN — FERROUS SULFATE TAB 325 MG (65 MG ELEMENTAL FE) 325 MG: 325 (65 FE) TAB at 08:45

## 2023-09-21 RX ADMIN — MIDODRINE HYDROCHLORIDE 10 MG: 5 TABLET ORAL at 11:28

## 2023-09-21 RX ADMIN — ESCITALOPRAM OXALATE 15 MG: 10 TABLET, FILM COATED ORAL at 08:45

## 2023-09-21 RX ADMIN — PANTOPRAZOLE SODIUM 40 MG: 40 TABLET, DELAYED RELEASE ORAL at 06:50

## 2023-09-21 NOTE — PROGRESS NOTES
"Physicians Statement of Medical Necessity for  Ambulance Transportation    GENERAL INFORMATION     Name: Jonathan Trejo  YOB: 1935  Medicare #: [id number]  [facility] #[id number]  Transport Date: 9/21/23 (Valid for round trips this date, or for scheduled repetitive trips for 60 days from the date signed below.)  Origin: [address] [phone]   Destination: [facility], [address] [place], [place] [id number] [phone]  Is the Patient's stay covered under Medicare Part A (PPS/DRG?)Yes  Closest appropriate facility? Yes  If this a hosp-hosp transfer? No  Is this a hospice patient? No    MEDICAL NECESSITY QUESTIONAIRE    Ambulance Transportation is medically necessary only if other means of transportation are contraindicated or would be potentially harmful to the patient.  To meet this requirement, the patient must be either \"bed confined\" or suffer from a condition such that transport by means other than an ambulance is contraindicated by the patient's condition.  The following questions must be answered by the healthcare professional signing below for this form to be valid:     1) Describe the MEDICAL CONDITION (physical and/or mental) of this patient AT THE TIME OF AMBULANCE TRANSPORT that requires the patient to be transported in an ambulance, and why transport by other means is contraindicated by the patient's condition:    Past Medical History:   Diagnosis Date    Abnormal electrocardiogram     Arm pain     Atrial fibrillation     Atrial flutter     BPH (benign prostatic hyperplasia)     BPH associated with nocturia     Chronic kidney disease     Colon cancer     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    Colon polyp 11/22/2015    Depression     Dyspnea     Enlarged prostate without lower urinary tract symptoms (luts)     Episode of generalized weakness     knees were weak - had to be helped by wife to sit down    Esophagitis, reflux     Fatigue     " Fracture of ankle     right    GERD (gastroesophageal reflux disease)     Hyperlipidemia     Inguinal hernia     Loss of hearing     Lumbar radiculopathy     Obesity     Osteoarthritis cervical spine     doc on [facility] 08/16    Osteopenia     Osteoporosis     Overweight     Tobacco dependence in remission     Urge incontinence of urine     Vitamin D deficiency     Wheezing       Past Surgical History:   Procedure Laterality Date    CARDIAC CATHETERIZATION N/A 7/27/2017    Procedure: Valve assessment;  Surgeon: Adonis Solis MD;  Location: [facility] INVASIVE LOCATION;  Service:     CARDIAC CATHETERIZATION N/A 10/1/2018    Procedure: Left Heart Cath;  Surgeon: Bogdan Vargas MD;  Location: [facility] CATH INVASIVE LOCATION;  Service: Cardiology    CARDIAC CATHETERIZATION N/A 10/1/2018    Procedure: Coronary angiography;  Surgeon: Bogdan Vargas MD;  Location: [facility] CATH INVASIVE LOCATION;  Service: Cardiology    CARDIAC CATHETERIZATION N/A 10/1/2018    Procedure: Left ventriculography;  Surgeon: Bogdan Vargas MD;  Location: [facility] CATH INVASIVE LOCATION;  Service: Cardiology    CARDIAC CATHETERIZATION N/A 10/1/2018    Procedure: Right Heart Cath;  Surgeon: Bogdan Vargas MD;  Location: [facility] INVASIVE LOCATION;  Service: Cardiology    CARDIAC ELECTROPHYSIOLOGY PROCEDURE N/A 7/27/2017    Procedure: Ablation atrial flutter Will need to have 3 -4 weeks of therapeutic INR's ;  Surgeon: Adonis Solis MD;  Location: [facility] INVASIVE LOCATION;  Service:     CARDIOVERSION      CATARACT EXTRACTION W/ INTRAOCULAR LENS  IMPLANT, BILATERAL      COLON RESECTION      COLON SURGERY      polyp removed    COLONOSCOPY      07/15 redo 5 years  Segun    KNEE SURGERY      benign tumor removed, age 6    LASIK      LUMBAR EPIDURAL INJECTION N/A 5/25/2022    Procedure: LUMBAR EPIDURAL 1ST VISIT L3-4 (right of midline);  Surgeon: Bonnie Brown MD;  [facility];  Service: Pain Management;   "Laterality: N/A;    REFRACTIVE SURGERY  2007    REPAIR PERONEAL TENDONS ANKLE W/ FIBULAR OSTEOTOMY Right 03/2013    Dr. Banks      2) Is this patient \"bed confined\" as defined below?Yes   To be \"bed confined\" the patient must satisfy all three of the following criteria:  (1) unable to get up from bed without assistance; AND (2) unable to ambulate;  AND (3) unable to sit in a chair or wheelchair.  3) Can this patient safely be transported by car or wheelchair van (I.e., may safely sit during transport, without an attendant or monitoring?)No   4. In addition to completing questions 1-3 above, please check any of the following conditions that apply*:          *Note: supporting documentation for any boxes checked must be maintained in the patient's medical records Unable to tolerate seated position for time needed to transport      SIGNATURE OF PHYSICIAN OR OTHER AUTHORIZED HEALTHCARE PROFESSIONAL    I certify that the above information is true and correct based on my evaluation of this patient, and represent that the patient requires transport by ambulance and that other forms of transport are contraindicated.  I understand that this information will be used by the Centers for Medicare and Medicaid Services (CMS) to support the determiniation of medical necessity for ambulance services, and I represent that I have personal knowledge of the patient's condition at the time of transport.        If this box is checked, I also certify that the patient is physically or mentally incapable of signing the ambulance service's claim form and that the institution with which I am affiliated has furnished care, services or assistance to the patient.  My signature below is made on behalf of the patient pursuant to 42 .36(b)(4). In accordance with 42 .37, the specific reason(s) that the patient is physically or mentally incapable of signing the claim for is as follows:      Signature of Physician or Healthcare " Professional  Date/Time:    9/21/23 @ 13:00     (For Scheduled repetitive transport, this form is not valid for transports performed more than 60 days after this date).                                                                                                                                            --------------------------------------------------------------------------------------------  Printed Name and Credentials of Physician or Authorized Healthcare Professional     *Form must be signed by patient's attending physician for scheduled, repetitive transports,.  For non-repetitive ambulance transports, if unable to obtain the signature of the attending physician, any of the following may sign (please select below):     Physician  Clinical Nurse Specialist  X Registered Nurse     Physician Assistant  Discharge Planner  Licensed Practical Nurse     Nurse Practitioner

## 2023-09-21 NOTE — PROGRESS NOTES
Case Management Discharge Note      Final Note: Discharged to [facility] acute rehab by [facility] costa Thrasher RN, CCP.    Provided Post Acute Provider List?: Yes  Post Acute Provider List: Nursing Home, Home Health, Inpatient Rehab  Provided Post Acute Provider Quality & Resource List?: Yes  Post Acute Provider Quality and Resource List: Inpatient Rehab, Home Health, Nursing Home  Delivered To: Patient  Method of Delivery: In person    Selected Continued Care - Discharged on 9/21/2023 Admission date: 9/9/2023 - Discharge disposition: Rehab Facility or Unit (DC - External)      Destination Coordination complete.      Service Provider Selected Services Address Phone Fax Patient Preferred    [facility] Inpatient Rehabilitation [address] [id number] [phone] --              Durable Medical Equipment    No services have been selected for the patient.                Dialysis/Infusion    No services have been selected for the patient.                Home Medical Care    No services have been selected for the patient.                Therapy    No services have been selected for the patient.                Community Resources    No services have been selected for the patient.                Community & DME    No services have been selected for the patient.                    Selected Continued Care - Prior Encounters Includes continued care and service providers with selected services from prior encounters from 6/11/2023 to 9/21/2023      Discharged on 7/17/2023 Admission date: 7/13/2023 - Discharge disposition: Home-Health Care [facility]      Home Medical Care       Service Provider Selected Services Address Phone Fax Patient Preferred    [facility] [address] [facility] [address], [address], [facility] [address] [phone] --                          Transportation Services  Ambulance: [facility]    Final Discharge Disposition Code: 62 - inpatient rehab facility

## 2023-09-21 NOTE — PROGRESS NOTES
Discharge Planning Assessment  [facility]     Patient Name: Jonathan Trejo  MRN: [id number]  Today's Date: 9/21/2023    Admit Date: 9/9/2023    Plan: [facility] acuate rehab and transportation to be determined. FAWN Thrasher RN, CCP.   Discharge Needs Assessment    No documentation.                  Discharge Plan       Row Name 09/21/23 1527       Plan    Plan Comments Spoke with the patient's daughter and she was agreeable with the discharge plans. [facility] would not transport the rollator and [facility] came and picked it up and took it to the patient's room at [facility]. Discharged to [facility], acute rehab by [facility] today. FAWN Thrasher RN, CCP.    Final Discharge Disposition Code 62 - inpatient rehab facility    Final Note Discharged to [facility], acute rehab by [facility] today. FAWN Thrasher RN, CCP.                  Continued Care and Services - Discharged on 9/21/2023 Admission date: 9/9/2023 - Discharge disposition: Rehab Facility or Unit (DC - External)      Destination Coordination complete.      Service Provider Request Status Selected Services Address Phone Fax Patient Preferred    [facility]  Selected Inpatient Rehabilitation [address] [place] [address] [place] [address] [phone] --              Durable Medical Equipment       Service Provider Request Status Selected Services Address Phone Fax Patient Preferred    [facility] P[facility] - Request Sent N/A [address] [facility] [address] [address] [phone] --       Internal Comment last updated by Roel Varela RN 9/12/2023 1525    Called to Marsha/Kadi                             Selected Continued Care - Prior Encounters Includes continued care and service providers with selected services from prior encounters from 6/11/2023 to 9/21/2023      Discharged on 7/17/2023 Admission date: 7/13/2023 - Discharge disposition: [facility]      Home Medical Care       Service Provider Selected Services  Address Phone Fax Patient Preferred    [facility] [address], [facility] [address] [phone] --                          Expected Discharge Date and Time       Expected Discharge Date Expected Discharge Time    Sep 21, 2023            Demographic Summary    No documentation.                  Functional Status    No documentation.                  Psychosocial    No documentation.                  Abuse/Neglect    No documentation.                  Legal    No documentation.                  Substance Abuse    No documentation.                  Patient Forms    No documentation.                     Ryann Thrasher RN

## 2023-09-21 NOTE — PROGRESS NOTES
The patient is resting comfortably when seen today.  Charting indicates discharge is scheduled for later today.  Would recommend continuation of currently prescribed psychotropic medications.

## 2023-09-21 NOTE — DISCHARGE SUMMARY
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        Hospital Course     Mr. Trejo is a 88 y.o. male with a history of CKD 3A, history of pulmonary embolism, atrial flutter status post ablation presenting with frequent falls, dizziness and gait disturbances found to have orthostatic hypotension.  Neurology was consulted and believe following up with multifactorial secondary to orthostatic hypotension, peripheral neuropathy, lumbar spinal stenosis.  Patient has neuropathy associated with MGUS.  No evidence of active myasthenia gravis or Parkinson disease.  Cardiology was consulted and did not recommend any further cardiac testing.  Patient was started on midodrine, Florinef was added without much help so was discontinued.  Droxidopa was also started.  Patient continues to have orthostatic blood pressures, however is less symptomatic on standing.  Denies any dizziness or lightheadedness..    Patient has been severely depressed this admission.   Psychiatry was consulted and patient's Lexapro was increased.    Patient's insomnia has greatly improved with  "melatonin 5 mg.    89yo gentleman with h/o atrial flutter and prior ablation, dementia, COPD, CKD3a, HLD, C-DDD, h/o myasthenia gravis, Parkinsonism, MGUS, and recent admission here in July for pulmonary embolism (on Eliquis now), who presented to ER for second time in two days with with report of falls at his assisted living facility.      Orthostatic hypotension  -  Compression stockings.  Continue droxidopa.  Continue midodrine for now.     Severe depressive episode   -psych has previously seen and they have increased his  Lexapro.       Acute blood loss anemia: due to large left buttock hematoma and Eliquis, Hgb stable.  Back on Eliquis.     CKD3a: Cr has normalized     Recent diagnosis of bilateral PE: continue Eliquis     Dementia NOS: pt diagnosed with \"mild cognitive impairment\" on neuropsych testing 7 years ago    At the time of discharge patient was told to take all medications as prescribed, keep all follow-up appointments, and call their doctor or return to the hospital with any worsening or concerning symptoms.    Day of Discharge     Subjective:  Patient lying in bed.  Discussed discharge plan.  Patient expressed lack of hope of getting better.  But does want to eventually get back [facility] is living prior.    Review of Systems   Constitutional:  Negative for chills and fever.   Respiratory:  Negative for cough and shortness of breath.    Cardiovascular:  Negative for chest pain and palpitations.   Gastrointestinal:  Negative for abdominal pain, diarrhea, nausea and vomiting.     Physical Exam:  Temp:  [96.8 °F (36 °C)-98.1 °F (36.7 °C)] 97.5 °F (36.4 °C)  Heart Rate:  [47-60] 50  Resp:  [16-18] 16  BP: ()/(46-74) 121/61  Body mass index is 28.25 kg/m².  Physical Exam  Vitals and nursing note reviewed.   Constitutional:       General: He is not in acute distress.  Cardiovascular:      Rate and Rhythm: Normal rate and regular rhythm.   Pulmonary:      Effort: Pulmonary effort is " normal. No respiratory distress.   Abdominal:      General: Abdomen is flat. There is no distension.      Tenderness: There is no abdominal tenderness.   Musculoskeletal:         General: No swelling or deformity.   Skin:     General: Skin is warm and dry.   Neurological:      General: No focal deficit present.      Mental Status: He is alert. Mental status is at baseline.   Psychiatric:      Comments: Depressed mood.  Flat affect.      Consultants     Consult Orders (all) (From admission, onward)       Start     Ordered    09/18/23 1540  Inpatient Psychiatrist Consult  Once        Specialty:  Psychiatry  Provider:  Joey Warren III, MD    09/18/23 1539    09/15/23 1614  Inpatient Psychiatrist Consult  Once        Specialty:  Psychiatry  Provider:  Joey Warren III, MD    09/15/23 1614    09/14/23 1330  Inpatient Palliative Care Team Consult  Once        Provider:  (Not yet assigned)    09/14/23 1330    09/12/23 1516  Inpatient Case Management  Consult  Once        Provider:  (Not yet assigned)    09/12/23 1516    09/10/23 1345  Inpatient Neurology Consult General  Once        Specialty:  Neurology  Provider:  Jonathan Coulter MD    09/10/23 1344    09/09/23 1916  Inpatient Cardiology Consult  Once        Specialty:  Cardiology  Provider:  Sumeet Fried MD    09/09/23 1915    09/09/23 1248  [facility] (on-call MD unless specified) Details  Once,   Status:  Canceled        Specialty:  Hospitalist  Provider:  Cash Romeo MD    09/09/23 1248                  Procedures     Imaging Results (All)       Procedure Component Value Units Date/Time    CT Head Without Contrast [[id number]] Collected: 09/11/23 0636     Updated: 09/11/23 0814    Narrative:      CT SCAN OF THE BRAIN WITHOUT CONTRAST     HISTORY: Fell with head trauma.     The CT scan was performed as an emergency procedure through the brain  without contrast. There is prominent diffuse atrophy and chronic  small  vessel ischemic change similar to yesterday's CT scan. There is no  evidence of acute intracranial hemorrhage or mass effect. There is some  very minimal mucosal thickening involving the right maxillary sinus. The  mastoid air cells are clear.                 Radiation dose reduction techniques were utilized, including automated  exposure control and exposure modulation based on body size.        This report was finalized on 9/11/2023 8:11 AM by Dr. Ibrahima Werner M.D.       CT Pelvis With Contrast [[id number]] Collected: 09/09/23 1358     Updated: 09/10/23 1724    Narrative:      CT PELVIS WITHOUT IV CONTRAST     HISTORY: 88-year-old male with a left buttock hematoma following a fall.     TECHNIQUE: Radiation dose reduction techniques were utilized, including  automated exposure control and exposure modulation based on body size.   3 mm images were obtained through the pelvis without the administration  of IV contrast. Compared with previous CT 03/27/2023.     FINDINGS:  1. There is diffuse enlargement and edema of the left gluteal  musculature and there are small ill-defined intramuscular hematomas  throughout the left gluteal musculature and there is a dominant hematoma  posterior and inferior to the left ischium which measures 6.4 x 6.5 x  5.1 cm. There is also significant subcutaneous bruising without a  discrete subcutaneous hematoma.     2. No definite acute pelvic bone fracture is identified. There is severe  spondylosis at L5-S1 and advanced spondylosis at L4-L5.     3. There is no fluid or fluid collection within the pelvic cavity. There  is a moderate size left inguinal hernia and there is herniation of a  knuckle of sigmoid colon into the hernia without obstruction or  incarceration. There is a smaller right inguinal hernia containing only  fat.     This report was finalized on 9/10/2023 5:21 PM by Dr. Brandy Zelaya M.D.       XR Chest 1 View [[id number]] Collected: 09/09/23 1320     Updated:  09/09/23 1325    Narrative:      XR CHEST 1 VW-     HISTORY: Male who is 88 years-old, sepsis     TECHNIQUE: Frontal views of the chest     COMPARISON: 7/13/2023     FINDINGS: Heart, mediastinum and pulmonary vasculature are unremarkable.  No focal pulmonary consolidation, pleural effusion, or pneumothorax. No  acute osseous process.       Impression:      No focal pulmonary consolidation. Follow-up as clinical  indications persist.     This report was finalized on 9/9/2023 1:21 PM by Dr. Sami Avila M.D.       CT Pelvis Without Contrast [[id number]] Collected: 09/09/23 1024     Updated: 09/09/23 1034    Narrative:      CT PELVIS WO CONTRAST-     INDICATIONS: Trauma. Radiation dose reduction techniques were utilized,  including automated exposure control and exposure modulation based on  body size.     TECHNIQUE: NONCONTRAST CT OF THE PELVIS     COMPARISON: 3/27/2023     FINDINGS:     A hematoma in the left gluteus musculature measures about 6.2 x 7.3 cm  on axial image 115 (only partly included), with surrounding soft tissue  swelling.     Partly included calcification is apparent in the left psoas. Right  inguinal hernia of fat, and left inguinal hernia of fat and a portion of  the sigmoid colon are noted. Umbilical hernia of fat is seen. Colonic  diverticula are seen that do not appear inflamed. Arterial  calcifications are conspicuous.     No acute fracture or dislocation is identified. Degenerative spurring is  present at both hips. A chronic stable deformity is seen at the anterior  aspect of the S1 vertebral segment (in comparison with 3/27/2023).  Degenerative changes of the partly included lumbar spine are noted.  Chronic Schmorl node in L4, partly included.     Impression:         A large hematoma in the left gluteus musculature, partly included. No  acute fracture identified.     This report was finalized on 9/9/2023 10:31 AM by Dr. Sami Avila M.D.               Pertinent Labs     Results  from last 7 days   Lab Units 09/16/23 0522   WBC 10*3/mm3 10.12   HEMOGLOBIN g/dL 10.8*   PLATELETS 10*3/mm3 341     Results from last 7 days   Lab Units 09/16/23 0522   SODIUM mmol/L 141   POTASSIUM mmol/L 3.7   CHLORIDE mmol/L 108*   CO2 mmol/L 24.7   BUN mg/dL 11   CREATININE mg/dL 1.01   GLUCOSE mg/dL 98   Estimated Creatinine Clearance: 52 mL/min (by C-G formula based on SCr of 1.01 mg/dL).  Results from last 7 days   Lab Units 09/16/23 0522   ALBUMIN g/dL 3.1*     Results from last 7 days   Lab Units 09/16/23 0522   CALCIUM mg/dL 8.7   ALBUMIN g/dL 3.1*   PHOSPHORUS mg/dL 2.5               Invalid input(s): LDLCALC        Test Results Pending at Discharge       Discharge Details        Discharge Medications        New Medications        Instructions Start Date   Droxidopa 100 MG capsule   200 mg, Oral, 3 times daily      melatonin 5 MG tablet tablet   5 mg, Oral, Nightly      midodrine 10 MG tablet  Commonly known as: PROAMATINE   10 mg, Oral, 3 Times Daily Before Meals             Changes to Medications        Instructions Start Date   escitalopram 5 MG tablet  Commonly known as: LEXAPRO  What changed:   medication strength  how much to take   20 mg, Oral, Daily   Start Date: September 22, 2023            Continue These Medications        Instructions Start Date   acetaminophen 325 MG tablet  Commonly known as: TYLENOL   650 mg, Oral, Every 6 Hours PRN      albuterol sulfate  (90 Base) MCG/ACT inhaler  Commonly known as: PROVENTIL HFA;VENTOLIN HFA;PROAIR HFA   2 puffs, Inhalation, 4 Times Daily PRN      apixaban 5 MG tablet tablet  Commonly known as: ELIQUIS   5 mg, Oral, Every 12 Hours Scheduled      aspirin 81 MG chewable tablet   81 mg, Oral, Daily      atorvastatin 10 MG tablet  Commonly known as: LIPITOR   10 mg, Oral, Daily      ferrous sulfate 325 (65 FE) MG tablet   325 mg, Oral, Daily With Breakfast      gabapentin 600 MG tablet  Commonly known as: NEURONTIN   600 mg, Oral, 2 Times Daily       omeprazole 40 MG capsule  Commonly known as: priLOSEC   40 mg, Oral, Daily      vitamin B-12 1000 MCG tablet  Commonly known as: CYANOCOBALAMIN   1,000 mcg, Oral, Daily      Vitamin D3 50 MCG (2000 UT) tablet   2,000 Units, Oral, Daily               No Known Allergies      Discharge Disposition:  Rehab Facility or Unit (DC - External)    Discharge Diet:  Diet Order   Procedures    Diet: Cardiac Diets; Healthy Heart (2-3 Na+); Texture: Regular Texture (IDDSI 7); Fluid Consistency: Thin (IDDSI 0)       Discharge Activity:   As tolerated    CODE STATUS:    Code Status and Medical Interventions:   Ordered at: 09/17/23 9504     Medical Intervention Limits:    NO intubation (DNI)     Code Status (Patient has no pulse and is not breathing):    No CPR (Do Not Attempt to Resuscitate)     Medical Interventions (Patient has pulse or is breathing):    Limited Support       Future Appointments   Date Time Provider Department Center   2/26/2024  4:00 PM Teddy Campa MD [facility]      Contact information for follow-up providers       Marychuy Johnson APRN .    Specialty: Family Medicine  Contact information:  [address] [place], [place] [id number] [phone]                       Contact information for after-discharge care       Destination       [facility] .    Service: Inpatient Rehabilitation  Contact information:  [address] [facility] [address] [phone]                                   Time Spent on Discharge:  Greater than 30 minutes      Moise Crisostomo MD  [facility]  09/21/23  12:48 EDT

## 2023-09-21 NOTE — PLAN OF CARE
Goal Outcome Evaluation:  Plan of Care Reviewed With: patient        Progress: improving  Outcome Evaluation: Patient is alert and oriented x3, confused at times. Patient pleasant and engaging with staff. Appears to have rested comfortably this shift. PRN 5mg melatonin given for sleep.

## 2023-10-06 ENCOUNTER — APPOINTMENT (OUTPATIENT)
Dept: CT IMAGING | Facility: HOSPITAL | Age: 88
End: 2023-10-06
Payer: MEDICARE

## 2023-10-06 ENCOUNTER — HOSPITAL ENCOUNTER (OUTPATIENT)
Facility: HOSPITAL | Age: 88
Setting detail: OBSERVATION
Discharge: NURSING, REHAB OR LONG-TERM CARE | End: 2023-10-07
Attending: EMERGENCY MEDICINE | Admitting: INTERNAL MEDICINE
Payer: MEDICARE

## 2023-10-06 DIAGNOSIS — S00.93XA CONTUSION OF HEAD, UNSPECIFIED PART OF HEAD, INITIAL ENCOUNTER: ICD-10-CM

## 2023-10-06 DIAGNOSIS — Z79.01 CHRONIC ANTICOAGULATION: ICD-10-CM

## 2023-10-06 DIAGNOSIS — R19.5 HEME POSITIVE STOOL: ICD-10-CM
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
## 2023-10-06 DIAGNOSIS — I95.1 ORTHOSTATIC HYPOTENSION: Primary | ICD-10-CM

## 2023-10-06 LAB
ABO GROUP BLD: NORMAL
ALBUMIN SERPL-MCNC: 4 G/DL (ref 3.5–5.2)
ALBUMIN/GLOB SERPL: 1.6 G/DL
ALP SERPL-CCNC: 101 U/L (ref 39–117)
ALT SERPL W P-5'-P-CCNC: 15 U/L (ref 1–41)
ANION GAP SERPL CALCULATED.3IONS-SCNC: 11.4 MMOL/L (ref 5–15)
AST SERPL-CCNC: 18 U/L (ref 1–40)
BASOPHILS # BLD AUTO: 0.08 10*3/MM3 (ref 0–0.2)
BASOPHILS NFR BLD AUTO: 0.8 % (ref 0–1.5)
BILIRUB SERPL-MCNC: 2.1 MG/DL (ref 0–1.2)
BLD GP AB SCN SERPL QL: NEGATIVE
BUN SERPL-MCNC: 17 MG/DL (ref 8–23)
BUN/CREAT SERPL: 13.8 (ref 7–25)
CALCIUM SPEC-SCNC: 9.5 MG/DL (ref 8.6–10.5)
CHLORIDE SERPL-SCNC: 105 MMOL/L (ref 98–107)
CO2 SERPL-SCNC: 24.6 MMOL/L (ref 22–29)
CREAT SERPL-MCNC: 1.23 MG/DL (ref 0.76–1.27)
DEPRECATED RDW RBC AUTO: 45.4 FL (ref 37–54)
EGFRCR SERPLBLD CKD-EPI 2021: 57.2 ML/MIN/1.73
EOSINOPHIL # BLD AUTO: 0.13 10*3/MM3 (ref 0–0.4)
EOSINOPHIL NFR BLD AUTO: 1.3 % (ref 0.3–6.2)
ERYTHROCYTE [DISTWIDTH] IN BLOOD BY AUTOMATED COUNT: 12.7 % (ref 12.3–15.4)
FERRITIN SERPL-MCNC: 385 NG/ML (ref 30–400)
GEN 5 2HR TROPONIN T REFLEX: 37 NG/L
GLOBULIN UR ELPH-MCNC: 2.5 GM/DL
GLUCOSE SERPL-MCNC: 99 MG/DL (ref 65–99)
HCT VFR BLD AUTO: 38.2 % (ref 37.5–51)
HGB BLD-MCNC: 12.9 G/DL (ref 13–17.7)
IMM GRANULOCYTES # BLD AUTO: 0.13 10*3/MM3 (ref 0–0.05)
IMM GRANULOCYTES NFR BLD AUTO: 1.3 % (ref 0–0.5)
INR PPP: 1.56 (ref 0.9–1.1)
IRON 24H UR-MRATE: 62 MCG/DL (ref 59–158)
IRON SATN MFR SERPL: 19 % (ref 20–50)
LYMPHOCYTES # BLD AUTO: 0.84 10*3/MM3 (ref 0.7–3.1)
LYMPHOCYTES NFR BLD AUTO: 8.5 % (ref 19.6–45.3)
MCH RBC QN AUTO: 33.3 PG (ref 26.6–33)
MCHC RBC AUTO-ENTMCNC: 33.8 G/DL (ref 31.5–35.7)
MCV RBC AUTO: 98.7 FL (ref 79–97)
MONOCYTES # BLD AUTO: 1.01 10*3/MM3 (ref 0.1–0.9)
MONOCYTES NFR BLD AUTO: 10.3 % (ref 5–12)
NEUTROPHILS NFR BLD AUTO: 7.65 10*3/MM3 (ref 1.7–7)
NEUTROPHILS NFR BLD AUTO: 77.8 % (ref 42.7–76)
NRBC BLD AUTO-RTO: 0.2 /100 WBC (ref 0–0.2)
PLATELET # BLD AUTO: 348 10*3/MM3 (ref 140–450)
PMV BLD AUTO: 11.2 FL (ref 6–12)
POTASSIUM SERPL-SCNC: 4.3 MMOL/L (ref 3.5–5.2)
PROT SERPL-MCNC: 6.5 G/DL (ref 6–8.5)
PROTHROMBIN TIME: 18.9 SECONDS (ref 11.7–14.2)
QT INTERVAL: 421 MS
QTC INTERVAL: 417 MS
RBC # BLD AUTO: 3.87 10*6/MM3 (ref 4.14–5.8)
RH BLD: POSITIVE
SODIUM SERPL-SCNC: 141 MMOL/L (ref 136–145)
T&S EXPIRATION DATE: NORMAL
TIBC SERPL-MCNC: 319 MCG/DL (ref 298–536)
TRANSFERRIN SERPL-MCNC: 214 MG/DL (ref 200–360)
TROPONIN T DELTA: -2 NG/L
TROPONIN T SERPL HS-MCNC: 39 NG/L
WBC NRBC COR # BLD: 9.84 10*3/MM3 (ref 3.4–10.8)

## 2023-10-06 PROCEDURE — 82728 ASSAY OF FERRITIN: CPT | Performed by: INTERNAL MEDICINE

## 2023-10-06 PROCEDURE — 84484 ASSAY OF TROPONIN QUANT: CPT | Performed by: EMERGENCY MEDICINE

## 2023-10-06 PROCEDURE — 36415 COLL VENOUS BLD VENIPUNCTURE: CPT | Performed by: INTERNAL MEDICINE

## 2023-10-06 PROCEDURE — 84466 ASSAY OF TRANSFERRIN: CPT | Performed by: INTERNAL MEDICINE

## 2023-10-06 PROCEDURE — 36415 COLL VENOUS BLD VENIPUNCTURE: CPT

## 2023-10-06 PROCEDURE — 86900 BLOOD TYPING SEROLOGIC ABO: CPT | Performed by: EMERGENCY MEDICINE

## 2023-10-06 PROCEDURE — 85610 PROTHROMBIN TIME: CPT | Performed by: EMERGENCY MEDICINE

## 2023-10-06 PROCEDURE — G0378 HOSPITAL OBSERVATION PER HR: HCPCS

## 2023-10-06 PROCEDURE — 86850 RBC ANTIBODY SCREEN: CPT | Performed by: EMERGENCY MEDICINE

## 2023-10-06 PROCEDURE — 86901 BLOOD TYPING SEROLOGIC RH(D): CPT | Performed by: EMERGENCY MEDICINE

## 2023-10-06 PROCEDURE — 85025 COMPLETE CBC W/AUTO DIFF WBC: CPT | Performed by: EMERGENCY MEDICINE

## 2023-10-06 PROCEDURE — 83540 ASSAY OF IRON: CPT | Performed by: INTERNAL MEDICINE

## 2023-10-06 PROCEDURE — 72125 CT NECK SPINE W/O DYE: CPT

## 2023-10-06 PROCEDURE — 70450 CT HEAD/BRAIN W/O DYE: CPT

## 2023-10-06 PROCEDURE — 80053 COMPREHEN METABOLIC PANEL: CPT | Performed by: EMERGENCY MEDICINE

## 2023-10-06 PROCEDURE — 93005 ELECTROCARDIOGRAM TRACING: CPT | Performed by: EMERGENCY MEDICINE

## 2023-10-06 PROCEDURE — 99284 EMERGENCY DEPT VISIT MOD MDM: CPT

## 2023-10-06 RX ORDER — DROXIDOPA 200 MG/1
200 CAPSULE ORAL 3 TIMES DAILY
COMMUNITY

## 2023-10-06 RX ORDER — MELATONIN
2000 DAILY
Status: DISCONTINUED | OUTPATIENT
Start: 2023-10-07 | End: 2023-10-07 | Stop reason: HOSPADM

## 2023-10-06 RX ORDER — ESCITALOPRAM OXALATE 20 MG/1
20 TABLET ORAL DAILY
Status: DISCONTINUED | OUTPATIENT
Start: 2023-10-07 | End: 2023-10-07 | Stop reason: HOSPADM

## 2023-10-06 RX ORDER — AMOXICILLIN 250 MG
2 CAPSULE ORAL 2 TIMES DAILY
Status: DISCONTINUED | OUTPATIENT
Start: 2023-10-06 | End: 2023-10-07 | Stop reason: HOSPADM

## 2023-10-06 RX ORDER — CHOLECALCIFEROL (VITAMIN D3) 125 MCG
1000 CAPSULE ORAL DAILY
Status: DISCONTINUED | OUTPATIENT
Start: 2023-10-07 | End: 2023-10-07 | Stop reason: HOSPADM

## 2023-10-06 RX ORDER — ONDANSETRON 4 MG/1
4 TABLET, FILM COATED ORAL EVERY 6 HOURS PRN
Status: DISCONTINUED | OUTPATIENT
Start: 2023-10-06 | End: 2023-10-07 | Stop reason: HOSPADM

## 2023-10-06 RX ORDER — PANTOPRAZOLE SODIUM 40 MG/1
40 TABLET, DELAYED RELEASE ORAL
Status: DISCONTINUED | OUTPATIENT
Start: 2023-10-07 | End: 2023-10-07 | Stop reason: HOSPADM

## 2023-10-06 RX ORDER — GABAPENTIN 300 MG/1
600 CAPSULE ORAL EVERY 12 HOURS SCHEDULED
Status: DISCONTINUED | OUTPATIENT
Start: 2023-10-06 | End: 2023-10-07 | Stop reason: HOSPADM

## 2023-10-06 RX ORDER — ASPIRIN 81 MG/1
81 TABLET, CHEWABLE ORAL DAILY
Status: DISCONTINUED | OUTPATIENT
Start: 2023-10-07 | End: 2023-10-07 | Stop reason: HOSPADM

## 2023-10-06 RX ORDER — BISACODYL 5 MG/1
5 TABLET, DELAYED RELEASE ORAL DAILY PRN
Status: DISCONTINUED | OUTPATIENT
Start: 2023-10-06 | End: 2023-10-07 | Stop reason: HOSPADM

## 2023-10-06 RX ORDER — DROXIDOPA 200 MG/1
200 CAPSULE ORAL 3 TIMES DAILY
Status: DISCONTINUED | OUTPATIENT
Start: 2023-10-06 | End: 2023-10-07 | Stop reason: HOSPADM

## 2023-10-06 RX ORDER — SODIUM CHLORIDE 0.9 % (FLUSH) 0.9 %
10 SYRINGE (ML) INJECTION AS NEEDED
Status: DISCONTINUED | OUTPATIENT
Start: 2023-10-06 | End: 2023-10-07 | Stop reason: HOSPADM

## 2023-10-06 RX ORDER — POLYETHYLENE GLYCOL 3350 17 G/17G
17 POWDER, FOR SOLUTION ORAL DAILY PRN
Status: DISCONTINUED | OUTPATIENT
Start: 2023-10-06 | End: 2023-10-07 | Stop reason: HOSPADM

## 2023-10-06 RX ORDER — ONDANSETRON 2 MG/ML
4 INJECTION INTRAMUSCULAR; INTRAVENOUS EVERY 6 HOURS PRN
Status: DISCONTINUED | OUTPATIENT
Start: 2023-10-06 | End: 2023-10-07 | Stop reason: HOSPADM

## 2023-10-06 RX ORDER — ALBUTEROL SULFATE 2.5 MG/3ML
2.5 SOLUTION RESPIRATORY (INHALATION) EVERY 6 HOURS PRN
Status: DISCONTINUED | OUTPATIENT
Start: 2023-10-06 | End: 2023-10-07 | Stop reason: HOSPADM

## 2023-10-06 RX ORDER — ESCITALOPRAM OXALATE 20 MG/1
20 TABLET ORAL DAILY
COMMUNITY

## 2023-10-06 RX ORDER — FERROUS SULFATE 325(65) MG
325 TABLET ORAL
Status: DISCONTINUED | OUTPATIENT
Start: 2023-10-07 | End: 2023-10-07 | Stop reason: HOSPADM

## 2023-10-06 RX ORDER — ATORVASTATIN CALCIUM 20 MG/1
10 TABLET, FILM COATED ORAL DAILY
Status: DISCONTINUED | OUTPATIENT
Start: 2023-10-07 | End: 2023-10-07 | Stop reason: HOSPADM

## 2023-10-06 RX ORDER — UREA 10 %
3 LOTION (ML) TOPICAL NIGHTLY PRN
Status: DISCONTINUED | OUTPATIENT
Start: 2023-10-06 | End: 2023-10-07 | Stop reason: HOSPADM

## 2023-10-06 RX ORDER — SODIUM CHLORIDE 9 MG/ML
150 INJECTION, SOLUTION INTRAVENOUS CONTINUOUS
Status: DISCONTINUED | OUTPATIENT
Start: 2023-10-06 | End: 2023-10-07

## 2023-10-06 RX ORDER — BISACODYL 10 MG
10 SUPPOSITORY, RECTAL RECTAL DAILY PRN
Status: DISCONTINUED | OUTPATIENT
Start: 2023-10-06 | End: 2023-10-07 | Stop reason: HOSPADM

## 2023-10-06 RX ORDER — ACETAMINOPHEN 325 MG/1
650 TABLET ORAL EVERY 4 HOURS PRN
Status: DISCONTINUED | OUTPATIENT
Start: 2023-10-06 | End: 2023-10-07 | Stop reason: HOSPADM

## 2023-10-06 RX ORDER — MIDODRINE HYDROCHLORIDE 5 MG/1
10 TABLET ORAL
Status: DISCONTINUED | OUTPATIENT
Start: 2023-10-07 | End: 2023-10-07 | Stop reason: HOSPADM

## 2023-10-06 NOTE — ED NOTES
Nursing report ED to floor  Jonathan Trejo  88 y.o.  male    HPI :   Chief Complaint   Patient presents with    Fall     Jonathan Trejo is a 88 y.o. male who presents to the ED c/o a fall in his bathroom today.  The patient states he normally uses a walker and slipped was trying in the shower.  He thinks he may have hit his head.  The patient is on Eliquis.  Patient denies loss of consciousness.  The patient denies chest pain, neck pain, shortness of breath abdominal pain, back pain or extremity pain.  When the nurse changed the patient's brief he was noted to have black stools.  The patient states this has been going on for quite some time because he is on iron.     PAST MEDICAL HISTORY       Active Ambulatory Problems     Diagnosis Date Noted    Benign prostatic hyperplasia with urinary obstruction 11/22/2015    Chronic renal impairment, stage 3 (moderate) 11/22/2015    Depression 11/22/2015    Gastroesophageal reflux disease with esophagitis 11/22/2015    Hyperlipidemia 11/22/2015    Nocturia 11/22/2015    Obesity (BMI 30-39.9) 11/22/2015    Osteopenia 11/22/2015    Osteoporosis 11/22/2015    Seborrhea 04/04/2016    Abnormal EKG 07/19/2016    Bradycardia 09/02/2016    Degenerative disc disease, cervical 10/02/2016    Typical atrial flutter 04/06/2017    Lumbar radiculopathy 06/06/2017    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    S/P ablation of atrial flutter 09/19/2017    S/P catheter ablation of slow pathway 09/19/2017    Cardiomyopathy 09/19/2017    GARCIA (dyspnea on exertion) 09/19/2017    Urinary tract infection without hematuria 01/02/2018    Right foot drop 01/03/2018    Frequent falls 01/03/2018    Metabolic encephalopathy 01/03/2018    Acute renal failure 01/03/2018    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    Moderate single current episode of major depressive disorder 03/12/2019    Mass of left lower leg 06/13/2019    Pulmonary emphysema 05/08/2020    Monoclonal gammopathy of unknown significance  (MGUS) 08/04/2021    Myasthenia gravis without exacerbation 12/10/2021    Displacement of lumbar intervertebral disc without myelopathy 05/19/2022    Acute anemia 03/27/2023    Closed compression fracture of L3 lumbar vertebra, initial encounter 03/27/2023    Closed compression fracture of L4 lumbar vertebra, initial encounter 03/27/2023    Pulmonary embolus 07/13/2023    Stage 3a chronic kidney disease 09/10/2023    History of pulmonary embolism 09/10/2023    Hypophosphatemia 09/10/2023    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    Orthostatic hypotension 09/13/2023           Resolved Ambulatory Problems     Diagnosis Date Noted    Pain of upper extremity 11/22/2015    Colon polyp 11/22/2015    Hearing loss 11/22/2015    Obesity (BMI 30-39.9) 11/22/2015    Acute on chronic systolic heart failure 09/02/2016           Past Medical History:   Diagnosis Date    Abnormal electrocardiogram      Arm pain      Atrial fibrillation      Atrial flutter      BPH (benign prostatic hyperplasia)      BPH associated with nocturia      Chronic kidney disease      Colon cancer      Dyspnea      Enlarged prostate without lower urinary tract symptoms (luts)      Episode of generalized weakness      Esophagitis, reflux      Fatigue      Fracture of ankle      GERD (gastroesophageal reflux disease)      Inguinal hernia      Loss of hearing      Obesity      Osteoarthritis cervical spine      Overweight      Tobacco dependence in remission      Urge incontinence of urine      Vitamin D deficiency      Wheezing        Admitting doctor:   Teena Crisostomo MD    Admitting diagnosis:   The primary encounter diagnosis was Orthostatic hypotension. Diagnoses of Fall, initial encounter, Contusion of head, unspecified part of head, initial encounter, Heme positive stool, and Chronic anticoagulation were also pertinent to this visit.    Code status:   Current Code Status       Date Active Code Status Order ID Comments User Context        Prior            Allergies:   Patient has no known allergies.    Isolation:   No active isolations    Intake and Output  No intake or output data in the 24 hours ending 10/06/23 1809    Weight:   There were no vitals filed for this visit.    Most recent vitals:   Vitals:    10/06/23 1701 10/06/23 1711 10/06/23 1728 10/06/23 1731   BP: 148/78 99/78 149/78 136/68   Patient Position: Lying Standing Lying    Pulse: 67 73 60 60   Resp:       Temp:       SpO2:   97% 97%       Active LDAs/IV Access:   Lines, Drains & Airways       Active LDAs       Name Placement date Placement time Site Days    Peripheral IV 10/06/23 1445 Right Antecubital 10/06/23  1445  Antecubital  less than 1                    Labs (abnormal labs have a star):   Labs Reviewed   COMPREHENSIVE METABOLIC PANEL - Abnormal; Notable for the following components:       Result Value    Total Bilirubin 2.1 (*)     eGFR 57.2 (*)     All other components within normal limits    Narrative:     GFR Normal >60  Chronic Kidney Disease <60  Kidney Failure <15    The GFR formula is only valid for adults with stable renal function between ages 18 and 70.   PROTIME-INR - Abnormal; Notable for the following components:    Protime 18.9 (*)     INR 1.56 (*)     All other components within normal limits   TROPONIN - Abnormal; Notable for the following components:    HS Troponin T 39 (*)     All other components within normal limits    Narrative:     High Sensitive Troponin T Reference Range:  <10.0 ng/L- Negative Female for AMI  <15.0 ng/L- Negative Male for AMI  >=10 - Abnormal Female indicating possible myocardial injury.  >=15 - Abnormal Male indicating possible myocardial injury.   Clinicians would have to utilize clinical acumen, EKG, Troponin, and serial changes to determine if it is an Acute Myocardial Infarction or myocardial injury due to an underlying chronic condition.        CBC WITH AUTO DIFFERENTIAL - Abnormal; Notable for the following components:    RBC  3.87 (*)     Hemoglobin 12.9 (*)     MCV 98.7 (*)     MCH 33.3 (*)     Neutrophil % 77.8 (*)     Lymphocyte % 8.5 (*)     Immature Grans % 1.3 (*)     Neutrophils, Absolute 7.65 (*)     Monocytes, Absolute 1.01 (*)     Immature Grans, Absolute 0.13 (*)     All other components within normal limits   HIGH SENSITIVITIY TROPONIN T 2HR   TYPE AND SCREEN   CBC AND DIFFERENTIAL    Narrative:     The following orders were created for panel order CBC & Differential.  Procedure                               Abnormality         Status                     ---------                               -----------         ------                     CBC Auto Differential[[id number]]        Abnormal            Final result                 Please view results for these tests on the individual orders.       EKG:   ECG 12 Lead Syncope   Final Result   HEART RATE= 59  bpm   RR Interval= 1017  ms   VT Interval= 158  ms   P Horizontal Axis= -70  deg   P Front Axis= 43  deg   QRSD Interval= 99  ms   QT Interval= 421  ms   QTcB= 417  ms   QRS Axis= 7  deg   T Wave Axis= 144  deg   - ABNORMAL ECG -   Sinus rhythm   Repol abnrm suggests ischemia, diffuse leads   Minimal ST elevation, inferior leads   No change from previous tracing   Electronically Signed By: Neo BanuelosIRENA) ([facility]) 06-Oct-2023 17:52:42   Date and Time of Study: 2023-10-06 16:20:41          Meds given in ED:   Medications   sodium chloride 0.9 % flush 10 mL (has no administration in time range)       Imaging results:  CT Head Without Contrast    Result Date: 10/6/2023  1. There has been no change when compared to recent head CT on 09/08/2023 with no acute intracranial abnormality identified  2. There is moderate to severe small vessel disease in cerebral white matter and there is diffuse cerebral atrophy. The lateral and third ventricles are large in size. This probably on the basis of central volume loss or atrophy rather than NPH of course correlate clinically. The remainder  of the head CT is normal, specifically no acute skull fracture or intracranial hemorrhage is identified  CERVICAL SPINE CT TECHNIQUE: Spiral CT images were obtained from the skull base to the T2 thoracic level and the images were reformatted and submitted in 2 mm thick axial and sagittal CT sections with soft tissue algorithm and 1 mm thick axial, sagittal and coronal CT sections with high-resolution bone algorithm  There are no prior cervical spine CTs for comparison.  FINDINGS: The craniocervical junction is normal in appearance.  At C1-2 there are some arthritic changes at atlantodental interval with mild marginal spurring of the anterior ring of C1 and the odontoid marginal spurs off the odontoid abut the inferior margin of the clivus. Otherwise the C1-2 level is normal in appearance  At C2-3 there is mild-moderate left and there is moderate right facet overgrowth. There is disc space narrowing and there are degenerative endplate changes, posterior spurs abut the ventral cord mildly narrowing the canal. There is uncovertebral joint hypertrophy.  There is mild left and mild-moderate right bony foraminal narrowing  At C3-4 there is disc space narrowing and there is some partial bony fusion across the endplates. There is mild-moderate bilateral facet overgrowth, bony fusion across the facet joints and there is some uncovertebral joint hypertrophy, posterior endplate spurring left posterolateral mildly narrowing the left side of the canal. Facet and uncovertebral joint spurs result in mild right and there is moderate left bony foraminal narrowing.  At C4-5 there is some disc space narrowing, degenerative endplate changes.  There is mild posterior spurring that abuts the ventral cord mildly narrowing the left side of the canal. There is moderate left and mild right facet overgrowth and some uncovertebral joint hypertrophy and there is moderate right, moderate to severe left bony foraminal narrowing.  At C5-6 there is  flowing ossification anterior to the C5 and C6 vertebrae, anterior marginal spurs are only partially fused across the anterior endplates. There is mild right and mild-moderate left facet overgrowth and there is moderate bilateral foraminal narrowing and mild central canal narrowing at C5-6.  At C6-7 there is flowing ossification anterior to the vertebrae and disc space serving the to ankylose the vertebrae anteriorly.  Disc space narrowing and degenerative endplate changes, posterior spurs abut and deform the ventral surface of the cord mild to moderately narrowing the canal. There is bilateral uncovertebral joint hypertrophy and there is moderate to severe right and there is severe left bony foraminal narrowing.  At C7-T1 there is flowing ossification anterior to the vertebrae, anterior marginal bridging osteophytes. There is mild-moderate right and minimal left facet overgrowth 1 to 2 mm anterolisthesis of C7 on T1 there is no canal or foraminal narrowing  No acute fractures seen in the cervical spine.  IMPRESSION: 1. No acute fracture seen in the cervical spine. There is cervical spondylosis as described.  Radiation dose reduction techniques were utilized, including automated exposure control and exposure modulation based on body size.    This report was finalized on 10/6/2023 4:58 PM by Dr. Joey Dorsey M.D on Workstation: [id number]      CT Cervical Spine Without Contrast    Result Date: 10/6/2023  1. There has been no change when compared to recent head CT on 09/08/2023 with no acute intracranial abnormality identified  2. There is moderate to severe small vessel disease in cerebral white matter and there is diffuse cerebral atrophy. The lateral and third ventricles are large in size. This probably on the basis of central volume loss or atrophy rather than NPH of course correlate clinically. The remainder of the head CT is normal, specifically no acute skull fracture or intracranial hemorrhage is identified   CERVICAL SPINE CT TECHNIQUE: Spiral CT images were obtained from the skull base to the T2 thoracic level and the images were reformatted and submitted in 2 mm thick axial and sagittal CT sections with soft tissue algorithm and 1 mm thick axial, sagittal and coronal CT sections with high-resolution bone algorithm  There are no prior cervical spine CTs for comparison.  FINDINGS: The craniocervical junction is normal in appearance.  At C1-2 there are some arthritic changes at atlantodental interval with mild marginal spurring of the anterior ring of C1 and the odontoid marginal spurs off the odontoid abut the inferior margin of the clivus. Otherwise the C1-2 level is normal in appearance  At C2-3 there is mild-moderate left and there is moderate right facet overgrowth. There is disc space narrowing and there are degenerative endplate changes, posterior spurs abut the ventral cord mildly narrowing the canal. There is uncovertebral joint hypertrophy.  There is mild left and mild-moderate right bony foraminal narrowing  At C3-4 there is disc space narrowing and there is some partial bony fusion across the endplates. There is mild-moderate bilateral facet overgrowth, bony fusion across the facet joints and there is some uncovertebral joint hypertrophy, posterior endplate spurring left posterolateral mildly narrowing the left side of the canal. Facet and uncovertebral joint spurs result in mild right and there is moderate left bony foraminal narrowing.  At C4-5 there is some disc space narrowing, degenerative endplate changes.  There is mild posterior spurring that abuts the ventral cord mildly narrowing the left side of the canal. There is moderate left and mild right facet overgrowth and some uncovertebral joint hypertrophy and there is moderate right, moderate to severe left bony foraminal narrowing.  At C5-6 there is flowing ossification anterior to the C5 and C6 vertebrae, anterior marginal spurs are only partially  fused across the anterior endplates. There is mild right and mild-moderate left facet overgrowth and there is moderate bilateral foraminal narrowing and mild central canal narrowing at C5-6.  At C6-7 there is flowing ossification anterior to the vertebrae and disc space serving the to ankylose the vertebrae anteriorly.  Disc space narrowing and degenerative endplate changes, posterior spurs abut and deform the ventral surface of the cord mild to moderately narrowing the canal. There is bilateral uncovertebral joint hypertrophy and there is moderate to severe right and there is severe left bony foraminal narrowing.  At C7-T1 there is flowing ossification anterior to the vertebrae, anterior marginal bridging osteophytes. There is mild-moderate right and minimal left facet overgrowth 1 to 2 mm anterolisthesis of C7 on T1 there is no canal or foraminal narrowing  No acute fractures seen in the cervical spine.  IMPRESSION: 1. No acute fracture seen in the cervical spine. There is cervical spondylosis as described.  Radiation dose reduction techniques were utilized, including automated exposure control and exposure modulation based on body size.    This report was finalized on 10/6/2023 4:58 PM by Dr. Joey Dorsey M.D on Workstation: [id number]       Ambulatory status:   - assist    Social issues:   Social History     Socioeconomic History    Marital status:     Number of children: 2    Years of education: COLLEGE GRADUATE   Tobacco Use    Smoking status: Former     Packs/day: 0.50     Years: 55.00     Pack years: 27.50     Types: Cigarettes     Quit date: 5/1/2008     Years since quitting: 15.4    Smokeless tobacco: Never    Tobacco comments:     QUIT 10 YRS   Vaping Use    Vaping Use: Never used   Substance and Sexual Activity    Alcohol use: Not Currently     Comment: SOCIALLY    Drug use: No    Sexual activity: Defer       NIH Stroke Scale:       Julianna Rudolph RN  10/06/23 18:09 EDT

## 2023-10-06 NOTE — ED NOTES
Patient had a fall today, he fell in his bathroom today. He slipped while trying to get into the shower. He usually uses a walker. Patient reports no pain. He states he thinks he hit his head-he is on eliquis. Patient denies loc

## 2023-10-06 NOTE — PROGRESS NOTES
Clinical Pharmacy Services: Medication History    Jonathan Trejo is a 88 y.o. male presenting to [facility] for   Chief Complaint   Patient presents with    Fall       He  has a past medical history of Abnormal electrocardiogram, Arm pain, Atrial fibrillation, Atrial flutter, BPH (benign prostatic hyperplasia), BPH associated with nocturia, Chronic kidney disease, Colon cancer, Colon polyp (11/22/2015), Depression, Dyspnea, Enlarged prostate without lower urinary tract symptoms (luts), Episode of generalized weakness, Esophagitis, reflux, Fatigue, Fracture of ankle, GERD (gastroesophageal reflux disease), Hyperlipidemia, Inguinal hernia, Loss of hearing, Lumbar radiculopathy, Obesity, Osteoarthritis cervical spine, Osteopenia, Osteoporosis, Overweight, Tobacco dependence in remission, Urge incontinence of urine, Vitamin D deficiency, and Wheezing.    Allergies as of 10/06/2023    (No Known Allergies)       Medication information was obtained from: [facility]   Pharmacy and Phone Number:     Prior to Admission Medications       Prescriptions Last Dose Informant Patient Reported? Taking?    acetaminophen (TYLENOL) 325 MG tablet  Pharmacy Yes Yes    Take 2 tablets by mouth Every 6 (Six) Hours As Needed for Mild Pain.    albuterol sulfate  (90 Base) MCG/ACT inhaler  Pharmacy Yes Yes    Inhale 2 puffs 4 (Four) Times a Day As Needed for Wheezing.    apixaban (ELIQUIS) 5 MG tablet tablet  Pharmacy No Yes    Take 1 tablet by mouth Every 12 (Twelve) Hours. Indications: DVT/PE (active thrombosis)    aspirin 81 MG chewable tablet  Pharmacy Yes Yes    Chew 1 tablet Daily.    atorvastatin (LIPITOR) 10 MG tablet  Pharmacy No Yes    TAKE 1 TABLET BY MOUTH DAILY.    Cholecalciferol (VITAMIN D3) 2000 UNITS tablet  Pharmacy Yes Yes    Take 1 tablet by mouth Daily.    Droxidopa 200 MG capsule  Pharmacy Yes Yes    Take 200 mg by mouth 3 (Three) Times a Day.    escitalopram (LEXAPRO) 20 MG tablet  Pharmacy Yes Yes     Take 1 tablet by mouth Daily.    ferrous sulfate 325 (65 FE) MG tablet  Pharmacy No Yes    Take 1 tablet by mouth Daily With Breakfast.    gabapentin (NEURONTIN) 600 MG tablet  Pharmacy Yes Yes    Take 1 tablet by mouth 2 (Two) Times a Day.    melatonin 5 MG tablet tablet  Pharmacy No Yes    Take 1 tablet by mouth Every Night.    midodrine (PROAMATINE) 10 MG tablet  Pharmacy No Yes    Take 1 tablet by mouth 3 (Three) Times a Day Before Meals.    omeprazole (priLOSEC) 40 MG capsule  Pharmacy No Yes    TAKE 1 CAPSULE BY MOUTH DAILY.    vitamin B-12 (CYANOCOBALAMIN) 1000 MCG tablet  Pharmacy No Yes    Take 1 tablet by mouth Daily.    Droxidopa 100 MG capsule  Pharmacy No No    Take 2 capsules by mouth 3 times a day.    escitalopram (LEXAPRO) 5 MG tablet   No No    Take 4 tablets by mouth Daily.              Medication notes:     This medication list is complete to the best of my knowledge as of 10/6/2023    Please call if questions.    Dominick Gaston  Medication History Technician   [phone]    10/6/2023 18:57 EDT

## 2023-10-06 NOTE — ED PROVIDER NOTES
EMERGENCY DEPARTMENT ENCOUNTER    Room Number:  30/30  Date seen:  10/6/2023  PCP: Marychuy Johnson APRN  Historian: Patient and EMS      HPI:  Chief Complaint: Fall  Context: Jonathan Trejo is a 88 y.o. male who presents to the ED c/o a fall in his bathroom today.  The patient states he normally uses a walker and slipped was trying in the shower.  He thinks he may have hit his head.  The patient is on Eliquis.  Patient denies loss of consciousness.  The patient denies chest pain, neck pain, shortness of breath abdominal pain, back pain or extremity pain.  When the nurse changed the patient's brief he was noted to have black stools.  The patient states this has been going on for quite some time because he is on iron.      PAST MEDICAL HISTORY  Active Ambulatory Problems     Diagnosis Date Noted    Benign prostatic hyperplasia with urinary obstruction 11/22/2015    Chronic renal impairment, stage 3 (moderate) 11/22/2015    Depression 11/22/2015    Gastroesophageal reflux disease with esophagitis 11/22/2015    Hyperlipidemia 11/22/2015    Nocturia 11/22/2015    Obesity (BMI 30-39.9) 11/22/2015    Osteopenia 11/22/2015    Osteoporosis 11/22/2015    Seborrhea 04/04/2016    Abnormal EKG 07/19/2016    Bradycardia 09/02/2016    Degenerative disc disease, cervical 10/02/2016    Typical atrial flutter 04/06/2017    Lumbar radiculopathy 06/06/2017    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    S/P ablation of atrial flutter 09/19/2017    S/P catheter ablation of slow pathway 09/19/2017    Cardiomyopathy 09/19/2017    GARCIA (dyspnea on exertion) 09/19/2017    Urinary tract infection without hematuria 01/02/2018    Right foot drop 01/03/2018    Frequent falls 01/03/2018    Metabolic encephalopathy 01/03/2018    Acute renal failure 01/03/2018    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    Moderate single current episode of major depressive disorder 03/12/2019    Mass of left lower leg 06/13/2019    Pulmonary emphysema  05/08/2020    Monoclonal gammopathy of unknown significance (MGUS) 08/04/2021    Myasthenia gravis without exacerbation 12/10/2021    Displacement of lumbar intervertebral disc without myelopathy 05/19/2022    Acute anemia 03/27/2023    Closed compression fracture of L3 lumbar vertebra, initial encounter 03/27/2023    Closed compression fracture of L4 lumbar vertebra, initial encounter 03/27/2023    Pulmonary embolus 07/13/2023    Stage 3a chronic kidney disease 09/10/2023    History of pulmonary embolism 09/10/2023    Hypophosphatemia 09/10/2023    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    Orthostatic hypotension 09/13/2023     Resolved Ambulatory Problems     Diagnosis Date Noted    Pain of upper extremity 11/22/2015    Colon polyp 11/22/2015    Hearing loss 11/22/2015    Obesity (BMI 30-39.9) 11/22/2015    Acute on chronic systolic heart failure 09/02/2016     Past Medical History:   Diagnosis Date    Abnormal electrocardiogram     Arm pain     Atrial fibrillation     Atrial flutter     BPH (benign prostatic hyperplasia)     BPH associated with nocturia     Chronic kidney disease     Colon cancer     Dyspnea     Enlarged prostate without lower urinary tract symptoms (luts)     Episode of generalized weakness     Esophagitis, reflux     Fatigue     Fracture of ankle     GERD (gastroesophageal reflux disease)     Inguinal hernia     Loss of hearing     Obesity     Osteoarthritis cervical spine     Overweight     Tobacco dependence in remission     Urge incontinence of urine     Vitamin D deficiency     Wheezing          REVIEW OF SYSTEMS  All systems reviewed and negative except for those discussed in HPI.       PAST SURGICAL HISTORY  Past Surgical History:   Procedure Laterality Date    CARDIAC CATHETERIZATION N/A 7/27/2017    Procedure: Valve assessment;  Surgeon: Adonis Solis MD;  Location: [facility] INVASIVE LOCATION;  Service:     CARDIAC CATHETERIZATION N/A 10/1/2018    Procedure: Left Heart Cath;   Surgeon: Bogdan Vargas MD;  Location:  [facility] CATH INVASIVE LOCATION;  Service: Cardiology    CARDIAC CATHETERIZATION N/A 10/1/2018    Procedure: Coronary angiography;  Surgeon: Bogdan Vargas MD;  Location:  [facility] CATH INVASIVE LOCATION;  Service: Cardiology    CARDIAC CATHETERIZATION N/A 10/1/2018    Procedure: Left ventriculography;  Surgeon: Bogdan Vargas MD;  Location: [facility] CATH INVASIVE LOCATION;  Service: Cardiology    CARDIAC CATHETERIZATION N/A 10/1/2018    Procedure: Right Heart Cath;  Surgeon: Bogdan Vargas MD;  Location: [facility] CATH INVASIVE LOCATION;  Service: Cardiology    CARDIAC ELECTROPHYSIOLOGY PROCEDURE N/A 7/27/2017    Procedure: Ablation atrial flutter Will need to have 3 -4 weeks of therapeutic INR's ;  Surgeon: Adonis Solis MD;  Location:  [facility] CATH INVASIVE LOCATION;  Service:     CARDIOVERSION      CATARACT EXTRACTION W/ INTRAOCULAR LENS  IMPLANT, BILATERAL      COLON RESECTION      COLON SURGERY      polyp removed    COLONOSCOPY      07/15 redo 5 years  Segun    KNEE SURGERY      benign tumor removed, age 6    LASIK      LUMBAR EPIDURAL INJECTION N/A 5/25/2022    Procedure: LUMBAR EPIDURAL 1ST VISIT L3-4 (right of midline);  Surgeon: Bonnie Brown MD;  [place] [facility] [place];  Service: Pain Management;  Laterality: N/A;    REFRACTIVE SURGERY  2007    REPAIR PERONEAL TENDONS ANKLE W/ FIBULAR OSTEOTOMY Right 03/2013    Dr. Banks         FAMILY HISTORY  Family History   Problem Relation Age of Onset    Breast cancer Mother     Heart disease Father     Hypertension Father     Heart attack Father     Hypertension Sister          SOCIAL HISTORY  Social History     Socioeconomic History    Marital status:     Number of children: 2    Years of education: COLLEGE GRADUATE   Tobacco Use    Smoking status: Former     Packs/day: 0.50     Years: 55.00     Pack years: 27.50     Types: Cigarettes     Quit date: 5/1/2008     Years since quitting: 15.4     Smokeless tobacco: Never    Tobacco comments:     QUIT 10 YRS   Vaping Use    Vaping Use: Never used   Substance and Sexual Activity    Alcohol use: Not Currently     Comment: SOCIALLY    Drug use: No    Sexual activity: Defer         ALLERGIES  Patient has no known allergies.      PHYSICAL EXAM  ED Triage Vitals [10/06/23 1342]   Temp Heart Rate Resp BP SpO2   97 °F (36.1 °C) 65 16 123/64 96 %      Temp src Heart Rate Source Patient Position BP Location FiO2 (%)   -- -- -- -- --       Physical Exam      GENERAL: 88-year-old male in no acute distress  HENT: NCAT: nares patent: Neck supple with minimal C-spine tenderness  EYES: no scleral icterus  CV: regular rhythm, normal rate  RESPIRATORY: normal effort  ABDOMEN: soft, NTND: Bowel sounds positive  MUSCULOSKELETAL: no deformity: Full range of motion of all 4 extremities without pain to movement or palpation  NEURO: alert with nonfocal neuro exam  PSYCH:  calm, cooperative  SKIN: warm, dry    Vital signs and nursing notes reviewed.      LAB RESULTS  Recent Results (from the past 24 hour(s))   Comprehensive Metabolic Panel    Collection Time: 10/06/23  2:45 PM    Specimen: Blood   Result Value Ref Range    Glucose 99 65 - 99 mg/dL    BUN 17 8 - 23 mg/dL    Creatinine 1.23 0.76 - 1.27 mg/dL    Sodium 141 136 - 145 mmol/L    Potassium 4.3 3.5 - 5.2 mmol/L    Chloride 105 98 - 107 mmol/L    CO2 24.6 22.0 - 29.0 mmol/L    Calcium 9.5 8.6 - 10.5 mg/dL    Total Protein 6.5 6.0 - 8.5 g/dL    Albumin 4.0 3.5 - 5.2 g/dL    ALT (SGPT) 15 1 - 41 U/L    AST (SGOT) 18 1 - 40 U/L    Alkaline Phosphatase 101 39 - 117 U/L    Total Bilirubin 2.1 (H) 0.0 - 1.2 mg/dL    Globulin 2.5 gm/dL    A/G Ratio 1.6 g/dL    BUN/Creatinine Ratio 13.8 7.0 - 25.0    Anion Gap 11.4 5.0 - 15.0 mmol/L    eGFR 57.2 (L) >60.0 mL/min/1.73   Protime-INR    Collection Time: 10/06/23  2:45 PM    Specimen: Blood   Result Value Ref Range    Protime 18.9 (H) 11.7 - 14.2 Seconds    INR 1.56 (H) 0.90 - 1.10    High Sensitivity Troponin T    Collection Time: 10/06/23  2:45 PM    Specimen: Blood   Result Value Ref Range    HS Troponin T 39 (H) <15 ng/L   Type & Screen    Collection Time: 10/06/23  2:45 PM    Specimen: Blood   Result Value Ref Range    ABO Type B     RH type Positive     Antibody Screen Negative     T&S Expiration Date 10/9/2023 11:59:59 PM    CBC Auto Differential    Collection Time: 10/06/23  2:45 PM    Specimen: Blood   Result Value Ref Range    WBC 9.84 3.40 - 10.80 10*3/mm3    RBC 3.87 (L) 4.14 - 5.80 10*6/mm3    Hemoglobin 12.9 (L) 13.0 - 17.7 g/dL    Hematocrit 38.2 37.5 - 51.0 %    MCV 98.7 (H) 79.0 - 97.0 fL    MCH 33.3 (H) 26.6 - 33.0 pg    MCHC 33.8 31.5 - 35.7 g/dL    RDW 12.7 12.3 - 15.4 %    RDW-SD 45.4 37.0 - 54.0 fl    MPV 11.2 6.0 - 12.0 fL    Platelets 348 140 - 450 10*3/mm3    Neutrophil % 77.8 (H) 42.7 - 76.0 %    Lymphocyte % 8.5 (L) 19.6 - 45.3 %    Monocyte % 10.3 5.0 - 12.0 %    Eosinophil % 1.3 0.3 - 6.2 %    Basophil % 0.8 0.0 - 1.5 %    Immature Grans % 1.3 (H) 0.0 - 0.5 %    Neutrophils, Absolute 7.65 (H) 1.70 - 7.00 10*3/mm3    Lymphocytes, Absolute 0.84 0.70 - 3.10 10*3/mm3    Monocytes, Absolute 1.01 (H) 0.10 - 0.90 10*3/mm3    Eosinophils, Absolute 0.13 0.00 - 0.40 10*3/mm3    Basophils, Absolute 0.08 0.00 - 0.20 10*3/mm3    Immature Grans, Absolute 0.13 (H) 0.00 - 0.05 10*3/mm3    nRBC 0.2 0.0 - 0.2 /100 WBC   ECG 12 Lead Syncope    Collection Time: 10/06/23  4:20 PM   Result Value Ref Range    QT Interval 421 ms    QTC Interval 417 ms       Ordered the above labs and reviewed the results.        RADIOLOGY  CT Head Without Contrast, CT Cervical Spine Without Contrast    Result Date: 10/6/2023  EMERGENCY CT SCAN OF THE HEAD AND CERVICAL SPINE WITHOUT CONTRAST ON 10/06/2023  CLINICAL HISTORY: Head injury  HEAD CT TECHNIQUE: Spiral CT images were obtained from the base of the skull to the vertex without intravenous contrast. The images were reformatted and submitted in 3 mm  thick axial, sagittal and coronal CT sections with brain algorithm and 2 mm thick axial CT sections with high-resolution bone algorithm.  COMPARISON: This is correlated to a prior head CT from [facility] on 09/08/2023.  FINDINGS: There are patchy and confluent areas of low-density in the periventricular and throughout the subcortical white matter of the cerebral hemispheres consistent with moderate to severe small vessel disease. The remainder of the brain parenchyma is normal in attenuation. There is diffuse cerebral atrophy. The lateral and third ventricles are prominent in size probably on the basis of central volume loss or atrophy. I see no focal mass effect. There is no midline shift. No extra-axial fluid collections are identified. There is no evidence of acute intracranial hemorrhage. No acute skull fracture is identified. The calvarium and the skull base are normal in appearance. The paranasal sinuses and the mastoid air cells and middle ear cavities are clear.      1. There has been no change when compared to recent head CT on 09/08/2023 with no acute intracranial abnormality identified  2. There is moderate to severe small vessel disease in cerebral white matter and there is diffuse cerebral atrophy. The lateral and third ventricles are large in size. This probably on the basis of central volume loss or atrophy rather than NPH of course correlate clinically. The remainder of the head CT is normal, specifically no acute skull fracture or intracranial hemorrhage is identified  CERVICAL SPINE CT TECHNIQUE: Spiral CT images were obtained from the skull base to the T2 thoracic level and the images were reformatted and submitted in 2 mm thick axial and sagittal CT sections with soft tissue algorithm and 1 mm thick axial, sagittal and coronal CT sections with high-resolution bone algorithm  There are no prior cervical spine CTs for comparison.  FINDINGS: The craniocervical junction is normal  in appearance.  At C1-2 there are some arthritic changes at atlantodental interval with mild marginal spurring of the anterior ring of C1 and the odontoid marginal spurs off the odontoid abut the inferior margin of the clivus. Otherwise the C1-2 level is normal in appearance  At C2-3 there is mild-moderate left and there is moderate right facet overgrowth. There is disc space narrowing and there are degenerative endplate changes, posterior spurs abut the ventral cord mildly narrowing the canal. There is uncovertebral joint hypertrophy.  There is mild left and mild-moderate right bony foraminal narrowing  At C3-4 there is disc space narrowing and there is some partial bony fusion across the endplates. There is mild-moderate bilateral facet overgrowth, bony fusion across the facet joints and there is some uncovertebral joint hypertrophy, posterior endplate spurring left posterolateral mildly narrowing the left side of the canal. Facet and uncovertebral joint spurs result in mild right and there is moderate left bony foraminal narrowing.  At C4-5 there is some disc space narrowing, degenerative endplate changes.  There is mild posterior spurring that abuts the ventral cord mildly narrowing the left side of the canal. There is moderate left and mild right facet overgrowth and some uncovertebral joint hypertrophy and there is moderate right, moderate to severe left bony foraminal narrowing.  At C5-6 there is flowing ossification anterior to the C5 and C6 vertebrae, anterior marginal spurs are only partially fused across the anterior endplates. There is mild right and mild-moderate left facet overgrowth and there is moderate bilateral foraminal narrowing and mild central canal narrowing at C5-6.  At C6-7 there is flowing ossification anterior to the vertebrae and disc space serving the to ankylose the vertebrae anteriorly.  Disc space narrowing and degenerative endplate changes, posterior spurs abut and deform the ventral  surface of the cord mild to moderately narrowing the canal. There is bilateral uncovertebral joint hypertrophy and there is moderate to severe right and there is severe left bony foraminal narrowing.  At C7-T1 there is flowing ossification anterior to the vertebrae, anterior marginal bridging osteophytes. There is mild-moderate right and minimal left facet overgrowth 1 to 2 mm anterolisthesis of C7 on T1 there is no canal or foraminal narrowing  No acute fractures seen in the cervical spine.  IMPRESSION: 1. No acute fracture seen in the cervical spine. There is cervical spondylosis as described.  Radiation dose reduction techniques were utilized, including automated exposure control and exposure modulation based on body size.    This report was finalized on 10/6/2023 4:58 PM by Dr. Joey Dorsey M.D on Workstation: [id number]       Ordered the above noted radiological studies. Reviewed by me in PACS.            PROCEDURES  Procedures          MEDICATIONS GIVEN IN ER  Medications   sodium chloride 0.9 % flush 10 mL (has no administration in time range)   sodium chloride 0.9 % infusion (has no administration in time range)   acetaminophen (TYLENOL) tablet 650 mg (has no administration in time range)   sennosides-docusate (PERICOLACE) 8.6-50 MG per tablet 2 tablet (has no administration in time range)     And   polyethylene glycol (MIRALAX) packet 17 g (has no administration in time range)     And   bisacodyl (DULCOLAX) EC tablet 5 mg (has no administration in time range)     And   bisacodyl (DULCOLAX) suppository 10 mg (has no administration in time range)   ondansetron (ZOFRAN) tablet 4 mg (has no administration in time range)     Or   ondansetron (ZOFRAN) injection 4 mg (has no administration in time range)   melatonin tablet 3 mg (has no administration in time range)             MEDICAL DECISION MAKING, PROGRESS, and CONSULTS    All labs have been independently reviewed by me.  All radiology studies have been reviewed  by me and I have also reviewed the radiology report.   EKG's independently viewed and interpreted by me.  Discussion below represents my analysis of pertinent findings related to patient's condition, differential diagnosis, treatment plan and final disposition.      Additional sources:      - External (non-ED) record review: I reviewed the patient's admission to the hospital from 9/9 through 9/21 for fall and dizziness.  The patient was noted to have orthostatic hypotension.  The patient was given midodrine and Florinef without help and thus was discontinued.  The patient does have a history of a flutter and and was admitted to the hospital in July of this year for PE and is now on Eliquis.  Patient was noted to be anemic at that time is felt secondary to a left buttock hematoma.      - Shared decision making: After shared decision-making discussion to myself and the patient we agree he needs to admitted for further evaluation and care      Orders placed during this visit:  Orders Placed This Encounter   Procedures    CT Head Without Contrast    CT Cervical Spine Without Contrast    Comprehensive Metabolic Panel    Protime-INR    High Sensitivity Troponin T    CBC Auto Differential    High Sensitivity Troponin T 2Hr    Basic Metabolic Panel    CBC (No Diff)    Ferritin    Iron Profile    Diet: Liquid Diets; Clear Liquid; Fluid Consistency: Thin (IDDSI 0)    Monitor Blood Pressure    Orthostatic Vitals    Vital Signs Recheck    Vital Signs    Up with assistance    Intake & Output    Oral Care    Place Sequential Compression Device    Maintain Sequential Compression Device    Code Status and Medical Interventions:    DOYLE (on-call MD unless specified) Details    Inpatient Gastroenterology Consult    ECG 12 Lead Syncope    Type & Screen    Insert Peripheral IV    Initiate Observation Status    CBC & Differential             Independent interpretation of labs, radiology studies, and discussions with consultants:  ED  Course as of 10/06/23 1857   Fri Oct 06, 2023   1436 The black stool was heme positive.  In addition to imaging the patient's head and neck I will obtain labs and EKG for further evaluation and care. [GP]   1510 Patient's hemoglobin is 12.9 which is up from 10 previously [GP]   1625 EKG    EKG time: 1620  Rhythm/Rate: Normal/59  No Acute Ischemia  Non-Specific ST-T changes anteriorly    Similar compared to prior on 9/21/2023    Interpreted Contemporaneously by me.  Independently viewed by me     [GP]   1633 I discussed the patient's CT head and C-spine with Dr. Dorsey who states that there is no fracture or bleed.  He states they are negative acute. [GP]   1633 Patient's initial troponin was 39 but it appears to been elevated in the past.  I will repeat it. [GP]   1714 The patient is orthostatic with a blood pressure drop from 148-99 and near syncopal.  Thus I will give him a fluid bolus and admit him to the hospital for further evaluation and care with his heme positive stool and orthostasis.  Of note is at rest the patient's vital signs are stable and his hemoglobin is actually improved from previous.  He was admitted recently with profound orthostasis. [GP]   1747 On repeat examination the patient's vital signs are stable.  I have advised the patient of the above and he understands and agrees with the plan. [GP]   1752 I discussed the case with Dr. Crisostomo from [facility].  We reviewed the patient's presentation and work-up.  She will admit him to a telemetry bed for further evaluation and care. [GP]      ED Course User Index  [GP] Joe Poole MD               DIAGNOSIS  Final diagnoses:   Orthostatic hypotension   Fall, initial encounter   Contusion of head, unspecified part of head, initial encounter   Heme positive stool   Chronic anticoagulation         DISPOSITION  ADMISSION    Discussed treatment plan and reason for admission with pt/family and admitting physician.  Pt/family voiced understanding of the plan for  admission for further testing/treatment as needed.            Latest Documented Vital Signs:  As of 18:57 EDT  BP- 138/78 HR- 59 Temp- 97 °F (36.1 °C) O2 sat- 96%--      --------------------  Please note that portions of this were completed with a voice recognition program.       Note Disclaimer: At [facility], we believe that sharing information builds trust and better relationships. You are receiving this note because you are receiving care at [facility] or recently visited. It is possible you will see health information before a provider has talked with you about it. This kind of information can be easy to misunderstand. To help you fully understand what it means for your health, we urge you to discuss this note with your provider.             Joe Poole MD  10/06/23 3328

## 2023-10-07 VITALS
HEART RATE: 66 BPM | DIASTOLIC BLOOD PRESSURE: 64 MMHG | TEMPERATURE: 98.4 F | SYSTOLIC BLOOD PRESSURE: 120 MMHG | RESPIRATION RATE: 20 BRPM | WEIGHT: 168.1 LBS | HEIGHT: 66 IN | BODY MASS INDEX: 27.02 KG/M2 | OXYGEN SATURATION: 98 %

## 2023-10-07 PROBLEM — R19.5 HEME POSITIVE STOOL: Status: ACTIVE | Noted: 2023-10-07

## 2023-10-07 PROBLEM — Z66 DNR (DO NOT RESUSCITATE): Status: ACTIVE | Noted: 2023-10-07

## 2023-10-07 PROBLEM — Z79.01 CHRONIC ANTICOAGULATION: Status: ACTIVE | Noted: 2023-10-07

## 2023-10-07 LAB
ANION GAP SERPL CALCULATED.3IONS-SCNC: 18.6 MMOL/L (ref 5–15)
BUN SERPL-MCNC: 18 MG/DL (ref 8–23)
BUN/CREAT SERPL: 15.4 (ref 7–25)
CALCIUM SPEC-SCNC: 9.2 MG/DL (ref 8.6–10.5)
CHLORIDE SERPL-SCNC: 108 MMOL/L (ref 98–107)
CO2 SERPL-SCNC: 15.4 MMOL/L (ref 22–29)
CREAT SERPL-MCNC: 1.17 MG/DL (ref 0.76–1.27)
DEPRECATED RDW RBC AUTO: 47.2 FL (ref 37–54)
EGFRCR SERPLBLD CKD-EPI 2021: 60.7 ML/MIN/1.73
ERYTHROCYTE [DISTWIDTH] IN BLOOD BY AUTOMATED COUNT: 12.8 % (ref 12.3–15.4)
GLUCOSE SERPL-MCNC: 74 MG/DL (ref 65–99)
HCT VFR BLD AUTO: 43.7 % (ref 37.5–51)
HGB BLD-MCNC: 14.9 G/DL (ref 13–17.7)
MCH RBC QN AUTO: 34 PG (ref 26.6–33)
MCHC RBC AUTO-ENTMCNC: 34.1 G/DL (ref 31.5–35.7)
MCV RBC AUTO: 99.8 FL (ref 79–97)
PLATELET # BLD AUTO: 224 10*3/MM3 (ref 140–450)
PMV BLD AUTO: 11.5 FL (ref 6–12)
POTASSIUM SERPL-SCNC: 4.1 MMOL/L (ref 3.5–5.2)
RBC # BLD AUTO: 4.38 10*6/MM3 (ref 4.14–5.8)
SODIUM SERPL-SCNC: 142 MMOL/L (ref 136–145)
WBC NRBC COR # BLD: 10.06 10*3/MM3 (ref 3.4–10.8)

## 2023-10-07 PROCEDURE — G0378 HOSPITAL OBSERVATION PER HR: HCPCS

## 2023-10-07 PROCEDURE — 25810000003 SODIUM CHLORIDE 0.9 % SOLUTION: Performed by: INTERNAL MEDICINE

## 2023-10-07 PROCEDURE — 85027 COMPLETE CBC AUTOMATED: CPT | Performed by: INTERNAL MEDICINE

## 2023-10-07 PROCEDURE — 96360 HYDRATION IV INFUSION INIT: CPT

## 2023-10-07 RX ADMIN — GABAPENTIN 600 MG: 300 CAPSULE ORAL at 00:44

## 2023-10-07 RX ADMIN — Medication 1000 MCG: at 09:29

## 2023-10-07 RX ADMIN — MIDODRINE HYDROCHLORIDE 10 MG: 5 TABLET ORAL at 09:21

## 2023-10-07 RX ADMIN — Medication 2000 UNITS: at 09:22

## 2023-10-07 RX ADMIN — MIDODRINE HYDROCHLORIDE 10 MG: 5 TABLET ORAL at 11:32

## 2023-10-07 RX ADMIN — GABAPENTIN 600 MG: 300 CAPSULE ORAL at 09:22

## 2023-10-07 RX ADMIN — ESCITALOPRAM 20 MG: 20 TABLET, FILM COATED ORAL at 09:22

## 2023-10-07 RX ADMIN — Medication 10 ML: at 11:40

## 2023-10-07 RX ADMIN — PANTOPRAZOLE SODIUM 40 MG: 40 TABLET, DELAYED RELEASE ORAL at 06:43

## 2023-10-07 RX ADMIN — ASPIRIN 81 MG: 81 TABLET, CHEWABLE ORAL at 09:21

## 2023-10-07 RX ADMIN — FERROUS SULFATE TAB 325 MG (65 MG ELEMENTAL FE) 325 MG: 325 (65 FE) TAB at 09:21

## 2023-10-07 RX ADMIN — ATORVASTATIN CALCIUM 10 MG: 20 TABLET, FILM COATED ORAL at 09:21

## 2023-10-07 RX ADMIN — SODIUM CHLORIDE 150 ML/HR: 9 INJECTION, SOLUTION INTRAVENOUS at 11:40

## 2023-10-07 NOTE — DISCHARGE SUMMARY
NAME: Jonathan Trejo ADMIT: 10/6/2023   : 1935  PCP: Marychuy Johnson APRN    MRN: [id number] LOS: 0 days   AGE/SEX: 88 y.o. male  ROOM: [facility]     Date of Admission:  10/6/2023  Date of Discharge:  10/7/2023    PCP: Marychuy Johnson APRN    CHIEF COMPLAINT  Fall      DISCHARGE DIAGNOSIS  Active Hospital Problems    Diagnosis  POA    **Orthostatic hypotension [I95.1]  Yes    Heme positive stool [R19.5]  Unknown    Chronic anticoagulation [Z79.01]  Not Applicable    DNR (do not resuscitate) [Z66]  Unknown    Stage 3a chronic kidney disease [N18.31]  Yes    Pulmonary embolus [I26.99]  Yes    Chronic renal impairment, stage 3 (moderate) [N18.30]  Yes    Gastroesophageal reflux disease with esophagitis [K21.00]  Yes      Resolved Hospital Problems   No resolved problems to display.       SECONDARY DIAGNOSES  Past Medical History:   Diagnosis Date    Abnormal electrocardiogram     Arm pain     Atrial fibrillation     Atrial flutter     BPH (benign prostatic hyperplasia)     BPH associated with nocturia     Chronic kidney disease     Colon cancer     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    Colon polyp 2015    Depression     Dyspnea     Enlarged prostate without lower urinary tract symptoms (luts)     Episode of generalized weakness     knees were weak - had to be helped by wife to sit down    Esophagitis, reflux     Fatigue     Fracture of ankle     right    GERD (gastroesophageal reflux disease)     Hyperlipidemia     Inguinal hernia     Loss of hearing     Lumbar radiculopathy     Obesity     Osteoarthritis cervical spine     doc on [facility]     Osteopenia     Osteoporosis     Overweight     Tobacco dependence in remission     Urge incontinence of urine     Vitamin D deficiency     Wheezing          HOSPITAL COURSE  Mr. Trejo is a 88 y.o. male with a history of CKD 3A, history of pulmonary embolism, atrial flutter status post ablation. Patient has neuropathy associated with MGUS.  He also has known  history of chronic orthostatic hypotension and is on midodrine as well as droxidopa as an outpatient.  He has a few recent hospitalizations over the past couple of months for orthostatic hypotension as well as for VTE and he is on Eliquis.    He presented with a fall and was admitted.  There was no real need for any change of medication as his orthostasis is chronic and is being addressed as well as possible.  There is noted dark stool in the ER so I think they had admitted as observation for potential GI bleed but discussion with the patient as well as family he is on chronic iron which can cause the dark stool and his hemoglobin is actually excellent and had increased from 10 6-10 7 so no indication for endoscopy especially in an 88-year-old male with multiple comorbidities and some dementia.  I discussed this both with the patient who was not willing to have an endoscopy anyway.  I also discussed this with the patient's daughter who agrees with conservative plan and he will return to his facility and will have repeat blood work in the next week or 2-it would not surprise me if his hemoglobin trends more in the hemoglobin of 10 range which it has in the past, I wonder if his hemoglobin of 14.9 today is an outlier potentially related to hydration status. Overall trying to avoid hospitalizations which can cause complications in his increasing age.  He is DNR/DNI.      DIAGNOSTICS      Collected Updated Procedure Result Status    10/07/2023 1008 10/07/2023 1040 CBC (No Diff) [[id number]]   (Abnormal)   Blood    Final result Component Value Units   WBC 10.06 10*3/mm3   RBC 4.38 10*6/mm3   Hemoglobin 14.9 g/dL   Hematocrit 43.7 %   MCV 99.8 High  fL   MCH 34.0 High  pg   MCHC 34.1 g/dL   RDW 12.8 %   RDW-SD 47.2 fl   MPV 11.5 fL   Platelets 224 10*3/mm3          10/06/2023 1848 10/06/2023 1924 High Sensitivity Troponin T 2Hr [[id number]]    (Abnormal)   Blood    Final result Component Value Units   HS Troponin T 37 High   "ng/L   Troponin T Delta -2 ng/L          10/06/2023 1848 10/06/2023 1924 Ferritin [[id number]]    Blood    Final result Component Value Units   Ferritin 385.00 ng/mL          10/06/2023 1848 10/06/2023 1919 Iron Profile [[id number]]   (Abnormal)   Blood    Final result Component Value Units   Iron 62 mcg/dL   Iron Saturation (TSAT) 19 Low  %   Transferrin 214 mg/dL   TIBC 319 mcg/dL          10/06/2023 1848 10/07/2023 1112 Basic Metabolic Panel [[id number]]    (Abnormal)   Blood    Final result Component Value Units   Glucose 74 mg/dL   BUN 18 mg/dL   Creatinine 1.17 mg/dL   Sodium 142 mmol/L   Potassium 4.1  mmol/L   Chloride 108 High  mmol/L   CO2 15.4 Low  mmol/L   Calcium 9.2 mg/dL   BUN/Creatinine Ratio 15.4    Anion Gap 18.6 High  mmol/L   eGFR 60.7 mL/min/1.73        PHYSICAL EXAM  Objective:  Vital signs: (most recent): Blood pressure 120/64, pulse 66, temperature 98.4 °F (36.9 °C), temperature source Oral, resp. rate 20, height 167.6 cm (66\"), weight 76.2 kg (168 lb 1.6 oz), SpO2 98%.                Alert  nad  No resp distress  Pleasant  Wishes for discharge today     CONDITION ON DISCHARGE  Stable.      DISCHARGE DISPOSITION   Skilled Nursing Facility (DC - External)      DISCHARGE MEDICATIONS       Your medication list        CHANGE how you take these medications        Instructions Last Dose Given Next Dose Due   Droxidopa 200 MG capsule  What changed: Another medication with the same name was removed. Continue taking this medication, and follow the directions you see here.      Take 200 mg by mouth 3 (Three) Times a Day.       escitalopram 20 MG tablet  Commonly known as: LEXAPRO  What changed: Another medication with the same name was removed. Continue taking this medication, and follow the directions you see here.      Take 1 tablet by mouth Daily.              CONTINUE taking these medications        Instructions Last Dose Given Next Dose Due   acetaminophen 325 MG tablet  Commonly known as: TYLENOL   "    Take 2 tablets by mouth Every 6 (Six) Hours As Needed for Mild Pain.       albuterol sulfate  (90 Base) MCG/ACT inhaler  Commonly known as: PROVENTIL HFA;VENTOLIN HFA;PROAIR HFA      Inhale 2 puffs 4 (Four) Times a Day As Needed for Wheezing.       apixaban 5 MG tablet tablet  Commonly known as: ELIQUIS      Take 1 tablet by mouth Every 12 (Twelve) Hours. Indications: DVT/PE (active thrombosis)       aspirin 81 MG chewable tablet      Chew 1 tablet Daily.       atorvastatin 10 MG tablet  Commonly known as: LIPITOR      TAKE 1 TABLET BY MOUTH DAILY.       ferrous sulfate 325 (65 FE) MG tablet      Take 1 tablet by mouth Daily With Breakfast.       gabapentin 600 MG tablet  Commonly known as: NEURONTIN      Take 1 tablet by mouth 2 (Two) Times a Day.       melatonin 5 MG tablet tablet      Take 1 tablet by mouth Every Night.       midodrine 10 MG tablet  Commonly known as: PROAMATINE      Take 1 tablet by mouth 3 (Three) Times a Day Before Meals.       omeprazole 40 MG capsule  Commonly known as: priLOSEC      TAKE 1 CAPSULE BY MOUTH DAILY.       vitamin B-12 1000 MCG tablet  Commonly known as: CYANOCOBALAMIN      Take 1 tablet by mouth Daily.       Vitamin D3 50 MCG (2000 UT) tablet      Take 1 tablet by mouth Daily.                 Future Appointments   Date Time Provider Department Center   2/26/2024  4:00 PM Teddy Campa MD [facility]      Follow-up Information       Marychuy Johnson APRN .    Specialty: Family Medicine  Contact information:  [address] [place], [place] [id number] [phone]                             TEST  RESULTS PENDING AT DISCHARGE         Refugio Han MD  [facility]  10/07/23  13:03 EDT      Time: greater than 32 minutes on discharge  It was a pleasure taking care of this patient while in the hospital.

## 2023-10-07 NOTE — H&P
HISTORY AND PHYSICAL   [facility]        Date of Admission: 10/6/2023  Patient Identification:  Name: Jonathan Trejo  Age: 88 y.o.  Sex: male  :  1935  MRN: [id number]                     Primary Care Physician: Marychuy Johnson APRN    Chief Complaint:  88 year old gentleman presented to the emergency room after he fell in his bathroom; he lost his balance while trying to use his walker; he did not lose consciousness; while in the ED he had a black stool; no abdominal pain; he says he has had dark stool for a while but was occult positive; he has a history of orthostatic hypotension    History of Present Illness:   As above    Past Medical History:  Past Medical History:   Diagnosis Date    Abnormal electrocardiogram     Arm pain     Atrial fibrillation     Atrial flutter     BPH (benign prostatic hyperplasia)     BPH associated with nocturia     Chronic kidney disease     Colon cancer     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    Colon polyp 2015    Depression     Dyspnea     Enlarged prostate without lower urinary tract symptoms (luts)     Episode of generalized weakness     knees were weak - had to be helped by wife to sit down    Esophagitis, reflux     Fatigue     Fracture of ankle     right    GERD (gastroesophageal reflux disease)     Hyperlipidemia     Inguinal hernia     Loss of hearing     Lumbar radiculopathy     Obesity     Osteoarthritis cervical spine     doc on [place]     Osteopenia     Osteoporosis     Overweight     Tobacco dependence in remission     Urge incontinence of urine     Vitamin D deficiency     Wheezing      Past Surgical History:  Past Surgical History:   Procedure Laterality Date    CARDIAC CATHETERIZATION N/A 2017    Procedure: Valve assessment;  Surgeon: Adonis Solis MD;  Location: [facility] INVASIVE LOCATION;  Service:     CARDIAC CATHETERIZATION N/A 10/1/2018    Procedure: Left Heart Cath;  Surgeon: Bogdan Vargas MD;  Location: [facility]  CATH INVASIVE LOCATION;  Service: Cardiology    CARDIAC CATHETERIZATION N/A 10/1/2018    Procedure: Coronary angiography;  Surgeon: Bogdan Vargas MD;  Location: [facility] CATH INVASIVE LOCATION;  Service: Cardiology    CARDIAC CATHETERIZATION N/A 10/1/2018    Procedure: Left ventriculography;  Surgeon: Bogdan Vargas MD;  Location: [facility] CATH INVASIVE LOCATION;  Service: Cardiology    CARDIAC CATHETERIZATION N/A 10/1/2018    Procedure: Right Heart Cath;  Surgeon: Bogdan Vargas MD;  Location: [facility] CATH INVASIVE LOCATION;  Service: Cardiology    CARDIAC ELECTROPHYSIOLOGY PROCEDURE N/A 7/27/2017    Procedure: Ablation atrial flutter Will need to have 3 -4 weeks of therapeutic INR's ;  Surgeon: Adonis Solis MD;  Location: [facility] CATH INVASIVE LOCATION;  Service:     CARDIOVERSION      CATARACT EXTRACTION W/ INTRAOCULAR LENS  IMPLANT, BILATERAL      COLON RESECTION      COLON SURGERY      polyp removed    COLONOSCOPY      07/15 redo 5 years  Segun    KNEE SURGERY      benign tumor removed, age 6    LASIK      LUMBAR EPIDURAL INJECTION N/A 5/25/2022    Procedure: LUMBAR EPIDURAL 1ST VISIT L3-4 (right of midline);  Surgeon: Bonnie Brown MD;  Location: [facility] [place];  Service: Pain Management;  Laterality: N/A;    REFRACTIVE SURGERY  2007    REPAIR PERONEAL TENDONS ANKLE W/ FIBULAR OSTEOTOMY Right 03/2013    Dr. Banks      Home Meds:  Medications Prior to Admission   Medication Sig Dispense Refill Last Dose    acetaminophen (TYLENOL) 325 MG tablet Take 2 tablets by mouth Every 6 (Six) Hours As Needed for Mild Pain.       albuterol sulfate  (90 Base) MCG/ACT inhaler Inhale 2 puffs 4 (Four) Times a Day As Needed for Wheezing.       apixaban (ELIQUIS) 5 MG tablet tablet Take 1 tablet by mouth Every 12 (Twelve) Hours. Indications: DVT/PE (active thrombosis) 60 tablet 0     aspirin 81 MG chewable tablet Chew 1 tablet Daily.       atorvastatin (LIPITOR) 10 MG tablet TAKE 1 TABLET BY MOUTH DAILY.  90 tablet 1     Cholecalciferol (VITAMIN D3) 2000 UNITS tablet Take 1 tablet by mouth Daily.       Droxidopa 200 MG capsule Take 200 mg by mouth 3 (Three) Times a Day.       escitalopram (LEXAPRO) 20 MG tablet Take 1 tablet by mouth Daily.       ferrous sulfate 325 (65 FE) MG tablet Take 1 tablet by mouth Daily With Breakfast. 30 tablet 0     gabapentin (NEURONTIN) 600 MG tablet Take 1 tablet by mouth 2 (Two) Times a Day.       melatonin 5 MG tablet tablet Take 1 tablet by mouth Every Night.       midodrine (PROAMATINE) 10 MG tablet Take 1 tablet by mouth 3 (Three) Times a Day Before Meals.       omeprazole (priLOSEC) 40 MG capsule TAKE 1 CAPSULE BY MOUTH DAILY. 30 capsule 1     vitamin B-12 (CYANOCOBALAMIN) 1000 MCG tablet Take 1 tablet by mouth Daily.       Droxidopa 100 MG capsule Take 2 capsules by mouth 3 times a day. 90 capsule      escitalopram (LEXAPRO) 5 MG tablet Take 4 tablets by mouth Daily.          Allergies:  No Known Allergies  Immunizations:  Immunization History   Administered Date(s) Administered    COVID-19 (PFIZER) Purple Cap Monovalent 12/29/2020, 12/30/2020, 02/10/2021, 10/28/2021    Fluzone High-Dose 65+yrs 10/06/2022    Pneumococcal Conjugate 13-Valent (PCV13) 01/01/2016, 04/04/2016    Pneumococcal Polysaccharide (PPSV23) 01/01/2004    Pneumococcal, Unspecified 02/15/2004    Td (TDVAX) 01/01/1998    Tdap 03/18/2014, 10/19/2020    Zostavax 03/15/2009     Social History:   Social History     Social History Narrative    Not on file     Social History     Socioeconomic History    Marital status:     Number of children: 2    Years of education: COLLEGE GRADUATE   Tobacco Use    Smoking status: Former     Packs/day: 0.50     Years: 55.00     Pack years: 27.50     Types: Cigarettes     Quit date: 5/1/2008     Years since quitting: 15.4    Smokeless tobacco: Never    Tobacco comments:     QUIT 10 YRS   Vaping Use    Vaping Use: Never used   Substance and Sexual Activity    Alcohol use: Not  "Currently     Comment: SOCIALLY    Drug use: No    Sexual activity: Defer       Family History:  Family History   Problem Relation Age of Onset    Breast cancer Mother     Heart disease Father     Hypertension Father     Heart attack Father     Hypertension Sister         Review of Systems  See history of present illness and past medical history.  Patient denies headache, dizziness, syncope, trauma, change in vision, change in hearing, change in taste, changes in weight, changes in appetite, focal weakness, numbness, or paresthesia.  Patient denies chest pain, palpitations, dyspnea, orthopnea, PND, cough, sinus pressure, rhinorrhea, epistaxis, hemoptysis, nausea, vomiting,hematemesis, diarrhea, constipation or hematochezia.  Denies cold or heat intolerance, polydipsia, polyuria, polyphagia. Denies hematuria, pyuria, dysuria, hesitancy, frequency or urgency. Denies consumption of raw and under cooked meats foods or change in water source.  Denies fever, chills, sweats, night sweats.  Denies missing any routine medications.      Objective:  T Max 24 hrs: Temp (24hrs), Av.8 °F (36.6 °C), Min:97 °F (36.1 °C), Max:98.6 °F (37 °C)    Vitals Ranges:   Temp:  [97 °F (36.1 °C)-98.6 °F (37 °C)] 98.6 °F (37 °C)  Heart Rate:  [58-73] 58  Resp:  [16] 16  BP: ()/(64-81) 142/70      Exam:  /70 (BP Location: Left arm, Patient Position: Lying)   Pulse 58   Temp 98.6 °F (37 °C) (Oral)   Resp 16   Ht 167.6 cm (66\")   Wt 76.2 kg (168 lb 1.6 oz)   SpO2 99%   BMI 27.13 kg/m²     General Appearance:    Alert, cooperative, no distress, appears stated age   Head:    Normocephalic, without obvious abnormality, atraumatic   Eyes:    PERRL, conjunctivae/corneas clear, EOM's intact, both eyes   Ears:    Normal external ear canals, both ears   Nose:   Nares normal, septum midline, mucosa normal, no drainage    or sinus tenderness   Throat:   Lips, mucosa, and tongue normal   Neck:   Supple, symmetrical, trachea midline, no " adenopathy;     thyroid:  no enlargement/tenderness/nodules; no carotid    bruit or JVD   Back:     Symmetric, no curvature, ROM normal, no CVA tenderness   Lungs:     Decreased breath sounds bilaterally, respirations unlabored   Chest Wall:    No tenderness or deformity    Heart:    Regular rate and rhythm, S1 and S2 normal, no murmur, rub   or gallop   Abdomen:     Soft, nontender, bowel sounds active all four quadrants,     no masses, no hepatomegaly, no splenomegaly   Extremities:   Extremities normal, atraumatic, no cyanosis or edema                       .    Data Review:  Labs in chart were reviewed.  WBC   Date Value Ref Range Status   10/06/2023 9.84 3.40 - 10.80 10*3/mm3 Final     Hemoglobin   Date Value Ref Range Status   10/06/2023 12.9 (L) 13.0 - 17.7 g/dL Final     Hematocrit   Date Value Ref Range Status   10/06/2023 38.2 37.5 - 51.0 % Final     Platelets   Date Value Ref Range Status   10/06/2023 348 140 - 450 10*3/mm3 Final     Sodium   Date Value Ref Range Status   10/06/2023 141 136 - 145 mmol/L Final     Potassium   Date Value Ref Range Status   10/06/2023 4.3 3.5 - 5.2 mmol/L Final     Comment:     Slight hemolysis detected by analyzer. Results may be affected.     Chloride   Date Value Ref Range Status   10/06/2023 105 98 - 107 mmol/L Final     CO2   Date Value Ref Range Status   10/06/2023 24.6 22.0 - 29.0 mmol/L Final     BUN   Date Value Ref Range Status   10/06/2023 17 8 - 23 mg/dL Final     Creatinine   Date Value Ref Range Status   10/06/2023 1.23 0.76 - 1.27 mg/dL Final     Glucose   Date Value Ref Range Status   10/06/2023 99 65 - 99 mg/dL Final     Calcium   Date Value Ref Range Status   10/06/2023 9.5 8.6 - 10.5 mg/dL Final                Imaging Results (All)       Procedure Component Value Units Date/Time    CT Head Without Contrast [[id number]] Collected: 10/06/23 1656     Updated: 10/06/23 1701    Narrative:      EMERGENCY CT SCAN OF THE HEAD AND CERVICAL SPINE WITHOUT CONTRAST  ON  10/06/2023     CLINICAL HISTORY: Head injury     HEAD CT TECHNIQUE: Spiral CT images were obtained from the base of the  skull to the vertex without intravenous contrast. The images were  reformatted and submitted in 3 mm thick axial, sagittal and coronal CT  sections with brain algorithm and 2 mm thick axial CT sections with  high-resolution bone algorithm.     COMPARISON: This is correlated to a prior head CT from [facility] on 09/08/2023.      FINDINGS: There are patchy and confluent areas of low-density in the  periventricular and throughout the subcortical white matter of the  cerebral hemispheres consistent with moderate to severe small vessel  disease. The remainder of the brain parenchyma is normal in attenuation.  There is diffuse cerebral atrophy. The lateral and third ventricles are  prominent in size probably on the basis of central volume loss or  atrophy. I see no focal mass effect. There is no midline shift. No  extra-axial fluid collections are identified. There is no evidence of  acute intracranial hemorrhage. No acute skull fracture is identified.  The calvarium and the skull base are normal in appearance. The paranasal  sinuses and the mastoid air cells and middle ear cavities are clear.        Impression:      1. There has been no change when compared to recent head CT on  09/08/2023 with no acute intracranial abnormality identified     2. There is moderate to severe small vessel disease in cerebral white  matter and there is diffuse cerebral atrophy. The lateral and third  ventricles are large in size. This probably on the basis of central  volume loss or atrophy rather than NPH of course correlate clinically.  The remainder of the head CT is normal, specifically no acute skull  fracture or intracranial hemorrhage is identified     CERVICAL SPINE CT TECHNIQUE: Spiral CT images were obtained from the  skull base to the T2 thoracic level and the images were reformatted  and  submitted in 2 mm thick axial and sagittal CT sections with soft tissue  algorithm and 1 mm thick axial, sagittal and coronal CT sections with  high-resolution bone algorithm     There are no prior cervical spine CTs for comparison.     FINDINGS: The craniocervical junction is normal in appearance.     At C1-2 there are some arthritic changes at atlantodental interval with  mild marginal spurring of the anterior ring of C1 and the odontoid  marginal spurs off the odontoid abut the inferior margin of the clivus.  Otherwise the C1-2 level is normal in appearance     At C2-3 there is mild-moderate left and there is moderate right facet  overgrowth. There is disc space narrowing and there are degenerative  endplate changes, posterior spurs abut the ventral cord mildly narrowing  the canal. There is uncovertebral joint hypertrophy.  There is mild left  and mild-moderate right bony foraminal narrowing     At C3-4 there is disc space narrowing and there is some partial bony  fusion across the endplates. There is mild-moderate bilateral facet  overgrowth, bony fusion across the facet joints and there is some  uncovertebral joint hypertrophy, posterior endplate spurring left  posterolateral mildly narrowing the left side of the canal. Facet and  uncovertebral joint spurs result in mild right and there is moderate  left bony foraminal narrowing.     At C4-5 there is some disc space narrowing, degenerative endplate  changes.  There is mild posterior spurring that abuts the ventral cord  mildly narrowing the left side of the canal. There is moderate left and  mild right facet overgrowth and some uncovertebral joint hypertrophy and  there is moderate right, moderate to severe left bony foraminal  narrowing.     At C5-6 there is flowing ossification anterior to the C5 and C6  vertebrae, anterior marginal spurs are only partially fused across the  anterior endplates. There is mild right and mild-moderate left  facet  overgrowth and there is moderate bilateral foraminal narrowing and mild  central canal narrowing at C5-6.     At C6-7 there is flowing ossification anterior to the vertebrae and disc  space serving the to ankylose the vertebrae anteriorly.  Disc space  narrowing and degenerative endplate changes, posterior spurs abut and  deform the ventral surface of the cord mild to moderately narrowing the  canal. There is bilateral uncovertebral joint hypertrophy and there is  moderate to severe right and there is severe left bony foraminal  narrowing.     At C7-T1 there is flowing ossification anterior to the vertebrae,  anterior marginal bridging osteophytes. There is mild-moderate right and  minimal left facet overgrowth 1 to 2 mm anterolisthesis of C7 on T1  there is no canal or foraminal narrowing     No acute fractures seen in the cervical spine.     IMPRESSION:  1. No acute fracture seen in the cervical spine. There is cervical  spondylosis as described.      Radiation dose reduction techniques were utilized, including automated  exposure control and exposure modulation based on body size.           This report was finalized on 10/6/2023 4:58 PM by Dr. Joey Dorsey M.D  on Workstation: [id number]       CT Cervical Spine Without Contrast [[id number]] Collected: 10/06/23 1656     Updated: 10/06/23 1701    Narrative:      EMERGENCY CT SCAN OF THE HEAD AND CERVICAL SPINE WITHOUT CONTRAST ON  10/06/2023     CLINICAL HISTORY: Head injury     HEAD CT TECHNIQUE: Spiral CT images were obtained from the base of the  skull to the vertex without intravenous contrast. The images were  reformatted and submitted in 3 mm thick axial, sagittal and coronal CT  sections with brain algorithm and 2 mm thick axial CT sections with  high-resolution bone algorithm.     COMPARISON: This is correlated to a prior head CT from [facility] on 09/08/2023.      FINDINGS: There are patchy and confluent areas of low-density in  the  periventricular and throughout the subcortical white matter of the  cerebral hemispheres consistent with moderate to severe small vessel  disease. The remainder of the brain parenchyma is normal in attenuation.  There is diffuse cerebral atrophy. The lateral and third ventricles are  prominent in size probably on the basis of central volume loss or  atrophy. I see no focal mass effect. There is no midline shift. No  extra-axial fluid collections are identified. There is no evidence of  acute intracranial hemorrhage. No acute skull fracture is identified.  The calvarium and the skull base are normal in appearance. The paranasal  sinuses and the mastoid air cells and middle ear cavities are clear.        Impression:      1. There has been no change when compared to recent head CT on  09/08/2023 with no acute intracranial abnormality identified     2. There is moderate to severe small vessel disease in cerebral white  matter and there is diffuse cerebral atrophy. The lateral and third  ventricles are large in size. This probably on the basis of central  volume loss or atrophy rather than NPH of course correlate clinically.  The remainder of the head CT is normal, specifically no acute skull  fracture or intracranial hemorrhage is identified     CERVICAL SPINE CT TECHNIQUE: Spiral CT images were obtained from the  skull base to the T2 thoracic level and the images were reformatted and  submitted in 2 mm thick axial and sagittal CT sections with soft tissue  algorithm and 1 mm thick axial, sagittal and coronal CT sections with  high-resolution bone algorithm     There are no prior cervical spine CTs for comparison.     FINDINGS: The craniocervical junction is normal in appearance.     At C1-2 there are some arthritic changes at atlantodental interval with  mild marginal spurring of the anterior ring of C1 and the odontoid  marginal spurs off the odontoid abut the inferior margin of the clivus.  Otherwise the C1-2  level is normal in appearance     At C2-3 there is mild-moderate left and there is moderate right facet  overgrowth. There is disc space narrowing and there are degenerative  endplate changes, posterior spurs abut the ventral cord mildly narrowing  the canal. There is uncovertebral joint hypertrophy.  There is mild left  and mild-moderate right bony foraminal narrowing     At C3-4 there is disc space narrowing and there is some partial bony  fusion across the endplates. There is mild-moderate bilateral facet  overgrowth, bony fusion across the facet joints and there is some  uncovertebral joint hypertrophy, posterior endplate spurring left  posterolateral mildly narrowing the left side of the canal. Facet and  uncovertebral joint spurs result in mild right and there is moderate  left bony foraminal narrowing.     At C4-5 there is some disc space narrowing, degenerative endplate  changes.  There is mild posterior spurring that abuts the ventral cord  mildly narrowing the left side of the canal. There is moderate left and  mild right facet overgrowth and some uncovertebral joint hypertrophy and  there is moderate right, moderate to severe left bony foraminal  narrowing.     At C5-6 there is flowing ossification anterior to the C5 and C6  vertebrae, anterior marginal spurs are only partially fused across the  anterior endplates. There is mild right and mild-moderate left facet  overgrowth and there is moderate bilateral foraminal narrowing and mild  central canal narrowing at C5-6.     At C6-7 there is flowing ossification anterior to the vertebrae and disc  space serving the to ankylose the vertebrae anteriorly.  Disc space  narrowing and degenerative endplate changes, posterior spurs abut and  deform the ventral surface of the cord mild to moderately narrowing the  canal. There is bilateral uncovertebral joint hypertrophy and there is  moderate to severe right and there is severe left bony foraminal  narrowing.      At C7-T1 there is flowing ossification anterior to the vertebrae,  anterior marginal bridging osteophytes. There is mild-moderate right and  minimal left facet overgrowth 1 to 2 mm anterolisthesis of C7 on T1  there is no canal or foraminal narrowing     No acute fractures seen in the cervical spine.     IMPRESSION:  1. No acute fracture seen in the cervical spine. There is cervical  spondylosis as described.      Radiation dose reduction techniques were utilized, including automated  exposure control and exposure modulation based on body size.           This report was finalized on 10/6/2023 4:58 PM by Dr. Joey Dorsey M.D  on Workstation: [id number]                 Assessment:  Active Hospital Problems    Diagnosis  POA    **Orthostatic hypotension [I95.1]  Yes      Resolved Hospital Problems   No resolved problems to display.   Melena  A fib  Fall  anemia    Plan:  Will trend hgb  Ask gi to see him  Hold eliquis  Monitor on telemetry  D/w patient    Teena Crisostomo MD  10/6/2023  22:06 EDT

## 2023-10-07 NOTE — PLAN OF CARE
Goal Outcome Evaluation:  Plan of Care Reviewed With: patient          Outcome Evaluation: Pt admitted 10/6/2023 for orthostatic hypotension. Pt denies pain. Vital stable. Pt refused to take night time med incluing Sodium chlorid 150ml/hr continues. Pt refused lab work. Edcation made .Pt A & O X 3 . Pt disoriented situation  . Reports no pain No chest pain. No Shortness of breath . Safety maintained.    Pt refused skin check , med, lab and Sodium chloride 100 ml/hr continue notified to A

## 2023-10-07 NOTE — NURSING NOTE
Pt admitted 10/6/2023 for orthostatic hypotension. Pt denies pain. Vital stable. Pt refused to take night time med incluing Sodium chlorid 150ml/hr continues. Pt refused lab work. Edcation made .

## 2023-10-09 NOTE — CASE MANAGEMENT/SOCIAL WORK
Case Management Discharge Note      Final Note: Patient discharged home, no needs.         Selected Continued Care - Discharged on 10/7/2023 Admission date: 10/6/2023 - Discharge disposition: Skilled Nursing Facility (DC - External)      Destination    No services have been selected for the patient.                Durable Medical Equipment    No services have been selected for the patient.                Dialysis/Infusion    No services have been selected for the patient.                Home Medical Care    No services have been selected for the patient.                Therapy    No services have been selected for the patient.                Community Resources    No services have been selected for the patient.                Community & DME    No services have been selected for the patient.                    Selected Continued Care - Prior Encounters Includes continued care and service providers with selected services from prior encounters from 7/8/2023 to 10/7/2023      Discharged on 9/21/2023 Admission date: 9/9/2023 - Discharge disposition: Rehab Facility or Unit (DC - External)      Destination       Service Provider Selected Services Address Phone Fax Patient Preferred    [facility] [address] [place], [place] [id number] [phone] --                      Discharged on 7/17/2023 Admission date: 7/13/2023 - Discharge disposition: Home-Health Care [facility] [place]      Home Medical Care       Service Provider Selected Services Address Phone Fax Patient Preferred    [facility] [address] [address] [phone] --                               Final Discharge Disposition Code: 01 - home or self-care

## 2023-11-21 NOTE — PROGRESS NOTES
Subjective   Patient ID: Jonathan Trejo is a 84 y.o. male is being seen for consultation today at the request of SAMI Anton for back, right hip and right leg pain with tingling in his feet. He was previously seen by arnoldo 11/28/2016 for neck pain. He has done physical therapy and had MDP. He ahs also seen Dr. Vences recently for bilateral hip pain.    History of Present Illness     This patient has been having fairly severe pain in his back with radiation into his right hip and the anterior aspect of his right thigh.  The pain is sharp and stabbing and quite severe.  He has no difficulty with bowel or bladder control or other associated symptoms.  He does not have any trouble on the left side at all.  He has been treated with some stretching exercises but no other treatment so far.    The following portions of the patient's history were reviewed and updated as appropriate: allergies, current medications, past family history, past medical history, past social history, past surgical history and problem list.    Review of Systems   Constitutional: Positive for activity change.   Respiratory: Negative for chest tightness and shortness of breath.    Cardiovascular: Negative for chest pain.   Musculoskeletal: Positive for arthralgias (Hip pain), back pain and myalgias.        Right leg pain   Neurological: Positive for numbness. Negative for weakness.        Positive for tingling   All other systems reviewed and are negative.      Objective   Physical Exam   Constitutional: He appears well-developed and well-nourished.   HENT:   Head: Normocephalic and atraumatic.   Eyes: Conjunctivae are normal. Pupils are equal, round, and reactive to light.   Fundoscopic exam:       The right eye shows no papilledema. The right eye shows venous pulsations.        The left eye shows no papilledema. The left eye shows venous pulsations.   Neck: Carotid bruit is not present.     Neurologic Exam     Cranial Nerves     CN III, IV, VI    Pupils are equal, round, and reactive to light.      Assessment/Plan   Independent Review of Radiographic Studies:        Reviewed an MRI of the lumbar spine done on 4 September of this year.  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  Unfortunately the radiologist read it is a right-sided disc herniation but I really do not appreciate this.  The other levels mostly look okay.    Medical Decision Making:      I told the patient about the MRI.  I told him I thought we should do a lumbar myelogram to better discern whether he has a pinched nerve or not.  I told the patient what a myelogram involves.  I explained that there is a 50% chance of developing a bad headache and nausea as a result of the test.  I explained that there is also a very small chance of infection, seizures, and bleeding.  I explained how we would treat a post myelogram headache including bedrest, caffeinated fluids, steroids, and blood patch.  The patient does ask to think about it and will call if he wishes to proceed.    Jonathan was seen today for back pain and leg pain.    Diagnoses and all orders for this visit:    Lumbar radiculopathy      Return for After radiology test.                  normal
